# Patient Record
Sex: FEMALE | Race: WHITE | NOT HISPANIC OR LATINO | Employment: UNEMPLOYED | URBAN - NONMETROPOLITAN AREA
[De-identification: names, ages, dates, MRNs, and addresses within clinical notes are randomized per-mention and may not be internally consistent; named-entity substitution may affect disease eponyms.]

---

## 2017-01-31 ENCOUNTER — OFFICE VISIT (OUTPATIENT)
Dept: MEDICAL GROUP | Facility: PHYSICIAN GROUP | Age: 27
End: 2017-01-31
Payer: COMMERCIAL

## 2017-01-31 VITALS
RESPIRATION RATE: 16 BRPM | OXYGEN SATURATION: 98 % | SYSTOLIC BLOOD PRESSURE: 134 MMHG | HEIGHT: 70 IN | WEIGHT: 293 LBS | TEMPERATURE: 98.7 F | DIASTOLIC BLOOD PRESSURE: 82 MMHG | HEART RATE: 74 BPM | BODY MASS INDEX: 41.95 KG/M2

## 2017-01-31 DIAGNOSIS — G93.2 PSEUDOTUMOR CEREBRI: ICD-10-CM

## 2017-01-31 DIAGNOSIS — I10 ESSENTIAL HYPERTENSION: ICD-10-CM

## 2017-01-31 DIAGNOSIS — R53.82 CHRONIC FATIGUE: ICD-10-CM

## 2017-01-31 DIAGNOSIS — E66.01 MORBID OBESITY WITH BMI OF 40.0-44.9, ADULT (HCC): ICD-10-CM

## 2017-01-31 PROCEDURE — 99214 OFFICE O/P EST MOD 30 MIN: CPT | Performed by: INTERNAL MEDICINE

## 2017-01-31 RX ORDER — AMLODIPINE BESYLATE 5 MG/1
5 TABLET ORAL DAILY
Qty: 30 TAB | Refills: 11 | Status: SHIPPED | OUTPATIENT
Start: 2017-01-31 | End: 2017-09-11

## 2017-01-31 ASSESSMENT — PATIENT HEALTH QUESTIONNAIRE - PHQ9: CLINICAL INTERPRETATION OF PHQ2 SCORE: 0

## 2017-01-31 NOTE — MR AVS SNAPSHOT
"Nickolas Galdamez   2017 10:00 AM   Office Visit   MRN: [id number]    Department:  [facility]   Dept Phone:  [phone]    Description:  Female : 1990   Provider:  Cecilia Garcia M.D.           Reason for Visit     Hypertension discuss elevated BP, weight      Allergies as of 2017     No Known Allergies      You were diagnosed with     Essential hypertension   [0539038]       Pseudotumor cerebri   [599684]       Morbid obesity with BMI of 40.0-44.9, adult (CMS-HCC)   [524019]       Chronic fatigue   [509735]         Vital Signs     Blood Pressure Pulse Temperature Respirations Height Weight    134/82 mmHg 74 37.1 °C (98.7 °F) 16 1.778 m (5' 10\") 137.44 kg (303 lb)    Body Mass Index Oxygen Saturation Last Menstrual Period Smoking Status          43.48 kg/m2 98% 2012 Former Smoker        Basic Information     Date Of Birth Sex Race Ethnicity Preferred Language    1990 Female White Non- English      Problem List              ICD-10-CM Priority Class Noted - Resolved    Pseudotumor cerebri G93.2   10/15/2012 - Present    History of migraine headaches Z86.69   10/15/2012 - Present    Seizure disorder (CMS-HCC) G40.909   10/22/2012 - Present    Obesity E66.9   2013 - Present    Essential hypertension I10   2017 - Present    Morbid obesity with BMI of 40.0-44.9, adult ([facility]) E66.01, Z68.41   2017 - Present      Health Maintenance        Date Due Completion Dates    IMM HEP B VACCINE (1 of 3 - Primary Series) 1990 ---    IMM HEP A VACCINE (1 of 2 - Standard Series) 3/2/1992 ---    IMM HPV VACCINE (1 of 3 - Female 3 Dose Series) 3/2/2002 ---    IMM VARICELLA (CHICKENPOX) VACCINE (1 of 2 - 2 Dose Adolescent Series) 3/2/2004 ---    IMM DTaP/Tdap/Td Vaccine (1 - Tdap) 3/2/2010 ---    IMM INFLUENZA (1) 2016    PAP SMEAR 2017, 10/15/2012            Current Immunizations     Influenza TIV (IM) 2013 11:30 AM      Below and/or " attached are the medications your provider expects you to take. Review all of your home medications and newly ordered medications with your provider and/or pharmacist. Follow medication instructions as directed by your provider and/or pharmacist. Please keep your medication list with you and share with your provider. Update the information when medications are discontinued, doses are changed, or new medications (including over-the-counter products) are added; and carry medication information at all times in the event of emergency situations     Allergies:  No Known Allergies          Medications  Valid as of: January 31, 2017 - 10:22 AM    Generic Name Brand Name Tablet Size Instructions for use    AmLODIPine Besylate (Tab) NORVASC 5 MG Take 1 Tab by mouth every day.        Amoxicillin (Tab) AMOXIL 875 MG Take 1 Tab by mouth 2 times a day.        Amoxicillin (Cap) AMOXIL 500 MG Take 1 Cap by mouth 3 times a day.        Azithromycin (Tab) ZITHROMAX 250 MG Take 2 tablets PO on day one, then 1 tablet PO on day two to five.        Erythromycin (Ointment) erythromycin 5 MG/GM Place 0.5 Inches in right eye 2 times a day.        Fluticasone Propionate (Suspension) FLONASE 50 MCG/ACT Spray 1 Spray in nose every day. Each Nostril        Fluticasone Propionate (Suspension) FLONASE 50 MCG/ACT Spray 2 Sprays in nose every day.        maalox plus-benadryl-xylocaine (MBX) (Suspension) MBX  Take 5 mL by mouth every 6 hours as needed.        Topiramate (CAPSULE SPRINKLE) TOPAMAX 25 MG Take 1 Cap by mouth 2 times a day.        TraMADol HCl (Tab) ULTRAM 50 MG Take 1-2 Tabs by mouth every 6 hours as needed for Moderate Pain or Severe Pain.        TraMADol HCl (Tab) ULTRAM 50 MG Take 1-2 Tabs by mouth every 6 hours as needed.        .                 Medicines prescribed today were sent to:     [facility] - [address]    [address] [address]    Phone: [phone] Fax:  [phone]    Open 24 Hours?: No      Medication refill instructions:       If your prescription bottle indicates you have medication refills left, it is not necessary to call your provider’s office. Please contact your pharmacy and they will refill your medication.    If your prescription bottle indicates you do not have any refills left, you may request refills at any time through one of the following ways: The online GreenButton system (except Urgent Care), by calling your provider’s office, or by asking your pharmacy to contact your provider’s office with a refill request. Medication refills are processed only during regular business hours and may not be available until the next business day. Your provider may request additional information or to have a follow-up visit with you prior to refilling your medication.   *Please Note: Medication refills are assigned a new Rx number when refilled electronically. Your pharmacy may indicate that no refills were authorized even though a new prescription for the same medication is available at the pharmacy. Please request the medicine by name with the pharmacy before contacting your provider for a refill.        Your To Do List     Future Labs/Procedures Complete By Expires    COMP METABOLIC PANEL  As directed 1/31/2018    FREE THYROXINE  As directed 1/31/2018    LIPID PROFILE  As directed 1/31/2018    TESTOSTERONE SERUM  As directed 1/31/2018    TSH  As directed 1/31/2018      Referral     A referral request has been sent to our patient care coordination department. Please allow 3-5 business days for us to process this request and contact you either by phone or mail. If you do not hear from us by the 5th business day, please call us at [phone].        Instructions    1. Start amlodipine 5mg once per day.    2. Referral to Neurology.    3. Referral to Bariatric Surgery.     4. Follow up in 1 month with fasting labs.           GreenButton Access Code:  [email or web address]  Expires: 3/2/2017 10:22 AM    Seanodes  A secure, online tool to manage your health information     [email or web address]’s Seanodes® is a secure, online tool that connects you to your personalized health information from the privacy of your home -- day or night - making it very easy for you to manage your healthcare. Once the activation process is completed, you can even access your medical information using the Seanodes nevaeh, which is available for free in the Apple Nevaeh store or Google Play store.     Seanodes provides the following levels of access (as shown below):   My Chart Features   [facility] Primary Care Doctor [facility]  Specialists [facility]  Urgent  Care Non-[facility]  Primary Care  Doctor   Email your healthcare team securely and privately 24/7 X X X    Manage appointments: schedule your next appointment; view details of past/upcoming appointments X      Request prescription refills. X      View recent personal medical records, including lab and immunizations X X X X   View health record, including health history, allergies, medications X X X X   Read reports about your outpatient visits, procedures, consult and ER notes X X X X   See your discharge summary, which is a recap of your hospital and/or ER visit that includes your diagnosis, lab results, and care plan. X X       How to register for Seanodes:  1. Go to  [email or web address].  2. Click on the Sign Up Now box, which takes you to the New Member Sign Up page. You will need to provide the following information:  a. Enter your Seanodes Access Code exactly as it appears at the top of this page. (You will not need to use this code after you’ve completed the sign-up process. If you do not sign up before the expiration date, you must request a new code.)   b. Enter your date of birth.   c. Enter your home email address.   d. Click Submit, and follow the next screen’s instructions.  3. Create a Seanodes ID. This will be your Seanodes login ID and cannot be  changed, so think of one that is secure and easy to remember.  4. Create a GroupTalent password. You can change your password at any time.  5. Enter your Password Reset Question and Answer. This can be used at a later time if you forget your password.   6. Enter your e-mail address. This allows you to receive e-mail notifications when new information is available in GroupTalent.  7. Click Sign Up. You can now view your health information.    For assistance activating your GroupTalent account, call [phone]

## 2017-01-31 NOTE — PATIENT INSTRUCTIONS
1. Start amlodipine 5mg once per day.    2. Referral to Neurology.    3. Referral to Bariatric Surgery.     4. Follow up in 1 month with fasting labs.

## 2017-01-31 NOTE — PROGRESS NOTES
Chief Complaint   Patient presents with   • Hypertension     discuss elevated BP, weight       HISTORY OF PRESENT ILLNESS: Patient is a 26 y.o. female established patient who presents today to be seen for acute and chronic issues.    Essential hypertension  Patient is a 26-year-old female who comes in today for follow-up. I have not seen her in over 2 years. She tells me today that she's had 2 ER visits in the past few months for very high blood pressure. Both days she had bad headaches and on one episode she passed out. Patient notes that her blood pressures were 160 and 180 systolic when she was seen in the ER. She was given medications bring them down and recommended to follow-up with me but has not done so until now. Patient does have a history of pseudotumor cerebri. We discussed today starting her on amlodipine given these high blood pressures. She will begin 5 mg a day.    Pseudotumor cerebri  Patient has a long history of pseudotumor cerebri in the past was on Topamax and acetazolamide. She was seeing Dr. Maharaj. At her last visit with , he told her she no longer had the disease and didn't need to worry about it anymore. Based on these elevated pressures, I would like her evaluated by neurology due to this unusual history. Patient would prefer to see a different neurologist.    Morbid obesity with BMI of 40.0-44.9, adult ([facility])  Patient has morbid obesity and has struggled with weight gain for several years. She has tried exercise and diet control with no effect. She tried phentermine and did not lose weight on the medication. We discussed today referring her to bariatric surgery.      Patient Active Problem List    Diagnosis Date Noted   • Essential hypertension 01/31/2017   • Morbid obesity with BMI of 40.0-44.9, adult ([facility]) 01/31/2017   • Obesity 04/14/2013   • Seizure disorder (CMS-[facility]) 10/22/2012   • Pseudotumor cerebri 10/15/2012   • History of migraine headaches 10/15/2012       Allergies:Review  of patient's allergies indicates no known allergies.    Current Outpatient Prescriptions Ordered in [facility]   Medication Sig Dispense Refill   • amlodipine (NORVASC) 5 MG Tab Take 1 Tab by mouth every day. 30 Tab 11   • tramadol (ULTRAM) 50 MG Tab Take 1-2 Tabs by mouth every 6 hours as needed. 30 Tab 0   • amoxicillin (AMOXIL) 500 MG Cap Take 1 Cap by mouth 3 times a day. 30 Cap 0   • tramadol (ULTRAM) 50 MG Tab Take 1-2 Tabs by mouth every 6 hours as needed for Moderate Pain or Severe Pain. 6 Tab 0   • azithromycin (ZITHROMAX) 250 MG Tab Take 2 tablets PO on day one, then 1 tablet PO on day two to five. 6 Tab 0   • fluticasone (FLONASE) 50 MCG/ACT nasal spray Spray 2 Sprays in nose every day. 1 Bottle 0   • erythromycin 5 MG/GM Ointment Place 0.5 Inches in right eye 2 times a day. 1 Tube 1   • amoxicillin (AMOXIL) 875 MG tablet Take 1 Tab by mouth 2 times a day. 20 Tab 0   • maalox plus-benadryl-xylocaine (MBX) Take 5 mL by mouth every 6 hours as needed. 90 mL 0   • fluticasone (FLONASE) 50 MCG/ACT nasal spray Spray 1 Spray in nose every day. Each Nostril 1 Bottle 0   • topiramate (TOPAMAX) 25 MG capsule Take 1 Cap by mouth 2 times a day. 60 Cap 0     No current Epic-ordered facility-administered medications on file.       Past Medical History   Diagnosis Date   • Pseudotumor cerebri    • Seizure disorder (CMS-HCC)    • Migraine        Social History   Substance Use Topics   • Smoking status: Former Smoker     Quit date: 05/03/2012   • Smokeless tobacco: Never Used      Comment: 1/2 pack a day   • Alcohol Use: No       Family Status   Relation Status Death Age   • Mother Alive    • Father Alive    • Sister Alive    • Brother Alive    • Sister Alive    • Brother Alive    • Sister Alive    • Sister Alive    • Brother Alive      Family History   Problem Relation Age of Onset   • Cancer Mother      Cervical       ROS:  Review of Systems   Constitutional: Negative for fever and malaise/fatigue.   HENT: Negative for  "congestion  Respiratory: Negative for cough  Cardiovascular: Negative for chest pain  Gastrointestinal: Negative for nausea, vomiting and abdominal pain.  Neuro: positive for headaches  All other systems reviewed and are negative except as in HPI.      Exam:  Blood pressure 134/82, pulse 74, temperature 37.1 °C (98.7 °F), resp. rate 16, height 1.778 m (5' 10\"), weight 137.44 kg (303 lb), last menstrual period 03/03/2012, SpO2 98 %.  General:  Morbidly obese female in NAD  Head is grossly normal.  Neck: Supple without JVD   Pulmonary: Clear to ausculation and percussion.  Normal effort. No rales, ronchi, or wheezing.  Cardiovascular: Regular rate and rhythm without murmur. Carotid and radial pulses are intact and equal bilaterally.  Extremities: no clubbing, cyanosis, or edema.  Neuro: CN 2-12 grossly intact, strength 5/5 in upper and lower extremities bilaterally, sensation intact to light touch, gait intact        Assessment/Plan:  1. Essential hypertension  amlodipine (NORVASC) 5 MG Tab    REFERRAL TO BARIATRIC SURGERY    COMP METABOLIC PANEL    LIPID PROFILE    uncontrolled, starting medications   2. Pseudotumor cerebri  REFERRAL TO NEUROLOGY    uncontrolled, referred to Neurology   3. Morbid obesity with BMI of 40.0-44.9, adult (CMS-[facility])  REFERRAL TO BARIATRIC SURGERY    uncontrolled, referred to bariatric surgery   4. Chronic fatigue  TSH    FREE THYROXINE    TESTOSTERONE SERUM     Please note that this dictation was created using voice recognition software. I have made every reasonable attempt to correct obvious errors, but I expect that there are errors of grammar and possibly content that I did not discover before finalizing the note.         "

## 2017-01-31 NOTE — ASSESSMENT & PLAN NOTE
Patient is a 26-year-old female who comes in today for follow-up. I have not seen her in over 2 years. She tells me today that she's had 2 ER visits in the past few months for very high blood pressure. Both days she had bad headaches and on one episode she passed out. Patient notes that her blood pressures were 160 and 180 systolic when she was seen in the ER. She was given medications bring them down and recommended to follow-up with me but has not done so until now. Patient does have a history of pseudotumor cerebri. We discussed today starting her on amlodipine given these high blood pressures. She will begin 5 mg a day.

## 2017-01-31 NOTE — ASSESSMENT & PLAN NOTE
Patient has a long history of pseudotumor cerebri in the past was on Topamax and acetazolamide. She was seeing Dr. Maharaj. At her last visit with , he told her she no longer had the disease and didn't need to worry about it anymore. Based on these elevated pressures, I would like her evaluated by neurology due to this unusual history. Patient would prefer to see a different neurologist.

## 2017-01-31 NOTE — ASSESSMENT & PLAN NOTE
Patient has morbid obesity and has struggled with weight gain for several years. She has tried exercise and diet control with no effect. She tried phentermine and did not lose weight on the medication. We discussed today referring her to bariatric surgery.

## 2017-02-06 ENCOUNTER — HOSPITAL ENCOUNTER (OUTPATIENT)
Dept: LAB | Facility: MEDICAL CENTER | Age: 27
End: 2017-02-06
Attending: INTERNAL MEDICINE
Payer: COMMERCIAL

## 2017-02-06 DIAGNOSIS — R53.82 CHRONIC FATIGUE: ICD-10-CM

## 2017-02-06 DIAGNOSIS — I10 ESSENTIAL HYPERTENSION: ICD-10-CM

## 2017-02-06 LAB
ALBUMIN SERPL BCP-MCNC: 4.4 G/DL (ref 3.2–4.9)
ALBUMIN/GLOB SERPL: 1.5 G/DL
ALP SERPL-CCNC: 71 U/L (ref 30–99)
ALT SERPL-CCNC: 20 U/L (ref 2–50)
ANION GAP SERPL CALC-SCNC: 9 MMOL/L (ref 0–11.9)
AST SERPL-CCNC: 19 U/L (ref 12–45)
BILIRUB SERPL-MCNC: 0.4 MG/DL (ref 0.1–1.5)
BUN SERPL-MCNC: 9 MG/DL (ref 8–22)
CALCIUM SERPL-MCNC: 9.4 MG/DL (ref 8.5–10.5)
CHLORIDE SERPL-SCNC: 106 MMOL/L (ref 96–112)
CHOLEST SERPL-MCNC: 174 MG/DL (ref 100–199)
CO2 SERPL-SCNC: 24 MMOL/L (ref 20–33)
CREAT SERPL-MCNC: 0.87 MG/DL (ref 0.5–1.4)
GLOBULIN SER CALC-MCNC: 2.9 G/DL (ref 1.9–3.5)
GLUCOSE SERPL-MCNC: 80 MG/DL (ref 65–99)
HDLC SERPL-MCNC: 34 MG/DL
LDLC SERPL CALC-MCNC: 105 MG/DL
POTASSIUM SERPL-SCNC: 4 MMOL/L (ref 3.6–5.5)
PROT SERPL-MCNC: 7.3 G/DL (ref 6–8.2)
SODIUM SERPL-SCNC: 139 MMOL/L (ref 135–145)
T4 FREE SERPL-MCNC: 0.89 NG/DL (ref 0.53–1.43)
TESTOST SERPL-MCNC: 92 NG/DL (ref 9–75)
TRIGL SERPL-MCNC: 176 MG/DL (ref 0–149)
TSH SERPL DL<=0.005 MIU/L-ACNC: 4.8 UIU/ML (ref 0.3–3.7)

## 2017-02-06 PROCEDURE — 84439 ASSAY OF FREE THYROXINE: CPT

## 2017-02-06 PROCEDURE — 80053 COMPREHEN METABOLIC PANEL: CPT

## 2017-02-06 PROCEDURE — 84443 ASSAY THYROID STIM HORMONE: CPT

## 2017-02-06 PROCEDURE — 84403 ASSAY OF TOTAL TESTOSTERONE: CPT

## 2017-02-06 PROCEDURE — 80061 LIPID PANEL: CPT

## 2017-02-06 PROCEDURE — 36415 COLL VENOUS BLD VENIPUNCTURE: CPT

## 2017-02-20 ENCOUNTER — APPOINTMENT (OUTPATIENT)
Dept: RADIOLOGY | Facility: IMAGING CENTER | Age: 27
End: 2017-02-20
Attending: PHYSICIAN ASSISTANT
Payer: COMMERCIAL

## 2017-02-20 ENCOUNTER — OFFICE VISIT (OUTPATIENT)
Dept: URGENT CARE | Facility: PHYSICIAN GROUP | Age: 27
End: 2017-02-20
Payer: COMMERCIAL

## 2017-02-20 VITALS
BODY MASS INDEX: 41.95 KG/M2 | DIASTOLIC BLOOD PRESSURE: 80 MMHG | SYSTOLIC BLOOD PRESSURE: 126 MMHG | OXYGEN SATURATION: 95 % | WEIGHT: 293 LBS | HEART RATE: 84 BPM | TEMPERATURE: 97.3 F | HEIGHT: 70 IN | RESPIRATION RATE: 14 BRPM

## 2017-02-20 DIAGNOSIS — M25.571 ACUTE RIGHT ANKLE PAIN: ICD-10-CM

## 2017-02-20 DIAGNOSIS — S93.401A SPRAIN OF RIGHT ANKLE, UNSPECIFIED LIGAMENT, INITIAL ENCOUNTER: ICD-10-CM

## 2017-02-20 PROCEDURE — 73610 X-RAY EXAM OF ANKLE: CPT | Mod: TC | Performed by: PHYSICIAN ASSISTANT

## 2017-02-20 PROCEDURE — 99214 OFFICE O/P EST MOD 30 MIN: CPT | Performed by: PHYSICIAN ASSISTANT

## 2017-02-20 RX ORDER — ACETAMINOPHEN AND CODEINE PHOSPHATE 300; 30 MG/1; MG/1
1 TABLET ORAL EVERY 6 HOURS PRN
Qty: 16 TAB | Refills: 0 | Status: SHIPPED | OUTPATIENT
Start: 2017-02-20 | End: 2017-02-24

## 2017-02-20 ASSESSMENT — ENCOUNTER SYMPTOMS
DIZZINESS: 0
EYE REDNESS: 0
EYE DISCHARGE: 0
WHEEZING: 0
TINGLING: 1
FEVER: 0
ABDOMINAL PAIN: 0
SORE THROAT: 0
COUGH: 0
FALLS: 0
CHILLS: 0
BACK PAIN: 0
LOSS OF MOTION: 1
NECK PAIN: 0
DIARRHEA: 0
HEADACHES: 0
INABILITY TO BEAR WEIGHT: 1
MYALGIAS: 0

## 2017-02-20 NOTE — MR AVS SNAPSHOT
"        Nickolas Galdamez   2017 6:00 PM   Office Visit   MRN: [id number]    Department:  [facility]   Dept Phone:  [phone]    Description:  Female : 1990   Provider:  Raffy Hernandez PA-C           Reason for Visit     Ankle Injury fell last night / twisted akle/ rolled and felt a pop      Allergies as of 2017     No Known Allergies      You were diagnosed with     Acute right ankle pain   [[id number]]       Sprain of right ankle, unspecified ligament, initial encounter   [[id number]]         Vital Signs     Blood Pressure Pulse Temperature Respirations Height Weight    126/80 mmHg 84 36.3 °C (97.3 °F) 14 1.778 m (5' 10\") 141.976 kg (313 lb)    Body Mass Index Oxygen Saturation Last Menstrual Period Smoking Status          44.91 kg/m2 95% 2012 Former Smoker        Basic Information     Date Of Birth Sex Race Ethnicity Preferred Language    1990 Female White Non- English      Your appointments     2017  8:40 AM   Established Patient with Cecilia Garcia M.D.   [facility] ([address] [address] [phone]           You will be receiving a confirmation call a few days before your appointment from our automated call confirmation system.            2017  8:00 AM   New Patient with Chel Sandra M.D.   [facility] (--)    [address] [place] [address], [address]  Formerly [facility] [address] [phone]           Please bring Photo ID, Insurance Cards, All Medication Bottles and copies of any legal documents (such as Living Will, Power of ) If speaking a language besides English please bring an adult . Please arrive 30 minutes prior for check in and registration. You will be receiving a confirmation call a few days before your appointment from our automated call confirmation system.              Problem List              ICD-10-CM Priority Class Noted - Resolved    Pseudotumor cerebri G93.2   " 10/15/2012 - Present    History of migraine headaches Z86.69   10/15/2012 - Present    Seizure disorder (CMS-[facility]) G40.909   10/22/2012 - Present    Obesity E66.9   4/14/2013 - Present    Essential hypertension I10   1/31/2017 - Present    Morbid obesity with BMI of 40.0-44.9, adult ([facility]) E66.01, Z68.41   1/31/2017 - Present      Health Maintenance        Date Due Completion Dates    IMM HEP B VACCINE (1 of 3 - Primary Series) 1990 ---    IMM HEP A VACCINE (1 of 2 - Standard Series) 3/2/1992 ---    IMM HPV VACCINE (1 of 3 - Female 3 Dose Series) 3/2/2002 ---    IMM VARICELLA (CHICKENPOX) VACCINE (1 of 2 - 2 Dose Adolescent Series) 3/2/2004 ---    IMM DTaP/Tdap/Td Vaccine (1 - Tdap) 3/2/2010 ---    IMM INFLUENZA (1) 9/1/2016 1/30/2013    PAP SMEAR 12/1/2017 12/1/2014, 10/15/2012            Current Immunizations     Influenza TIV (IM) 1/30/2013 11:30 AM      Below and/or attached are the medications your provider expects you to take. Review all of your home medications and newly ordered medications with your provider and/or pharmacist. Follow medication instructions as directed by your provider and/or pharmacist. Please keep your medication list with you and share with your provider. Update the information when medications are discontinued, doses are changed, or new medications (including over-the-counter products) are added; and carry medication information at all times in the event of emergency situations     Allergies:  No Known Allergies          Medications  Valid as of: February 20, 2017 -  6:45 PM    Generic Name Brand Name Tablet Size Instructions for use    Acetaminophen-Codeine (Tab) Acetaminophen-Codeine 300-30 MG Take 1 Tab by mouth every 6 hours as needed for up to 4 days.        AmLODIPine Besylate (Tab) NORVASC 5 MG Take 1 Tab by mouth every day.        Amoxicillin (Tab) AMOXIL 875 MG Take 1 Tab by mouth 2 times a day.        Amoxicillin (Cap) AMOXIL 500 MG Take 1 Cap by mouth 3 times a day.         Azithromycin (Tab) ZITHROMAX 250 MG Take 2 tablets PO on day one, then 1 tablet PO on day two to five.        Erythromycin (Ointment) erythromycin 5 MG/GM Place 0.5 Inches in right eye 2 times a day.        Fluticasone Propionate (Suspension) FLONASE 50 MCG/ACT Spray 1 Spray in nose every day. Each Nostril        Fluticasone Propionate (Suspension) FLONASE 50 MCG/ACT Spray 2 Sprays in nose every day.        maalox plus-benadryl-xylocaine (MBX) (Suspension) MBX  Take 5 mL by mouth every 6 hours as needed.        Topiramate (CAPSULE SPRINKLE) TOPAMAX 25 MG Take 1 Cap by mouth 2 times a day.        TraMADol HCl (Tab) ULTRAM 50 MG Take 1-2 Tabs by mouth every 6 hours as needed for Moderate Pain or Severe Pain.        TraMADol HCl (Tab) ULTRAM 50 MG Take 1-2 Tabs by mouth every 6 hours as needed.        .                 Medicines prescribed today were sent to:     [facility] [address] [place] [id number]    Phone: [phone] Fax: [phone]    Open 24 Hours?: No      Medication refill instructions:       If your prescription bottle indicates you have medication refills left, it is not necessary to call your provider’s office. Please contact your pharmacy and they will refill your medication.    If your prescription bottle indicates you do not have any refills left, you may request refills at any time through one of the following ways: The online [employer] system (except Urgent Care), by calling your provider’s office, or by asking your pharmacy to contact your provider’s office with a refill request. Medication refills are processed only during regular business hours and may not be available until the next business day. Your provider may request additional information or to have a follow-up visit with you prior to refilling your medication.   *Please Note: Medication refills are assigned a new Rx number when refilled electronically. Your pharmacy may  indicate that no refills were authorized even though a new prescription for the same medication is available at the pharmacy. Please request the medicine by name with the pharmacy before contacting your provider for a refill.        Your To Do List     Future Labs/Procedures Complete By Expires    DX-ANKLE 3+ VIEWS RIGHT  As directed 2/20/2018         [employer] Access Code: [email or web address]  Expires: 3/15/2017  8:09 AM    [employer]  A secure, online tool to manage your health information     [employer]’s [employer]® is a secure, online tool that connects you to your personalized health information from the privacy of your home -- day or night - making it very easy for you to manage your healthcare. Once the activation process is completed, you can even access your medical information using the [employer] nevaeh, which is available for free in the Apple [employer] store or Google Play store.     [employer] provides the following levels of access (as shown below):   My Chart Features   [facility] Doctor [facility]  Specialists [facility]  Urgent  [facility]  Primary Care  Doctor   Email your healthcare team securely and privately 24/7 X X X    Manage appointments: schedule your next appointment; view details of past/upcoming appointments X      Request prescription refills. X      View recent personal medical records, including lab and immunizations X X X X   View health record, including health history, allergies, medications X X X X   Read reports about your outpatient visits, procedures, consult and ER notes X X X X   See your discharge summary, which is a recap of your hospital and/or ER visit that includes your diagnosis, lab results, and care plan. X X       How to register for [employer]:  1. Go to  [email or web address].  2. Click on the Sign Up Now box, which takes you to the New Member Sign Up page. You will need to provide the following information:  a. Enter your [employer] Access Code exactly as it appears at the top  of this page. (You will not need to use this code after you’ve completed the sign-up process. If you do not sign up before the expiration date, you must request a new code.)   b. Enter your date of birth.   c. Enter your home email address.   d. Click Submit, and follow the next screen’s instructions.  3. Create a [employer] ID. This will be your [employer] login ID and cannot be changed, so think of one that is secure and easy to remember.  4. Create a [employer] password. You can change your password at any time.  5. Enter your Password Reset Question and Answer. This can be used at a later time if you forget your password.   6. Enter your e-mail address. This allows you to receive e-mail notifications when new information is available in [employer].  7. Click Sign Up. You can now view your health information.    For assistance activating your [employer] account, call [phone]

## 2017-02-21 NOTE — PROGRESS NOTES
"Subjective:      Nickolas Galdamez is a 26 y.o. female who presents with Ankle Injury          Pt is 25 y/o female who presents with right ankle pain after avoiding stepping on her daughter who was on the floor last night and rolled her right ankle. She notes that the swelling, and pain has gotten worse today, however she was limping at work as well.   She denies any numbness into her toes, or pain into her knee.   Ankle Injury   The incident occurred 12 to 24 hours ago. The incident occurred at home. The injury mechanism was an inversion injury. The pain is present in the right ankle. The quality of the pain is described as shooting and stabbing. The pain is at a severity of 5/10. The pain is moderate. The pain has been constant since onset. Associated symptoms include an inability to bear weight, a loss of motion and tingling. She reports no foreign bodies present. The symptoms are aggravated by movement, palpation and weight bearing. She has tried ice for the symptoms. The treatment provided mild relief.       Review of Systems   Constitutional: Negative for fever and chills.   HENT: Negative for sore throat.    Eyes: Negative for discharge and redness.   Respiratory: Negative for cough and wheezing.    Cardiovascular: Negative for chest pain and leg swelling.   Gastrointestinal: Negative for abdominal pain and diarrhea.   Genitourinary: Negative for dysuria and urgency.   Musculoskeletal: Positive for joint pain. Negative for myalgias, back pain, falls and neck pain.   Skin: Negative for itching and rash.   Neurological: Positive for tingling. Negative for dizziness and headaches.          Objective:     /80 mmHg  Pulse 84  Temp(Src) 36.3 °C (97.3 °F)  Resp 14  Ht 1.778 m (5' 10\")  Wt 141.976 kg (313 lb)  BMI 44.91 kg/m2  SpO2 95%  LMP 03/03/2012   PMH:  has a past medical history of Pseudotumor cerebri; Seizure disorder (CMS-HCC); and Migraine. She also has no past medical history of " Diabetes.  MEDS:   Current outpatient prescriptions:   •  amlodipine (NORVASC) 5 MG Tab, Take 1 Tab by mouth every day., Disp: 30 Tab, Rfl: 11  •  tramadol (ULTRAM) 50 MG Tab, Take 1-2 Tabs by mouth every 6 hours as needed., Disp: 30 Tab, Rfl: 0  •  amoxicillin (AMOXIL) 500 MG Cap, Take 1 Cap by mouth 3 times a day., Disp: 30 Cap, Rfl: 0  •  tramadol (ULTRAM) 50 MG Tab, Take 1-2 Tabs by mouth every 6 hours as needed for Moderate Pain or Severe Pain., Disp: 6 Tab, Rfl: 0  •  azithromycin (ZITHROMAX) 250 MG Tab, Take 2 tablets PO on day one, then 1 tablet PO on day two to five., Disp: 6 Tab, Rfl: 0  •  fluticasone (FLONASE) 50 MCG/ACT nasal spray, Spray 2 Sprays in nose every day., Disp: 1 Bottle, Rfl: 0  •  erythromycin 5 MG/GM Ointment, Place 0.5 Inches in right eye 2 times a day., Disp: 1 Tube, Rfl: 1  •  amoxicillin (AMOXIL) 875 MG tablet, Take 1 Tab by mouth 2 times a day., Disp: 20 Tab, Rfl: 0  •  maalox plus-benadryl-xylocaine (MBX), Take 5 mL by mouth every 6 hours as needed., Disp: 90 mL, Rfl: 0  •  fluticasone (FLONASE) 50 MCG/ACT nasal spray, Spray 1 Spray in nose every day. Each Nostril, Disp: 1 Bottle, Rfl: 0  •  topiramate (TOPAMAX) 25 MG capsule, Take 1 Cap by mouth 2 times a day., Disp: 60 Cap, Rfl: 0  ALLERGIES: No Known Allergies  SURGHX: History reviewed. No pertinent past surgical history.  SOCHX:  reports that she quit smoking about 4 years ago. She has never used smokeless tobacco. She reports that she does not drink alcohol or use illicit drugs.  FH: Family history was reviewed, no pertinent findings to report    Physical Exam   Constitutional: She is oriented to person, place, and time. She appears well-developed and well-nourished.   HENT:   Head: Normocephalic and atraumatic.   Eyes: EOM are normal. Pupils are equal, round, and reactive to light.   Neck: Normal range of motion. Neck supple.   Cardiovascular: Normal rate.    Pulmonary/Chest: Effort normal.   Musculoskeletal: She exhibits  edema.        Right ankle: She exhibits decreased range of motion, swelling and ecchymosis. She exhibits no deformity, no laceration and normal pulse. Tenderness. Lateral malleolus tenderness found. No medial malleolus, no AITFL, no head of 5th metatarsal and no proximal fibula tenderness found. Achilles tendon exhibits no pain, no defect and normal Tristan's test results.        Feet:    Noted tenderness over lateral malleolus- entirely. DPF intact without noted deformity. Pulses 2+ cap. Refill 2+.    Neurological: She is alert and oriented to person, place, and time.   Skin: Skin is warm. No rash noted.   Psychiatric: She has a normal mood and affect. Her behavior is normal.   Vitals reviewed.            Right Ankle xray:    No evidence of fracture or dislocation.    Soft tissue swelling about the lateral ankle.    Ankle joint effusion may be present.  Reviewed by the radiologist.     Assessment/Plan:     1. Acute right ankle pain    - DX-ANKLE 3+ VIEWS RIGHT; Future    2. Ankle sprain- Tylenol #3 written.     NV  was reviewed by myself-  Document  does not reveal any concerning patterns. Pt. was advised to avoid the operation of heavy machine along with driving while on such medications. Finally pt. was advised to use medication only as prescribed.     Pt. Has an ankle brace and even splint- at home. Pt. Also has crutches at home.   Pt. Is to [facility]- she has an appt. Next week with her PCP- encouraged recheck.   RTC PRN or if not improving prior to visit.

## 2017-02-28 ENCOUNTER — OFFICE VISIT (OUTPATIENT)
Dept: MEDICAL GROUP | Facility: PHYSICIAN GROUP | Age: 27
End: 2017-02-28
Payer: COMMERCIAL

## 2017-02-28 VITALS
RESPIRATION RATE: 20 BRPM | TEMPERATURE: 97.9 F | HEIGHT: 70 IN | WEIGHT: 293 LBS | SYSTOLIC BLOOD PRESSURE: 122 MMHG | BODY MASS INDEX: 41.95 KG/M2 | DIASTOLIC BLOOD PRESSURE: 82 MMHG | HEART RATE: 82 BPM | OXYGEN SATURATION: 98 %

## 2017-02-28 DIAGNOSIS — I10 ESSENTIAL HYPERTENSION: ICD-10-CM

## 2017-02-28 DIAGNOSIS — R79.89 ELEVATED TESTOSTERONE LEVEL IN FEMALE: ICD-10-CM

## 2017-02-28 DIAGNOSIS — R51.9 CHRONIC DAILY HEADACHE: ICD-10-CM

## 2017-02-28 DIAGNOSIS — E66.01 MORBID OBESITY WITH BMI OF 40.0-44.9, ADULT (HCC): ICD-10-CM

## 2017-02-28 PROCEDURE — 99214 OFFICE O/P EST MOD 30 MIN: CPT | Performed by: INTERNAL MEDICINE

## 2017-02-28 RX ORDER — TOPIRAMATE SPINKLE 25 MG/1
CAPSULE ORAL
Qty: 60 CAP | Refills: 3 | Status: SHIPPED | OUTPATIENT
Start: 2017-02-28 | End: 2017-05-02

## 2017-02-28 NOTE — PROGRESS NOTES
Chief Complaint   Patient presents with   • Weight Gain     fv weight, BP       HISTORY OF PRESENT ILLNESS: Patient is a 26 y.o. female established patient who presents today to be seen for acute and chronic issues.    Chronic daily headache  Patient is a 26-year-old female who has struggled with chronic daily headache for a while. She has a history of pseudotumor cerebri and migraine headaches and will be establishing with neurology in April. She notes that she's had chronic tension headaches. She is also noted headaches that she has the day after she has sexual intercourse. In the past the patient was on Topamax for her pseudotumor cerebri and so she was taken off by a previous neurology. We discussed today restarting her Topamax to also see if that might help with weight loss. Patient will start on 25 once a day for a week and then increase to 25 mg twice a day.    Morbid obesity with BMI of 40.0-44.9, adult (HCC)  At her last visit, I started the patient on amlodipine for high blood pressure and referred her to bariatric surgery. She recently found out that her work will not cover the cost of the surgery. She is quite devastated by this. She was very hopeful that she could have the procedure done. We discussed going to the gym a working on dietary changes. She is wondering about restarting phentermine. I told her I would like to first discuss this with neurology.    Essential hypertension  This is a chronic condition which is well controlled on medications. Patient is tolerating medications without side effects.    Elevated testosterone level in female  Patient recently was found to have an elevated testosterone level. She notes that she struggles with facial hair. Patient does have a 4-year-old child but she was actually active with her  for 3 years off of birth control prior to becoming pregnant. She wonders if she could've PCOS. I think it is a good possibility. She does have a gynecologist. I  recommended I order a ultrasound looking for ovarian cysts and the patient follow-up with gynecology.      Patient Active Problem List    Diagnosis Date Noted   • Chronic daily headache 02/28/2017   • Elevated testosterone level in female 02/28/2017   • Essential hypertension 01/31/2017   • Morbid obesity with BMI of 40.0-44.9, adult ([facility]) 01/31/2017   • Obesity 04/14/2013   • Seizure disorder (CMS-HCC) 10/22/2012   • Pseudotumor cerebri 10/15/2012   • History of migraine headaches 10/15/2012       Allergies:Review of patient's allergies indicates no known allergies.    Current Outpatient Prescriptions Ordered in [facility]   Medication Sig Dispense Refill   • topiramate (TOPAMAX) 25 MG capsule Take 1 tab once per day x 7 days then increase to 1 tab twice per day 60 Cap 3   • amlodipine (NORVASC) 5 MG Tab Take 1 Tab by mouth every day. 30 Tab 11   • fluticasone (FLONASE) 50 MCG/ACT nasal spray Spray 1 Spray in nose every day. Each Nostril 1 Bottle 0     No current Epic-ordered facility-administered medications on file.       Past Medical History   Diagnosis Date   • Pseudotumor cerebri    • Seizure disorder (CMS-HCC)    • Migraine        Social History   Substance Use Topics   • Smoking status: Former Smoker     Quit date: 05/03/2012   • Smokeless tobacco: Never Used      Comment: 1/2 pack a day   • Alcohol Use: No       Family Status   Relation Status Death Age   • Mother Alive    • Father Alive    • Sister Alive    • Brother Alive    • Sister Alive    • Brother Alive    • Sister Alive    • Sister Alive    • Brother Alive      Family History   Problem Relation Age of Onset   • Cancer Mother      Cervical       ROS:  Review of Systems   Constitutional: Negative for fever and malaise/fatigue.   HENT: Negative for congestion  Respiratory: Negative for cough  Cardiovascular: Negative for chest pain  Gastrointestinal: Negative for nausea, vomiting and abdominal pain.  Neuro: positive for headaches  All other systems reviewed  "and are negative except as in HPI.      Exam:  Blood pressure 122/82, pulse 82, temperature 36.6 °C (97.9 °F), resp. rate 20, height 1.778 m (5' 10\"), weight 139.708 kg (308 lb), last menstrual period 03/03/2012, SpO2 98 %.  General:  Morbidly obese female in NAD  Head is grossly normal.  Neck: Supple without JVD   Pulmonary: Clear to ausculation and percussion.  Normal effort. No rales, ronchi, or wheezing.  Cardiovascular: Regular rate and rhythm without murmur. Carotid and radial pulses are intact and equal bilaterally.  Extremities: no clubbing, cyanosis, or edema.  Neuro: CN 2-12 grossly intact, strength 5/5 in upper and lower extremities bilaterally, sensation intact to light touch, Rhomberg negative, gait intact    Hospital Outpatient Visit on 02/06/2017   Component Date Value Ref Range Status   • TSH 02/06/2017 4.800* 0.300 - 3.700 uIU/mL Final   • Free T-4 02/06/2017 0.89  0.53 - 1.43 ng/dL Final   • Testosterone,Total 02/06/2017 92* 9 - 75 ng/dL Final   • Sodium 02/06/2017 139  135 - 145 mmol/L Final   • Potassium 02/06/2017 4.0  3.6 - 5.5 mmol/L Final   • Chloride 02/06/2017 106  96 - 112 mmol/L Final   • Co2 02/06/2017 24  20 - 33 mmol/L Final   • Anion Gap 02/06/2017 9.0  0.0 - 11.9 Final   • Glucose 02/06/2017 80  65 - 99 mg/dL Final   • Bun 02/06/2017 9  8 - 22 mg/dL Final   • Creatinine 02/06/2017 0.87  0.50 - 1.40 mg/dL Final   • Calcium 02/06/2017 9.4  8.5 - 10.5 mg/dL Final   • AST(SGOT) 02/06/2017 19  12 - 45 U/L Final   • ALT(SGPT) 02/06/2017 20  2 - 50 U/L Final   • Alkaline Phosphatase 02/06/2017 71  30 - 99 U/L Final   • Total Bilirubin 02/06/2017 0.4  0.1 - 1.5 mg/dL Final   • Albumin 02/06/2017 4.4  3.2 - 4.9 g/dL Final   • Total Protein 02/06/2017 7.3  6.0 - 8.2 g/dL Final   • Globulin 02/06/2017 2.9  1.9 - 3.5 g/dL Final   • A-G Ratio 02/06/2017 1.5   Final   • Cholesterol,Tot 02/06/2017 174  100 - 199 mg/dL Final   • Triglycerides 02/06/2017 176* 0 - 149 mg/dL Final   • HDL 02/06/2017 " 34* >=40 mg/dL Final   • LDL 02/06/2017 105* <100 mg/dL Final   • GFR If  02/06/2017 >60  >60 mL/min/1.73 m 2 Final   • GFR If Non  02/06/2017 >60  >60 mL/min/1.73 m 2 Final         Assessment/Plan:  1. Chronic daily headache  topiramate (TOPAMAX) 25 MG capsule    Uncontrolled, will start on Topamax   2. Elevated testosterone level in female  US-GYN-PELVIS TRANSVAGINAL    Uncontrolled, order gynecologic ultrasound   3. Morbid obesity with BMI of 40.0-44.9, adult (HCC)      Uncontrolled, working on dietary changes   4. Essential hypertension      Controlled, on amlodipine     Please note that this dictation was created using voice recognition software. I have made every reasonable attempt to correct obvious errors, but I expect that there are errors of grammar and possibly content that I did not discover before finalizing the note.

## 2017-02-28 NOTE — PATIENT INSTRUCTIONS
1. Have ultrasound.    2. Start topamax for headaches.    3. Continue amlodipine for now.    4. Follow up in 1 month on headaches.

## 2017-02-28 NOTE — MR AVS SNAPSHOT
"Nickolas Galdamez   2017 8:40 AM   Office Visit   MRN: [id number]    Department:  [facility]   Dept Phone:  [phone]    Description:  Female : 1990   Provider:  Cecilia Garcia M.D.           Reason for Visit     Weight Gain fv weight, BP      Allergies as of 2017     No Known Allergies      You were diagnosed with     Chronic daily headache   [779699]       Elevated testosterone level in female   [822857]         Vital Signs     Blood Pressure Pulse Temperature Respirations Height Weight    122/82 mmHg 82 36.6 °C (97.9 °F) 20 1.778 m (5' 10\") 139.708 kg (308 lb)    Body Mass Index Oxygen Saturation Last Menstrual Period Smoking Status          44.19 kg/m2 98% 2012 Former Smoker        Basic Information     Date Of Birth Sex Race Ethnicity Preferred Language    1990 Female White Non- English      Your appointments     2017  8:00 AM   New Patient with Chel Sandra M.D.   [facility] Neurology (--)    [address] [place] [address], [address]  [facility] [id number] [phone]           Please bring Photo ID, Insurance Cards, All Medication Bottles and copies of any legal documents (such as Living Will, Power of ) If speaking a language besides English please bring an adult . Please arrive 30 minutes prior for check in and registration. You will be receiving a confirmation call a few days before your appointment from our automated call confirmation system.              Problem List              ICD-10-CM Priority Class Noted - Resolved    Pseudotumor cerebri G93.2   10/15/2012 - Present    History of migraine headaches Z86.69   10/15/2012 - Present    Seizure disorder (CMS-HCC) G40.909   10/22/2012 - Present    Obesity E66.9   2013 - Present    Essential hypertension I10   2017 - Present    Morbid obesity with BMI of 40.0-44.9, adult ([facility]) E66.01, Z68.41   2017 - Present    Chronic daily headache R51   2017 - Present      "   Health Maintenance        Date Due Completion Dates    IMM HEP B VACCINE (1 of 3 - Primary Series) 1990 ---    IMM HEP A VACCINE (1 of 2 - Standard Series) 3/2/1992 ---    IMM HPV VACCINE (1 of 3 - Female 3 Dose Series) 3/2/2002 ---    IMM VARICELLA (CHICKENPOX) VACCINE (1 of 2 - 2 Dose Adolescent Series) 3/2/2004 ---    IMM DTaP/Tdap/Td Vaccine (1 - Tdap) 3/2/2010 ---    IMM INFLUENZA (1) 9/1/2016 1/30/2013    PAP SMEAR 12/1/2017 12/1/2014, 10/15/2012            Current Immunizations     Influenza TIV (IM) 1/30/2013 11:30 AM      Below and/or attached are the medications your provider expects you to take. Review all of your home medications and newly ordered medications with your provider and/or pharmacist. Follow medication instructions as directed by your provider and/or pharmacist. Please keep your medication list with you and share with your provider. Update the information when medications are discontinued, doses are changed, or new medications (including over-the-counter products) are added; and carry medication information at all times in the event of emergency situations     Allergies:  No Known Allergies          Medications  Valid as of: February 28, 2017 -  9:13 AM    Generic Name Brand Name Tablet Size Instructions for use    AmLODIPine Besylate (Tab) NORVASC 5 MG Take 1 Tab by mouth every day.        Fluticasone Propionate (Suspension) FLONASE 50 MCG/ACT Spray 1 Spray in nose every day. Each Nostril        Topiramate (CAPSULE SPRINKLE) TOPAMAX 25 MG Take 1 tab once per day x 7 days then increase to 1 tab twice per day        .                 Medicines prescribed today were sent to:     [id number]  [place] - [address]    [address] [facility] [address]    Phone: [phone] Fax: [phone]    Open 24 Hours?: No    [id number] - [place], [place] - [address]    [address] [place] [address]    Phone: [phone] Fax: [phone]    Open  24 Hours?: No      Medication refill instructions:       If your prescription bottle indicates you have medication refills left, it is not necessary to call your provider’s office. Please contact your pharmacy and they will refill your medication.    If your prescription bottle indicates you do not have any refills left, you may request refills at any time through one of the following ways: The online [employer] system (except Urgent Care), by calling your provider’s office, or by asking your pharmacy to contact your provider’s office with a refill request. Medication refills are processed only during regular business hours and may not be available until the next business day. Your provider may request additional information or to have a follow-up visit with you prior to refilling your medication.   *Please Note: Medication refills are assigned a new Rx number when refilled electronically. Your pharmacy may indicate that no refills were authorized even though a new prescription for the same medication is available at the pharmacy. Please request the medicine by name with the pharmacy before contacting your provider for a refill.        Your To Do List     Future Labs/Procedures Complete By Expires    US-GYN-PELVIS TRANSVAGINAL  As directed 2/28/2018      Instructions    1. Have ultrasound.    2. Start topamax for headaches.    3. Continue amlodipine for now.    4. Follow up in 1 month on headaches.          [employer] Access Code: [email or web address]  Expires: 3/15/2017  8:09 AM    [employer]  A secure, online tool to manage your health information     [employer]’s [employer]® is a secure, online tool that connects you to your personalized health information from the privacy of your home -- day or night - making it very easy for you to manage your healthcare. Once the activation process is completed, you can even access your medical information using the [employer] nevaeh, which is available for free in the Apple Nevaeh store or Op[employer]ative  Play store.     [employer] provides the following levels of access (as shown below):   My Chart Features   [facility] Doctor [facility]  Specialists [facility]  Urgent  Care Non-[facility]  Primary Care  Doctor   Email your healthcare team securely and privately 24/7 X X X    Manage appointments: schedule your next appointment; view details of past/upcoming appointments X      Request prescription refills. X      View recent personal medical records, including lab and immunizations X X X X   View health record, including health history, allergies, medications X X X X   Read reports about your outpatient visits, procedures, consult and ER notes X X X X   See your discharge summary, which is a recap of your hospital and/or ER visit that includes your diagnosis, lab results, and care plan. X X       How to register for [employer]:  1. Go to  [email or web address].  2. Click on the Sign Up Now box, which takes you to the New Member Sign Up page. You will need to provide the following information:  a. Enter your [employer] Access Code exactly as it appears at the top of this page. (You will not need to use this code after you’ve completed the sign-up process. If you do not sign up before the expiration date, you must request a new code.)   b. Enter your date of birth.   c. Enter your home email address.   d. Click Submit, and follow the next screen’s instructions.  3. Create a [employer] ID. This will be your [employer] login ID and cannot be changed, so think of one that is secure and easy to remember.  4. Create a [employer] password. You can change your password at any time.  5. Enter your Password Reset Question and Answer. This can be used at a later time if you forget your password.   6. Enter your e-mail address. This allows you to receive e-mail notifications when new information is available in [employer].  7. Click Sign Up. You can now view your health information.    For assistance activating your [employer] account, call [phone]

## 2017-02-28 NOTE — ASSESSMENT & PLAN NOTE
Patient is a 26-year-old female who has struggled with chronic daily headache for a while. She has a history of pseudotumor cerebri and migraine headaches and will be establishing with neurology in April. She notes that she's had chronic tension headaches. She is also noted headaches that she has the day after she has sexual intercourse. In the past the patient was on Topamax for her pseudotumor cerebri and so she was taken off by a previous neurology. We discussed today restarting her Topamax to also see if that might help with weight loss. Patient will start on 25 once a day for a week and then increase to 25 mg twice a day.

## 2017-02-28 NOTE — ASSESSMENT & PLAN NOTE
Patient recently was found to have an elevated testosterone level. She notes that she struggles with facial hair. Patient does have a 4-year-old child but she was actually active with her  for 3 years off of birth control prior to becoming pregnant. She wonders if she could've PCOS. I think it is a good possibility. She does have a gynecologist. I recommended I order a ultrasound looking for ovarian cysts and the patient follow-up with gynecology.

## 2017-02-28 NOTE — ASSESSMENT & PLAN NOTE
At her last visit, I started the patient on amlodipine for high blood pressure and referred her to bariatric surgery. She recently found out that her work will not cover the cost of the surgery. She is quite devastated by this. She was very hopeful that she could have the procedure done. We discussed going to the gym a working on dietary changes. She is wondering about restarting phentermine. I told her I would like to first discuss this with neurology.

## 2017-03-13 ENCOUNTER — TELEPHONE (OUTPATIENT)
Dept: MEDICAL GROUP | Facility: PHYSICIAN GROUP | Age: 27
End: 2017-03-13

## 2017-03-13 NOTE — TELEPHONE ENCOUNTER
1. Caller Name: Nickolas                               Call Back Number: [phone] (home) WORK [phone]        Patient approves a detailed voicemail message: yes Home #    2. Patient is requesting imaging - US results dated: about 1 week ago Kateryna Escobar    3. Results are not in chart, but have been requested. Patient advised they will be contacted once received and interpreted by provider.

## 2017-04-24 ENCOUNTER — OFFICE VISIT (OUTPATIENT)
Dept: NEUROLOGY | Facility: MEDICAL CENTER | Age: 27
End: 2017-04-24
Payer: COMMERCIAL

## 2017-04-24 ENCOUNTER — HOSPITAL ENCOUNTER (OUTPATIENT)
Dept: LAB | Facility: MEDICAL CENTER | Age: 27
End: 2017-04-24
Attending: PSYCHIATRY & NEUROLOGY
Payer: COMMERCIAL

## 2017-04-24 VITALS
RESPIRATION RATE: 18 BRPM | HEART RATE: 79 BPM | WEIGHT: 293 LBS | SYSTOLIC BLOOD PRESSURE: 120 MMHG | HEIGHT: 70 IN | OXYGEN SATURATION: 99 % | DIASTOLIC BLOOD PRESSURE: 76 MMHG | TEMPERATURE: 99 F | BODY MASS INDEX: 41.95 KG/M2

## 2017-04-24 DIAGNOSIS — G93.2 PSEUDOTUMOR CEREBRI: ICD-10-CM

## 2017-04-24 DIAGNOSIS — G43.019 INTRACTABLE MIGRAINE WITHOUT AURA AND WITHOUT STATUS MIGRAINOSUS: ICD-10-CM

## 2017-04-24 DIAGNOSIS — G40.909 SEIZURE DISORDER (HCC): ICD-10-CM

## 2017-04-24 DIAGNOSIS — R51.9 CHRONIC DAILY HEADACHE: ICD-10-CM

## 2017-04-24 PROBLEM — G43.919 INTRACTABLE MIGRAINE WITHOUT STATUS MIGRAINOSUS: Status: ACTIVE | Noted: 2017-04-24

## 2017-04-24 LAB
25(OH)D3 SERPL-MCNC: 9 NG/ML (ref 30–100)
APTT PPP: 26.2 SEC (ref 24.7–36)
BASOPHILS # BLD AUTO: 0.6 % (ref 0–1.8)
BASOPHILS # BLD: 0.05 K/UL (ref 0–0.12)
CRP SERPL HS-MCNC: 2.3 MG/L (ref 0–7.5)
EOSINOPHIL # BLD AUTO: 0.37 K/UL (ref 0–0.51)
EOSINOPHIL NFR BLD: 4.8 % (ref 0–6.9)
ERYTHROCYTE [DISTWIDTH] IN BLOOD BY AUTOMATED COUNT: 41.1 FL (ref 35.9–50)
ERYTHROCYTE [SEDIMENTATION RATE] IN BLOOD BY WESTERGREN METHOD: 9 MM/HOUR (ref 0–20)
HCT VFR BLD AUTO: 46.1 % (ref 37–47)
HGB BLD-MCNC: 15.5 G/DL (ref 12–16)
IMM GRANULOCYTES # BLD AUTO: 0.05 K/UL (ref 0–0.11)
IMM GRANULOCYTES NFR BLD AUTO: 0.6 % (ref 0–0.9)
INR PPP: 0.98 (ref 0.87–1.13)
LYMPHOCYTES # BLD AUTO: 1.84 K/UL (ref 1–4.8)
LYMPHOCYTES NFR BLD: 23.8 % (ref 22–41)
MCH RBC QN AUTO: 28.5 PG (ref 27–33)
MCHC RBC AUTO-ENTMCNC: 33.6 G/DL (ref 33.6–35)
MCV RBC AUTO: 84.7 FL (ref 81.4–97.8)
MONOCYTES # BLD AUTO: 0.43 K/UL (ref 0–0.85)
MONOCYTES NFR BLD AUTO: 5.6 % (ref 0–13.4)
NEUTROPHILS # BLD AUTO: 4.98 K/UL (ref 2–7.15)
NEUTROPHILS NFR BLD: 64.6 % (ref 44–72)
NRBC # BLD AUTO: 0 K/UL
NRBC BLD AUTO-RTO: 0 /100 WBC
PLATELET # BLD AUTO: 268 K/UL (ref 164–446)
PMV BLD AUTO: 9.7 FL (ref 9–12.9)
PROTHROMBIN TIME: 13.3 SEC (ref 12–14.6)
RBC # BLD AUTO: 5.44 M/UL (ref 4.2–5.4)
RHEUMATOID FACT SER IA-ACNC: <10 IU/ML (ref 0–14)
T4 FREE SERPL-MCNC: 0.85 NG/DL (ref 0.53–1.43)
THYROPEROXIDASE AB SERPL-ACNC: 69.9 IU/ML (ref 0–9)
TSH SERPL DL<=0.005 MIU/L-ACNC: 3.85 UIU/ML (ref 0.3–3.7)
VIT B12 SERPL-MCNC: 419 PG/ML (ref 211–911)
WBC # BLD AUTO: 7.7 K/UL (ref 4.8–10.8)

## 2017-04-24 PROCEDURE — 85610 PROTHROMBIN TIME: CPT

## 2017-04-24 PROCEDURE — 86141 C-REACTIVE PROTEIN HS: CPT

## 2017-04-24 PROCEDURE — 99204 OFFICE O/P NEW MOD 45 MIN: CPT | Performed by: PSYCHIATRY & NEUROLOGY

## 2017-04-24 PROCEDURE — 86431 RHEUMATOID FACTOR QUANT: CPT

## 2017-04-24 PROCEDURE — 84439 ASSAY OF FREE THYROXINE: CPT

## 2017-04-24 PROCEDURE — 86038 ANTINUCLEAR ANTIBODIES: CPT

## 2017-04-24 PROCEDURE — 86225 DNA ANTIBODY NATIVE: CPT

## 2017-04-24 PROCEDURE — 36415 COLL VENOUS BLD VENIPUNCTURE: CPT

## 2017-04-24 PROCEDURE — 86147 CARDIOLIPIN ANTIBODY EA IG: CPT

## 2017-04-24 PROCEDURE — 85652 RBC SED RATE AUTOMATED: CPT

## 2017-04-24 PROCEDURE — 86376 MICROSOMAL ANTIBODY EACH: CPT

## 2017-04-24 PROCEDURE — 86235 NUCLEAR ANTIGEN ANTIBODY: CPT | Mod: 91

## 2017-04-24 PROCEDURE — 82607 VITAMIN B-12: CPT

## 2017-04-24 PROCEDURE — 85025 COMPLETE CBC W/AUTO DIFF WBC: CPT

## 2017-04-24 PROCEDURE — 84443 ASSAY THYROID STIM HORMONE: CPT

## 2017-04-24 PROCEDURE — 86800 THYROGLOBULIN ANTIBODY: CPT

## 2017-04-24 PROCEDURE — 82595 ASSAY OF CRYOGLOBULIN: CPT

## 2017-04-24 PROCEDURE — 82306 VITAMIN D 25 HYDROXY: CPT

## 2017-04-24 PROCEDURE — 85730 THROMBOPLASTIN TIME PARTIAL: CPT

## 2017-04-24 RX ORDER — TOPIRAMATE 50 MG/1
50 TABLET, FILM COATED ORAL 2 TIMES DAILY
Qty: 60 TAB | Refills: 3 | Status: SHIPPED | OUTPATIENT
Start: 2017-04-24 | End: 2017-08-08 | Stop reason: SDUPTHER

## 2017-04-24 RX ORDER — SUMATRIPTAN 50 MG/1
50 TABLET, FILM COATED ORAL
Qty: 10 TAB | Refills: 3 | Status: SHIPPED | OUTPATIENT
Start: 2017-04-24 | End: 2018-01-11

## 2017-04-24 NOTE — PATIENT INSTRUCTIONS
No papilledema    IMPRESSION:    1.  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  Hx of seizure -- the last seizure was 2013  3.  Hx of HTN, Thyroid Disease   4.  Intractable headache associated with speech problems      PLAN/RECOMMENDATIONS:    Will give her Imitrex 50mg for headache rescue-- maximum 10# per month  Will repeat LP and measure opening pressure  Up Topamax to 50mg two times per day  for better headache prevention and weight reduction  Head CT  Blood Tests  EEG      ________________________________________________________________________    Advise exercise muscle and brain  Avoid processed foods-- focus on natural foods  Avoid sugar  Weight Loss  ________________________________________________________________________  ________________________________________________________________________    ________________________________________________________________________          SIGNATURE:  Chel Sandra    CC:  Cecilia Garcia M.D.

## 2017-04-24 NOTE — PROGRESS NOTES
NEUROLOGY NOTE    Referring Physician  Cecilia Garcia      CHIEF COMPLAINT:  Last GTCS at the age of 24YO  From the age of 16YO to 24YO-- followed by Dr Maharaj for pseudotumor cerebri  Dr Maharaj then referred the patient to another neurologist--another neurologist also told her that she does not have pseudotumor cereberi any more?  Insurance denied weight loss surgery  Headache    Chief Complaint   Patient presents with   • Establish Care     Pseudotumor       PRESENT ILLNESS:       Headache intractable-- for 4-5 months  1. Location--occipital  2. Characteristics--throbbing  3. Associated--light , blurred vision , dizziness, black spots  4. Worsening--stress, intercourse, altitude  5. Relieving factors-- Excedrin migraine, topamax 50mg HS   6. Family History-not she could recall  7. Every 3 day- could last 1-2 days   8. Severity-- can not work   9. Sleep-- not able to sleep  10. Coffee intake-- not coffee addict      The last lumbar puncture, the opening pressure was high normal based on what  Average 32-43 opening pressure    PAST MEDICAL HISTORY:  Past Medical History   Diagnosis Date   • Pseudotumor cerebri    • Seizure disorder (CMS-HCC)    • Migraine        PAST SURGICAL HISTORY:  No past surgical history on file.    FAMILY HISTORY:  Family History   Problem Relation Age of Onset   • Cancer Mother      Cervical       SOCIAL HISTORY:  Social History     Social History   • Marital Status: Single     Spouse Name: N/A   • Number of Children: N/A   • Years of Education: N/A     Occupational History   • Not on file.     Social History Main Topics   • Smoking status: Former Smoker     Quit date: 05/03/2012   • Smokeless tobacco: Never Used      Comment: 1/2 pack a day   • Alcohol Use: No   • Drug Use: No   • Sexual Activity: No     Other Topics Concern   • Not on file     Social History Narrative    ** Merged History Encounter **          ALLERGIES:  No Known Allergies  TOBHX  History   Smoking status   • Former  "Smoker   • Quit date: 05/03/2012   Smokeless tobacco   • Never Used     Comment: 1/2 pack a day     ALCHX  History   Alcohol Use No     DRUGHX  History   Drug Use No           MEDICATIONS:  Current Outpatient Prescriptions   Medication   • topiramate (TOPAMAX) 25 MG capsule   • amlodipine (NORVASC) 5 MG Tab   • fluticasone (FLONASE) 50 MCG/ACT nasal spray     No current facility-administered medications for this visit.       REVIEW OF SYSTEM:    Constitutional: Denies fevers, Denies weight changes   Eyes: Denies changes in vision, no eye pain   Ears/Nose/Throat/Mouth: Denies nasal congestion or sore throat   Cardiovascular: HTN  Respiratory: Denies SOB.   Gastrointestinal/Hepatic: Denies abdominal pain, nausea, vomiting, diarrhea, constipation or GI bleeding   Genitourinary: Denies bladder dysfunction, dysuria or frequency   Musculoskeletal/Rheum: Denies joint pain and swelling   Skin/Breast: Denies rash, denies breast lumps or discharge   Neurological: seizure, pseudotumor cereberri  Psychiatric: denies mood disorder   Endocrine: denies hx of diabetes or thyroid dysfunction   Heme/Oncology/Lymph Nodes: Denies enlarged lymph nodes, denies brusing or known bleeding disorder   Allergic/Immunologic: Denies hx of allergies   Thyroid -- borderline ,-- thyroid disease runs in the family        PHYSICAL AND NEUROLOGICAL EXMAINATIONS:  VITAL SIGNS: /76 mmHg  Pulse 79  Temp(Src) 37.2 °C (99 °F)  Resp 18  Ht 1.778 m (5' 10\")  Wt 138.347 kg (305 lb)  BMI 43.76 kg/m2  SpO2 99%  LMP 03/03/2012  CURRENT WEIGHT:   BMI: Body mass index is 43.76 kg/(m^2).  PREVIOUS WEIGHTS:  Wt Readings from Last 25 Encounters:   04/24/17 138.347 kg (305 lb)   02/28/17 139.708 kg (308 lb)   02/20/17 141.976 kg (313 lb)   01/31/17 137.44 kg (303 lb)   05/03/16 136.986 kg (302 lb)   11/15/15 134.265 kg (296 lb)   09/13/15 135.172 kg (298 lb)   09/04/15 133.811 kg (295 lb)   04/22/15 126.1 kg (278 lb)   12/01/14 127.007 kg (280 lb) "   06/05/14 123.378 kg (272 lb)   05/14/14 125.193 kg (276 lb)   01/16/14 113.399 kg (250 lb)   06/11/13 112.946 kg (249 lb)   05/10/13 115.214 kg (254 lb)   04/12/13 117.028 kg (258 lb)   03/12/13 113.853 kg (251 lb)   03/06/13 115.667 kg (255 lb)   01/29/13 121.11 kg (267 lb)   01/28/13 121 kg (266 lb 12.1 oz)   01/24/13 121.11 kg (267 lb)   01/15/13 123.378 kg (272 lb)   01/10/13 122.018 kg (269 lb)   12/21/12 119.75 kg (264 lb)   12/15/12 117.935 kg (260 lb)       General appearance of patient: WDWN(+) NAD(+)    EYES  o Fundus : Papilledem(-) Exudates(-) Hemorrhage(-)  Nervous System  Orientation to time, place and person(+)  Memory normal(+)  Language: aphasia(-)  Knowledge: past(+) Current(+)  Attention(+)  Cranial Nerves  • Nerve 2: intact  • Nerve 3,4,6: intact  • Nerve 5 : intact  • Nerve 7: intact  • Nerve 8: intact  • Nerve 9 & 10: intact  • Nerve 11: intact  • Nerve 12: intact  Muscle Power and muscle tone: symmetric, normal in upper and lower  Sensory System: Pin sensation intact(+)  Reflexes: symmetric throughout  Cerebellar Function FNP normal   Gait : Steady(+) TandemGait steady(+)  Heart and Vascular  Peripheral Vasucular system : Edema (-) Swelling(-)  RHB, Breathing sound clear  abdomen bowel sound normoactive  Extremities freely moveable  Joints no contracture       NEUROIMAGING: I reviewed the MRI/CT of brain         No papilledema    IMPRESSION:    1.  Hx of pseudotumor cereberi from age of 16 YO to 22YO--- taken cared by Dr Carol Ann hills  2.  Hx of seizure -- the last seizure was 2013  3.  Hx of HTN, Thyroid Disease   4.  Intractable headache associated with speech problems      PLAN/RECOMMENDATIONS:    Will give her Imitrex 50mg for headache rescue-- maximum 10# per month  Will repeat LP and measure opening pressure  Up Topamax to 50mg two times per day  for better headache prevention and weight reduction  Head CT  Blood  Tests  EEG      ________________________________________________________________________    Advise exercise muscle and brain  Avoid processed foods-- focus on natural foods  Avoid sugar  Weight Loss  ________________________________________________________________________  ________________________________________________________________________    ________________________________________________________________________          SIGNATURE:  Chel Sandra    CC:  Cecilia Garcia M.D.

## 2017-04-24 NOTE — MR AVS SNAPSHOT
"        Nickolas Galdamez   2017 8:00 AM   Office Visit   MRN: [id number]    Department:  [facility]   Dept Phone:  [phone]    Description:  Female : 1990   Provider:  Chel Sandra M.D.           Reason for Visit     Establish Care Pseudotumor      Allergies as of 2017     No Known Allergies      You were diagnosed with     Seizure disorder (CMS-HCC)   [622985]       Pseudotumor cerebri   [881651]       Chronic daily headache   [841967]   Uncontrolled, will start on Topamax    Intractable migraine without aura and without status migrainosus   [678608]         Vital Signs     Blood Pressure Pulse Temperature Respirations Height Weight    120/76 mmHg 79 37.2 °C (99 °F) 18 1.778 m (5' 10\") 138.347 kg (305 lb)    Body Mass Index Oxygen Saturation Last Menstrual Period Smoking Status          43.76 kg/m2 99% 2012 Former Smoker        Basic Information     Date Of Birth Sex Race Ethnicity Preferred Language    1990 Female White Non- English      Your appointments     May 02, 2017  1:00 PM   Established Patient with KARINE Cuevas   [facility]    [address] [place] [id number] [phone]           You will be receiving a confirmation call a few days before your appointment from our automated call confirmation system.            Aug 08, 2017 10:00 AM   Follow Up Visit with Chel Sandra M.D.   [facility] (--)    [address] [place] [address], [address]  [facility] [address] [phone]           You will be receiving a confirmation call a few days before your appointment from our automated call confirmation system.              Problem List              ICD-10-CM Priority Class Noted - Resolved    Pseudotumor cerebri G93.2   10/15/2012 - Present    History of migraine headaches Z86.69   10/15/2012 - Present    Seizure disorder (CMS-HCC) G40.909   10/22/2012 - Present    Obesity E66.9   2013 - Present    Essential " hypertension I10   1/31/2017 - Present    Morbid obesity with BMI of 40.0-44.9, adult ([facility]) E66.01, Z68.41   1/31/2017 - Present    Chronic daily headache R51   2/28/2017 - Present    Elevated testosterone level in female E34.9   2/28/2017 - Present    Intractable migraine without status migrainosus G43.919   4/24/2017 - Present      Health Maintenance        Date Due Completion Dates    IMM HEP B VACCINE (1 of 3 - Primary Series) 1990 ---    IMM HEP A VACCINE (1 of 2 - Standard Series) 3/2/1992 ---    IMM HPV VACCINE (1 of 3 - Female 3 Dose Series) 3/2/2002 ---    IMM VARICELLA (CHICKENPOX) VACCINE (1 of 2 - 2 Dose Adolescent Series) 3/2/2004 ---    IMM DTaP/Tdap/Td Vaccine (1 - Tdap) 3/2/2010 ---    PAP SMEAR 12/1/2017 12/1/2014, 10/15/2012            Current Immunizations     Influenza TIV (IM) 1/30/2013 11:30 AM      Below and/or attached are the medications your provider expects you to take. Review all of your home medications and newly ordered medications with your provider and/or pharmacist. Follow medication instructions as directed by your provider and/or pharmacist. Please keep your medication list with you and share with your provider. Update the information when medications are discontinued, doses are changed, or new medications (including over-the-counter products) are added; and carry medication information at all times in the event of emergency situations     Allergies:  No Known Allergies          Medications  Valid as of: April 24, 2017 -  8:46 AM    Generic Name Brand Name Tablet Size Instructions for use    AmLODIPine Besylate (Tab) NORVASC 5 MG Take 1 Tab by mouth every day.        Fluticasone Propionate (Suspension) FLONASE 50 MCG/ACT Spray 1 Spray in nose every day. Each Nostril        SUMAtriptan Succinate (Tab) IMITREX 50 MG Take 1 Tab by mouth Once PRN for Migraine for up to 1 dose.        Topiramate (CAPSULE SPRINKLE) TOPAMAX 25 MG Take 1 tab once per day x 7 days then increase to 1 tab  twice per day        Topiramate (Tab) TOPAMAX 50 MG Take 1 Tab by mouth 2 times a day.        .                 Medicines prescribed today were sent to:     [id number] - [place] - [address]    [address] [place] [address]    Phone: [phone] Fax: [phone]    Open 24 Hours?: No    [facility]Y #[id number] - [place] - [address]    [address] [place] [address]    Phone: [phone] Fax: [phone]    Open 24 Hours?: No      Medication refill instructions:       If your prescription bottle indicates you have medication refills left, it is not necessary to call your provider’s office. Please contact your pharmacy and they will refill your medication.    If your prescription bottle indicates you do not have any refills left, you may request refills at any time through one of the following ways: The online [employer] system (except Urgent Care), by calling your provider’s office, or by asking your pharmacy to contact your provider’s office with a refill request. Medication refills are processed only during regular business hours and may not be available until the next business day. Your provider may request additional information or to have a follow-up visit with you prior to refilling your medication.   *Please Note: Medication refills are assigned a new Rx number when refilled electronically. Your pharmacy may indicate that no refills were authorized even though a new prescription for the same medication is available at the pharmacy. Please request the medicine by name with the pharmacy before contacting your provider for a refill.        Your To Do List     Future Labs/Procedures Complete By Expires    GLORIA ANTIBODY WITH REFLEX  As directed 4/25/2018    CBC WITH DIFFERENTIAL  As directed 4/24/2018    CSF CELL COUNT  As directed 4/24/2018    CSF GLUCOSE  As directed 4/24/2018    CSF PROTEIN  As directed 4/24/2018    CT-HEAD W/O  As directed 10/25/2017     OLIGOCLONAL BANDS SERUM/CSF  As directed 4/24/2018    Comments:    Needs simultaneous blood tests    RHEUMATOID ARTHRITIS FACTOR  As directed 4/25/2018    VITAMIN B12  As directed 4/25/2018    WESTERGREN SED RATE  As directed 4/25/2018      Referral     A referral request has been sent to our patient care coordination department. Please allow 3-5 business days for us to process this request and contact you either by phone or mail. If you do not hear from us by the 5th business day, please call us at [phone].        Instructions        No papilledema    IMPRESSION:    1.  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  Hx of seizure -- the last seizure was 2013  3.  Hx of HTN, Thyroid Disease   4.  Intractable headache associated with speech problems      PLAN/RECOMMENDATIONS:    Will give her Imitrex 50mg for headache rescue-- maximum 10# per month  Will repeat LP and measure opening pressure  Up Topamax to 50mg two times per day  for better headache prevention and weight reduction  Head CT  Blood Tests  EEG      ________________________________________________________________________    Advise exercise muscle and brain  Avoid processed foods-- focus on natural foods  Avoid sugar  Weight Loss  ________________________________________________________________________  ________________________________________________________________________    ________________________________________________________________________          SIGNATURE:  Chel Sandra    CC:  DARBY Barrera Access Code: [email or web address]  Expires: 5/24/2017  7:44 AM    [employer]  A secure, online tool to manage your health information     [employer]® is a secure, online tool that connects you to your personalized health information from the privacy of your home -- day or night - making it very easy for you to manage your healthcare. Once the activation process is  completed, you can even access your medical information using the [employer] nevaeh, which is available for free in the Apple Nevaeh store or Google Play store.     [employer] provides the following levels of access (as shown below):   My Chart Features   [facility] Doctor [facility]  Specialists [facility]  Urgent  Care Non-[facility]  Primary Care  Doctor   Email your healthcare team securely and privately 24/7 X X X    Manage appointments: schedule your next appointment; view details of past/upcoming appointments X      Request prescription refills. X      View recent personal medical records, including lab and immunizations X X X X   View health record, including health history, allergies, medications X X X X   Read reports about your outpatient visits, procedures, consult and ER notes X X X X   See your discharge summary, which is a recap of your hospital and/or ER visit that includes your diagnosis, lab results, and care plan. X X       How to register for [employer]:  1. Go to  [email or web address].  2. Click on the Sign Up Now box, which takes you to the New Member Sign Up page. You will need to provide the following information:  a. Enter your [employer] Access Code exactly as it appears at the top of this page. (You will not need to use this code after you’ve completed the sign-up process. If you do not sign up before the expiration date, you must request a new code.)   b. Enter your date of birth.   c. Enter your home email address.   d. Click Submit, and follow the next screen’s instructions.  3. Create a [employer] ID. This will be your [employer] login ID and cannot be changed, so think of one that is secure and easy to remember.  4. Create a [employer] password. You can change your password at any time.  5. Enter your Password Reset Question and Answer. This can be used at a later time if you forget your password.   6. Enter your e-mail address. This allows you to receive e-mail notifications when new information is  available in [employer].  7. Click Sign Up. You can now view your health information.    For assistance activating your [employer] account, call [phone]

## 2017-04-25 ENCOUNTER — TELEPHONE (OUTPATIENT)
Dept: NEUROLOGY | Facility: MEDICAL CENTER | Age: 27
End: 2017-04-25

## 2017-04-25 LAB — THYROGLOB AB SERPL-ACNC: 1.1 IU/ML (ref 0–4)

## 2017-04-25 NOTE — TELEPHONE ENCOUNTER
Due to Vit D deficiency, please take vit D-3 2000unit-4000unit daily . VILMA    Pt called back and all information/instructions were given. She verbally understood and will comply with all given. ERIN

## 2017-04-26 LAB
CARDIOLIPIN IGA SER IA-ACNC: 1 APL (ref 0–11)
CARDIOLIPIN IGG SER IA-ACNC: 2 GPL (ref 0–14)
CARDIOLIPIN IGM SER IA-ACNC: 4 MPL (ref 0–12)
NUCLEAR IGG SER QL IA: NORMAL

## 2017-04-29 LAB — CRYOGLOB SER QL 3D COLD INC: NORMAL

## 2017-04-30 ENCOUNTER — HOSPITAL ENCOUNTER (OUTPATIENT)
Dept: RADIOLOGY | Facility: MEDICAL CENTER | Age: 27
End: 2017-04-30
Attending: PSYCHIATRY & NEUROLOGY
Payer: COMMERCIAL

## 2017-04-30 DIAGNOSIS — R51.9 CHRONIC DAILY HEADACHE: ICD-10-CM

## 2017-04-30 DIAGNOSIS — G43.019 INTRACTABLE MIGRAINE WITHOUT AURA AND WITHOUT STATUS MIGRAINOSUS: ICD-10-CM

## 2017-04-30 DIAGNOSIS — G93.2 PSEUDOTUMOR CEREBRI: ICD-10-CM

## 2017-04-30 DIAGNOSIS — G40.909 SEIZURE DISORDER (HCC): ICD-10-CM

## 2017-04-30 PROCEDURE — 70450 CT HEAD/BRAIN W/O DYE: CPT

## 2017-05-01 ENCOUNTER — TELEPHONE (OUTPATIENT)
Dept: NEUROLOGY | Facility: MEDICAL CENTER | Age: 27
End: 2017-05-01

## 2017-05-01 DIAGNOSIS — G40.909 SEIZURE DISORDER (HCC): ICD-10-CM

## 2017-05-02 ENCOUNTER — OFFICE VISIT (OUTPATIENT)
Dept: MEDICAL GROUP | Facility: PHYSICIAN GROUP | Age: 27
End: 2017-05-02
Payer: COMMERCIAL

## 2017-05-02 VITALS
BODY MASS INDEX: 41.95 KG/M2 | DIASTOLIC BLOOD PRESSURE: 80 MMHG | HEIGHT: 70 IN | SYSTOLIC BLOOD PRESSURE: 124 MMHG | RESPIRATION RATE: 16 BRPM | OXYGEN SATURATION: 98 % | TEMPERATURE: 97.7 F | HEART RATE: 66 BPM | WEIGHT: 293 LBS

## 2017-05-02 DIAGNOSIS — G40.909 SEIZURE DISORDER (HCC): ICD-10-CM

## 2017-05-02 DIAGNOSIS — R79.89 ABNORMAL THYROID BLOOD TEST: ICD-10-CM

## 2017-05-02 DIAGNOSIS — E66.01 MORBID OBESITY WITH BMI OF 40.0-44.9, ADULT (HCC): ICD-10-CM

## 2017-05-02 DIAGNOSIS — R79.89 ELEVATED TESTOSTERONE LEVEL IN FEMALE: ICD-10-CM

## 2017-05-02 DIAGNOSIS — G93.2 PSEUDOTUMOR CEREBRI: ICD-10-CM

## 2017-05-02 PROCEDURE — 99214 OFFICE O/P EST MOD 30 MIN: CPT | Performed by: NURSE PRACTITIONER

## 2017-05-02 RX ORDER — PHENTERMINE HYDROCHLORIDE 37.5 MG/1
37.5 TABLET ORAL
COMMUNITY
End: 2017-05-05

## 2017-05-02 RX ORDER — MULTIVIT-MIN/IRON/FOLIC ACID/K 18-600-40
2000 CAPSULE ORAL 2 TIMES DAILY
COMMUNITY
End: 2019-10-30

## 2017-05-02 NOTE — ASSESSMENT & PLAN NOTE
Patient is followed by neurology, she has recently reestablished with new provider. She recently had a panel of lab work done and was restarted on some medications. She is also been restarted on vitamin D as well, for a vitamin D of 9. She does have follow-up appointment in August.

## 2017-05-02 NOTE — ASSESSMENT & PLAN NOTE
Patient has long history of pseudotumor cerebri. She has reestablished with new neurologist and has been seen recently. She has had labs done and has follow-up appointment for follow-up in August. She is to continue on her current regimen and follow-up as scheduled.

## 2017-05-02 NOTE — MR AVS SNAPSHOT
"        Nickolas Galdamez   2017 1:00 PM   Office Visit   MRN: [id number]    Department:  [facility]   Dept Phone:  [phone]    Description:  Female : 1990   Provider:  KARINE Cuevas           Reason for Visit     Establish Care fv neurology, fv labs    Weight Loss discuss phentermine      Allergies as of 2017     No Known Allergies      Vital Signs     Blood Pressure Pulse Temperature Respirations Height Weight    124/80 mmHg 66 36.5 °C (97.7 °F) 16 1.778 m (5' 10\") 136.079 kg (300 lb)    Body Mass Index Oxygen Saturation Last Menstrual Period Smoking Status          43.05 kg/m2 98% 2012 Former Smoker        Basic Information     Date Of Birth Sex Race Ethnicity Preferred Language    1990 Female White Non- English      Your appointments     May 03, 2017  2:30 PM   DX LUMBAR PUNCTURE with [facility]O [facility], RADIOLOGIST, FLUORO, [facility] IMAGING -  DIAGNOSTIC - [facility] ([facility])    [address] [address]   [phone]            2017  7:30 AM   ELECTROENCEPHALOGRAPHY with NEURODIAGNOSTIC LAB [facility] (--)    [address] [id number] [phone]           Limit caffeine. Clean, dry hair, no gels, oils, or hairsprays. If testing for seizures or spells, please allow no more than 4 hours of sleep the night before the exam (sleep 12am-4am).            Aug 08, 2017 10:00 AM   Follow Up Visit with Chel Sandra M.D.   [facility] Neu[facility] (--)    [address], [address] [id number] [phone]           You will be receiving a confirmation call a few days before your appointment from our automated call confirmation system.              Problem List              ICD-10-CM Priority Class Noted - Resolved    Pseudotumor cerebri G93.2   10/15/2012 - Present    History of migraine headaches Z86.69   10/15/2012 - Present    Seizure disorder (CMS-HCC) " G40.909   10/22/2012 - Present    Obesity E66.9   4/14/2013 - Present    Essential hypertension I10   1/31/2017 - Present    Morbid obesity with BMI of 40.0-44.9, adult ([facility]) E66.01, Z68.41   1/31/2017 - Present    Chronic daily headache R51   2/28/2017 - Present    Elevated testosterone level in female E34.9   2/28/2017 - Present    Intractable migraine without status migrainosus G43.919   4/24/2017 - Present      Health Maintenance        Date Due Completion Dates    IMM HEP B VACCINE (1 of 3 - Primary Series) 1991 ---    IMM HEP A VACCINE (1 of 2 - Standard Series) 3/2/1992 ---    IMM HPV VACCINE (1 of 3 - Female 3 Dose Series) 3/2/2002 ---    IMM VARICELLA (CHICKENPOX) VACCINE (1 of 2 - 2 Dose Adolescent Series) 3/2/2004 ---    IMM DTaP/Tdap/Td Vaccine (1 - Tdap) 3/2/2010 ---    PAP SMEAR 12/1/2017 12/1/2014, 10/15/2012            Current Immunizations     Influenza TIV (IM) 1/30/2013 11:30 AM      Below and/or attached are the medications your provider expects you to take. Review all of your home medications and newly ordered medications with your provider and/or pharmacist. Follow medication instructions as directed by your provider and/or pharmacist. Please keep your medication list with you and share with your provider. Update the information when medications are discontinued, doses are changed, or new medications (including over-the-counter products) are added; and carry medication information at all times in the event of emergency situations     Allergies:  No Known Allergies          Medications  Valid as of: May 02, 2017 -  1:37 PM    Generic Name Brand Name Tablet Size Instructions for use    AmLODIPine Besylate (Tab) NORVASC 5 MG Take 1 Tab by mouth every day.        Cholecalciferol (Cap) Vitamin D 2000 UNITS Take  by mouth.        Phentermine HCl (Tab) ADIPEX-P 37.5 MG Take 37.5 mg by mouth every morning before breakfast.        SUMAtriptan Succinate (Tab) IMITREX 50 MG Take 1 Tab by mouth Once  PRN for Migraine for up to 1 dose.        Topiramate (Tab) TOPAMAX 50 MG Take 1 Tab by mouth 2 times a day.        .                 Medicines prescribed today were sent to:     [facility]ARMACY #[id number] - [place] - [address]    [address] [place] [address]    Phone: [phone] Fax: [phone]    Open 24 Hours?: No      Medication refill instructions:       If your prescription bottle indicates you have medication refills left, it is not necessary to call your provider’s office. Please contact your pharmacy and they will refill your medication.    If your prescription bottle indicates you do not have any refills left, you may request refills at any time through one of the following ways: The online [employer] system (except Urgent Care), by calling your provider’s office, or by asking your pharmacy to contact your provider’s office with a refill request. Medication refills are processed only during regular business hours and may not be available until the next business day. Your provider may request additional information or to have a follow-up visit with you prior to refilling your medication.   *Please Note: Medication refills are assigned a new Rx number when refilled electronically. Your pharmacy may indicate that no refills were authorized even though a new prescription for the same medication is available at the pharmacy. Please request the medicine by name with the pharmacy before contacting your provider for a refill.        Instructions    Will send him a message regarding the phentermine--I will get back to as soon as possible    With regard to thyroid--let's monitor this, let's recheck in 3 months    Continue care per neurology    Stay on all meds    Follow up with me 3 months          [employer] Access Code: Activation code not generated  Current [employer] Status: Active

## 2017-05-02 NOTE — ASSESSMENT & PLAN NOTE
Patient here to discuss the several labs done after reestablishing care with neurology for her migraines, seizure disorder and history of pseudotumor cerebri. She does not have follow up appointment with neurology until August and has concerns regarding the thyroid labs. Her TSH is very mildly elevated 3.850, however her thyroid antibodies are elevated at 69. She is concerned about this.   I explained to her that the presence of thyroid antibodies usually indicates the cause of a thyroid disorder is autoimmune in nature, such as in the cases of Hashimoto's or Graves' disease. Because her TSH is only very mildly elevated, I discussed with her that we would continue to monitor this closely. Patient verbalized understanding and was agreeable to this plan.

## 2017-05-02 NOTE — PROGRESS NOTES
Chief Complaint   Patient presents with   • Establish Care     fv neurology, fv labs   • Weight Loss     discuss phentermine         This is a 27 y.o.female patient that presents today with the following: Establish care with new PCP, discuss acute and chronic conditions, review labs    Morbid obesity with BMI of 40.0-44.9, adult (HCC)  This is a chronic condition, uncontrolled. She has struggled with obesity and difficulty with weight loss. Her weight is 300 pounds, BMI 43.05. At her last visit with PCP, she requested to go back on phentermine that had been prescribed in the past. Her past PCP told her that she would have to discuss this with neurology as she is being treated for seizure disorder, pseudotumor cerebri and intractable migraines. Patient states to me that neurology has cleared her to take phentermine, but is not willing to prescribe it. I have reviewed that past note, the do not see documentation that she has been cleared to take the medication. I did discuss with her that I would send staff message to neurologist to confirm this and if it is okay with him I will prescribe the medication.  She does admit to me today that she has bought phentermine online and has been taking 37.5 mg daily for the past month. She reports to me that she tolerates this medication well with no significant or bothersome side effects other than elevated heart rate. She does state that she has lost 15 pounds in the last month according to her scale home. I encouraged her to continue with regular physical activity and healthy diet. She reports to me she has run out of the medication and would like to know soon as possible with her not she can restart the phentermine, she will need to have this prescribed on myself because she is not willing to pay the inflated prices online.    Elevated testosterone level in female  Patient reports a history of elevated testosterone levels. She has been referred back to her OB/GYN and has an  appointment at the end of the month. She had pelvic ultrasound ordered and has showed me a copy of these results, I do not see the scanned into the medical record. We will request these records.    Abnormal thyroid blood test  Patient here to discuss the several labs done after reestablishing care with neurology for her migraines, seizure disorder and history of pseudotumor cerebri. She does not have follow up appointment with neurology until August and has concerns regarding the thyroid labs. Her TSH is very mildly elevated 3.850, however her thyroid antibodies are elevated at 69. She is concerned about this.   I explained to her that the presence of thyroid antibodies usually indicates the cause of a thyroid disorder is autoimmune in nature, such as in the cases of Hashimoto's or Graves' disease. Because her TSH is only very mildly elevated, I discussed with her that we would continue to monitor this closely. Patient verbalized understanding and was agreeable to this plan.    Seizure disorder  Patient is followed by neurology, she has recently reestablished with new provider. She recently had a panel of lab work done and was restarted on some medications. She is also been restarted on vitamin D as well, for a vitamin D of 9. She does have follow-up appointment in August.    Pseudotumor cerebri  Patient has long history of pseudotumor cerebri. She has reestablished with new neurologist and has been seen recently. She has had labs done and has follow-up appointment for follow-up in August. She is to continue on her current regimen and follow-up as scheduled.      Hospital Outpatient Visit on 04/24/2017   Component Date Value   • Antinuclear Antibody 04/24/2017 None Detected    • Rheumatoid Factor -Neph- 04/24/2017 <10    • Sed Rate Westergren 04/24/2017 9    • Vitamin B12 -True Cobala* 04/24/2017 419    • WBC 04/24/2017 7.7    • RBC 04/24/2017 5.44*   • Hemoglobin 04/24/2017 15.5    • Hematocrit 04/24/2017 46.1    •  MCV 04/24/2017 84.7    • MCH 04/24/2017 28.5    • MCHC 04/24/2017 33.6    • RDW 04/24/2017 41.1    • Platelet Count 04/24/2017 268    • MPV 04/24/2017 9.7    • Neutrophils-Polys 04/24/2017 64.60    • Lymphocytes 04/24/2017 23.80    • Monocytes 04/24/2017 5.60    • Eosinophils 04/24/2017 4.80    • Basophils 04/24/2017 0.60    • Immature Granulocytes 04/24/2017 0.60    • Nucleated RBC 04/24/2017 0.00    • Neutrophils (Absolute) 04/24/2017 4.98    • Lymphs (Absolute) 04/24/2017 1.84    • Monos (Absolute) 04/24/2017 0.43    • Eos (Absolute) 04/24/2017 0.37    • Baso (Absolute) 04/24/2017 0.05    • Immature Granulocytes (a* 04/24/2017 0.05    • NRBC (Absolute) 04/24/2017 0.00    • C Reactive Protein High * 04/24/2017 2.3    • 25-Hydroxy   Vitamin D 25 04/24/2017 9*   • Cryoglobulins 04/24/2017 NEG 72Hour    • Anti-Cariolipin Ab Igg 04/24/2017 2    • Anti-Cardiolipin Ab Igm 04/24/2017 4    • Anti-Cardiolipin Ab Iga 04/24/2017 1    • PT 04/24/2017 13.3    • INR 04/24/2017 0.98    • APTT 04/24/2017 26.2    • TSH 04/24/2017 3.850*   • Free T-4 04/24/2017 0.85    • Anti-Thyroglobulin 04/24/2017 1.1    • Microsomal -Tpo- Abs 04/24/2017 69.9*         clinical course has been stable    Past Medical History   Diagnosis Date   • Pseudotumor cerebri    • Seizure disorder (CMS-HCC)    • Migraine        No past surgical history on file.    Family History   Problem Relation Age of Onset   • Cancer Mother      Cervical       Review of patient's allergies indicates no known allergies.    Current Outpatient Prescriptions Ordered in [facility]   Medication Sig Dispense Refill   • phentermine (ADIPEX-P) 37.5 MG tablet Take 37.5 mg by mouth every morning before breakfast.     • Cholecalciferol (VITAMIN D) 2000 UNITS Cap Take  by mouth.     • sumatriptan (IMITREX) 50 MG Tab Take 1 Tab by mouth Once PRN for Migraine for up to 1 dose. 10 Tab 3   • topiramate (TOPAMAX) 50 MG tablet Take 1 Tab by mouth 2 times a day. 60 Tab 3   • amlodipine (NORVASC)  "5 MG Tab Take 1 Tab by mouth every day. 30 Tab 11     No current Epic-ordered facility-administered medications on file.       Constitutional ROS: No unexpected change in weight, No weakness, No unexplained fevers, sweats, or chills  Pulmonary ROS: No chronic cough, sputum, or hemoptysis, No shortness of breath, No recent change in breathing  Cardiovascular ROS: No chest pain, No edema, No palpitations, Positive for hypertension, controlled  Gastrointestinal ROS: No abdominal pain, No nausea, vomiting, diarrhea, or constipation, no blood in stool  Musculoskeletal/Extremities ROS: No clubbing, No peripheral edema, No pain, redness or swelling on the joints  Neurologic ROS: Positive per history of present illness  Endocrine ROS: Positive per history of present illness  Genitourinary ROS: Positive per history of present illness    Physical exam:  /80 mmHg  Pulse 66  Temp(Src) 36.5 °C (97.7 °F)  Resp 16  Ht 1.778 m (5' 10\")  Wt 136.079 kg (300 lb)  BMI 43.05 kg/m2  SpO2 98%  LMP 03/03/2012  General Appearance: Young female, alert, no distress, morbidly obese, well-groomed  Skin: Skin color, texture, turgor normal. No rashes or lesions.  Lungs: negative findings: normal respiratory rate and rhythm, lungs clear to auscultation  Heart: negative. RRR without murmur, gallop, or rubs.  No ectopy.  Abdomen: Abdomen soft, non-tender. BS normal. No masses,  No organomegaly  Musculoskeletal: negative findings: ROM of all joints is normal, strength normal, no evidence of muscle atrophy, no deformities present  Neurologic: intact, oriented, mood appropriate, judgment intact. Cranial nerves II through XII grossly intact    Medical decision making/discussion: Patient here to establish care with new PCP and discuss her acute and chronic conditions. With regards to her weight and desire to restart Tim, I discussed with her that I would have to confirm appropriateness with her neurologist. I will send him staff " message back to patient as soon as possible. In the interim, she is to continue with healthy eating and regular physical activity. She is also to continue care per neurology for seizure disorder, migraines and pseudotumor cerebri monitoring. She is also follow-up with her OB/GYN for her elevated testosterone levels. We will closely monitor her thyroid function tests including TSH and antibodies. She is to follow up with me in 3 months, sooner if needed.    Nickolas was seen today for establish care and weight loss.    Diagnoses and all orders for this visit:    Seizure disorder (CMS-HCC)    Pseudotumor cerebri    Morbid obesity with BMI of 40.0-44.9, adult (HCC)    Abnormal thyroid blood test    Elevated testosterone level in female          Please note that this dictation was created using voice recognition software. I have made every reasonable attempt to correct obvious errors, but I expect that there are errors of grammar and possibly content that I did not discover before finalizing the note.

## 2017-05-02 NOTE — ASSESSMENT & PLAN NOTE
Patient reports a history of elevated testosterone levels. She has been referred back to her OB/GYN and has an appointment at the end of the month. She had pelvic ultrasound ordered and has showed me a copy of these results, I do not see the scanned into the medical record. We will request these records.

## 2017-05-02 NOTE — ASSESSMENT & PLAN NOTE
This is a chronic condition, uncontrolled. She has struggled with obesity and difficulty with weight loss. Her weight is 300 pounds, BMI 43.05. At her last visit with PCP, she requested to go back on phentermine that had been prescribed in the past. Her past PCP told her that she would have to discuss this with neurology as she is being treated for seizure disorder, pseudotumor cerebri and intractable migraines. Patient states to me that neurology has cleared her to take phentermine, but is not willing to prescribe it. I have reviewed that past note, the do not see documentation that she has been cleared to take the medication. I did discuss with her that I would send staff message to neurologist to confirm this and if it is okay with him I will prescribe the medication.  She does admit to me today that she has bought phentermine online and has been taking 37.5 mg daily for the past month. She reports to me that she tolerates this medication well with no significant or bothersome side effects other than elevated heart rate. She does state that she has lost 15 pounds in the last month according to her scale home. I encouraged her to continue with regular physical activity and healthy diet. She reports to me she has run out of the medication and would like to know soon as possible with her not she can restart the phentermine, she will need to have this prescribed on myself because she is not willing to pay the inflated prices online.

## 2017-05-02 NOTE — PATIENT INSTRUCTIONS
Will send him a message regarding the phentermine--I will get back to as soon as possible    With regard to thyroid--let's monitor this, let's recheck in 3 months    Continue care per neurology    Stay on all meds    Follow up with me 3 months

## 2017-05-03 ENCOUNTER — HOSPITAL ENCOUNTER (OUTPATIENT)
Facility: MEDICAL CENTER | Age: 27
End: 2017-05-03
Attending: PSYCHIATRY & NEUROLOGY
Payer: COMMERCIAL

## 2017-05-03 ENCOUNTER — TELEPHONE (OUTPATIENT)
Dept: NEUROLOGY | Facility: MEDICAL CENTER | Age: 27
End: 2017-05-03

## 2017-05-03 ENCOUNTER — HOSPITAL ENCOUNTER (OUTPATIENT)
Dept: RADIOLOGY | Facility: MEDICAL CENTER | Age: 27
End: 2017-05-03
Attending: PSYCHIATRY & NEUROLOGY
Payer: COMMERCIAL

## 2017-05-03 DIAGNOSIS — G40.909 SEIZURE DISORDER (HCC): ICD-10-CM

## 2017-05-03 DIAGNOSIS — G93.2 PSEUDOTUMOR CEREBRI: ICD-10-CM

## 2017-05-03 DIAGNOSIS — G43.019 INTRACTABLE MIGRAINE WITHOUT AURA AND WITHOUT STATUS MIGRAINOSUS: ICD-10-CM

## 2017-05-03 DIAGNOSIS — R51.9 CHRONIC DAILY HEADACHE: ICD-10-CM

## 2017-05-03 LAB
25(OH)D3 SERPL-MCNC: 11 NG/ML (ref 30–100)
APTT PPP: 28.1 SEC (ref 24.7–36)
BASOPHILS # BLD AUTO: 0.5 % (ref 0–1.8)
BASOPHILS # BLD: 0.04 K/UL (ref 0–0.12)
BURR CELLS/RBC NFR CSF MANUAL: 75 %
CLARITY CSF: NORMAL
COLOR CSF: NORMAL
COLOR SPUN CSF: COLORLESS
CRP SERPL HS-MCNC: 1.8 MG/L (ref 0–7.5)
EOSINOPHIL # BLD AUTO: 0.3 K/UL (ref 0–0.51)
EOSINOPHIL NFR BLD: 3.6 % (ref 0–6.9)
ERYTHROCYTE [DISTWIDTH] IN BLOOD BY AUTOMATED COUNT: 41.1 FL (ref 35.9–50)
ERYTHROCYTE [SEDIMENTATION RATE] IN BLOOD BY WESTERGREN METHOD: 11 MM/HOUR (ref 0–20)
GLUCOSE CSF-MCNC: 65 MG/DL (ref 40–80)
HCT VFR BLD AUTO: 43.9 % (ref 37–47)
HGB BLD-MCNC: 14.8 G/DL (ref 12–16)
IMM GRANULOCYTES # BLD AUTO: 0.08 K/UL (ref 0–0.11)
IMM GRANULOCYTES NFR BLD AUTO: 0.9 % (ref 0–0.9)
INR PPP: 0.98 (ref 0.87–1.13)
LYMPHOCYTES # BLD AUTO: 2.06 K/UL (ref 1–4.8)
LYMPHOCYTES NFR BLD: 24.4 % (ref 22–41)
LYMPHOCYTES NFR CSF: 25 %
MCH RBC QN AUTO: 28.2 PG (ref 27–33)
MCHC RBC AUTO-ENTMCNC: 33.7 G/DL (ref 33.6–35)
MCV RBC AUTO: 83.8 FL (ref 81.4–97.8)
MONOCYTES # BLD AUTO: 0.5 K/UL (ref 0–0.85)
MONOCYTES NFR BLD AUTO: 5.9 % (ref 0–13.4)
MONONUC CELLS NFR CSF: 23 %
NEUTROPHILS # BLD AUTO: 5.45 K/UL (ref 2–7.15)
NEUTROPHILS NFR BLD: 64.7 % (ref 44–72)
NEUTROPHILS NFR CSF: 52 %
NRBC # BLD AUTO: 0 K/UL
NRBC BLD AUTO-RTO: 0 /100 WBC
PLATELET # BLD AUTO: 304 K/UL (ref 164–446)
PMV BLD AUTO: 9.8 FL (ref 9–12.9)
PROT CSF-MCNC: 56 MG/DL (ref 15–45)
PROTHROMBIN TIME: 13.3 SEC (ref 12–14.6)
RBC # BLD AUTO: 5.24 M/UL (ref 4.2–5.4)
RBC # CSF: 5000 CELLS/UL
SPECIMEN VOL CSF: 19 ML
T4 FREE SERPL-MCNC: 0.89 NG/DL (ref 0.53–1.43)
THYROPEROXIDASE AB SERPL-ACNC: 86.3 IU/ML (ref 0–9)
TSH SERPL DL<=0.005 MIU/L-ACNC: 5.54 UIU/ML (ref 0.3–3.7)
TUBE # CSF: 3
TUBE # CSF: 4
VIT B12 SERPL-MCNC: 434 PG/ML (ref 211–911)
WBC # BLD AUTO: 8.4 K/UL (ref 4.8–10.8)
WBC # CSF: 9 CELLS/UL (ref 0–10)

## 2017-05-03 PROCEDURE — 86225 DNA ANTIBODY NATIVE: CPT

## 2017-05-03 PROCEDURE — 82945 GLUCOSE OTHER FLUID: CPT

## 2017-05-03 PROCEDURE — 83916 OLIGOCLONAL BANDS: CPT

## 2017-05-03 PROCEDURE — 85610 PROTHROMBIN TIME: CPT

## 2017-05-03 PROCEDURE — 84439 ASSAY OF FREE THYROXINE: CPT

## 2017-05-03 PROCEDURE — 86376 MICROSOMAL ANTIBODY EACH: CPT

## 2017-05-03 PROCEDURE — 85025 COMPLETE CBC W/AUTO DIFF WBC: CPT

## 2017-05-03 PROCEDURE — 86038 ANTINUCLEAR ANTIBODIES: CPT

## 2017-05-03 PROCEDURE — 82306 VITAMIN D 25 HYDROXY: CPT

## 2017-05-03 PROCEDURE — 89051 BODY FLUID CELL COUNT: CPT

## 2017-05-03 PROCEDURE — 86147 CARDIOLIPIN ANTIBODY EA IG: CPT

## 2017-05-03 PROCEDURE — 86141 C-REACTIVE PROTEIN HS: CPT

## 2017-05-03 PROCEDURE — 85652 RBC SED RATE AUTOMATED: CPT

## 2017-05-03 PROCEDURE — 84443 ASSAY THYROID STIM HORMONE: CPT

## 2017-05-03 PROCEDURE — 85730 THROMBOPLASTIN TIME PARTIAL: CPT

## 2017-05-03 PROCEDURE — 62270 DX LMBR SPI PNXR: CPT

## 2017-05-03 PROCEDURE — 86800 THYROGLOBULIN ANTIBODY: CPT

## 2017-05-03 PROCEDURE — 84157 ASSAY OF PROTEIN OTHER: CPT

## 2017-05-03 PROCEDURE — 82607 VITAMIN B-12: CPT

## 2017-05-03 PROCEDURE — 86235 NUCLEAR ANTIGEN ANTIBODY: CPT

## 2017-05-03 NOTE — TELEPHONE ENCOUNTER
Pt is calling and stating that you started her on Topamax for the Seizures and she is try ing to lose some weight. She is aware that Topamax can help with this. She is also asking her PCP if she can get a Rx for Phentermine. Her PCP will not do the Rx until she hears from you that it is ok for her to take this medication with her current medical issues. Please advise. Thank you. ERIN

## 2017-05-04 ENCOUNTER — TELEPHONE (OUTPATIENT)
Dept: MEDICAL GROUP | Facility: PHYSICIAN GROUP | Age: 27
End: 2017-05-04

## 2017-05-04 ENCOUNTER — TELEPHONE (OUTPATIENT)
Dept: NEUROLOGY | Facility: MEDICAL CENTER | Age: 27
End: 2017-05-04

## 2017-05-04 DIAGNOSIS — E55.9 VITAMIN D DEFICIENCY: ICD-10-CM

## 2017-05-04 DIAGNOSIS — E06.3 HASHIMOTO'S THYROIDITIS: ICD-10-CM

## 2017-05-04 RX ORDER — LEVOTHYROXINE SODIUM 0.05 MG/1
50 TABLET ORAL
Qty: 30 TAB | Refills: 5 | Status: SHIPPED | OUTPATIENT
Start: 2017-05-04 | End: 2017-05-05

## 2017-05-04 RX ORDER — ERGOCALCIFEROL 1.25 MG/1
50000 CAPSULE ORAL
Qty: 12 CAP | Refills: 0 | Status: SHIPPED | OUTPATIENT
Start: 2017-05-04 | End: 2017-08-16

## 2017-05-04 NOTE — TELEPHONE ENCOUNTER
Called and discussed with the patient on the phone.     She is quite concerned that her labs have come back abnormal from Dr. Sandra. She does have elevated TSH, normal T4, she has elevated TPO antibodies. We did discuss Hashimoto's thyroiditis. I started her on Synthroid 50 µg daily, she'll repeat her TSH and T4 in 6 weeks.    Additionally her vitamin D is low at 11 despite 4000 international units of D3 daily, she will start on 50,000 international units every Sunday for 12 weeks. We will recheck this in 3 months.    Lastly, Dr. Sandra has approved phentermine use, she is requesting a prescription from her PCP as they discuss this at her last office visit. Please faxed to pharmacy on file.

## 2017-05-04 NOTE — TELEPHONE ENCOUNTER
When I called and spoke with pt about her lab results and about the LP and opening pressure at 28 CM. She stated the Radiologist took out 11 cc of the CSF. She wanted to let you know since he drained that much CSF out she has not had a headache. Please advise. Thank you. ERIN

## 2017-05-04 NOTE — OR SURGEON
Immediate Post- Operative Note        PostOp Diagnosis: elevated CSF pressure      Procedure(s): fluoro guided lumbar puncture      Estimated Blood Loss: Less than 5 ml        Complications: None            5/3/2017     5:10 PM     Uli Lilly

## 2017-05-04 NOTE — TELEPHONE ENCOUNTER
The weight losing medicine is not my speciality   I do can say, from neurologist's perspective, I do not see any contraindication from neurologist's perspective     Weight losing medicine may have side effects on GI , other organs, which is not my speciality     Due to Vit D deficiency, please take vit D-3 2000 unit-4000 unit daily     Also told her   The opening pressure 28 CM--- borderline   Weight loss is extremely important for her. YP    Called and spoke with pt. All information/instructions were given. She verbally understood and will comply with all given. ERIN

## 2017-05-04 NOTE — TELEPHONE ENCOUNTER
1. Caller Name: Pt                      Call Back Number: [phone] (home)       2. Message: Pt states her neurologist okayed a rx of Phentermine, also she had a spinal tap yesterday and was told her PCP needs to look at it because she needs to be on thyroid medication. Pt is very concerned and would like call back.     3. Patient approves office to leave a detailed voicemail/MyChart message: N\A

## 2017-05-05 DIAGNOSIS — E66.01 MORBID OBESITY WITH BMI OF 40.0-44.9, ADULT (HCC): ICD-10-CM

## 2017-05-05 LAB — THYROGLOB AB SERPL-ACNC: 1 IU/ML (ref 0–4)

## 2017-05-05 RX ORDER — PHENTERMINE HYDROCHLORIDE 37.5 MG/1
37.5 TABLET ORAL
Qty: 30 TAB | Refills: 2 | Status: SHIPPED
Start: 2017-05-05 | End: 2017-08-16 | Stop reason: SDUPTHER

## 2017-05-06 LAB
CARDIOLIPIN IGA SER IA-ACNC: 2 APL (ref 0–11)
CARDIOLIPIN IGG SER IA-ACNC: 4 GPL (ref 0–14)
CARDIOLIPIN IGM SER IA-ACNC: 0 MPL (ref 0–12)
OLIGOCLONAL BANDS CSF ELPH-IMP: NORMAL
OLIGOCLONAL BANDS CSF IEF: 0 BANDS (ref 0–1)
OLIGOCLONAL BANDS.IT SER+CSF QL: NEGATIVE

## 2017-05-07 LAB — NUCLEAR IGG SER QL IA: NORMAL

## 2017-05-10 ENCOUNTER — TELEPHONE (OUTPATIENT)
Dept: NEUROLOGY | Facility: MEDICAL CENTER | Age: 27
End: 2017-05-10

## 2017-05-10 NOTE — TELEPHONE ENCOUNTER
Pt is calling and stating that she saw on My Chart that the Protein was high in her CSF. She wants to know if she needs to be really concerned about this and what does it mean that it is high. Please advise. Thank you. ERIN

## 2017-05-11 ENCOUNTER — TELEPHONE (OUTPATIENT)
Dept: URGENT CARE | Facility: PHYSICIAN GROUP | Age: 27
End: 2017-05-11

## 2017-05-11 NOTE — TELEPHONE ENCOUNTER
No need to worry   We will notify her is something in lab needs attention. YP    Called and spoke with pt. All information was given. Pt understood all information given. ERIN

## 2017-05-12 ENCOUNTER — TELEPHONE (OUTPATIENT)
Dept: NEUROLOGY | Facility: MEDICAL CENTER | Age: 27
End: 2017-05-12

## 2017-05-12 NOTE — TELEPHONE ENCOUNTER
Pt is calling and stating that she had an episode last night where she passed out due to a really bad headache. When she passed out it was after intercourse. She states out of all the migraines she has ever had this was the worse one. She has never passed out either. She states she was out only for about 10 seconds or so. She took an Imitrex to stop the pain. She woke up this morning and she still has a slight/dull headache. She feels the pain in the back of her head at the base of her neck. Pt is super scared this pain was the worse she has ever experienced. Please advise. Thank you. ERIN

## 2017-05-12 NOTE — TELEPHONE ENCOUNTER
Keep tract of these events     We are waiting for EEG   Brain CT -- did not show structural lesion. YP    Pt notified. She verbally understood and will comply with all given information/instructions. ERIN

## 2017-06-08 ENCOUNTER — OFFICE VISIT (OUTPATIENT)
Dept: NEUROLOGY | Facility: MEDICAL CENTER | Age: 27
End: 2017-06-08
Payer: COMMERCIAL

## 2017-06-08 VITALS
BODY MASS INDEX: 41.95 KG/M2 | WEIGHT: 293 LBS | HEART RATE: 70 BPM | TEMPERATURE: 98.1 F | SYSTOLIC BLOOD PRESSURE: 124 MMHG | HEIGHT: 70 IN | OXYGEN SATURATION: 97 % | RESPIRATION RATE: 16 BRPM | DIASTOLIC BLOOD PRESSURE: 80 MMHG

## 2017-06-08 DIAGNOSIS — R76.8 ANTI-TPO ANTIBODIES PRESENT: ICD-10-CM

## 2017-06-08 DIAGNOSIS — E66.9 NON MORBID OBESITY, UNSPECIFIED OBESITY TYPE: ICD-10-CM

## 2017-06-08 PROCEDURE — 7101 PR PHYSICAL: Performed by: NURSE PRACTITIONER

## 2017-06-08 NOTE — PROGRESS NOTES
"Subjective:      Nickolas Galdamez is a 27 y.o. female who presents with Follow-Up for History of pseudo-tumor cerebri and seizures.  Established with Dr Sandra.        HPI   Here for [facility] paperwork today.    Wishes to mention that since since a lumbar tap her general headache migraines have improved.  However, with pressure changes primarily during sex she will have an extreme head pressure starting in the occipital region.    Appreciates the TPX 50mg BID dosing as is.  Has had some weight loss with the phentermine 27.5mg per morning.    Current Outpatient Prescriptions   Medication Sig Dispense Refill   • phentermine (ADIPEX-P) 37.5 MG tablet Take 1 Tab by mouth every morning before breakfast. 30 Tab 2   • vitamin D, Ergocalciferol, (DRISDOL) 76492 UNITS Cap capsule Take 1 Cap by mouth every 7 days. 12 Cap 0   • Cholecalciferol (VITAMIN D) 2000 UNITS Cap Take  by mouth.     • topiramate (TOPAMAX) 50 MG tablet Take 1 Tab by mouth 2 times a day. 60 Tab 3   • amlodipine (NORVASC) 5 MG Tab Take 1 Tab by mouth every day. 30 Tab 11   • sumatriptan (IMITREX) 50 MG Tab Take 1 Tab by mouth Once PRN for Migraine for up to 1 dose. 10 Tab 3     No current facility-administered medications for this visit.        ROS       Objective:     /80 mmHg  Pulse 70  Temp(Src) 36.7 °C (98.1 °F)  Resp 16  Ht 1.778 m (5' 10\")  Wt 135.172 kg (298 lb)  BMI 42.76 kg/m2  SpO2 97%  LMP 03/03/2012     Physical Exam            Assessment/Plan:     Pseudo-tumor cerebri and Seizure Disorder:  [facility] paperwork completed.    Spoke at length regarding phone call messages.    Ultimately, she is advised and agrees to have her labs collected per PCP and a referral is placed to endocrinology.    Return for follow-up as previously planned for EEG and subsequent follow-up.  "

## 2017-06-16 ENCOUNTER — TELEPHONE (OUTPATIENT)
Dept: NEUROLOGY | Facility: MEDICAL CENTER | Age: 27
End: 2017-06-16

## 2017-06-27 ENCOUNTER — HOSPITAL ENCOUNTER (OUTPATIENT)
Dept: LAB | Facility: MEDICAL CENTER | Age: 27
End: 2017-06-27
Attending: NURSE PRACTITIONER
Payer: COMMERCIAL

## 2017-06-27 DIAGNOSIS — E06.3 HASHIMOTO'S THYROIDITIS: ICD-10-CM

## 2017-06-27 LAB
T4 FREE SERPL-MCNC: 0.98 NG/DL (ref 0.53–1.43)
TSH SERPL DL<=0.005 MIU/L-ACNC: 1.92 UIU/ML (ref 0.3–3.7)

## 2017-06-27 PROCEDURE — 36415 COLL VENOUS BLD VENIPUNCTURE: CPT

## 2017-06-27 PROCEDURE — 84443 ASSAY THYROID STIM HORMONE: CPT

## 2017-06-27 PROCEDURE — 84439 ASSAY OF FREE THYROXINE: CPT

## 2017-07-24 ENCOUNTER — NON-PROVIDER VISIT (OUTPATIENT)
Dept: NEUROLOGY | Facility: MEDICAL CENTER | Age: 27
End: 2017-07-24
Payer: COMMERCIAL

## 2017-07-24 DIAGNOSIS — G40.909 SEIZURE DISORDER (HCC): ICD-10-CM

## 2017-07-24 PROCEDURE — 95819 EEG AWAKE AND ASLEEP: CPT | Performed by: PSYCHIATRY & NEUROLOGY

## 2017-07-24 NOTE — MR AVS SNAPSHOT
Nickolas Galdamez   2017 7:30 AM   Non-Provider Visit   MRN: [id number]    Department:  [facility]   Dept Phone:  [phone]    Description:  Female : 1990   Provider:  NEURODIAGNOSTIC LAB [facility]           Reason for Visit     Procedure           Allergies as of 2017     No Known Allergies      You were diagnosed with     Seizure disorder (CMS-HCC)   [255924]         Vital Signs     Last Menstrual Period Smoking Status                2012 Former Smoker          Basic Information     Date Of Birth Sex Race Ethnicity Preferred Language    1990 Female White Non- English      Your appointments     2017  9:30 AM   New Patient with Sanford Ayala M.D.   [facility]    [address], [address]  [place] [address] [phone]           Please bring Photo ID, Insurance Cards, All Medication Bottles and copies of any legal documents (such as Living Will, Power of ) If speaking a language besides English please bring an adult . Please arrive 30 minutes prior for check in and registration. You will be receiving a confirmation call a few days before your appointment from our automated call confirmation system.            Aug 08, 2017 10:00 AM   Follow Up Visit with Chel Sandra M.D.   [facility] (--)    [address], [address]  [place] [address] [phone]           You will be receiving a confirmation call a few days before your appointment from our automated call confirmation system.            Aug 16, 2017  1:20 PM   Established Patient with KARINE Cuevas   [facility]    [address] [address] [phone]           You will be receiving a confirmation call a few days before your appointment from our automated call confirmation system.              Problem List              ICD-10-CM Priority Class Noted -  Resolved    Pseudotumor cerebri G93.2   10/15/2012 - Present    History of migraine headaches Z86.69   10/15/2012 - Present    Seizure disorder (CMS-[facility]) G40.909   10/22/2012 - Present    Obesity E66.9   4/14/2013 - Present    Essential hypertension I10   1/31/2017 - Present    Morbid obesity with BMI of 40.0-44.9, adult ([facility]) E66.01, Z68.41   1/31/2017 - Present    Chronic daily headache R51   2/28/2017 - Present    Elevated testosterone level in female E34.9   2/28/2017 - Present    Intractable migraine without status migrainosus G43.919   4/24/2017 - Present    Abnormal thyroid blood test R94.6   5/2/2017 - Present    Vitamin D deficiency E55.9   5/4/2017 - Present    Hashimoto's thyroiditis E06.3   5/4/2017 - Present      Health Maintenance        Date Due Completion Dates    IMM HEP B VACCINE (1 of 3 - Primary Series) 1990 ---    IMM HEP A VACCINE (1 of 2 - Standard Series) 3/2/1992 ---    IMM HPV VACCINE (1 of 3 - Female 3 Dose Series) 3/2/2002 ---    IMM VARICELLA (CHICKENPOX) VACCINE (1 of 2 - 2 Dose Adolescent Series) 3/2/2004 ---    IMM DTaP/Tdap/Td Vaccine (1 - Tdap) 3/2/2010 ---    IMM INFLUENZA (1) 9/1/2017 1/30/2013    PAP SMEAR 12/1/2017 12/1/2014, 10/15/2012            Current Immunizations     Influenza TIV (IM) 1/30/2013 11:30 AM      Below and/or attached are the medications your provider expects you to take. Review all of your home medications and newly ordered medications with your provider and/or pharmacist. Follow medication instructions as directed by your provider and/or pharmacist. Please keep your medication list with you and share with your provider. Update the information when medications are discontinued, doses are changed, or new medications (including over-the-counter products) are added; and carry medication information at all times in the event of emergency situations     Allergies:  No Known Allergies          Medications  Valid as of: July 24, 2017 - 12:34 PM    Generic Name  Brand Name Tablet Size Instructions for use    AmLODIPine Besylate (Tab) NORVASC 5 MG Take 1 Tab by mouth every day.        Cholecalciferol (Cap) Vitamin D 2000 UNITS Take  by mouth.        Ergocalciferol (Cap) DRISDOL 27772 UNITS Take 1 Cap by mouth every 7 days.        Phentermine HCl (Tab) ADIPEX-P 37.5 MG Take 1 Tab by mouth every morning before breakfast.        SUMAtriptan Succinate (Tab) IMITREX 50 MG Take 1 Tab by mouth Once PRN for Migraine for up to 1 dose.        Topiramate (Tab) TOPAMAX 50 MG Take 1 Tab by mouth 2 times a day.        .                 Medicines prescribed today were sent to:     [id number] - [place] - [address]    [address] [place] [address]    Phone: [phone] Fax: [phone]    Open 24 Hours?: No      Medication refill instructions:       If your prescription bottle indicates you have medication refills left, it is not necessary to call your provider’s office. Please contact your pharmacy and they will refill your medication.    If your prescription bottle indicates you do not have any refills left, you may request refills at any time through one of the following ways: The online [employer] system (except Urgent Care), by calling your provider’s office, or by asking your pharmacy to contact your provider’s office with a refill request. Medication refills are processed only during regular business hours and may not be available until the next business day. Your provider may request additional information or to have a follow-up visit with you prior to refilling your medication.   *Please Note: Medication refills are assigned a new Rx number when refilled electronically. Your pharmacy may indicate that no refills were authorized even though a new prescription for the same medication is available at the pharmacy. Please request the medicine by name with the pharmacy before contacting your provider for a refill.           [employer] Access Code: Activation code not  generated  Current MyChart Status: Active

## 2017-07-25 NOTE — PROGRESS NOTES
ROUTINE ELECTROENCEPHALOGRAM REPORT        INDICATION:     History of pseudo-tumor cerebri and seizures. Migraines    TECHNIQUE: 30 channel routine electroencephalogram (EEG) was performed in accordance with the international 10-20 system. The study was reviewed in bipolar and referential montages. The recording examined the patient during wakeful and drowsy state(s).     DESCRIPTION OF THE RECORD:        Background rhythm during awake stage shows well-organized, well-developed, average voltage 10 to 11 hertz alpha activity in the posterior regions.  It blocks with eye opening and it is bilaterally synchronous and symmetrical.  No definite spike-and-wave discharges or any lateralizing abnormalities are seen.  Photic stimulation induced some generalized bursts- theta, delta and sharp. Hyperventilation did not produce any abnormalities.   Stage I sleep was achieved.      ACTIVATION PROCEDURES:      Hyperventilation and Photic Stimulation were done    ICTAL AND/OR INTERICTAL FINDINGS:    Photic stimulation induced some generalized bursts- theta, delta and sharp No clinical events or seizures were reported or recorded during the study.      EKG: sampling of the EKG recording demonstrated sinus rhythm.        INTERPRETATION:    ________________________________________________________________________    This Scalp EEG is mildly abnormal because of photic stimulation induced generalized bursts- theta, delta and sharp    This abnormality remains not specific--- and idiopathic generalized epilepsy or frontal lobe seizures remain possible    Clinical Correlation would help    ________________________________________________________________________

## 2017-07-27 ENCOUNTER — OFFICE VISIT (OUTPATIENT)
Dept: ENDOCRINOLOGY | Facility: MEDICAL CENTER | Age: 27
End: 2017-07-27
Payer: COMMERCIAL

## 2017-07-27 VITALS
HEIGHT: 70 IN | HEART RATE: 92 BPM | OXYGEN SATURATION: 98 % | DIASTOLIC BLOOD PRESSURE: 80 MMHG | WEIGHT: 286 LBS | SYSTOLIC BLOOD PRESSURE: 128 MMHG | BODY MASS INDEX: 40.94 KG/M2

## 2017-07-27 DIAGNOSIS — R73.03 PREDIABETES: ICD-10-CM

## 2017-07-27 DIAGNOSIS — E16.2 HYPOGLYCEMIA: ICD-10-CM

## 2017-07-27 DIAGNOSIS — E24.0 CUSHING'S DISEASE (HCC): ICD-10-CM

## 2017-07-27 DIAGNOSIS — E53.8 VITAMIN B12 DEFICIENCY: ICD-10-CM

## 2017-07-27 DIAGNOSIS — E06.3 HYPOTHYROIDISM DUE TO HASHIMOTO'S THYROIDITIS: ICD-10-CM

## 2017-07-27 DIAGNOSIS — E03.8 HYPOTHYROIDISM DUE TO HASHIMOTO'S THYROIDITIS: ICD-10-CM

## 2017-07-27 DIAGNOSIS — E55.9 VITAMIN D DEFICIENCY: ICD-10-CM

## 2017-07-27 PROCEDURE — 99205 OFFICE O/P NEW HI 60 MIN: CPT | Performed by: INTERNAL MEDICINE

## 2017-07-27 RX ORDER — ERGOCALCIFEROL 1.25 MG/1
50000 CAPSULE ORAL
Qty: 18 CAP | Refills: 0 | Status: SHIPPED | OUTPATIENT
Start: 2017-07-27 | End: 2017-08-16

## 2017-07-27 NOTE — PROGRESS NOTES
New Patient Consult Note  Primary care physician: KARINE Cuevas    Reason for consult: Hashimoto's thyroiditis    HPI:  Nickolas Galdamez is a 27 y.o. old patient who comes in today for evaluation of Hashimoto's thyroiditis. Most recent TSH and T4 appears to be in the normal range however the previous 3 thyroid function tests on 3 separate occasions clearly showing hypothyroidism secondary to Hashimoto's thyroiditis.     She complains of dizzy spells since February 2017. She states that this dizzy spells could happen any time in few minutes prior to this dizzy spells she would experience heart palpitations along with sweating, slight confusion and sometimes this is followed by completely passing out. She also states that on one occasion she developed this dizzy spell at the time of the sexual intercourse when she was about to enter into the orgasmic face and she completely passed out. She doesn't even remember what happened and she woke up several hours later not knowing that she had passed out. Similar episodes am also happened when she is driving.     ROS:  Constitutional: No weight loss  Cardiac:  palpitations or racing heart prior to dizziness and passing out  Resp: No shortness of breath  Neuro: dizziness, No numbness or tinging in feet  Endo: No heat or cold intolerance, no polyuria or polydipsia  Skin:   All other systems were reviewed and were negative.    Past Medical History:  Patient Active Problem List    Diagnosis Date Noted   • Vitamin D deficiency 05/04/2017   • Hashimoto's thyroiditis 05/04/2017   • Abnormal thyroid blood test 05/02/2017   • Intractable migraine without status migrainosus 04/24/2017   • Chronic daily headache 02/28/2017   • Elevated testosterone level in female 02/28/2017   • Essential hypertension 01/31/2017   • Morbid obesity with BMI of 40.0-44.9, adult ([facility]) 01/31/2017   • Obesity 04/14/2013   • Seizure disorder (CMS-[facility]) 10/22/2012   • Pseudotumor cerebri 10/15/2012  "  • History of migraine headaches 10/15/2012       Past Surgical History:  History reviewed. No pertinent past surgical history.    Allergies:  Review of patient's allergies indicates no known allergies.    Social History:  Social History     Social History   • Marital Status: Single     Spouse Name: N/A   • Number of Children: N/A   • Years of Education: N/A     Occupational History   • Not on file.     Social History Main Topics   • Smoking status: Former Smoker     Quit date: 05/03/2012   • Smokeless tobacco: Never Used      Comment: 1/2 pack a day   • Alcohol Use: 0.6 oz/week     0 Standard drinks or equivalent, 1 Glasses of wine per week      Comment: socially   • Drug Use: No   • Sexual Activity: No     Other Topics Concern   • Not on file     Social History Narrative    ** Merged History Encounter **            Family History:  Family History   Problem Relation Age of Onset   • Cancer Mother      Cervical       Medications:    Current outpatient prescriptions:   •  vitamin D, Ergocalciferol, (DRISDOL) 16128 UNITS Cap capsule, Take 1 Cap by mouth every 3 days for 56 days., Disp: 18 Cap, Rfl: 0  •  phentermine (ADIPEX-P) 37.5 MG tablet, Take 1 Tab by mouth every morning before breakfast., Disp: 30 Tab, Rfl: 2  •  vitamin D, Ergocalciferol, (DRISDOL) 50500 UNITS Cap capsule, Take 1 Cap by mouth every 7 days., Disp: 12 Cap, Rfl: 0  •  Cholecalciferol (VITAMIN D) 2000 UNITS Cap, Take 4,000 Units by mouth., Disp: , Rfl:   •  topiramate (TOPAMAX) 50 MG tablet, Take 1 Tab by mouth 2 times a day., Disp: 60 Tab, Rfl: 3  •  amlodipine (NORVASC) 5 MG Tab, Take 1 Tab by mouth every day., Disp: 30 Tab, Rfl: 11  •  sumatriptan (IMITREX) 50 MG Tab, Take 1 Tab by mouth Once PRN for Migraine for up to 1 dose., Disp: 10 Tab, Rfl: 3    Labs:     Physical Examination:  Vital signs: /80 mmHg  Pulse 92  Ht 1.778 m (5' 10\")  Wt 129.729 kg (286 lb)  BMI 41.04 kg/m2  SpO2 98%  LMP 03/03/2012 Body mass index is 41.04 " kg/(m^2).  General: No apparent distress, cooperative, morbidly obese  Eyes: No scleral icterus or discharge  ENMT: Normal on external inspection of nose, lips, normal thyroid exam  Neck: No abnormal masses on inspection  Resp: Normal effort, clear to auscultation bilaterally   CVS: Regular rate and rhythm, S1 S2 normal, no murmur   Extremities: No edema  Abdomen: abdominal obesity present  Neuro: Alert and oriented  Skin: No rash  Psych: Normal mood and affect, intact memory and able to make informed decisions    Assessment and Plan:    1. Hypoglycemia  Ensure that she is not having hypoglycemia at the time of her dizzy spells.  She Does have some symptoms that could be attributed to neuroglycopenic symptoms of hypoglycemia like sweating and heart palpitations along with confusion. Her A1c done in the office is also very low at 5%. A1c 5% correlates to an average glucose of 97 only which indicates that she might be having a lot of simple hypoglycemia. Advised the patient to check blood sugars in the fasting state and also during the times of dizziness    - C-PEPTIDE; Future  - BLOOD GLUCOSE; Future  - INSULIN FASTING; Future    2. Hypothyroidism due to Hashimoto's thyroiditis  Obtain a detailed thyroid panel for better clarification.    - TRIIDOTHYRONINE; Future  - FREE THYROXINE; Future  - TRIIDOTHYRONINE; Future  - TSH; Future    3. Vitamin B12 deficiency  Rule out vitamin B12 deficiency    4. Cushing's disease (CMS-HCC)  Screen for Cushing's considering her morbid obesity.    - URINE CORTISOL 24 HR, ICMA; Future  - URINE CREATININE 24 HR; Future  - CORTISOL; Future  - ACTH; Future    5. Prediabetes  There is no evidence of prediabetes based on A1c    6. Vitamin D deficiency  Start   - vitamin D, Ergocalciferol, (DRISDOL) 86596 UNITS Cap capsule; Take 1 Cap by mouth every 3 days for 56 days.     Return in about 1 month (around 8/27/2017).    Total face to face time spent with patient equals 60 minutes. 35/60  minutes were spent on counseling the patient about the pathophysiology of pituitary-thyroid axis, pituitary-adrenal axis, hypoglycemia and its possible causes including insulinoma, side effects and benefits of thyroid hormone, Vit D and Vit B12 replacement.    Thank you for allowing me to participate in the care of this patient.    Sanford Ayala M.D.  07/27/2017    CC:   Erica Fuentes, [email or web address].    This note was created using voice recognition software (Dragon). The accuracy of the dictation is limited by the abilities of the software. I have reviewed the note prior to signing, however some errors in grammar and context are still possible. If you have any questions related to this note please do not hesitate to contact our office.

## 2017-07-27 NOTE — MR AVS SNAPSHOT
"        Nickolas Denice   2017 9:30 AM   Office Visit   MRN: [id number]    Department:  [facility]inology [facility]   Dept Phone:  [phone]    Description:  Female : 1990   Provider:  Sanford Ayala M.D.           Reason for Visit     Headache     Weight Gain           Allergies as of 2017     No Known Allergies      You were diagnosed with     Hypoglycemia   [488470]       Hypothyroidism due to Hashimoto's thyroiditis   [1029264]       Vitamin B12 deficiency   [926874]       Cushing's disease (CMS-[facility])   [672765]       Prediabetes   [691015]       Vitamin D deficiency   [5737861]         Vital Signs     Blood Pressure Pulse Height Weight Body Mass Index Oxygen Saturation    128/80 mmHg 92 1.778 m (5' 10\") 129.729 kg (286 lb) 41.04 kg/m2 98%    Last Menstrual Period Smoking Status                2012 Former Smoker          Basic Information     Date Of Birth Sex Race Ethnicity Preferred Language    1990 Female White Non- English      Your appointments     Aug 08, 2017 10:00 AM   Follow Up Visit with Chel Sandra M.D.   [facility] (--)    [address], [address]  [facility] [address] [phone]           You will be receiving a confirmation call a few days before your appointment from our automated call confirmation system.            Aug 16, 2017  1:20 PM   Established Patient with KARINE Cuevas   [address] [address] [phone]           You will be receiving a confirmation call a few days before your appointment from our automated call confirmation system.            Aug 30, 2017 10:30 AM   Established Patient with Sanford Ayala M.D.   [facility] Endocr[facility]    [address], [address]  [facility] [address] [phone]           You will be receiving a confirmation call a few days before your appointment from our automated call " confirmation system.              Problem List              ICD-10-CM Priority Class Noted - Resolved    Pseudotumor cerebri G93.2   10/15/2012 - Present    History of migraine headaches Z86.69   10/15/2012 - Present    Seizure disorder (CMS-HCC) G40.909   10/22/2012 - Present    Obesity E66.9   4/14/2013 - Present    Essential hypertension I10   1/31/2017 - Present    Morbid obesity with BMI of 40.0-44.9, adult ([facility]) E66.01, Z68.41   1/31/2017 - Present    Chronic daily headache R51   2/28/2017 - Present    Elevated testosterone level in female E34.9   2/28/2017 - Present    Intractable migraine without status migrainosus G43.919   4/24/2017 - Present    Abnormal thyroid blood test R94.6   5/2/2017 - Present    Vitamin D deficiency E55.9   5/4/2017 - Present    Hashimoto's thyroiditis E06.3   5/4/2017 - Present      Health Maintenance        Date Due Completion Dates    IMM HEP B VACCINE (1 of 3 - Primary Series) 1990 ---    IMM HEP A VACCINE (1 of 2 - Standard Series) 3/2/1992 ---    IMM HPV VACCINE (1 of 3 - Female 3 Dose Series) 3/2/2002 ---    IMM VARICELLA (CHICKENPOX) VACCINE (1 of 2 - 2 Dose Adolescent Series) 3/2/2004 ---    IMM DTaP/Tdap/Td Vaccine (1 - Tdap) 3/2/2010 ---    IMM INFLUENZA (1) 9/1/2017 1/30/2013    PAP SMEAR 12/1/2017 12/1/2014, 10/15/2012            Current Immunizations     Influenza TIV (IM) 1/30/2013 11:30 AM      Below and/or attached are the medications your provider expects you to take. Review all of your home medications and newly ordered medications with your provider and/or pharmacist. Follow medication instructions as directed by your provider and/or pharmacist. Please keep your medication list with you and share with your provider. Update the information when medications are discontinued, doses are changed, or new medications (including over-the-counter products) are added; and carry medication information at all times in the event of emergency situations     Allergies:  No  Known Allergies          Medications  Valid as of: July 27, 2017 - 10:47 AM    Generic Name Brand Name Tablet Size Instructions for use    AmLODIPine Besylate (Tab) NORVASC 5 MG Take 1 Tab by mouth every day.        Cholecalciferol (Cap) Vitamin D 2000 UNITS Take 4,000 Units by mouth.        Ergocalciferol (Cap) DRISDOL 62731 UNITS Take 1 Cap by mouth every 7 days.        Ergocalciferol (Cap) DRISDOL 12994 UNITS Take 1 Cap by mouth every 3 days for 56 days.        Phentermine HCl (Tab) ADIPEX-P 37.5 MG Take 1 Tab by mouth every morning before breakfast.        SUMAtriptan Succinate (Tab) IMITREX 50 MG Take 1 Tab by mouth Once PRN for Migraine for up to 1 dose.        Topiramate (Tab) TOPAMAX 50 MG Take 1 Tab by mouth 2 times a day.        .                 Medicines prescribed today were sent to:     [facility]/PHARMACY #[id number] - [place], [place] - [address]    [address] [place] [place] [address]    Phone: [phone] Fax: [phone]    Open 24 Hours?: No      Medication refill instructions:       If your prescription bottle indicates you have medication refills left, it is not necessary to call your provider’s office. Please contact your pharmacy and they will refill your medication.    If your prescription bottle indicates you do not have any refills left, you may request refills at any time through one of the following ways: The online [employer] system (except Urgent Care), by calling your provider’s office, or by asking your pharmacy to contact your provider’s office with a refill request. Medication refills are processed only during regular business hours and may not be available until the next business day. Your provider may request additional information or to have a follow-up visit with you prior to refilling your medication.   *Please Note: Medication refills are assigned a new Rx number when refilled electronically. Your pharmacy may indicate that no refills were authorized even though a new prescription for the  same medication is available at the pharmacy. Please request the medicine by name with the pharmacy before contacting your provider for a refill.        Your To Do List     Future Labs/Procedures Complete By Expires    ACTH  As directed 7/27/2018    BLOOD GLUCOSE  As directed 7/27/2018    C-PEPTIDE  As directed 7/27/2018    CORTISOL  As directed 7/27/2018    Scheduling Instructions:    Between 6.0 am and 9.0 am only.    Comments:    Between 6.0 am and 9.0 am only.    FREE THYROXINE  As directed 7/27/2018    INSULIN FASTING  As directed 7/27/2018    TRIIDOTHYRONINE  As directed 7/27/2018    Comments:    Free T3    TRIIDOTHYRONINE  As directed 7/27/2018    Comments:    Total T3    TSH  As directed 7/27/2018    URINE CORTISOL 24 HR, ICMA  As directed 7/27/2018    URINE CREATININE 24 HR  As directed 7/27/2018    VITAMIN B12  As directed 7/27/2018         [employer] Access Code: Activation code not generated  Current Fairphone Status: Active

## 2017-08-08 ENCOUNTER — OFFICE VISIT (OUTPATIENT)
Dept: NEUROLOGY | Facility: MEDICAL CENTER | Age: 27
End: 2017-08-08
Payer: COMMERCIAL

## 2017-08-08 ENCOUNTER — HOSPITAL ENCOUNTER (OUTPATIENT)
Dept: LAB | Facility: MEDICAL CENTER | Age: 27
End: 2017-08-08
Attending: NURSE PRACTITIONER
Payer: COMMERCIAL

## 2017-08-08 ENCOUNTER — HOSPITAL ENCOUNTER (OUTPATIENT)
Dept: LAB | Facility: MEDICAL CENTER | Age: 27
End: 2017-08-08
Attending: INTERNAL MEDICINE
Payer: COMMERCIAL

## 2017-08-08 VITALS
DIASTOLIC BLOOD PRESSURE: 70 MMHG | HEIGHT: 70 IN | SYSTOLIC BLOOD PRESSURE: 140 MMHG | WEIGHT: 282 LBS | RESPIRATION RATE: 16 BRPM | BODY MASS INDEX: 40.37 KG/M2 | OXYGEN SATURATION: 99 % | HEART RATE: 84 BPM | TEMPERATURE: 98.1 F

## 2017-08-08 DIAGNOSIS — E06.3 HYPOTHYROIDISM DUE TO HASHIMOTO'S THYROIDITIS: ICD-10-CM

## 2017-08-08 DIAGNOSIS — E55.9 VITAMIN D DEFICIENCY: ICD-10-CM

## 2017-08-08 DIAGNOSIS — G93.2 PSEUDOTUMOR CEREBRI: ICD-10-CM

## 2017-08-08 DIAGNOSIS — G40.909 SEIZURE DISORDER (HCC): ICD-10-CM

## 2017-08-08 DIAGNOSIS — E24.0 CUSHING'S DISEASE (HCC): ICD-10-CM

## 2017-08-08 DIAGNOSIS — R51.9 CHRONIC DAILY HEADACHE: ICD-10-CM

## 2017-08-08 DIAGNOSIS — E53.8 VITAMIN B12 DEFICIENCY: ICD-10-CM

## 2017-08-08 DIAGNOSIS — G43.019 INTRACTABLE MIGRAINE WITHOUT AURA AND WITHOUT STATUS MIGRAINOSUS: ICD-10-CM

## 2017-08-08 DIAGNOSIS — R79.89 ABNORMAL THYROID BLOOD TEST: ICD-10-CM

## 2017-08-08 DIAGNOSIS — E03.8 HYPOTHYROIDISM DUE TO HASHIMOTO'S THYROIDITIS: ICD-10-CM

## 2017-08-08 DIAGNOSIS — E16.2 HYPOGLYCEMIA: ICD-10-CM

## 2017-08-08 DIAGNOSIS — R73.03 PREDIABETES: ICD-10-CM

## 2017-08-08 LAB
25(OH)D3 SERPL-MCNC: 41 NG/ML (ref 30–100)
GLUCOSE SERPL-MCNC: 76 MG/DL (ref 65–99)
T3 SERPL-MCNC: 100.6 NG/DL (ref 60–181)
T4 FREE SERPL-MCNC: 0.92 NG/DL (ref 0.53–1.43)
THYROPEROXIDASE AB SERPL-ACNC: 60.7 IU/ML (ref 0–9)
TSH SERPL DL<=0.005 MIU/L-ACNC: 3.16 UIU/ML (ref 0.3–3.7)
TSH SERPL DL<=0.005 MIU/L-ACNC: 3.68 UIU/ML (ref 0.3–3.7)
VIT B12 SERPL-MCNC: 584 PG/ML (ref 211–911)

## 2017-08-08 PROCEDURE — 82607 VITAMIN B-12: CPT

## 2017-08-08 PROCEDURE — 99214 OFFICE O/P EST MOD 30 MIN: CPT | Performed by: PSYCHIATRY & NEUROLOGY

## 2017-08-08 PROCEDURE — 82306 VITAMIN D 25 HYDROXY: CPT

## 2017-08-08 PROCEDURE — 84480 ASSAY TRIIODOTHYRONINE (T3): CPT

## 2017-08-08 PROCEDURE — 82024 ASSAY OF ACTH: CPT

## 2017-08-08 PROCEDURE — 84681 ASSAY OF C-PEPTIDE: CPT

## 2017-08-08 PROCEDURE — 83525 ASSAY OF INSULIN: CPT

## 2017-08-08 PROCEDURE — 82947 ASSAY GLUCOSE BLOOD QUANT: CPT

## 2017-08-08 PROCEDURE — 84439 ASSAY OF FREE THYROXINE: CPT

## 2017-08-08 PROCEDURE — 84443 ASSAY THYROID STIM HORMONE: CPT

## 2017-08-08 PROCEDURE — 86376 MICROSOMAL ANTIBODY EACH: CPT

## 2017-08-08 PROCEDURE — 84443 ASSAY THYROID STIM HORMONE: CPT | Mod: 91

## 2017-08-08 PROCEDURE — 36415 COLL VENOUS BLD VENIPUNCTURE: CPT

## 2017-08-08 RX ORDER — TOPIRAMATE 100 MG/1
100 TABLET, FILM COATED ORAL 2 TIMES DAILY
Qty: 60 TAB | Refills: 3 | Status: SHIPPED | OUTPATIENT
Start: 2017-08-08 | End: 2017-12-18 | Stop reason: SDUPTHER

## 2017-08-08 NOTE — MR AVS SNAPSHOT
"        Nickolas Denice   2017 10:00 AM   Office Visit   MRN: [id number]    Department:  [facility]   Dept Phone:  [phone]    Description:  Female : 1990   Provider:  Chel Sandra M.D.           Reason for Visit     Follow-Up Seizure      Allergies as of 2017     No Known Allergies      You were diagnosed with     Seizure disorder (CMS-HCC)   [285523]       Pseudotumor cerebri   [268565]       Chronic daily headache   [459124]   Uncontrolled, will start on Topamax    Intractable migraine without aura and without status migrainosus   [042498]         Vital Signs     Blood Pressure Pulse Temperature Respirations Height Weight    140/70 mmHg 84 36.7 °C (98.1 °F) 16 1.778 m (5' 10\") 127.914 kg (282 lb)    Body Mass Index Oxygen Saturation Last Menstrual Period Smoking Status          40.46 kg/m2 99% 2012 Former Smoker        Basic Information     Date Of Birth Sex Race Ethnicity Preferred Language    1990 Female White Non- English      Your appointments     Aug 08, 2017 11:45 AM   Adult Draw/Collection with LAB [facility] OUT (--)    [address] [place] [address] [phone]            Aug 16, 2017  1:20 PM   Established Patient with KARINE Cuevas   [facility] ([place])    [address] [place] [address] [phone]           You will be receiving a confirmation call a few days before your appointment from our automated call confirmation system.            Aug 30, 2017 10:30 AM   Established Patient with Sanford Ayala M.D.   [facility] End[facility] ([facility])    [address], [address]  [place] [address] [phone]           You will be receiving a confirmation call a few days before your appointment from our automated call confirmation system.              Problem List              ICD-10-CM Priority Class Noted - Resolved    Pseudotumor cerebri G93.2   10/15/2012 - " Present    History of migraine headaches Z86.69   10/15/2012 - Present    Seizure disorder (CMS-[facility]) G40.909   10/22/2012 - Present    Obesity E66.9   4/14/2013 - Present    Essential hypertension I10   1/31/2017 - Present    Morbid obesity with BMI of 40.0-44.9, adult ([facility]) E66.01, Z68.41   1/31/2017 - Present    Chronic daily headache R51   2/28/2017 - Present    Elevated testosterone level in female E34.9   2/28/2017 - Present    Intractable migraine without status migrainosus G43.919   4/24/2017 - Present    Abnormal thyroid blood test R94.6   5/2/2017 - Present    Vitamin D deficiency E55.9   5/4/2017 - Present    Hashimoto's thyroiditis E06.3   5/4/2017 - Present      Health Maintenance        Date Due Completion Dates    IMM HEP B VACCINE (1 of 3 - Primary Series) 1990 ---    IMM HEP A VACCINE (1 of 2 - Standard Series) 3/2/1992 ---    IMM HPV VACCINE (1 of 3 - Female 3 Dose Series) 3/2/2002 ---    IMM VARICELLA (CHICKENPOX) VACCINE (1 of 2 - 2 Dose Adolescent Series) 3/2/2004 ---    IMM DTaP/Tdap/Td Vaccine (1 - Tdap) 3/2/2010 ---    IMM INFLUENZA (1) 9/1/2017 1/30/2013    PAP SMEAR 12/1/2017 12/1/2014, 10/15/2012            Current Immunizations     Influenza TIV (IM) 1/30/2013 11:30 AM      Below and/or attached are the medications your provider expects you to take. Review all of your home medications and newly ordered medications with your provider and/or pharmacist. Follow medication instructions as directed by your provider and/or pharmacist. Please keep your medication list with you and share with your provider. Update the information when medications are discontinued, doses are changed, or new medications (including over-the-counter products) are added; and carry medication information at all times in the event of emergency situations     Allergies:  No Known Allergies          Medications  Valid as of: August 08, 2017 - 10:24 AM    Generic Name Brand Name Tablet Size Instructions for use     AmLODIPine Besylate (Tab) NORVASC 5 MG Take 1 Tab by mouth every day.        Cholecalciferol (Cap) Vitamin D 2000 UNITS Take 4,000 Units by mouth.        Ergocalciferol (Cap) DRISDOL 54736 UNITS Take 1 Cap by mouth every 7 days.        Ergocalciferol (Cap) DRISDOL 61910 UNITS Take 1 Cap by mouth every 3 days for 56 days.        Phentermine HCl (Tab) ADIPEX-P 37.5 MG Take 1 Tab by mouth every morning before breakfast.        SUMAtriptan Succinate (Tab) IMITREX 50 MG Take 1 Tab by mouth Once PRN for Migraine for up to 1 dose.        Topiramate (Tab) TOPAMAX 100 MG Take 1 Tab by mouth 2 times a day.        .                 Medicines prescribed today were sent to:     [facility]/PHARMACY #[id number] - [place] - [address]    [address] [place] [address]    Phone: [phone] Fax: [phone]    Open 24 Hours?: No      Medication refill instructions:       If your prescription bottle indicates you have medication refills left, it is not necessary to call your provider’s office. Please contact your pharmacy and they will refill your medication.    If your prescription bottle indicates you do not have any refills left, you may request refills at any time through one of the following ways: The online [employer] system (except Urgent Care), by calling your provider’s office, or by asking your pharmacy to contact your provider’s office with a refill request. Medication refills are processed only during regular business hours and may not be available until the next business day. Your provider may request additional information or to have a follow-up visit with you prior to refilling your medication.   *Please Note: Medication refills are assigned a new Rx number when refilled electronically. Your pharmacy may indicate that no refills were authorized even though a new prescription for the same medication is available at the pharmacy. Please request the medicine by name with the pharmacy before contacting your provider for a  refill.        Instructions      IMPRESSION:    1.  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  Hx of seizure -- the last seizure was 2013-- but there are spells of passing out associated with headache--- usually happened   3.  Hx of HTN, Thyroid Disease ( Borderline)   4.  Intractable headache associated with speech problems  5.  Vit D deficiency      PLAN/RECOMMENDATIONS:      Topamax helped the headache--- but sex and light could triggered headache( worse)    Imitrex 50mg for headache rescue-- maximum 10# per month    Repeat LP and opening pressure 5/2017  The opening pressure was 28 cm CSF and the closing pressure 17 cm CSF. The patient tolerated the procedure well with no evidence of complication.  We will need to repeat LP in the future    This time will up Topamax to 100mg two times per day  for better headache prevention and weight reduction  Try ASA before sex activity-- try sunglasses to address light sensitivity    We also told the patient, no driving 3 months after any spell of passing out    Due to Vit D deficiency--- PCP is taking care of this Vit D already      ________________________________________________________________________    Advise exercise muscle and brain  Avoid processed foods-- focus on natural foods  Avoid sugar  Weight Loss  ________________________________________________________________________  ________________________________________________________________________    ________________________________________________________________________              MyChart Access Code: Activation code not generated  Current MyChart Status: Active

## 2017-08-08 NOTE — PROGRESS NOTES
NEUROLOGY NOTE    Referring Physician  Cecilia Garcia      CHIEF COMPLAINT:  Last GTCS at the age of 22YO  From the age of 18YO to 22YO-- followed by Dr Maharaj for pseudotumor cerebri  Dr Maharaj then referred the patient to another neurologist--another neurologist also told her that she does not have pseudotumor cereberi any more?  Insurance denied weight loss surgery  Headache    Chief Complaint   Patient presents with   • Follow-Up     Seizure       PRESENT ILLNESS:       Headache intractable-- for 4-5 months  1. Location--occipital  2. Characteristics--throbbing  3. Associated--light , blurred vision , dizziness, black spots  4. Worsening--stress, intercourse, altitude  5. Relieving factors-- Excedrin migraine, topamax 50mg HS   6. Family History-not she could recall  7. Every 3 day- could last 1-2 days   8. Severity-- can not work   9. Sleep-- not able to sleep  10. Coffee intake-- not coffee addict      The last lumbar puncture, the opening pressure was high normal based on what  Average 32-43 opening pressure    PAST MEDICAL HISTORY:  Past Medical History   Diagnosis Date   • Pseudotumor cerebri    • Seizure disorder (CMS-HCC)    • Migraine        PAST SURGICAL HISTORY:  No past surgical history on file.    FAMILY HISTORY:  Family History   Problem Relation Age of Onset   • Cancer Mother      Cervical       SOCIAL HISTORY:  Social History     Social History   • Marital Status: Single     Spouse Name: N/A   • Number of Children: N/A   • Years of Education: N/A     Occupational History   • Not on file.     Social History Main Topics   • Smoking status: Former Smoker     Quit date: 05/03/2012   • Smokeless tobacco: Never Used      Comment: 1/2 pack a day   • Alcohol Use: 0.6 oz/week     0 Standard drinks or equivalent, 1 Glasses of wine per week      Comment: socially   • Drug Use: No   • Sexual Activity: No     Other Topics Concern   • Not on file     Social History Narrative    ** Merged History Encounter  "**          ALLERGIES:  No Known Allergies  TOBHX  History   Smoking status   • Former Smoker   • Quit date: 05/03/2012   Smokeless tobacco   • Never Used     Comment: 1/2 pack a day     ALCHX  History   Alcohol Use   • 0.6 oz/week   • 0 Standard drinks or equivalent, 1 Glasses of wine per week     Comment: socially     DRUGHX  History   Drug Use No           MEDICATIONS:  Current Outpatient Prescriptions   Medication   • vitamin D, Ergocalciferol, (DRISDOL) 25871 UNITS Cap capsule   • phentermine (ADIPEX-P) 37.5 MG tablet   • Cholecalciferol (VITAMIN D) 2000 UNITS Cap   • topiramate (TOPAMAX) 50 MG tablet   • amlodipine (NORVASC) 5 MG Tab   • vitamin D, Ergocalciferol, (DRISDOL) 21542 UNITS Cap capsule   • sumatriptan (IMITREX) 50 MG Tab     No current facility-administered medications for this visit.       REVIEW OF SYSTEM:    Constitutional: Denies fevers, Denies weight changes   Eyes: Denies changes in vision, no eye pain   Ears/Nose/Throat/Mouth: Denies nasal congestion or sore throat   Cardiovascular: HTN  Respiratory: Denies SOB.   Gastrointestinal/Hepatic: Denies abdominal pain, nausea, vomiting, diarrhea, constipation or GI bleeding   Genitourinary: Denies bladder dysfunction, dysuria or frequency   Musculoskeletal/Rheum: Denies joint pain and swelling   Skin/Breast: Denies rash, denies breast lumps or discharge   Neurological: seizure, pseudotumor cereberri  Psychiatric: denies mood disorder   Endocrine: denies hx of diabetes or thyroid dysfunction   Heme/Oncology/Lymph Nodes: Denies enlarged lymph nodes, denies brusing or known bleeding disorder   Allergic/Immunologic: Denies hx of allergies   Thyroid -- borderline ,-- thyroid disease runs in the family        PHYSICAL AND NEUROLOGICAL EXMAINATIONS:  VITAL SIGNS: /70 mmHg  Pulse 84  Temp(Src) 36.7 °C (98.1 °F)  Resp 16  Ht 1.778 m (5' 10\")  Wt 127.914 kg (282 lb)  BMI 40.46 kg/m2  SpO2 99%  LMP 03/03/2012  CURRENT WEIGHT:   BMI: Body mass " index is 40.46 kg/(m^2).  PREVIOUS WEIGHTS:  Wt Readings from Last 25 Encounters:   08/08/17 127.914 kg (282 lb)   07/27/17 129.729 kg (286 lb)   06/08/17 135.172 kg (298 lb)   05/02/17 136.079 kg (300 lb)   04/24/17 138.347 kg (305 lb)   02/28/17 139.708 kg (308 lb)   02/20/17 141.976 kg (313 lb)   01/31/17 137.44 kg (303 lb)   05/03/16 136.986 kg (302 lb)   11/15/15 134.265 kg (296 lb)   09/13/15 135.172 kg (298 lb)   09/04/15 133.811 kg (295 lb)   04/22/15 126.1 kg (278 lb)   12/01/14 127.007 kg (280 lb)   06/05/14 123.378 kg (272 lb)   05/14/14 125.193 kg (276 lb)   01/16/14 113.399 kg (250 lb)   06/11/13 112.946 kg (249 lb)   05/10/13 115.214 kg (254 lb)   04/12/13 117.028 kg (258 lb)   03/12/13 113.853 kg (251 lb)   03/06/13 115.667 kg (255 lb)   01/29/13 121.11 kg (267 lb)   01/28/13 121 kg (266 lb 12.1 oz)   01/24/13 121.11 kg (267 lb)       General appearance of patient: WDWN(+) NAD(+)    EYES  o Fundus : Papilledem(-) Exudates(-) Hemorrhage(-)  Nervous System  Orientation to time, place and person(+)  Memory normal(+)  Language: aphasia(-)  Knowledge: past(+) Current(+)  Attention(+)  Cranial Nerves  • Nerve 2: intact  • Nerve 3,4,6: intact  • Nerve 5 : intact  • Nerve 7: intact  • Nerve 8: intact  • Nerve 9 & 10: intact  • Nerve 11: intact  • Nerve 12: intact  Muscle Power and muscle tone: symmetric, normal in upper and lower  Sensory System: Pin sensation intact(+)  Reflexes: symmetric throughout  Cerebellar Function FNP normal   Gait : Steady(+) TandemGait steady(+)  Heart and Vascular  Peripheral Vasucular system : Edema (-) Swelling(-)  RHB, Breathing sound clear  abdomen bowel sound normoactive  Extremities freely moveable  Joints no contracture       NEUROIMAGING: I reviewed the MRI/CT of brain       Seeing PCP  No papilledema, seeing an endocrinologist now ( was referred to endocrinologist by Jennie)    IMPRESSION:    1.  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  Hx of seizure -- the last seizure was 2013-- but there are spells of passing out associated with headache--- usually happened   3.  Hx of HTN, Thyroid Disease ( Borderline)   4.  Intractable headache associated with speech problems  5.  Vit D deficiency      PLAN/RECOMMENDATIONS:      Topamax helped the headache--- but sex and light could triggered headache( worse)    Imitrex 50mg for headache rescue-- maximum 10# per month    Repeat LP and opening pressure 5/2017  The opening pressure was 28 cm CSF and the closing pressure 17 cm CSF. The patient tolerated the procedure well with no evidence of complication.  We will need to repeat LP in the future    This time will up Topamax to 100mg two times per day  for better headache prevention and weight reduction  Try ASA before sex activity-- try sunglasses to address light sensitivity    We also told the patient, no driving 3 months after any spell of passing out    Due to Vit D deficiency--- PCP is taking care of this Vit D already      ________________________________________________________________________    Advise exercise muscle and brain  Avoid processed foods-- focus on natural foods  Avoid sugar  Weight Loss  ________________________________________________________________________  ________________________________________________________________________    ________________________________________________________________________      Results for WILLIE FLANAGAN (MRN [id number]) as of 8/8/2017 10:05   Ref. Range 5/3/2017 16:30   Vitamin B12 -True Cobalamin Latest Ref Range: 211-911 pg/mL 434   25-Hydroxy   Vitamin D 25 Latest Ref Range:  ng/mL 11 (L)   Anti-Cariolipin Ab Igg Latest Ref Range: 0-14 GPL 4   Anti-Cardiolipin Ab Iga Latest Ref Range: 0-11 APL 2   Anti-Cardiolipin Ab Igm Latest Ref Range: 0-12 MPL 0   TSH Latest Ref Range: 0.300-3.700 uIU/mL 5.540 (H)   Free T-4 Latest Ref  Range: 0.53-1.43 ng/dL 0.89   Microsomal -Tpo- Abs Latest Ref Range: 0.0-9.0 IU/mL 86.3 (H)   Anti-Thyroglobulin Latest Ref Range: 0.0-4.0 IU/mL 1.0   Antinuclear Antibody Latest Ref Range: None Detected  None Detected   Oligoclonal Bands CSF Latest Ref Range: 0-1 Bands 0   Oligoclonal Bands CSF Unknown Negative   Interpretation Unknown See Note           SIGNATURE:  Chel Sandra    CC:  Cecilia Garcia M.D.              INTERPRETATION:    ________________________________________________________________________    This Scalp EEG is mildly abnormal because of photic stimulation induced generalized bursts- theta, delta and sharp    This abnormality remains not specific--- and idiopathic generalized epilepsy or frontal lobe seizures remain possible    Clinical Correlation would help    ________________________________________________________________________

## 2017-08-08 NOTE — PATIENT INSTRUCTIONS
IMPRESSION:    1.  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  Hx of seizure -- the last seizure was 2013-- but there are spells of passing out associated with headache--- usually happened   3.  Hx of HTN, Thyroid Disease ( Borderline)   4.  Intractable headache associated with speech problems  5.  Vit D deficiency      PLAN/RECOMMENDATIONS:      Topamax helped the headache--- but sex and light could triggered headache( worse)    Imitrex 50mg for headache rescue-- maximum 10# per month    Repeat LP and opening pressure 5/2017  The opening pressure was 28 cm CSF and the closing pressure 17 cm CSF. The patient tolerated the procedure well with no evidence of complication.  We will need to repeat LP in the future    This time will up Topamax to 100mg two times per day  for better headache prevention and weight reduction  Try ASA before sex activity-- try sunglasses to address light sensitivity    We also told the patient, no driving 3 months after any spell of passing out    Due to Vit D deficiency--- PCP is taking care of this Vit D already      ________________________________________________________________________    Advise exercise muscle and brain  Avoid processed foods-- focus on natural foods  Avoid sugar  Weight Loss  ________________________________________________________________________  ________________________________________________________________________    ________________________________________________________________________

## 2017-08-09 ENCOUNTER — HOSPITAL ENCOUNTER (OUTPATIENT)
Dept: LAB | Facility: MEDICAL CENTER | Age: 27
End: 2017-08-09
Attending: INTERNAL MEDICINE
Payer: COMMERCIAL

## 2017-08-09 DIAGNOSIS — E24.0 CUSHING'S DISEASE (HCC): ICD-10-CM

## 2017-08-09 LAB — CORTIS SERPL-MCNC: 9.4 UG/DL (ref 0–23)

## 2017-08-09 PROCEDURE — 36415 COLL VENOUS BLD VENIPUNCTURE: CPT

## 2017-08-09 PROCEDURE — 82533 TOTAL CORTISOL: CPT

## 2017-08-10 ENCOUNTER — HOSPITAL ENCOUNTER (OUTPATIENT)
Facility: MEDICAL CENTER | Age: 27
End: 2017-08-10
Attending: INTERNAL MEDICINE
Payer: COMMERCIAL

## 2017-08-10 DIAGNOSIS — E24.0 CUSHING'S DISEASE (HCC): ICD-10-CM

## 2017-08-10 LAB
ACTH PLAS-MCNC: 40 PG/ML (ref 6–58)
C PEPTIDE SERPL-MCNC: 2.4 NG/ML (ref 0.8–3.5)
CREAT 24H UR-MSRATE: 2511 MG/24 HR (ref 800–1800)
CREAT UR-MCNC: 173.2 MG/DL
INSULIN P FAST SERPL-ACNC: 16 UIU/ML (ref 3–19)
SPECIMEN VOL UR: 1450 ML

## 2017-08-10 PROCEDURE — 81050 URINALYSIS VOLUME MEASURE: CPT

## 2017-08-10 PROCEDURE — 82530 CORTISOL FREE: CPT

## 2017-08-10 PROCEDURE — 82570 ASSAY OF URINE CREATININE: CPT

## 2017-08-14 LAB
ANNOTATION COMMENT IMP: ABNORMAL
CORTIS F 24H UR HPLC-MCNC: 9.72 UG/L
CORTIS F 24H UR-MRATE: 14.1 UG/D
CORTIS F/CREAT 24H UR: 6.35 UG/G CRT
CREAT 24H UR-MCNC: 153 MG/DL
CREAT 24H UR-MRATE: 2218 MG/D (ref 700–1600)
SPECIMEN VOL ?TM UR: 1450 ML

## 2017-08-16 ENCOUNTER — OFFICE VISIT (OUTPATIENT)
Dept: MEDICAL GROUP | Facility: PHYSICIAN GROUP | Age: 27
End: 2017-08-16
Payer: COMMERCIAL

## 2017-08-16 VITALS
HEIGHT: 70 IN | DIASTOLIC BLOOD PRESSURE: 80 MMHG | TEMPERATURE: 98.4 F | BODY MASS INDEX: 40.8 KG/M2 | RESPIRATION RATE: 16 BRPM | WEIGHT: 285 LBS | SYSTOLIC BLOOD PRESSURE: 138 MMHG | OXYGEN SATURATION: 95 % | HEART RATE: 81 BPM

## 2017-08-16 DIAGNOSIS — G40.909 SEIZURE DISORDER (HCC): ICD-10-CM

## 2017-08-16 DIAGNOSIS — G93.2 PSEUDOTUMOR CEREBRI: ICD-10-CM

## 2017-08-16 DIAGNOSIS — Z30.432 ENCOUNTER FOR REMOVAL OF INTRAUTERINE CONTRACEPTIVE DEVICE (IUD): ICD-10-CM

## 2017-08-16 DIAGNOSIS — E66.01 MORBID OBESITY WITH BMI OF 40.0-44.9, ADULT (HCC): ICD-10-CM

## 2017-08-16 DIAGNOSIS — R79.89 ABNORMAL THYROID BLOOD TEST: ICD-10-CM

## 2017-08-16 PROCEDURE — 99214 OFFICE O/P EST MOD 30 MIN: CPT | Performed by: NURSE PRACTITIONER

## 2017-08-16 RX ORDER — PHENTERMINE HYDROCHLORIDE 37.5 MG/1
37.5 TABLET ORAL
Qty: 30 TAB | Refills: 2 | Status: SHIPPED | OUTPATIENT
Start: 2017-08-16 | End: 2017-12-28 | Stop reason: SDUPTHER

## 2017-08-16 NOTE — MR AVS SNAPSHOT
"        Nickolas Galdamez   2017 1:20 PM   Office Visit   MRN: [id number]    Department:  [facility]   Dept Phone:  [phone]    Description:  Female : 1990   Provider:  KARINE Cuevas           Reason for Visit     Medication Refill phentermine    Referral Needed gyn      Allergies as of 2017     No Known Allergies      You were diagnosed with     Morbid obesity with BMI of 40.0-44.9, adult (CMS-HCC)   [167076]       Encounter for removal of intrauterine contraceptive device (IUD)   [5259433]         Vital Signs     Blood Pressure Pulse Temperature Respirations Height Weight    138/80 mmHg 81 36.9 °C (98.4 °F) 16 1.778 m (5' 10\") 129.275 kg (285 lb)    Body Mass Index Oxygen Saturation Last Menstrual Period Smoking Status          40.89 kg/m2 95% 2012 Former Smoker        Basic Information     Date Of Birth Sex Race Ethnicity Preferred Language    1990 Female White Non- English      Your appointments     Aug 30, 2017 10:30 AM   Established Patient with Sanford Ayala M.D.   [facility] ([facility])    [address], [address]  [place] [address] [phone]           You will be receiving a confirmation call a few days before your appointment from our automated call confirmation system.            Dec 06, 2017 10:00 AM   Follow Up Visit with Chel Sandra M.D.   [facility] (--)    [address], [address]  [place] [address] [phone]           You will be receiving a confirmation call a few days before your appointment from our automated call confirmation system.              Problem List              ICD-10-CM Priority Class Noted - Resolved    Pseudotumor cerebri G93.2   10/15/2012 - Present    History of migraine headaches Z86.69   10/15/2012 - Present    Seizure disorder (CMS-HCC) G40.909   10/22/2012 - Present    Obesity E66.9   2013 - Present    Essential hypertension I10   2017 " - Present    Morbid obesity with BMI of 40.0-44.9, adult ([facility]) E66.01, Z68.41   1/31/2017 - Present    Chronic daily headache R51   2/28/2017 - Present    Elevated testosterone level in female E34.9   2/28/2017 - Present    Intractable migraine without status migrainosus G43.919   4/24/2017 - Present    Abnormal thyroid blood test R94.6   5/2/2017 - Present    Vitamin D deficiency E55.9   5/4/2017 - Present    Hashimoto's thyroiditis E06.3   5/4/2017 - Present      Health Maintenance        Date Due Completion Dates    IMM HEP B VACCINE (1 of 3 - Primary Series) 1990 ---    IMM HEP A VACCINE (1 of 2 - Standard Series) 3/2/1992 ---    IMM HPV VACCINE (1 of 3 - Female 3 Dose Series) 3/2/2002 ---    IMM VARICELLA (CHICKENPOX) VACCINE (1 of 2 - 2 Dose Adolescent Series) 3/2/2004 ---    IMM DTaP/Tdap/Td Vaccine (1 - Tdap) 3/2/2010 ---    IMM INFLUENZA (1) 9/1/2017 1/30/2013    PAP SMEAR 12/1/2017 12/1/2014, 10/15/2012            Current Immunizations     Influenza TIV (IM) 1/30/2013 11:30 AM      Below and/or attached are the medications your provider expects you to take. Review all of your home medications and newly ordered medications with your provider and/or pharmacist. Follow medication instructions as directed by your provider and/or pharmacist. Please keep your medication list with you and share with your provider. Update the information when medications are discontinued, doses are changed, or new medications (including over-the-counter products) are added; and carry medication information at all times in the event of emergency situations     Allergies:  No Known Allergies          Medications  Valid as of: August 16, 2017 -  1:44 PM    Generic Name Brand Name Tablet Size Instructions for use    AmLODIPine Besylate (Tab) NORVASC 5 MG Take 1 Tab by mouth every day.        Cholecalciferol (Cap) Vitamin D 2000 UNITS Take 4,000 Units by mouth.        Phentermine HCl (Tab) ADIPEX-P 37.5 MG Take 1 Tab by mouth every  morning before breakfast.        SUMAtriptan Succinate (Tab) IMITREX 50 MG Take 1 Tab by mouth Once PRN for Migraine for up to 1 dose.        Topiramate (Tab) TOPAMAX 100 MG Take 1 Tab by mouth 2 times a day.        .                 Medicines prescribed today were sent to:     [facility][id number] - [place], [place] - [address]    [address] [place] [address]    Phone: [phone] Fax: [phone]    Open 24 Hours?: No      Medication refill instructions:       If your prescription bottle indicates you have medication refills left, it is not necessary to call your provider’s office. Please contact your pharmacy and they will refill your medication.    If your prescription bottle indicates you do not have any refills left, you may request refills at any time through one of the following ways: The online [employer] system (except Urgent Care), by calling your provider’s office, or by asking your pharmacy to contact your provider’s office with a refill request. Medication refills are processed only during regular business hours and may not be available until the next business day. Your provider may request additional information or to have a follow-up visit with you prior to refilling your medication.   *Please Note: Medication refills are assigned a new Rx number when refilled electronically. Your pharmacy may indicate that no refills were authorized even though a new prescription for the same medication is available at the pharmacy. Please request the medicine by name with the pharmacy before contacting your provider for a refill.        Referral     A referral request has been sent to our patient care coordination department. Please allow 3-5 business days for us to process this request and contact you either by phone or mail. If you do not hear from us by the 5th business day, please call us at [phone].        Instructions    Follow up with me in 4 months, sooner if needed    Referral to  gyn    Phentermine has been refilled          MyChart Access Code: Activation code not generated  Current [employer] Status: Active

## 2017-08-16 NOTE — ASSESSMENT & PLAN NOTE
Patient requesting referral to gynecology to have IUD removed. She states it is nearly time to have it removed and would like to remove any possible source of migraine trigger such as hormones that are contained in the IUD. She does not have difficulties with the IUD and she does not have menstrual periods as a result of the IUD. Referral has been placed for her.

## 2017-08-16 NOTE — ASSESSMENT & PLAN NOTE
This is a chronic condition, currently uncontrolled. Her weight is 285, BMI 40.89. However this is down since her last visit with me in early May, weight was 300, BMI was 43.05. She was congratulated for this. At that time we initiated treatment with phentermine 37.5 mg daily, as she had part medications online and this is the dose she started with. She has tolerated medication well with no significant bothersome side effects. She has left the prescription lapse and she needs refill. This is refilled for her atelectasis in her back in 3-4 months. She is to continue with healthy diet, regular physical activity as tolerated and continued efforts towards weight loss.  Of note she does have history of pseudotumor cerebri past seizure disorder, and intractable migraines for which she has been followed by neurology. She is currently being worked up by endocrinology for further conditions that could be contributing to her migraines. Her neurologist has okayed the use of phentermine.

## 2017-08-16 NOTE — PROGRESS NOTES
Chief Complaint   Patient presents with   • Medication Refill     phentermine   • Referral Needed     gyn         This is a 27 y.o.female patient that presents today with the following: Follow-up visit, acute and chronic conditions, medication refills    Morbid obesity with BMI of 40.0-44.9, adult ([facility])  This is a chronic condition, currently uncontrolled. Her weight is 285, BMI 40.89. However this is down since her last visit with me in early May, weight was 300, BMI was 43.05. She was congratulated for this. At that time we initiated treatment with phentermine 37.5 mg daily, as she had bought medications online and this is the dose she started with. She has tolerated medication well with no significant bothersome side effects. She has left the prescription lapse and she needs refill. This is refilled for her atelectasis in her back in 3-4 months. She is to continue with healthy diet, regular physical activity as tolerated and continued efforts towards weight loss.  Of note she does have history of pseudotumor cerebri past seizure disorder, and intractable migraines for which she has been followed by neurology. She is currently being worked up by endocrinology for further conditions that could be contributing to her migraines. Her neurologist has okayed the use of phentermine.    Abnormal thyroid blood test  Patient has been followed by endocrinology for abnormal thyroid tests. She has been diagnosed with Hashimoto's thyroiditis but is not currently on thyroid replacement as she is asymptomatic and her TSH has been within normal limits. She does have positive thyroid antibodies. She continues care per endocrinology as she is currently undergoing workup for conditions that could possibly be triggering her migraines. She does have upcoming appointment with endocrinologist on 30 August.    Encounter for removal of intrauterine contraceptive device (IUD)  Patient requesting referral to gynecology to have IUD removed.  She states it is nearly time to have it removed and would like to remove any possible source of migraine trigger such as hormones that are contained in the IUD. She does not have difficulties with the IUD and she does not have menstrual periods as a result of the IUD. Referral has been placed for her.      Hospital Outpatient Visit on 08/10/2017   Component Date Value   • Total Volume 08/10/2017 1450    • Cortisol, Urinary Free 2* 08/10/2017 14.1    • Cortisol Ur Free ug/g CRT 08/10/2017 6.35    • Cortisol Ur Free ug/L 08/10/2017 9.72    • Creatinine Urine 08/10/2017 153    • Creatinine, Random Urine 08/10/2017 2218*   • Cortisol, Ur Interp 08/10/2017 See Note    • Creatinine, Urine 08/10/2017 173.20    • Total Volume, Urine 08/10/2017 1450    • Creatinine 24 Hr Urine 08/10/2017 2511*   Hospital Outpatient Visit on 08/09/2017   Component Date Value   • Cortisol 08/09/2017 9.4    Hospital Outpatient Visit on 08/08/2017   Component Date Value   • TSH 08/08/2017 3.680    • Microsomal -Tpo- Abs 08/08/2017 60.7*   • 25-Hydroxy   Vitamin D 25 08/08/2017 41    Hospital Outpatient Visit on 08/08/2017   Component Date Value   • T3 08/08/2017 100.6    • Free T-4 08/08/2017 0.92    • TSH 08/08/2017 3.160    • C-Peptide 08/08/2017 2.4    • Glucose 08/08/2017 76    • Vitamin B12 -True Cobala* 08/08/2017 584    • Acth 08/08/2017 40    • Insulin Fasting 08/08/2017 16          clinical course has been stable    Past Medical History   Diagnosis Date   • Pseudotumor cerebri    • Seizure disorder (CMS-HCC)    • Migraine        History reviewed. No pertinent past surgical history.    Family History   Problem Relation Age of Onset   • Cancer Mother      Cervical       Review of patient's allergies indicates no known allergies.    Current Outpatient Prescriptions Ordered in [facility]   Medication Sig Dispense Refill   • phentermine (ADIPEX-P) 37.5 MG tablet Take 1 Tab by mouth every morning before breakfast. 30 Tab 2   • topiramate (TOPAMAX)  "100 MG Tab Take 1 Tab by mouth 2 times a day. 60 Tab 3   • Cholecalciferol (VITAMIN D) 2000 UNITS Cap Take 4,000 Units by mouth.     • sumatriptan (IMITREX) 50 MG Tab Take 1 Tab by mouth Once PRN for Migraine for up to 1 dose. 10 Tab 3   • amlodipine (NORVASC) 5 MG Tab Take 1 Tab by mouth every day. (Patient taking differently: Take 10 mg by mouth every day.) 30 Tab 11     No current [facility]-ordered facility-administered medications on file.       Constitutional ROS: No unexpected change in weight, No weakness, No unexplained fevers, sweats, or chills  Pulmonary ROS: No chronic cough, sputum, or hemoptysis, No shortness of breath, No recent change in breathing  Cardiovascular ROS: No chest pain, No edema, No palpitations  Gastrointestinal ROS: No abdominal pain, No nausea, vomiting, diarrhea, or constipation, no blood in stool  Musculoskeletal/Extremities ROS: No clubbing, No peripheral edema, No pain, redness or swelling on the joints  Neurologic ROS: Positive per history of present illness  Endocrine ROS: Positive per history of present illness  Genitourinary ROS: Positive per history of present illness    Physical exam:  /80 mmHg  Pulse 81  Temp(Src) 36.9 °C (98.4 °F)  Resp 16  Ht 1.778 m (5' 10\")  Wt 129.275 kg (285 lb)  BMI 40.89 kg/m2  SpO2 95%  LMP 03/03/2012  General Appearance: Young female, alert, no distress, morbid morbidly obese, well-groomed  Skin: Skin color, texture, turgor normal. No rashes or lesions.  Lungs: negative findings: normal respiratory rate and rhythm, normal effort  Abdomen: Abdomen soft, non-tender. BS normal. No masses,  No organomegaly  Musculoskeletal: negative findings: ROM of all joints is normal, no evidence of joint instability, strength normal, no deformities present  Neurologic: intact, oriented, mood appropriate, judgment intact. Cranial nerves II-12 grossly intact    Medical decision making/discussion: Medications have been refilled, she is to take these as " prescribed and follow up with me in 4 months. She is to continue care per neurology and endocrinology as scheduled. She is to continue working on healthy eating, regular physical activity as tolerated and continued efforts with weight loss. Referral has been placed gynecology.    Nickolas was seen today for medication refill and referral needed.    Diagnoses and all orders for this visit:    Morbid obesity with BMI of 40.0-44.9, adult ([facility])  -     phentermine (ADIPEX-P) 37.5 MG tablet; Take 1 Tab by mouth every morning before breakfast.    Pseudotumor cerebri    Seizure disorder (CMS-HCC)    Abnormal thyroid blood test    Encounter for removal of intrauterine contraceptive device (IUD)  -     REFERRAL TO GYNECOLOGY          Please note that this dictation was created using voice recognition software. I have made every reasonable attempt to correct obvious errors, but I expect that there are errors of grammar and possibly content that I did not discover before finalizing the note.

## 2017-08-16 NOTE — PATIENT INSTRUCTIONS
Follow up with me in 4 months, sooner if needed    Referral to gyn    Phentermine has been refilled

## 2017-08-16 NOTE — ASSESSMENT & PLAN NOTE
Patient has been followed by endocrinology for abnormal thyroid tests. She has been diagnosed with Hashimoto's thyroiditis but is not currently on thyroid replacement as she is asymptomatic and her TSH has been within normal limits. She does have positive thyroid antibodies. She continues care per endocrinology as she is currently undergoing workup for conditions that could possibly be triggering her migraines. She does have upcoming appointment with endocrinologist on 30 August.

## 2017-08-30 ENCOUNTER — OFFICE VISIT (OUTPATIENT)
Dept: ENDOCRINOLOGY | Facility: MEDICAL CENTER | Age: 27
End: 2017-08-30
Payer: COMMERCIAL

## 2017-08-30 VITALS
DIASTOLIC BLOOD PRESSURE: 80 MMHG | WEIGHT: 282 LBS | BODY MASS INDEX: 40.37 KG/M2 | OXYGEN SATURATION: 93 % | HEART RATE: 98 BPM | HEIGHT: 70 IN | SYSTOLIC BLOOD PRESSURE: 128 MMHG

## 2017-08-30 DIAGNOSIS — D13.7 INSULINOMA: ICD-10-CM

## 2017-08-30 DIAGNOSIS — E24.0 CUSHING'S DISEASE (HCC): ICD-10-CM

## 2017-08-30 DIAGNOSIS — E06.3 HYPOTHYROIDISM DUE TO HASHIMOTO'S THYROIDITIS: ICD-10-CM

## 2017-08-30 DIAGNOSIS — E55.9 VITAMIN D DEFICIENCY: ICD-10-CM

## 2017-08-30 DIAGNOSIS — E16.2 HYPOGLYCEMIA: ICD-10-CM

## 2017-08-30 DIAGNOSIS — E03.8 HYPOTHYROIDISM DUE TO HASHIMOTO'S THYROIDITIS: ICD-10-CM

## 2017-08-30 PROCEDURE — 99214 OFFICE O/P EST MOD 30 MIN: CPT | Performed by: INTERNAL MEDICINE

## 2017-08-30 RX ORDER — LEVOTHYROXINE SODIUM 0.2 MG/1
200 TABLET ORAL
Qty: 30 TAB | Refills: 3 | Status: SHIPPED | OUTPATIENT
Start: 2017-08-30 | End: 2017-11-03 | Stop reason: SDUPTHER

## 2017-08-30 NOTE — PROGRESS NOTES
Endocrinology Clinic Progress Note  PCP: COBY Cuevas.    HPI:  Nickolas Galdamez is a 27 y.o. old patient who comes in today for routine follow up. In my first encounter with her based on her symptoms I had suspected that she most likely is having hypoglycemia.  This suspicion is conformed as the patient has been checking blood sugars at home and she has recorded the lowest blood sugar of 55 mg/dL. She had this low blood sugar after she had done some exercise and after some exercise she had also participated in sexual activity with her boyfriend. She gets hypoglycemic after exercise and also sometimes early in the morning. He reports at least 4-5 episodes of hypoglycemia every week. Today  She also further adds that in 2010 when she was shopping with her mother in [place] she had seizures and at the time she was also found to have low blood sugars and when the paramedical team gave her glucose her seizures resolved.  She also complains of bloating sensation around her abdominal area as if she is still storing excessive fat in her belly. She does have difficulty losing weight.    ROS:  Constitutional: Hypoglycemia frequent, fatigue, No unintentional weight loss  Endo: Denies excessive thirst or frequent urination  All other systems were reviewed and were negative.    Past Medical History:  Patient Active Problem List    Diagnosis Date Noted   • Encounter for removal of intrauterine contraceptive device (IUD) 08/16/2017   • Vitamin D deficiency 05/04/2017   • Hashimoto's thyroiditis 05/04/2017   • Abnormal thyroid blood test 05/02/2017   • Intractable migraine without status migrainosus 04/24/2017   • Chronic daily headache 02/28/2017   • Elevated testosterone level in female 02/28/2017   • Essential hypertension 01/31/2017   • Morbid obesity with BMI of 40.0-44.9, adult (HCC) 01/31/2017   • Obesity 04/14/2013   • Seizure disorder (CMS-[facility]) 10/22/2012   • Pseudotumor cerebri 10/15/2012   •  "History of migraine headaches 10/15/2012       Medications:    Current Outpatient Prescriptions:   •  levothyroxine (SYNTHROID) 200 MCG Tab, Take 1 Tab by mouth Every morning on an empty stomach., Disp: 30 Tab, Rfl: 3  •  phentermine (ADIPEX-P) 37.5 MG tablet, Take 1 Tab by mouth every morning before breakfast., Disp: 30 Tab, Rfl: 2  •  topiramate (TOPAMAX) 100 MG Tab, Take 1 Tab by mouth 2 times a day. (Patient taking differently: Take 200 mg by mouth 2 times a day.), Disp: 60 Tab, Rfl: 3  •  sumatriptan (IMITREX) 50 MG Tab, Take 1 Tab by mouth Once PRN for Migraine for up to 1 dose., Disp: 10 Tab, Rfl: 3  •  amlodipine (NORVASC) 5 MG Tab, Take 1 Tab by mouth every day. (Patient taking differently: Take 10 mg by mouth every day.), Disp: 30 Tab, Rfl: 11  •  Cholecalciferol (VITAMIN D) 2000 UNITS Cap, Take 4,000 Units by mouth., Disp: , Rfl:     Labs: Reviewed    Physical Examination:  Vital signs: /80   Pulse 98   Ht 1.778 m (5' 10\")   Wt (!) 127.9 kg (282 lb)   LMP 03/03/2012   SpO2 93%   BMI 40.46 kg/m²  Body mass index is 40.46 kg/m².  General: No apparent distress, cooperative  Eyes: No scleral icterus, no discharge, normal eyelids  Neck: No abnormal masses on inspection, normal thyroid exam  Resp: Normal effort, clear to auscultation bilaterally  CVS: Regular rate and rhythm, S1 S2 normal, no murmur  Extremities: No lower extremity edema  Abdomen: abdominal obesity present  Musculoskeletal: Normal digits and nails  Skin: No rash on visible skin  Psych: Alert and oriented, normal mood and affect, intact memory and able to make informed decisions.    Assessment and Plan:    1. Vitamin D deficiency  Vitamin D levels have now come upto 41. She can take 50,000 units once a week his maintenance dose.    2. Cushing's disease (CMS-HCC)  The screening test has been negative.    3. Hypothyroidism due to Hashimoto's thyroiditis  He does have Hashimoto's hypothyroidism with high TSH. Start levothyroxine " (SYNTHROID) 200 MCG Tab daily.     4. Hypoglycemia  Based on her classic history as per the history of present illness in this visit and the first visit with me, and also on the basis of documented hypoglycemia readings the index of suspicion for insulinoma is very very high. However we need to document detectable C-peptide when the blood glucoses below 60 mg/dL to further confirm this.  - C-PEPTIDE; Future  - INSULIN FASTING; Future  - BETA-HYDROXYBUTYRIC ACID; Future  - KETONE BODIES, SERUM  - PROINSULIN  - BLOOD GLUCOSE; Standing    5. Insulinoma  Plan as per above. Will also do a sulfonylurea screen as well.      Return in about 2 months (around 10/30/2017).    Thank you for allowing me to participate in the care of this patient.    Sanford Ayala M.D.    CC:   Erica Fuentes, [email or web address].    This note was created using voice recognition software (Dragon). The accuracy of the dictation is limited by the abilities of the software. I have reviewed the note prior to signing, however some errors in grammar and context are still possible. If you have any questions related to this note please do not hesitate to contact our office.

## 2017-09-06 ENCOUNTER — HOSPITAL ENCOUNTER (OUTPATIENT)
Dept: LAB | Facility: MEDICAL CENTER | Age: 27
End: 2017-09-06
Attending: INTERNAL MEDICINE
Payer: COMMERCIAL

## 2017-09-06 ENCOUNTER — TELEPHONE (OUTPATIENT)
Dept: ENDOCRINOLOGY | Facility: MEDICAL CENTER | Age: 27
End: 2017-09-06

## 2017-09-06 DIAGNOSIS — D13.7 INSULINOMA: ICD-10-CM

## 2017-09-06 DIAGNOSIS — E16.2 HYPOGLYCEMIA: ICD-10-CM

## 2017-09-06 LAB
B-OH-BUTYR SERPL-MCNC: 0.16 MMOL/L (ref 0.02–0.27)
FASTING STATUS PATIENT QL REPORTED: NORMAL
FASTING STATUS PATIENT QL REPORTED: NORMAL
GLUCOSE SERPL-MCNC: 74 MG/DL (ref 65–99)

## 2017-09-06 PROCEDURE — 83525 ASSAY OF INSULIN: CPT

## 2017-09-06 PROCEDURE — 82010 KETONE BODYS QUAN: CPT

## 2017-09-06 PROCEDURE — 36415 COLL VENOUS BLD VENIPUNCTURE: CPT

## 2017-09-06 PROCEDURE — G0480 DRUG TEST DEF 1-7 CLASSES: HCPCS | Mod: 91

## 2017-09-06 PROCEDURE — 84206 ASSAY OF PROINSULIN: CPT

## 2017-09-06 PROCEDURE — 84681 ASSAY OF C-PEPTIDE: CPT

## 2017-09-06 PROCEDURE — G0480 DRUG TEST DEF 1-7 CLASSES: HCPCS

## 2017-09-06 PROCEDURE — 82947 ASSAY GLUCOSE BLOOD QUANT: CPT

## 2017-09-06 NOTE — TELEPHONE ENCOUNTER
Pt called and she mentioned that she had a Glucose test that need to be done and it she did fast since yesterday from 3 pm until today.    Pt was crying co'z her BS reading was 70 when she checked and she went to the Gym and did exercise and when she checked her BS it goes up to 80 and she didn't know what to do co'z its not dropping to 60's.    Pls. Advise    Thank you  Rhea

## 2017-09-06 NOTE — TELEPHONE ENCOUNTER
Spoke to the patient. She was able to get down to a blood sugar of 64 mg/dl today at around 1.30 pm and the necessary labs were also drawn at that time. Suggested to try to drop it below 60 mg/dl if she can and get all the labs drawn again.

## 2017-09-08 LAB — ACETONE SERPL-MCNC: <5 MG/DL

## 2017-09-09 LAB — C PEPTIDE SERPL-MCNC: 2.4 NG/ML (ref 0.8–3.5)

## 2017-09-11 ENCOUNTER — HOSPITAL ENCOUNTER (INPATIENT)
Facility: MEDICAL CENTER | Age: 27
LOS: 3 days | DRG: 641 | End: 2017-09-14
Attending: EMERGENCY MEDICINE | Admitting: HOSPITALIST
Payer: COMMERCIAL

## 2017-09-11 ENCOUNTER — RESOLUTE PROFESSIONAL BILLING HOSPITAL PROF FEE (OUTPATIENT)
Dept: HOSPITALIST | Facility: MEDICAL CENTER | Age: 27
End: 2017-09-11
Payer: COMMERCIAL

## 2017-09-11 ENCOUNTER — TELEPHONE (OUTPATIENT)
Dept: ENDOCRINOLOGY | Facility: MEDICAL CENTER | Age: 27
End: 2017-09-11

## 2017-09-11 DIAGNOSIS — E16.2 HYPOGLYCEMIA: ICD-10-CM

## 2017-09-11 PROBLEM — G43.909 MIGRAINE: Status: ACTIVE | Noted: 2017-09-11

## 2017-09-11 LAB
ACETOHEXAMIDE SERPL QL: NOT DETECTED NG/ML
ALBUMIN SERPL BCP-MCNC: 4.5 G/DL (ref 3.2–4.9)
ALBUMIN/GLOB SERPL: 1.6 G/DL
ALP SERPL-CCNC: 59 U/L (ref 30–99)
ALT SERPL-CCNC: 17 U/L (ref 2–50)
ANION GAP SERPL CALC-SCNC: 8 MMOL/L (ref 0–11.9)
AST SERPL-CCNC: 15 U/L (ref 12–45)
BASOPHILS # BLD AUTO: 0.3 % (ref 0–1.8)
BASOPHILS # BLD: 0.02 K/UL (ref 0–0.12)
BILIRUB SERPL-MCNC: 0.4 MG/DL (ref 0.1–1.5)
BUN SERPL-MCNC: 13 MG/DL (ref 8–22)
CALCIUM SERPL-MCNC: 8.9 MG/DL (ref 8.4–10.2)
CHLORIDE SERPL-SCNC: 107 MMOL/L (ref 96–112)
CHLORPROPAMIDE SERPL QL: NOT DETECTED NG/ML
CO2 SERPL-SCNC: 21 MMOL/L (ref 20–33)
CREAT SERPL-MCNC: 1.1 MG/DL (ref 0.5–1.4)
EOSINOPHIL # BLD AUTO: 0.21 K/UL (ref 0–0.51)
EOSINOPHIL NFR BLD: 2.9 % (ref 0–6.9)
ERYTHROCYTE [DISTWIDTH] IN BLOOD BY AUTOMATED COUNT: 39.6 FL (ref 35.9–50)
GFR SERPL CREATININE-BSD FRML MDRD: 60 ML/MIN/1.73 M 2
GLIMEPIRIDE SERPL QL: NOT DETECTED NG/ML
GLIPIZIDE SERPL QL: NOT DETECTED NG/ML
GLOBULIN SER CALC-MCNC: 2.8 G/DL (ref 1.9–3.5)
GLUCOSE BLD-MCNC: 77 MG/DL (ref 65–99)
GLUCOSE BLD-MCNC: 87 MG/DL (ref 65–99)
GLUCOSE SERPL-MCNC: 91 MG/DL (ref 65–99)
GLYBURIDE SERPL QL: NOT DETECTED NG/ML
HCG SERPL QL: NEGATIVE
HCT VFR BLD AUTO: 40.6 % (ref 37–47)
HGB BLD-MCNC: 14.2 G/DL (ref 12–16)
IMM GRANULOCYTES # BLD AUTO: 0.02 K/UL (ref 0–0.11)
IMM GRANULOCYTES NFR BLD AUTO: 0.3 % (ref 0–0.9)
LYMPHOCYTES # BLD AUTO: 1.79 K/UL (ref 1–4.8)
LYMPHOCYTES NFR BLD: 24.9 % (ref 22–41)
MCH RBC QN AUTO: 29 PG (ref 27–33)
MCHC RBC AUTO-ENTMCNC: 35 G/DL (ref 33.6–35)
MCV RBC AUTO: 82.9 FL (ref 81.4–97.8)
MONOCYTES # BLD AUTO: 0.39 K/UL (ref 0–0.85)
MONOCYTES NFR BLD AUTO: 5.4 % (ref 0–13.4)
NATEGLINIDE SERPL QL: NOT DETECTED NG/ML
NEUTROPHILS # BLD AUTO: 4.75 K/UL (ref 2–7.15)
NEUTROPHILS NFR BLD: 66.2 % (ref 44–72)
NRBC # BLD AUTO: 0 K/UL
NRBC BLD AUTO-RTO: 0 /100 WBC
PLATELET # BLD AUTO: 259 K/UL (ref 164–446)
PMV BLD AUTO: 9.4 FL (ref 9–12.9)
POTASSIUM SERPL-SCNC: 3.4 MMOL/L (ref 3.6–5.5)
PROT SERPL-MCNC: 7.3 G/DL (ref 6–8.2)
RBC # BLD AUTO: 4.9 M/UL (ref 4.2–5.4)
REPAGLINIDE SERPL QL: NOT DETECTED NG/ML
SODIUM SERPL-SCNC: 136 MMOL/L (ref 135–145)
TOLAZAMIDE SERPL QL: NOT DETECTED NG/ML
TOLBUTAMIDE SERPL QL: NOT DETECTED NG/ML
WBC # BLD AUTO: 7.2 K/UL (ref 4.8–10.8)

## 2017-09-11 PROCEDURE — 99285 EMERGENCY DEPT VISIT HI MDM: CPT

## 2017-09-11 PROCEDURE — 700101 HCHG RX REV CODE 250: Performed by: HOSPITALIST

## 2017-09-11 PROCEDURE — 96361 HYDRATE IV INFUSION ADD-ON: CPT

## 2017-09-11 PROCEDURE — 82962 GLUCOSE BLOOD TEST: CPT | Mod: 91

## 2017-09-11 PROCEDURE — 96360 HYDRATION IV INFUSION INIT: CPT

## 2017-09-11 PROCEDURE — 99223 1ST HOSP IP/OBS HIGH 75: CPT | Performed by: HOSPITALIST

## 2017-09-11 PROCEDURE — 80307 DRUG TEST PRSMV CHEM ANLYZR: CPT

## 2017-09-11 PROCEDURE — 36415 COLL VENOUS BLD VENIPUNCTURE: CPT

## 2017-09-11 PROCEDURE — 85025 COMPLETE CBC W/AUTO DIFF WBC: CPT

## 2017-09-11 PROCEDURE — 700105 HCHG RX REV CODE 258: Performed by: EMERGENCY MEDICINE

## 2017-09-11 PROCEDURE — 82010 KETONE BODYS QUAN: CPT

## 2017-09-11 PROCEDURE — 770020 HCHG ROOM/CARE - TELE (206)

## 2017-09-11 PROCEDURE — 84703 CHORIONIC GONADOTROPIN ASSAY: CPT

## 2017-09-11 PROCEDURE — 80053 COMPREHEN METABOLIC PANEL: CPT

## 2017-09-11 RX ORDER — SODIUM CHLORIDE, SODIUM LACTATE, POTASSIUM CHLORIDE, CALCIUM CHLORIDE 600; 310; 30; 20 MG/100ML; MG/100ML; MG/100ML; MG/100ML
INJECTION, SOLUTION INTRAVENOUS CONTINUOUS
Status: DISCONTINUED | OUTPATIENT
Start: 2017-09-11 | End: 2017-09-11

## 2017-09-11 RX ORDER — ACETAMINOPHEN 325 MG/1
650 TABLET ORAL EVERY 6 HOURS PRN
Status: DISCONTINUED | OUTPATIENT
Start: 2017-09-11 | End: 2017-09-14 | Stop reason: HOSPADM

## 2017-09-11 RX ORDER — LEVOTHYROXINE SODIUM 0.1 MG/1
200 TABLET ORAL
Status: DISCONTINUED | OUTPATIENT
Start: 2017-09-12 | End: 2017-09-14 | Stop reason: HOSPADM

## 2017-09-11 RX ORDER — AMLODIPINE BESYLATE 5 MG/1
10 TABLET ORAL DAILY
Status: DISCONTINUED | OUTPATIENT
Start: 2017-09-12 | End: 2017-09-14 | Stop reason: HOSPADM

## 2017-09-11 RX ORDER — BISACODYL 10 MG
10 SUPPOSITORY, RECTAL RECTAL
Status: DISCONTINUED | OUTPATIENT
Start: 2017-09-11 | End: 2017-09-14 | Stop reason: HOSPADM

## 2017-09-11 RX ORDER — AMOXICILLIN 250 MG
2 CAPSULE ORAL 2 TIMES DAILY
Status: DISCONTINUED | OUTPATIENT
Start: 2017-09-11 | End: 2017-09-14 | Stop reason: HOSPADM

## 2017-09-11 RX ORDER — POLYETHYLENE GLYCOL 3350 17 G/17G
1 POWDER, FOR SOLUTION ORAL
Status: DISCONTINUED | OUTPATIENT
Start: 2017-09-11 | End: 2017-09-14 | Stop reason: HOSPADM

## 2017-09-11 RX ORDER — ERGOCALCIFEROL 1.25 MG/1
50000 CAPSULE ORAL
Status: DISCONTINUED | OUTPATIENT
Start: 2017-09-15 | End: 2017-09-14 | Stop reason: HOSPADM

## 2017-09-11 RX ORDER — AMLODIPINE BESYLATE 10 MG/1
10 TABLET ORAL DAILY
COMMUNITY
End: 2018-01-30 | Stop reason: SDUPTHER

## 2017-09-11 RX ORDER — ERGOCALCIFEROL 1.25 MG/1
50000 CAPSULE ORAL
COMMUNITY
End: 2017-09-20 | Stop reason: SDUPTHER

## 2017-09-11 RX ORDER — SODIUM CHLORIDE 9 MG/ML
INJECTION, SOLUTION INTRAVENOUS CONTINUOUS
Status: DISCONTINUED | OUTPATIENT
Start: 2017-09-11 | End: 2017-09-11

## 2017-09-11 RX ORDER — LEVOTHYROXINE SODIUM 0.05 MG/1
200 TABLET ORAL
Status: DISCONTINUED | OUTPATIENT
Start: 2017-09-12 | End: 2017-09-11

## 2017-09-11 RX ORDER — TOPIRAMATE 100 MG/1
100 TABLET, FILM COATED ORAL 2 TIMES DAILY
Status: DISCONTINUED | OUTPATIENT
Start: 2017-09-11 | End: 2017-09-14 | Stop reason: HOSPADM

## 2017-09-11 RX ORDER — SODIUM CHLORIDE AND POTASSIUM CHLORIDE 150; 900 MG/100ML; MG/100ML
INJECTION, SOLUTION INTRAVENOUS CONTINUOUS
Status: DISCONTINUED | OUTPATIENT
Start: 2017-09-11 | End: 2017-09-12

## 2017-09-11 RX ORDER — DEXTROSE MONOHYDRATE 25 G/50ML
25 INJECTION, SOLUTION INTRAVENOUS
Status: DISCONTINUED | OUTPATIENT
Start: 2017-09-11 | End: 2017-09-14 | Stop reason: HOSPADM

## 2017-09-11 RX ORDER — SUMATRIPTAN 25 MG/1
50 TABLET, FILM COATED ORAL
Status: DISCONTINUED | OUTPATIENT
Start: 2017-09-11 | End: 2017-09-13

## 2017-09-11 RX ADMIN — POTASSIUM CHLORIDE AND SODIUM CHLORIDE: 900; 150 INJECTION, SOLUTION INTRAVENOUS at 23:44

## 2017-09-11 RX ADMIN — TOPIRAMATE 100 MG: 100 TABLET, FILM COATED ORAL at 20:51

## 2017-09-11 RX ADMIN — POTASSIUM CHLORIDE AND SODIUM CHLORIDE: 900; 150 INJECTION, SOLUTION INTRAVENOUS at 16:33

## 2017-09-11 RX ADMIN — SODIUM CHLORIDE, POTASSIUM CHLORIDE, SODIUM LACTATE AND CALCIUM CHLORIDE 125 ML: 600; 310; 30; 20 INJECTION, SOLUTION INTRAVENOUS at 12:50

## 2017-09-11 ASSESSMENT — ENCOUNTER SYMPTOMS
SPEECH CHANGE: 0
DEPRESSION: 0
DIARRHEA: 0
PALPITATIONS: 0
NECK PAIN: 0
HEADACHES: 0
SPUTUM PRODUCTION: 0
LOSS OF CONSCIOUSNESS: 0
BRUISES/BLEEDS EASILY: 0
FOCAL WEAKNESS: 0
VOMITING: 0
FEVER: 0
DIZZINESS: 0
SHORTNESS OF BREATH: 0
MYALGIAS: 0
ABDOMINAL PAIN: 0
EYE PAIN: 0
EYE DISCHARGE: 0
DIAPHORESIS: 0
SENSORY CHANGE: 0
SORE THROAT: 0
WEAKNESS: 0
COUGH: 0
SEIZURES: 1
CONSTIPATION: 0
WHEEZING: 0
HEMOPTYSIS: 0
CLAUDICATION: 0
NAUSEA: 0
BACK PAIN: 0
CHILLS: 0

## 2017-09-11 ASSESSMENT — LIFESTYLE VARIABLES
TOTAL SCORE: 0
AVERAGE NUMBER OF DAYS PER WEEK YOU HAVE A DRINK CONTAINING ALCOHOL: 1
EVER HAD A DRINK FIRST THING IN THE MORNING TO STEADY YOUR NERVES TO GET RID OF A HANGOVER: NO
ALCOHOL_USE: YES
CONSUMPTION TOTAL: NEGATIVE
HOW MANY TIMES IN THE PAST YEAR HAVE YOU HAD 5 OR MORE DRINKS IN A DAY: 0
TOTAL SCORE: 0
SUBSTANCE_ABUSE: 0
TOTAL SCORE: 0
EVER_SMOKED: YES
ON A TYPICAL DAY WHEN YOU DRINK ALCOHOL HOW MANY DRINKS DO YOU HAVE: 1
HAVE PEOPLE ANNOYED YOU BY CRITICIZING YOUR DRINKING: NO
EVER FELT BAD OR GUILTY ABOUT YOUR DRINKING: NO
HAVE YOU EVER FELT YOU SHOULD CUT DOWN ON YOUR DRINKING: NO

## 2017-09-11 ASSESSMENT — PAIN SCALES - GENERAL
PAINLEVEL_OUTOF10: 0
PAINLEVEL_OUTOF10: 6
PAINLEVEL_OUTOF10: 0

## 2017-09-11 ASSESSMENT — PATIENT HEALTH QUESTIONNAIRE - PHQ9
SUM OF ALL RESPONSES TO PHQ9 QUESTIONS 1 AND 2: 0
2. FEELING DOWN, DEPRESSED, IRRITABLE, OR HOPELESS: NOT AT ALL
SUM OF ALL RESPONSES TO PHQ QUESTIONS 1-9: 0
1. LITTLE INTEREST OR PLEASURE IN DOING THINGS: NOT AT ALL

## 2017-09-11 NOTE — ASSESSMENT & PLAN NOTE
Patient taking phentermine for weight loss since May 2017. She states she has had significant weight loss of 40 pounds since that time.

## 2017-09-11 NOTE — ED NOTES
Pt sent by endocrinologist, Lucy, for 72 hour fast and to be medically supervised through it for possible pancreatic mass.

## 2017-09-11 NOTE — ED PROVIDER NOTES
ED Provider Note    CHIEF COMPLAINT  Chief Complaint   Patient presents with   • Other       HPI  Nickolas Galdamez is a 27 y.o. female who presents at the urging of her endocrinologist for admission to the hospital. The patient has had frequent episodes of hypoglycemia. Her endocrinologist, with whom I spoke on like her admitted to the hospital for 72 hour fast, following her blood sugars through this as he has a strong suspicion she has an insulinoma. The patient has a history of pseudotumor cerebri with headaches and vision changes when she was younger. This seemed to go away when she was pregnant and delivered her child. However they again returned earlier this year. She socially had a CT scan which was done in April which showed no changes. She saw neurology who did a repeat lumbar puncture. Opening pressure was elevated at that time at 28 and also showing some protein, 56 back in May. EEG was done in July was nondiagnostic. Dr. Sandra was concerned about the possibility of an endocrine problem hence referred her. The patient describes having had episodes of hypoglycemia in the setting of seizures. Workup this month shows a negative acetone, normal bets Hydroxy and a normal C-peptide. Presently she feels fine and has no complaints other than being appropriately anxious to diagnose what might be wrong with her.    PAST MEDICAL HISTORY  Past Medical History:   Diagnosis Date   • Migraine    • Pseudotumor cerebri    • Seizure disorder (CMS-HCC)        FAMILY HISTORY  Family History   Problem Relation Age of Onset   • Cancer Mother      Cervical       SOCIAL HISTORY  Social History   Substance Use Topics   • Smoking status: Former Smoker     Quit date: 5/3/2012   • Smokeless tobacco: Never Used      Comment: 1/2 pack a day   • Alcohol use 0.6 oz/week     1 Glasses of wine per week      Comment: socially     She is here with mom and her daughter.    CURRENT MEDICATIONS  Home Medications     Reviewed by Edwin MEHTA  "Hank Scott (Pharmacy Tech) on 09/11/17 at 1146  Med List Status: Complete   Medication Last Dose Status   amlodipine (NORVASC) 10 MG Tab 9/11/2017 Active   Cholecalciferol (VITAMIN D) 2000 UNITS Cap 9/11/2017 Active   levothyroxine (SYNTHROID) 200 MCG Tab 9/11/2017 Active   phentermine (ADIPEX-P) 37.5 MG tablet 9/11/2017 Active   sumatriptan (IMITREX) 50 MG Tab >week Active   topiramate (TOPAMAX) 100 MG Tab 9/11/2017 Active   vitamin D, Ergocalciferol, (DRISDOL) 35354 units Cap capsule 9/8/2017 Active                I have reviewed the nurses notes and/or the list brought with the patient.    ALLERGIES  No Known Allergies    REVIEW OF SYSTEMS  See HPI for further details. Review of systems as above, otherwise all other systems are negative.     PHYSICAL EXAM  VITAL SIGNS: /100   Pulse 89   Temp 36.6 °C (97.9 °F)   Resp 16   Ht 1.778 m (5' 10\")   Wt (!) 126.8 kg (279 lb 8.7 oz)   LMP 03/03/2012   SpO2 99%   BMI 40.11 kg/m²   Constitutional: Well appearing patient in no acute distress.  Not toxic, nor ill in appearance.  HENT: Mucus membranes moist.  Oropharynx is clear.  Eyes: Pupils equally round.  No scleral icterus.   Neck: Full nontender range of motion.  Lymphatic: No cervical lymphadenopathy noted.   Cardiovascular: Regular heart rate and rhythm.  No murmurs, rubs, nor gallop appreciated.   Thorax & Lungs: Chest is nontender.  Lungs are clear to auscultation with good air movement bilaterally.  No wheeze, rhonchi, nor rales.   Abdomen: Soft, with no tenderness, rebound nor guarding.  No mass, pulsatile mass, nor hepatosplenomegaly appreciated.  Skin: No purpura nor petechia noted.  Extremities/Musculoskeletal: No sign of trauma.    Neurologic: Alert & oriented.  Strength and sensation is intact all around.  Gait is normal.  Psychiatric: Normal affect appropriate for the clinical situation.    LABS  Labs Reviewed   COMP METABOLIC PANEL - Abnormal; Notable for the following:        Result Value "    Potassium 3.4 (*)     All other components within normal limits   CBC WITH DIFFERENTIAL   HCG QUAL SERUM   ESTIMATED GFR         MEDICAL RECORD  I have reviewed patient's medical record and pertinent results are listed above.    COURSE & MEDICAL DECISION MAKING  I have reviewed any medical record information, laboratory studies and radiographic results as noted above.  This patient is referred to the ER for admission to the hospital for evaluation of possible insulinoma. I spoke with the referring endocrinologist. I have relayed her history to the hospitalist, Dr. Mcallister. Her initial laboratories are unrevealing. I have gone through her records as noted above. I do not see any recent hypoglycemia, however there is a gap about 3 years in our records. Dr. Mcallister is speaking with the endocrinologist once again and she is admitted for further workup. As she is nothing by mouth, IV fluids were initiated.      FINAL IMPRESSION  1. Hypoglycemia    2. Suspected insulinoma       This dictation was created using voice recognition software.    Electronically signed by: George Corea, 9/11/2017 1:38 PM

## 2017-09-11 NOTE — TELEPHONE ENCOUNTER
Talked to patient. She will be headed to ER to get admitted for 72 hour supervised fast to rule out insulinoma. I have provided my cell phone to her which she can provide to ER physician or the team and they can call me for questions or concerns.

## 2017-09-11 NOTE — ASSESSMENT & PLAN NOTE
SO FAR ACCU CHECKS ARE IN 80S AND NO NUMBERS BELOW 60  Patient currently is being worked up for possible insulinoma.   I did speak with Dr. Ayala, endocrinologist at [phone].  The plan is to keep patient nothing by mouth she can support her ice chips up to 72 hours. Normal saline for hydration. Check serum glucose every hour until blood sugar less than 60 at that point I have ordered for C-peptide serum hydroxybutyric acid serum serum ketones and proinsulin level. I've ordered for deformities to be given after these blood draws for seizure or instability. Once the blood was drawn that patient can resume eating a regular diet. Once this testing is confirmed for insulinoma then further imaging will be ordered as an outpatient. Of note she did take her home phentermine for weight loss this morning. I would hold any further phentermine. Also a serum sulfonylurea test was ordered to ensure the patient isn't taking endogenous metformin. A urine drug screen also was added. I have ordered a hemoglobin A1c. As well as a serum pregnancy test. She states she has an IUD in place but is sexually active.

## 2017-09-11 NOTE — H&P
Hospital Medicine History and Physical    Date of Service  9/11/2017    Chief Complaint  Chief Complaint   Patient presents with   • Other       History of Presenting Illness  27 y.o. female who presented 9/11/2017 with sent by patient's Endocrinologist Dr. 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 The patient states that she's had several seizures over the years with low blood sugars documented. He was treated for pseudotumor cerebri since 17 years of age. Had multiple OP EEGs performed by Dr. Sandra and Dr. Mahoney away over the years. Her last EEG were within normal limits. She was then sent to endocrinology who is working her up for insulinoma. As a source of the multiple seizures. She states she had low blood sugar was seizure in 2010. She's not had any recent seizures. She is taking phentermine for weight loss since May 2017. This actually might be the reason why she is having exercise intolerance. She is also recently diagnosed with Hashimoto's thyroiditis as well as vitamin D deficiency on replacement of both.   Of note she did take a dose of phentermine this morning. I have discontinued her phentermine during this study.     Primary Care Physician  Erica Fuentes, COBY.    Consultants  Dr. Ayala:  Endocrinologist cell phone [phone].  Stated to call anytime for questions or orders.    Code Status  Full code    Review of Systems  Review of Systems   Constitutional: Negative for chills, diaphoresis, fever and malaise/fatigue.   HENT: Negative for congestion and sore throat.    Eyes: Negative for pain and discharge.   Respiratory: Negative for cough, hemoptysis, sputum production, shortness of breath and wheezing.    Cardiovascular: Negative for chest pain, palpitations, claudication and leg swelling.   Gastrointestinal: Negative for abdominal pain, constipation, diarrhea, melena, nausea and vomiting.   Genitourinary: Negative for dysuria, frequency  and urgency.   Musculoskeletal: Negative for back pain, joint pain, myalgias and neck pain.   Skin: Negative for itching and rash.   Neurological: Positive for seizures. Negative for dizziness, sensory change, speech change, focal weakness, loss of consciousness, weakness and headaches.   Endo/Heme/Allergies: Does not bruise/bleed easily.   Psychiatric/Behavioral: Negative for depression, substance abuse and suicidal ideas.        Past Medical History  Past Medical History:   Diagnosis Date   • Migraine    • Pseudotumor cerebri    • Seizure disorder (CMS-HCC)        Surgical History  No past surgical history on file.    Medications  No current facility-administered medications on file prior to encounter.      Current Outpatient Prescriptions on File Prior to Encounter   Medication Sig Dispense Refill   • levothyroxine (SYNTHROID) 200 MCG Tab Take 1 Tab by mouth Every morning on an empty stomach. 30 Tab 3   • phentermine (ADIPEX-P) 37.5 MG tablet Take 1 Tab by mouth every morning before breakfast. 30 Tab 2   • topiramate (TOPAMAX) 100 MG Tab Take 1 Tab by mouth 2 times a day. 60 Tab 3   • Cholecalciferol (VITAMIN D) 2000 UNITS Cap Take 4,000 Units by mouth every day.     • sumatriptan (IMITREX) 50 MG Tab Take 1 Tab by mouth Once PRN for Migraine for up to 1 dose. 10 Tab 3       Family History  Family History   Problem Relation Age of Onset   • Cancer Mother      Cervical       Social History  Social History   Substance Use Topics   • Smoking status: Former Smoker     Quit date: 5/3/2012   • Smokeless tobacco: Never Used      Comment: 1/2 pack a day   • Alcohol use 0.6 oz/week     1 Glasses of wine per week      Comment: socially   Lives with her boyfriend as well as 4-year-old daughter. Denies any drug use smoking or alcohol. She states she has been steadily losing weight since May 2017.    Allergies  No Known Allergies     Physical Exam  Laboratory   Hemodynamics  Temp (24hrs), Av.6 °C (97.9 °F), Min:36.6 °C  (97.9 °F), Max:36.6 °C (97.9 °F)   Temperature: 36.6 °C (97.9 °F)  Pulse  Av  Min: 89  Max: 89    Blood Pressure: 137/100      Respiratory      Respiration: 16, Pulse Oximetry: 99 %             Physical Exam   Constitutional: She is oriented to person, place, and time. She appears well-developed and well-nourished. No distress.   HENT:   Head: Normocephalic and atraumatic.   Nose: Nose normal.   Mouth/Throat: Oropharynx is clear and moist. No oropharyngeal exudate.   Eyes: Conjunctivae and EOM are normal. Pupils are equal, round, and reactive to light. Right eye exhibits no discharge. Left eye exhibits no discharge. No scleral icterus.   Neck: Normal range of motion. Neck supple. No JVD present. No tracheal deviation present. No thyromegaly present.   Cardiovascular: Normal rate, regular rhythm, normal heart sounds and intact distal pulses.  Exam reveals no gallop and no friction rub.    No murmur heard.  Pulmonary/Chest: Effort normal and breath sounds normal. No stridor. No respiratory distress. She has no wheezes. She has no rales. She exhibits no tenderness.   Abdominal: Soft. Bowel sounds are normal. She exhibits no distension and no mass. There is no tenderness. There is no rebound and no guarding.   Musculoskeletal: Normal range of motion. She exhibits no edema or tenderness.   Lymphadenopathy:     She has no cervical adenopathy.   Neurological: She is alert and oriented to person, place, and time. No cranial nerve deficit. She exhibits normal muscle tone. Coordination normal.   Skin: Skin is warm and dry. No rash noted. She is not diaphoretic. No erythema.   Psychiatric: She has a normal mood and affect. Her behavior is normal. Judgment and thought content normal.   Nursing note and vitals reviewed.      Recent Labs      17   1246   WBC  7.2   RBC  4.90   HEMOGLOBIN  14.2   HEMATOCRIT  40.6   MCV  82.9   MCH  29.0   MCHC  35.0   RDW  39.6   PLATELETCT  259   MPV  9.4         No results for  input(s): ALTSGPT, ASTSGOT, ALKPHOSPHAT, TBILIRUBIN, DBILIRUBIN, GAMMAGT, AMYLASE, LIPASE, ALB, PREALBUMIN, GLUCOSE in the last 72 hours.              No results found for: TROPONINI  Urinalysis:    Lab Results  Component Value Date/Time   SPECGRAVITY 1.009 10/25/2012 1532   GLUCOSEUR Negative 10/25/2012 1532   KETONES Negative 10/25/2012 1532   NITRITE Negative 10/25/2012 1532   WBCURINE 2-5 10/25/2012 1532   RBCURINE 0-2 10/25/2012 1532   BACTERIA Few (A) 10/25/2012 1532   EPITHELCELL Few 10/25/2012 1532        Imaging  none   Assessment/Plan     I anticipate this patient is appropriate for observation status at this time.    * Hypoglycemia- (present on admission)   Assessment & Plan    Patient currently is being worked up for possible insulinoma.   I did speak with Dr. Ayala, endocrinologist at [phone].  The plan is to keep patient nothing by mouth she can support her ice chips up to 72 hours. Normal saline for hydration. Check serum glucose every hour until blood sugar less than 60 at that point I have ordered for C-peptide serum hydroxybutyric acid serum serum ketones and proinsulin level. I've ordered for deformities to be given after these blood draws for seizure or instability. Once the blood was drawn that patient can resume eating a regular diet. Once this testing is confirmed for insulinoma then further imaging will be ordered as an outpatient. Of note she did take her home phentermine for weight loss this morning. I would hold any further phentermine. Also a serum sulfonylurea test was ordered to ensure the patient isn't taking endogenous metformin. A urine drug screen also was added. I have ordered a hemoglobin A1c. As well as a serum pregnancy test. She states she has an IUD in place but is sexually active.        Migraine- (present on admission)   Assessment & Plan    History migraine headaches daily. Continue with Imitrex as needed.        Hashimoto's thyroiditis- (present on admission)    Assessment & Plan    Check a TSH as well as free T4. Continue home Synthroid dose 200 µg daily.        Vitamin D deficiency- (present on admission)   Assessment & Plan    Continue with the patient's home vitamin D replacement. [place] vitamin D level.        Essential hypertension- (present on admission)   Assessment & Plan    Continue with Norvasc home dose 10 mg daily.        Obesity- (present on admission)   Assessment & Plan    Patient taking phentermine for weight loss since May 2017. She states she has had significant weight loss of 40 pounds since that time.            VTE prophylaxis: SCDs.

## 2017-09-11 NOTE — ED NOTES
Report called to floor, no further questions . [facility]

## 2017-09-12 ENCOUNTER — APPOINTMENT (OUTPATIENT)
Dept: RADIOLOGY | Facility: MEDICAL CENTER | Age: 27
DRG: 641 | End: 2017-09-12
Attending: FAMILY MEDICINE
Payer: COMMERCIAL

## 2017-09-12 PROBLEM — R10.9 ABDOMINAL PAIN: Status: ACTIVE | Noted: 2017-09-12

## 2017-09-12 LAB
25(OH)D3 SERPL-MCNC: 59 NG/ML (ref 30–100)
AMPHET UR QL SCN: NEGATIVE
ANION GAP SERPL CALC-SCNC: 7 MMOL/L (ref 0–11.9)
B-OH-BUTYR SERPL-MCNC: 0.13 MMOL/L (ref 0.02–0.27)
BARBITURATES UR QL SCN: NEGATIVE
BASOPHILS # BLD AUTO: 0.4 % (ref 0–1.8)
BASOPHILS # BLD: 0.03 K/UL (ref 0–0.12)
BENZODIAZ UR QL SCN: NEGATIVE
BUN SERPL-MCNC: 8 MG/DL (ref 8–22)
BZE UR QL SCN: NEGATIVE
CALCIUM SERPL-MCNC: 8.6 MG/DL (ref 8.4–10.2)
CANNABINOIDS UR QL SCN: NEGATIVE
CHLORIDE SERPL-SCNC: 113 MMOL/L (ref 96–112)
CO2 SERPL-SCNC: 18 MMOL/L (ref 20–33)
CREAT SERPL-MCNC: 0.99 MG/DL (ref 0.5–1.4)
EOSINOPHIL # BLD AUTO: 0.32 K/UL (ref 0–0.51)
EOSINOPHIL NFR BLD: 4.6 % (ref 0–6.9)
ERYTHROCYTE [DISTWIDTH] IN BLOOD BY AUTOMATED COUNT: 39.3 FL (ref 35.9–50)
GFR SERPL CREATININE-BSD FRML MDRD: >60 ML/MIN/1.73 M 2
GLUCOSE BLD-MCNC: 101 MG/DL (ref 65–99)
GLUCOSE BLD-MCNC: 80 MG/DL (ref 65–99)
GLUCOSE BLD-MCNC: 83 MG/DL (ref 65–99)
GLUCOSE BLD-MCNC: 84 MG/DL (ref 65–99)
GLUCOSE BLD-MCNC: 84 MG/DL (ref 65–99)
GLUCOSE BLD-MCNC: 85 MG/DL (ref 65–99)
GLUCOSE BLD-MCNC: 86 MG/DL (ref 65–99)
GLUCOSE BLD-MCNC: 88 MG/DL (ref 65–99)
GLUCOSE BLD-MCNC: 89 MG/DL (ref 65–99)
GLUCOSE BLD-MCNC: 92 MG/DL (ref 65–99)
GLUCOSE BLD-MCNC: 93 MG/DL (ref 65–99)
GLUCOSE BLD-MCNC: 94 MG/DL (ref 65–99)
GLUCOSE BLD-MCNC: 99 MG/DL (ref 65–99)
GLUCOSE SERPL-MCNC: 88 MG/DL (ref 65–99)
HCT VFR BLD AUTO: 42.9 % (ref 37–47)
HGB BLD-MCNC: 14.9 G/DL (ref 12–16)
IMM GRANULOCYTES # BLD AUTO: 0.03 K/UL (ref 0–0.11)
IMM GRANULOCYTES NFR BLD AUTO: 0.4 % (ref 0–0.9)
LYMPHOCYTES # BLD AUTO: 1.67 K/UL (ref 1–4.8)
LYMPHOCYTES NFR BLD: 24.2 % (ref 22–41)
MCH RBC QN AUTO: 28.9 PG (ref 27–33)
MCHC RBC AUTO-ENTMCNC: 34.7 G/DL (ref 33.6–35)
MCV RBC AUTO: 83.1 FL (ref 81.4–97.8)
METHADONE UR QL SCN: NEGATIVE
MONOCYTES # BLD AUTO: 0.42 K/UL (ref 0–0.85)
MONOCYTES NFR BLD AUTO: 6.1 % (ref 0–13.4)
NEUTROPHILS # BLD AUTO: 4.43 K/UL (ref 2–7.15)
NEUTROPHILS NFR BLD: 64.3 % (ref 44–72)
NRBC # BLD AUTO: 0 K/UL
NRBC BLD AUTO-RTO: 0 /100 WBC
OPIATES UR QL SCN: NEGATIVE
OXYCODONE UR QL SCN: NEGATIVE
PCP UR QL SCN: NEGATIVE
PLATELET # BLD AUTO: 265 K/UL (ref 164–446)
PMV BLD AUTO: 9.6 FL (ref 9–12.9)
POTASSIUM SERPL-SCNC: 3.7 MMOL/L (ref 3.6–5.5)
PROINSULIN P 12H FAST SERPL-SCNC: 3.8 PMOL/L
PROPOXYPH UR QL SCN: NEGATIVE
RBC # BLD AUTO: 5.16 M/UL (ref 4.2–5.4)
SODIUM SERPL-SCNC: 138 MMOL/L (ref 135–145)
WBC # BLD AUTO: 6.9 K/UL (ref 4.8–10.8)

## 2017-09-12 PROCEDURE — 85025 COMPLETE CBC W/AUTO DIFF WBC: CPT

## 2017-09-12 PROCEDURE — 99232 SBSQ HOSP IP/OBS MODERATE 35: CPT | Performed by: FAMILY MEDICINE

## 2017-09-12 PROCEDURE — 700102 HCHG RX REV CODE 250 W/ 637 OVERRIDE(OP): Performed by: HOSPITALIST

## 2017-09-12 PROCEDURE — 770020 HCHG ROOM/CARE - TELE (206)

## 2017-09-12 PROCEDURE — 80048 BASIC METABOLIC PNL TOTAL CA: CPT

## 2017-09-12 PROCEDURE — A9270 NON-COVERED ITEM OR SERVICE: HCPCS | Performed by: FAMILY MEDICINE

## 2017-09-12 PROCEDURE — A9270 NON-COVERED ITEM OR SERVICE: HCPCS | Performed by: HOSPITALIST

## 2017-09-12 PROCEDURE — 700101 HCHG RX REV CODE 250: Performed by: HOSPITALIST

## 2017-09-12 PROCEDURE — 83036 HEMOGLOBIN GLYCOSYLATED A1C: CPT

## 2017-09-12 PROCEDURE — 82962 GLUCOSE BLOOD TEST: CPT

## 2017-09-12 PROCEDURE — 700102 HCHG RX REV CODE 250 W/ 637 OVERRIDE(OP): Performed by: FAMILY MEDICINE

## 2017-09-12 PROCEDURE — 74000 DX-ABDOMEN-1 VIEW: CPT

## 2017-09-12 PROCEDURE — 82306 VITAMIN D 25 HYDROXY: CPT

## 2017-09-12 RX ADMIN — SUMATRIPTAN SUCCINATE 50 MG: 25 TABLET ORAL at 03:10

## 2017-09-12 RX ADMIN — VITAMIN D, TAB 1000IU (100/BT) 2000 UNITS: 25 TAB at 20:22

## 2017-09-12 RX ADMIN — LEVOTHYROXINE SODIUM 200 MCG: 100 TABLET ORAL at 06:14

## 2017-09-12 RX ADMIN — TOPIRAMATE 100 MG: 100 TABLET, FILM COATED ORAL at 08:16

## 2017-09-12 RX ADMIN — TOPIRAMATE 100 MG: 100 TABLET, FILM COATED ORAL at 20:22

## 2017-09-12 RX ADMIN — AMLODIPINE BESYLATE 10 MG: 5 TABLET ORAL at 08:15

## 2017-09-12 RX ADMIN — VITAMIN D, TAB 1000IU (100/BT) 2000 UNITS: 25 TAB at 09:21

## 2017-09-12 ASSESSMENT — ENCOUNTER SYMPTOMS
CONSTITUTIONAL NEGATIVE: 1
CHILLS: 0
ABDOMINAL PAIN: 1
WEIGHT LOSS: 0
CARDIOVASCULAR NEGATIVE: 1
RESPIRATORY NEGATIVE: 1
FEVER: 0
VOMITING: 0
EYES NEGATIVE: 1
DIARRHEA: 0
NEUROLOGICAL NEGATIVE: 1
MUSCULOSKELETAL NEGATIVE: 1

## 2017-09-12 ASSESSMENT — PAIN SCALES - GENERAL
PAINLEVEL_OUTOF10: 4
PAINLEVEL_OUTOF10: 0

## 2017-09-12 NOTE — DISCHARGE PLANNING
Medical Social Work    Referral: Pt discussed at IDT rounds this AM.    Intervention: Per vicente, pt lives with boyfriend and expects to d.c home.  No SS needs identified nor any requests for VELMA Headley during rounds.      Plan: VELMA available for any assistance with d.c planning.    Care Transition Team Assessment    Information Source  Orientation : Oriented x 4  Information Given By: Patient    Readmission Evaluation  Is this a readmission?: No    Elopement Risk  Legal Hold: No  Ambulatory or Self Mobile in Wheelchair: Yes  Disoriented: No  Psychiatric Symptoms: None  History of Wandering: No  Elopement this Admit: No  Vocalizing Wanting to Leave: No  Displays Behaviors, Body Language Wanting to Leave: No-Not at Risk for Elopement  Elopement Risk: Not at Risk for Elopement    Interdisciplinary Discharge Planning  Does Admitting Nurse Feel This Could be a Complex Discharge?: No  Primary Care Physician: loly Fuentes referral  Lives with - Patient's Self Care Capacity: Significant Other  Patient or legal guardian wants to designate a caregiver (see row info): No  Support Systems: Family Member(s)  Housing / Facility: [address]  Do You Take your Prescribed Medications Regularly: Yes  Able to Return to Previous ADL's: Yes  Mobility Issues: No  Prior Services: None  Patient Expects to be Discharged to:: home  Assistance Needed: No  Durable Medical Equipment: Not Applicable    Discharge Preparedness  What is your plan after discharge?: Uncertain - pending medical team collaboration         Finances  Prescription Coverage: Yes    Vision / Hearing Impairment  Vision Impairment : Yes  Right Eye Vision: Impaired, Wears Glasses  Left Eye Vision: Impaired, Wears Glasses  Hearing Impairment : No    Values / Beliefs / Concerns  Values / Beliefs Concerns : No    Advance Directive  Advance Directive?: None    Domestic Abuse  Have you ever been the victim of abuse or violence?: No  Physical Abuse or Sexual Abuse:  No  Verbal Abuse or Emotional Abuse: No  Possible Abuse Reported to:: Not Applicable    Psychological Assessment  History of Substance Abuse: None         Anticipated Discharge Information  Anticipated discharge disposition: Home

## 2017-09-12 NOTE — PROGRESS NOTES
FSBS checks Q1h, see lab results. Values ranging between 80-92. Pt ambulating hallways with mother, more steady on her feet than this AM. Pt moved to [address] with all belongings. William RN manager spoke with pt and mother, OK per William for mother to stay due to pt's history of seizures. No additional needs, will CTM.

## 2017-09-12 NOTE — PROGRESS NOTES
1600Report received, plan of care reviewed and discussed, assessment complete, oriented to room, bed alarm on, nonskid socks applied, advised to call for assistance.   1800 Up to bathroom, complaints of headache, refusing medication.  Report given to next shift.  Cardiac Summary.  Sinus rhythm (0.16/0.10/0.42)

## 2017-09-12 NOTE — PROGRESS NOTES
Pt AAOx4, c/o abdominal bloating/discomfort and minor headache. Pain 3-4/10. Declines interventions at this time. No n/v. Due meds given. Pt up to bathroom, SBA with unsteady gait. FSBS 86. No additional needs, will monitor closely.

## 2017-09-12 NOTE — CARE PLAN
Problem: Safety  Goal: Will remain free from falls  Outcome: PROGRESSING AS EXPECTED  SBA. On seizure precautions. CLIP. Pt calls for assist appropriately. Hourly rounding. Personal belongings within reach. Non-skid socks. Bed alarm on. Bed locked & in low position.          Problem: Knowledge Deficit  Goal: Knowledge of disease process/condition, treatment plan, diagnostic tests, and medications will improve  Outcome: PROGRESSING AS EXPECTED  POC discussed w/ pt. Understands disease process and treatment plan, doing 72hr fasting glucose. Questions answered.     Problem: Pain Management  Goal: Pain level will decrease to patient's comfort goal  Outcome: PROGRESSING SLOWER THAN EXPECTED  C/o headache & abdominal pain. Verbalizes pain using 0-10 scale. Orders in place for pain medication. Medicated as needed. Non-pharmacologic options discussed and offered.

## 2017-09-12 NOTE — CARE PLAN
Problem: Communication  Goal: The ability to communicate needs accurately and effectively will improve  Outcome: PROGRESSING AS EXPECTED  POC discussed. Pt oriented to unit environment, moved to [facility] with all belongings, reoriented to new room. Allowed time for questions/concerns. Pt verbalizes understanding.    Problem: Safety  Goal: Will remain free from injury  Outcome: PROGRESSING AS EXPECTED  Safety precautions in place. Pt unsteady on her feet at times, has hx seizures, on 72 hr fast with target glucose <60. Sz precautions in place, glucose checked hourly, pt ambulates with assistance. Pt verbalizes understanding of safety measures.

## 2017-09-12 NOTE — PROGRESS NOTES
[facility] Hospitalist Progress Note    Date of Service: 2017    Chief Complaint  27 y.o. female admitted 2017 with ?OF INSULINOMA AND MILD ABD DISTENTION    Interval Problem Update  NONE    Consultants/Specialty  ENDOCRINOLOGY    Disposition  PENDING        Review of Systems   Constitutional: Negative.  Negative for chills, fever and weight loss.   HENT: Negative.    Eyes: Negative.    Respiratory: Negative.    Cardiovascular: Negative.    Gastrointestinal: Positive for abdominal pain. Negative for diarrhea and vomiting.   Genitourinary: Negative.    Musculoskeletal: Negative.    Skin: Negative.    Neurological: Negative.       Physical Exam  Laboratory/Imaging   Hemodynamics  Temp (24hrs), Av.7 °C (98 °F), Min:36.5 °C (97.7 °F), Max:36.8 °C (98.3 °F)   Temperature: 36.6 °C (97.9 °F)  Pulse  Av.6  Min: 69  Max: 89    Blood Pressure: 142/91, NIBP: 143/93      Respiratory      Respiration: 18, Pulse Oximetry: 99 %        RUL Breath Sounds: Clear, RML Breath Sounds: Clear, RLL Breath Sounds: Diminished, ARLEN Breath Sounds: Clear, LLL Breath Sounds: Diminished    Fluids    Intake/Output Summary (Last 24 hours) at 17 1244  Last data filed at 17 1200   Gross per 24 hour   Intake             1660 ml   Output             4050 ml   Net            -2390 ml       Nutrition  Orders Placed This Encounter   Procedures   • Diet NPO     Standing Status:   Standing     Number of Occurrences:   1     Order Specific Question:   Restrict to:     Answer:   Ice Chips [2]     Comments:   water      Physical Exam   Constitutional: She is oriented to person, place, and time. No distress.   HENT:   Head: Normocephalic and atraumatic.   Eyes: Right eye exhibits no discharge. Left eye exhibits no discharge.   Neck: Neck supple.   Cardiovascular: Regular rhythm.    Pulmonary/Chest: Effort normal. No stridor. No respiratory distress. She has no wheezes. She has no rales.   Abdominal: Soft. She exhibits distension.  There is no rebound and no guarding.   Musculoskeletal: She exhibits no edema or deformity.   Neurological: She is alert and oriented to person, place, and time.   Skin: Skin is warm and dry. She is not diaphoretic.       Recent Labs      09/11/17   1246  09/12/17   0452   WBC  7.2  6.9   RBC  4.90  5.16   HEMOGLOBIN  14.2  14.9   HEMATOCRIT  40.6  42.9   MCV  82.9  83.1   MCH  29.0  28.9   MCHC  35.0  34.7   RDW  39.6  39.3   PLATELETCT  259  265   MPV  9.4  9.6     Recent Labs      09/11/17   1246  09/12/17   0452   SODIUM  136  138   POTASSIUM  3.4*  3.7   CHLORIDE  107  113*   CO2  21  18*   GLUCOSE  91  88   BUN  13  8   CREATININE  1.10  0.99   CALCIUM  8.9  8.6                      Assessment/Plan     * Hypoglycemia- (present on admission)   Assessment & Plan    SO FAR ACCU CHECKS ARE IN 80S  Patient currently is being worked up for possible insulinoma.   I did speak with Dr. Ayala, endocrinologist at [phone].  The plan is to keep patient nothing by mouth she can support her ice chips up to 72 hours. Normal saline for hydration. Check serum glucose every hour until blood sugar less than 60 at that point I have ordered for C-peptide serum hydroxybutyric acid serum serum ketones and proinsulin level. I've ordered for deformities to be given after these blood draws for seizure or instability. Once the blood was drawn that patient can resume eating a regular diet. Once this testing is confirmed for insulinoma then further imaging will be ordered as an outpatient. Of note she did take her home phentermine for weight loss this morning. I would hold any further phentermine. Also a serum sulfonylurea test was ordered to ensure the patient isn't taking endogenous metformin. A urine drug screen also was added. I have ordered a hemoglobin A1c. As well as a serum pregnancy test. She states she has an IUD in place but is sexually active.        Abdominal pain   Assessment & Plan    MILD DISTENTION  WILL DO KUB         Migraine- (present on admission)   Assessment & Plan    History migraine headaches daily. Continue with Imitrex as needed.        Hashimoto's thyroiditis- (present on admission)   Assessment & Plan    Check a TSH as well as free T4. Continue home Synthroid dose 200 µg daily.        Vitamin D deficiency- (present on admission)   Assessment & Plan    Continue with the patient's home vitamin D replacement. [place] vitamin D level.        Essential hypertension- (present on admission)   Assessment & Plan    Continue with Norvasc home dose 10 mg daily.        Obesity- (present on admission)   Assessment & Plan    Patient taking phentermine for weight loss since May 2017. She states she has had significant weight loss of 40 pounds since that time.            DVT prophylaxis pharmacological::  Enoxaparin (Lovenox)

## 2017-09-12 NOTE — PROGRESS NOTES
"1905 -- Assumed care of patient. Bedside report from REMEDIOS Wilder. POC discussed w/ pt. Up to bathroom to void then back to bed. FSBS 77. Lines patent. CLIP. Fall precautions in place.    2058 -- AOx4. Afebrile. C/o 6/10 headache and abdominal pain. Pt relates it to not being able to eat, refuses PRN tylenol, cold, or heat pack. Due NOC meds given (see MAR). Assessment per doc flowsheet. PIV intact & patent. IVF infusing. FSBS 87 now. States understanding of POC. Resting comfortably in bed. No additional concerns at this time. CLIP. Personal belongings within reach. Non-skid socks. Bed locked & in low position.     2334 -- FSBS 94, pt frustrated levels keep increasing. Still in discomfort, but continues to not want any intervention as \"it would not help. Only thing that will help is me eating.\" Per pt able to doze off for short periods. Denies any other needs at this time. Continue to monitor.     0313 -- FSBS 89. Up to bathroom to void, then back to bed. PRN imitrex given for migraine (see MAR). No further needs.     0619 -- Pt states migraine is better. FSBS 88. Sitting up in bed now. Denies any other needs.    0729 -- Bedside report given to REMEDIOS Maguire. POC discussed w/ pt. Lines patent. Fall precautions in place.       Tele strip at 1904 shows sinus rhythm w/ HR of 70.     Measurements: 0.20 / 0.08 / 0.42    Tele Shift Summary:    Rhythm : SR  Rate : 60-80s (drop once to 40)    Ectopy : Per [facility] [place], pt had rare PVCs.     Telemetry monitoring strips placed in pt chart.      "

## 2017-09-12 NOTE — CARE PLAN
Problem: Knowledge Deficit  Goal: Knowledge of disease process/condition, treatment plan, diagnostic tests, and medications will improve  Outcome: PROGRESSING AS EXPECTED  Discussed plan of care, encouraged and answered all questions, will continue to monitor.

## 2017-09-12 NOTE — PROGRESS NOTES
Bedside report received from Jillian WHITTAKER @ 0715. Assumed care. POC discussed. Pt resting comfortably in bed. Safety and seizure precautions in place.

## 2017-09-12 NOTE — DIETARY
NUTRITION SERVICES: BMI - Pt with BMI >40 (40.11). Weight loss counseling not appropriate in acute care setting. RECOMMEND - Referral to outpatient nutrition services for weight management after D/C.

## 2017-09-13 LAB
ALBUMIN SERPL BCP-MCNC: 4.2 G/DL (ref 3.2–4.9)
ALBUMIN/GLOB SERPL: 1.3 G/DL
ALP SERPL-CCNC: 63 U/L (ref 30–99)
ALT SERPL-CCNC: 16 U/L (ref 2–50)
ANION GAP SERPL CALC-SCNC: 8 MMOL/L (ref 0–11.9)
AST SERPL-CCNC: 17 U/L (ref 12–45)
BASOPHILS # BLD AUTO: 0.4 % (ref 0–1.8)
BASOPHILS # BLD: 0.03 K/UL (ref 0–0.12)
BILIRUB SERPL-MCNC: 0.9 MG/DL (ref 0.1–1.5)
BUN SERPL-MCNC: 9 MG/DL (ref 8–22)
CALCIUM SERPL-MCNC: 8.7 MG/DL (ref 8.4–10.2)
CHLORIDE SERPL-SCNC: 110 MMOL/L (ref 96–112)
CO2 SERPL-SCNC: 17 MMOL/L (ref 20–33)
CREAT SERPL-MCNC: 1.03 MG/DL (ref 0.5–1.4)
EOSINOPHIL # BLD AUTO: 0.2 K/UL (ref 0–0.51)
EOSINOPHIL NFR BLD: 2.8 % (ref 0–6.9)
ERYTHROCYTE [DISTWIDTH] IN BLOOD BY AUTOMATED COUNT: 38.4 FL (ref 35.9–50)
GFR SERPL CREATININE-BSD FRML MDRD: >60 ML/MIN/1.73 M 2
GLOBULIN SER CALC-MCNC: 3.2 G/DL (ref 1.9–3.5)
GLUCOSE BLD-MCNC: 72 MG/DL (ref 65–99)
GLUCOSE BLD-MCNC: 76 MG/DL (ref 65–99)
GLUCOSE BLD-MCNC: 77 MG/DL (ref 65–99)
GLUCOSE BLD-MCNC: 77 MG/DL (ref 65–99)
GLUCOSE BLD-MCNC: 81 MG/DL (ref 65–99)
GLUCOSE BLD-MCNC: 81 MG/DL (ref 65–99)
GLUCOSE BLD-MCNC: 82 MG/DL (ref 65–99)
GLUCOSE BLD-MCNC: 84 MG/DL (ref 65–99)
GLUCOSE BLD-MCNC: 85 MG/DL (ref 65–99)
GLUCOSE BLD-MCNC: 86 MG/DL (ref 65–99)
GLUCOSE BLD-MCNC: 91 MG/DL (ref 65–99)
GLUCOSE BLD-MCNC: 91 MG/DL (ref 65–99)
GLUCOSE BLD-MCNC: 95 MG/DL (ref 65–99)
GLUCOSE SERPL-MCNC: 76 MG/DL (ref 65–99)
HCT VFR BLD AUTO: 43.4 % (ref 37–47)
HGB BLD-MCNC: 15.4 G/DL (ref 12–16)
IMM GRANULOCYTES # BLD AUTO: 0.03 K/UL (ref 0–0.11)
IMM GRANULOCYTES NFR BLD AUTO: 0.4 % (ref 0–0.9)
INSULIN P FAST SERPL-ACNC: 13 UIU/ML (ref 3–19)
LYMPHOCYTES # BLD AUTO: 1.39 K/UL (ref 1–4.8)
LYMPHOCYTES NFR BLD: 19.2 % (ref 22–41)
MCH RBC QN AUTO: 28.9 PG (ref 27–33)
MCHC RBC AUTO-ENTMCNC: 35.5 G/DL (ref 33.6–35)
MCV RBC AUTO: 81.6 FL (ref 81.4–97.8)
MONOCYTES # BLD AUTO: 0.43 K/UL (ref 0–0.85)
MONOCYTES NFR BLD AUTO: 5.9 % (ref 0–13.4)
NEUTROPHILS # BLD AUTO: 5.17 K/UL (ref 2–7.15)
NEUTROPHILS NFR BLD: 71.3 % (ref 44–72)
NRBC # BLD AUTO: 0 K/UL
NRBC BLD AUTO-RTO: 0 /100 WBC
PLATELET # BLD AUTO: 272 K/UL (ref 164–446)
PMV BLD AUTO: 9.6 FL (ref 9–12.9)
POTASSIUM SERPL-SCNC: 3.6 MMOL/L (ref 3.6–5.5)
PROT SERPL-MCNC: 7.4 G/DL (ref 6–8.2)
RBC # BLD AUTO: 5.32 M/UL (ref 4.2–5.4)
SODIUM SERPL-SCNC: 135 MMOL/L (ref 135–145)
WBC # BLD AUTO: 7.3 K/UL (ref 4.8–10.8)

## 2017-09-13 PROCEDURE — 82962 GLUCOSE BLOOD TEST: CPT

## 2017-09-13 PROCEDURE — 700102 HCHG RX REV CODE 250 W/ 637 OVERRIDE(OP): Performed by: FAMILY MEDICINE

## 2017-09-13 PROCEDURE — 700102 HCHG RX REV CODE 250 W/ 637 OVERRIDE(OP): Performed by: HOSPITALIST

## 2017-09-13 PROCEDURE — 80053 COMPREHEN METABOLIC PANEL: CPT

## 2017-09-13 PROCEDURE — A9270 NON-COVERED ITEM OR SERVICE: HCPCS | Performed by: HOSPITALIST

## 2017-09-13 PROCEDURE — 85025 COMPLETE CBC W/AUTO DIFF WBC: CPT

## 2017-09-13 PROCEDURE — 700101 HCHG RX REV CODE 250: Performed by: HOSPITALIST

## 2017-09-13 PROCEDURE — 770020 HCHG ROOM/CARE - TELE (206)

## 2017-09-13 PROCEDURE — 700111 HCHG RX REV CODE 636 W/ 250 OVERRIDE (IP): Performed by: FAMILY MEDICINE

## 2017-09-13 PROCEDURE — A9270 NON-COVERED ITEM OR SERVICE: HCPCS | Performed by: FAMILY MEDICINE

## 2017-09-13 PROCEDURE — 99232 SBSQ HOSP IP/OBS MODERATE 35: CPT | Performed by: FAMILY MEDICINE

## 2017-09-13 RX ORDER — SUMATRIPTAN 25 MG/1
50 TABLET, FILM COATED ORAL
Status: COMPLETED | OUTPATIENT
Start: 2017-09-13 | End: 2017-09-13

## 2017-09-13 RX ORDER — KETOROLAC TROMETHAMINE 30 MG/ML
30 INJECTION, SOLUTION INTRAMUSCULAR; INTRAVENOUS EVERY 6 HOURS PRN
Status: DISCONTINUED | OUTPATIENT
Start: 2017-09-13 | End: 2017-09-14 | Stop reason: HOSPADM

## 2017-09-13 RX ORDER — ONDANSETRON 2 MG/ML
4 INJECTION INTRAMUSCULAR; INTRAVENOUS EVERY 4 HOURS PRN
Status: DISCONTINUED | OUTPATIENT
Start: 2017-09-13 | End: 2017-09-14 | Stop reason: HOSPADM

## 2017-09-13 RX ADMIN — KETOROLAC TROMETHAMINE 30 MG: 30 INJECTION, SOLUTION INTRAMUSCULAR at 20:03

## 2017-09-13 RX ADMIN — LEVOTHYROXINE SODIUM 200 MCG: 100 TABLET ORAL at 05:54

## 2017-09-13 RX ADMIN — TOPIRAMATE 100 MG: 100 TABLET, FILM COATED ORAL at 09:11

## 2017-09-13 RX ADMIN — DOCUSATE SODIUM AND SENNOSIDES 2 TABLET: 8.6; 5 TABLET, FILM COATED ORAL at 09:10

## 2017-09-13 RX ADMIN — TOPIRAMATE 100 MG: 100 TABLET, FILM COATED ORAL at 20:02

## 2017-09-13 RX ADMIN — SUMATRIPTAN SUCCINATE 50 MG: 25 TABLET ORAL at 05:56

## 2017-09-13 RX ADMIN — KETOROLAC TROMETHAMINE 30 MG: 30 INJECTION, SOLUTION INTRAMUSCULAR at 10:29

## 2017-09-13 RX ADMIN — VITAMIN D, TAB 1000IU (100/BT) 2000 UNITS: 25 TAB at 20:02

## 2017-09-13 RX ADMIN — VITAMIN D, TAB 1000IU (100/BT) 2000 UNITS: 25 TAB at 09:10

## 2017-09-13 RX ADMIN — AMLODIPINE BESYLATE 10 MG: 5 TABLET ORAL at 09:10

## 2017-09-13 ASSESSMENT — ENCOUNTER SYMPTOMS
CHILLS: 0
MUSCULOSKELETAL NEGATIVE: 1
VOMITING: 0
HEADACHES: 1
EYES NEGATIVE: 1
DIARRHEA: 0
CARDIOVASCULAR NEGATIVE: 1
RESPIRATORY NEGATIVE: 1
FEVER: 0
ABDOMINAL PAIN: 0
WEIGHT LOSS: 0
CONSTITUTIONAL NEGATIVE: 1

## 2017-09-13 ASSESSMENT — PAIN SCALES - GENERAL: PAINLEVEL_OUTOF10: 5

## 2017-09-13 NOTE — DISCHARGE PLANNING
Medical Social Work    Referral: Pt discussed at IDT rounds this AM.    Intervention: Per flowsheet, pt lives with boyfriend and expects to d.c home.  No SS needs identified nor any requests for VELMA Headley during rounds.      Plan: SW available for any assistance with d.c planning.

## 2017-09-13 NOTE — CARE PLAN
Problem: Safety  Goal: Will remain free from falls  Outcome: PROGRESSING AS EXPECTED  SBA. On seizure & fall precautions. CLIP. Pt calls for assist appropriately. Hourly rounding. Personal belongings within reach. Non-skid socks. Bed alarm on. Bed locked & in low position.      Problem: Knowledge Deficit  Goal: Knowledge of disease process/condition, treatment plan, diagnostic tests, and medications will improve  Outcome: PROGRESSING AS EXPECTED  POC discussed w/ pt. Understands disease process and treatment plan, continues on 72hr fasting with qhr glucose checks. Goal < 50. Questions answered.

## 2017-09-13 NOTE — PROGRESS NOTES
[facility]    Date of Service: 2017    Chief Complaint  27 y.o. female admitted 2017 with ?OF INSULINOMA AND MILD ABD DISTENTION    Interval Problem Update  NONE    Consultants/Specialty  ENDOCRINOLOGY    Disposition  PENDING        Review of Systems   Constitutional: Negative.  Negative for chills, fever and weight loss.   Eyes: Negative.    Respiratory: Negative.    Cardiovascular: Negative.    Gastrointestinal: Negative for abdominal pain, diarrhea and vomiting.   Genitourinary: Negative.    Musculoskeletal: Negative.    Skin: Negative.    Neurological: Positive for headaches.      Physical Exam  Laboratory/Imaging   Hemodynamics  Temp (24hrs), Av.6 °C (97.8 °F), Min:36.3 °C (97.4 °F), Max:36.8 °C (98.2 °F)   Temperature: 36.6 °C (97.8 °F)  Pulse  Av.8  Min: 65  Max: 89    Blood Pressure: 117/87      Respiratory      Respiration: 18, Pulse Oximetry: 99 %        RUL Breath Sounds: Clear, RML Breath Sounds: Clear, RLL Breath Sounds: Diminished, ARLEN Breath Sounds: Clear, LLL Breath Sounds: Diminished    Fluids    Intake/Output Summary (Last 24 hours) at 17 1100  Last data filed at 17 1700   Gross per 24 hour   Intake              480 ml   Output                0 ml   Net              480 ml       Nutrition  Orders Placed This Encounter   Procedures   • Diet NPO     Standing Status:   Standing     Number of Occurrences:   1     Order Specific Question:   Restrict to:     Answer:   Ice Chips [2]     Comments:   water      Physical Exam   Constitutional: She is oriented to person, place, and time. No distress.   HENT:   Head: Normocephalic and atraumatic.   Eyes: Right eye exhibits no discharge. Left eye exhibits no discharge.   Neck: Neck supple.   Cardiovascular: Regular rhythm.    Pulmonary/Chest: Effort normal. No stridor. No respiratory distress. She has no wheezes. She has no rales.   Abdominal: Soft. She exhibits no distension. There is no rebound and no  guarding.   Musculoskeletal: She exhibits no edema or deformity.   Neurological: She is alert and oriented to person, place, and time.   Skin: Skin is warm and dry. She is not diaphoretic.       Recent Labs      09/11/17   1246  09/12/17 0452  09/13/17   0446   WBC  7.2  6.9  7.3   RBC  4.90  5.16  5.32   HEMOGLOBIN  14.2  14.9  15.4   HEMATOCRIT  40.6  42.9  43.4   MCV  82.9  83.1  81.6   MCH  29.0  28.9  28.9   MCHC  35.0  34.7  35.5*   RDW  39.6  39.3  38.4   PLATELETCT  259  265  272   MPV  9.4  9.6  9.6     Recent Labs      09/11/17 1246  09/12/17 0452  09/13/17   0446   SODIUM  136  138  135   POTASSIUM  3.4*  3.7  3.6   CHLORIDE  107  113*  110   CO2  21  18*  17*   GLUCOSE  91  88  76   BUN  13  8  9   CREATININE  1.10  0.99  1.03   CALCIUM  8.9  8.6  8.7                      Assessment/Plan     * Hypoglycemia- (present on admission)   Assessment & Plan    SO FAR ACCU CHECKS ARE IN 80S AND NO NUMBERS BELOW 60  Patient currently is being worked up for possible insulinoma.   I did speak with Dr. Ayala, endocrinologist at [phone].  The plan is to keep patient nothing by mouth she can support her ice chips up to 72 hours. Normal saline for hydration. Check serum glucose every hour until blood sugar less than 60 at that point I have ordered for C-peptide serum hydroxybutyric acid serum serum ketones and proinsulin level. I've ordered for deformities to be given after these blood draws for seizure or instability. Once the blood was drawn that patient can resume eating a regular diet. Once this testing is confirmed for insulinoma then further imaging will be ordered as an outpatient. Of note she did take her home phentermine for weight loss this morning. I would hold any further phentermine. Also a serum sulfonylurea test was ordered to ensure the patient isn't taking endogenous metformin. A urine drug screen also was added. I have ordered a hemoglobin A1c. As well as a serum pregnancy test. She  states she has an IUD in place but is sexually active.        Abdominal pain   Assessment & Plan    HAS RESOLVED  KUB RESULT IS NOTED        Migraine- (present on admission)   Assessment & Plan    History migraine headaches daily. Continue with Imitrex as needed.        Hashimoto's thyroiditis- (present on admission)   Assessment & Plan    Check a TSH as well as free T4. Continue home Synthroid dose 200 µg daily.        Vitamin D deficiency- (present on admission)   Assessment & Plan    Continue with the patient's home vitamin D replacement. [place] vitamin D level.        Intractable migraine without status migrainosus- (present on admission)   Assessment & Plan    TORADOL        Essential hypertension- (present on admission)   Assessment & Plan    Continue with Norvasc home dose 10 mg daily.        Obesity- (present on admission)   Assessment & Plan    Patient taking phentermine for weight loss since May 2017. She states she has had significant weight loss of 40 pounds since that time.            Ag catheter::  No Ag  DVT prophylaxis pharmacological::  Enoxaparin (Lovenox)

## 2017-09-13 NOTE — CARE PLAN
Problem: Knowledge Deficit  Goal: Knowledge of disease process/condition, treatment plan, diagnostic tests, and medications will improve  Outcome: PROGRESSING AS EXPECTED  Discussed POC with pt and family. Updating as appropriate.    Problem: Psychosocial Needs:  Goal: Level of anxiety will decrease  Using therapeutic communication and calming techniques, offering self, and encouraging family support.

## 2017-09-13 NOTE — PROGRESS NOTES
"1858 -- Assumed care of patient. Bedside report from REMEDIOS Maguire. POC discussed w/ pt. Lines patent. CLIP. Fall & seizure precautions in place.    2023 -- AOx4. Afebrile. Mild abdominal pain, no intervention needed at this time. Assessment per doc flowsheet. PIV intact & patent, saline locked. Due NOC meds given. FSBS 101 after pt ambulated hallway and did 5 reps up/down stairs with mother. States understanding of POC. No additional concerns at this time. CLIP. Personal belongings within reach. Non-skid socks. Bed locked & in low position.     2205 -- FSBS 99. Pt lying down in bed now. Frustrated glucose levels are not decreasing. Denied any other needs at this time.    2330 -- FSBS 91. No status changes.    0132 -- FSBS 95. No changes.    0249 -- FSBS 86. Pt denied any needs. Resting in bed, able to doze off occasionally in-between glucose checks.    0356 -- FSBS 81. Crying d/t migraine. Offered to call for order for imitrex or to give available PRN tylenol . Pt declined and asked for ice pack only. Mother comforting pt at bedside.    0504 -- FSBS 85. Pt visibly upset and crying. States \"I cannot do this anymore.\" Mother providing comfort and attempting distraction. Pt having nausea and severe migraine.    0524 --  Spoke to Dr. Ahumada and telephone order received to do give imitrex 50mg PO x1 and IV zofran 4mg q4h PRN. RBV.    0559 -- FSBS 82. Synthroid and imitrex given. Pt states nausea is \"better\", does not want zofran at this time. She is very frustrated. Tells this nurse if glucose level does not go below 60 before noon, she is quitting.    0718 -- Report given to REMEDIOS Momin. POC discussed w/ pt and mother. Pt wants to take a shower. Lines patent. Fall & seizure precautions in place.         Tele strip at 1931 shows sinus rhythm w/ HR of 79.   Measurements: 0.18 / 0.08 / 0.40    Tele Shift Summary:  Rhythm : SR/SB/ST  Rate : 50-97 (lowest 39, highest 150)  Ectopy : Per CCT Karmen, pt had rare PVC.     Telemetry " monitoring strips placed in pt chart.

## 2017-09-13 NOTE — PROGRESS NOTES
FSBS remains in the 80's. Pt continues to ambulate hallways with family.     Tele strip at 0700 shows SR w/ HR of 77  Measurements: .18/.10/.42    Tele Shift Summary:  Rhythm: SR  Rate: 60's-80's  Ectopy: Per CCT Vicenta, pt had no ectopy.    Telemetry monitoring strips placed in pt chart.    Bedside report given to Jillian WHITTAKER. POC discussed. Pt resting comfortably in bed. Safety precautions in place.

## 2017-09-14 VITALS
SYSTOLIC BLOOD PRESSURE: 122 MMHG | OXYGEN SATURATION: 95 % | DIASTOLIC BLOOD PRESSURE: 74 MMHG | HEIGHT: 70 IN | HEART RATE: 91 BPM | WEIGHT: 279 LBS | TEMPERATURE: 98.5 F | BODY MASS INDEX: 39.94 KG/M2 | RESPIRATION RATE: 18 BRPM

## 2017-09-14 LAB
ANION GAP SERPL CALC-SCNC: 8 MMOL/L (ref 0–11.9)
BUN SERPL-MCNC: 12 MG/DL (ref 8–22)
CALCIUM SERPL-MCNC: 8.6 MG/DL (ref 8.4–10.2)
CHLORIDE SERPL-SCNC: 110 MMOL/L (ref 96–112)
CO2 SERPL-SCNC: 18 MMOL/L (ref 20–33)
CREAT SERPL-MCNC: 0.97 MG/DL (ref 0.5–1.4)
EST. AVERAGE GLUCOSE BLD GHB EST-MCNC: 105 MG/DL
GFR SERPL CREATININE-BSD FRML MDRD: >60 ML/MIN/1.73 M 2
GLUCOSE BLD-MCNC: 82 MG/DL (ref 65–99)
GLUCOSE BLD-MCNC: 84 MG/DL (ref 65–99)
GLUCOSE BLD-MCNC: 85 MG/DL (ref 65–99)
GLUCOSE BLD-MCNC: 85 MG/DL (ref 65–99)
GLUCOSE BLD-MCNC: 86 MG/DL (ref 65–99)
GLUCOSE BLD-MCNC: 94 MG/DL (ref 65–99)
GLUCOSE SERPL-MCNC: 78 MG/DL (ref 65–99)
HBA1C MFR BLD: 5.3 % (ref 0–5.6)
POTASSIUM SERPL-SCNC: 3.5 MMOL/L (ref 3.6–5.5)
SODIUM SERPL-SCNC: 136 MMOL/L (ref 135–145)

## 2017-09-14 PROCEDURE — 82962 GLUCOSE BLOOD TEST: CPT | Mod: 91

## 2017-09-14 PROCEDURE — 700102 HCHG RX REV CODE 250 W/ 637 OVERRIDE(OP): Performed by: HOSPITALIST

## 2017-09-14 PROCEDURE — 80048 BASIC METABOLIC PNL TOTAL CA: CPT

## 2017-09-14 PROCEDURE — A9270 NON-COVERED ITEM OR SERVICE: HCPCS | Performed by: HOSPITALIST

## 2017-09-14 PROCEDURE — A9270 NON-COVERED ITEM OR SERVICE: HCPCS | Performed by: FAMILY MEDICINE

## 2017-09-14 PROCEDURE — 700102 HCHG RX REV CODE 250 W/ 637 OVERRIDE(OP): Performed by: FAMILY MEDICINE

## 2017-09-14 PROCEDURE — 99239 HOSP IP/OBS DSCHRG MGMT >30: CPT | Performed by: FAMILY MEDICINE

## 2017-09-14 PROCEDURE — 700101 HCHG RX REV CODE 250: Performed by: HOSPITALIST

## 2017-09-14 RX ADMIN — TOPIRAMATE 100 MG: 100 TABLET, FILM COATED ORAL at 08:26

## 2017-09-14 RX ADMIN — AMLODIPINE BESYLATE 10 MG: 5 TABLET ORAL at 08:26

## 2017-09-14 RX ADMIN — VITAMIN D, TAB 1000IU (100/BT) 2000 UNITS: 25 TAB at 08:26

## 2017-09-14 RX ADMIN — LEVOTHYROXINE SODIUM 200 MCG: 100 TABLET ORAL at 06:27

## 2017-09-14 ASSESSMENT — LIFESTYLE VARIABLES: EVER_SMOKED: YES

## 2017-09-14 ASSESSMENT — PAIN SCALES - GENERAL: PAINLEVEL_OUTOF10: 0

## 2017-09-14 NOTE — PROGRESS NOTES
Discharge instructions provided, verbalized understanding. PIV removed tip intact. Pt refused escort and wheelchair out. Walking steady with mother at side. All belongings with patient.

## 2017-09-14 NOTE — PROGRESS NOTES
EKG Report:     Rhythm: Sinus rhythm, sinus tachycardia.    Ectopy: Rare-PACs, Rare-PVCs    0.20/0.08/0.42

## 2017-09-14 NOTE — CARE PLAN
Problem: Safety  Goal: Will remain free from injury  Outcome: PROGRESSING AS EXPECTED  Bed locked and low, controls on call light button within reach.  Steady on her feet.  Pt calls appropriately for needs.    Problem: Infection  Goal: Will remain free from infection  Outcome: PROGRESSING AS EXPECTED  Remains afebrile, will continue to monitor.      Problem: Knowledge Deficit  Goal: Knowledge of disease process/condition, treatment plan, diagnostic tests, and medications will improve  Outcome: PROGRESSING AS EXPECTED  Pt and family updated with POCs, questions answered  wctm    Problem: Pain Management  Goal: Pain level will decrease to patient's comfort goal   09/13/17 2009 09/13/17 2228   OTHER   Nurse Pain Scale 0 - 10  5 --    Non Verbal Scale  --  Sleeping;Unlabored Breathing   Comfort Goal Comfort at Rest;Comfort with Movement;Perform Activity --

## 2017-09-14 NOTE — DISCHARGE PLANNING
Medical Social Work    Referral: Pt discussed at IDT rounds this AM.    Intervention: Per flowsheet, pt lives with boyfriend and expects to d.c home.  Probable d.c home today.  No SS needs identified nor any requests for VELMA Headley during rounds.      Plan: SW available for any assistance with d.c planning.

## 2017-09-14 NOTE — PROGRESS NOTES
Received bedside report from Jahaira RN. Assumed care of pt who is resting in bed, RR even/unlabored. Fall protocol in place. CLIP. Will continue to monitor.

## 2017-09-14 NOTE — DISCHARGE SUMMARY
CHIEF COMPLAINT ON ADMISSION  Chief Complaint   Patient presents with   • Other       CODE STATUS  Full Code    HPI & HOSPITAL COURSE  This is a 27 y.o. female here with ?OF INSULINOMA:  SHE WAS SENT TO THE HOSPITAL BY HER ENDOCRINOLOGIST  FOR 72 HRS EVERY HOUR ACCU CHECKS;  THE LOWEST ACCU CHECKS HAVE BEEN IN THE 7OS.  I CALLED HER ENDOCRINOLOGIST AND LEFYT A MESSAGE WITH OUR FINDINGS.     Therefore, she is discharged in good and stable condition with close outpatient follow-up.    SPECIFIC OUTPATIENT FOLLOW-UP  WITH ENDOCRINOLOGIST AS SCHEDULED    DISCHARGE PROBLEM LIST  Principal Problem:    Hypoglycemia POA: Yes  Active Problems:    Obesity POA: Yes    Essential hypertension POA: Yes    Intractable migraine without status migrainosus POA: Yes    Vitamin D deficiency POA: Yes    Hashimoto's thyroiditis POA: Yes    Migraine POA: Yes    Abdominal pain POA: Unknown      Overview: MILD DISTENTION      WILL DO KUB  Resolved Problems:    * No resolved hospital problems. *      FOLLOW UP  Future Appointments  Date Time Provider Department Center   11/8/2017 4:10 PM DARBY Haq   12/6/2017 10:00 AM Chel Sandra M.D. [facility] None   12/6/2017 1:00 PM KARINE Cuevas [facility]     No follow-up provider specified.    MEDICATIONS ON DISCHARGE   DomiisabellaNickolas   Home Medication Instructions ASHLIE:[id number]    Printed on:09/14/17 0922   Medication Information                      amlodipine (NORVASC) 10 MG Tab  Take 10 mg by mouth every day.             Cholecalciferol (VITAMIN D) 2000 UNITS Cap  Take 2,000 Units by mouth 2 Times a Day.             levothyroxine (SYNTHROID) 200 MCG Tab  Take 1 Tab by mouth Every morning on an empty stomach.             phentermine (ADIPEX-P) 37.5 MG tablet  Take 1 Tab by mouth every morning before breakfast.             sumatriptan (IMITREX) 50 MG Tab  Take 1 Tab by mouth Once PRN for Migraine for up to 1 dose.             topiramate (TOPAMAX) 100 MG  Tab  Take 1 Tab by mouth 2 times a day.             vitamin D, Ergocalciferol, (DRISDOL) 96522 units Cap capsule  Take 50,000 Units by mouth every 7 days.                 DIET  Orders Placed This Encounter   Procedures   • Diet NPO     Standing Status:   Standing     Number of Occurrences:   1     Order Specific Question:   Restrict to:     Answer:   Ice Chips [2]     Comments:   water        ACTIVITY  As tolerated.  Weight bearing as tolerated      CONSULTATIONS  NONE    PROCEDURES  NONE    LABORATORY  Lab Results   Component Value Date/Time    SODIUM 136 09/14/2017 04:21 AM    POTASSIUM 3.5 (L) 09/14/2017 04:21 AM    CHLORIDE 110 09/14/2017 04:21 AM    CO2 18 (L) 09/14/2017 04:21 AM    GLUCOSE 78 09/14/2017 04:21 AM    BUN 12 09/14/2017 04:21 AM    CREATININE 0.97 09/14/2017 04:21 AM        Lab Results   Component Value Date/Time    WBC 7.3 09/13/2017 04:46 AM    HEMOGLOBIN 15.4 09/13/2017 04:46 AM    HEMATOCRIT 43.4 09/13/2017 04:46 AM    PLATELETCT 272 09/13/2017 04:46 AM        Total time of the discharge process exceeds 36 minutes

## 2017-09-21 RX ORDER — ERGOCALCIFEROL 1.25 MG/1
50000 CAPSULE ORAL
Qty: 30 CAP | Refills: 3 | Status: SHIPPED | OUTPATIENT
Start: 2017-09-21 | End: 2018-01-11

## 2017-10-18 ENCOUNTER — APPOINTMENT (OUTPATIENT)
Dept: RADIOLOGY | Facility: MEDICAL CENTER | Age: 27
End: 2017-10-18
Attending: OBSTETRICS & GYNECOLOGY
Payer: COMMERCIAL

## 2017-11-03 DIAGNOSIS — E03.8 HYPOTHYROIDISM DUE TO HASHIMOTO'S THYROIDITIS: ICD-10-CM

## 2017-11-03 DIAGNOSIS — E06.3 HYPOTHYROIDISM DUE TO HASHIMOTO'S THYROIDITIS: ICD-10-CM

## 2017-11-03 RX ORDER — LEVOTHYROXINE SODIUM 0.2 MG/1
200 TABLET ORAL
Qty: 90 TAB | Refills: 0 | Status: SHIPPED | OUTPATIENT
Start: 2017-11-03 | End: 2018-01-08 | Stop reason: SDUPTHER

## 2017-12-04 ENCOUNTER — OFFICE VISIT (OUTPATIENT)
Dept: NEUROLOGY | Facility: MEDICAL CENTER | Age: 27
End: 2017-12-04
Payer: COMMERCIAL

## 2017-12-04 VITALS
HEIGHT: 70 IN | TEMPERATURE: 98.2 F | SYSTOLIC BLOOD PRESSURE: 136 MMHG | BODY MASS INDEX: 38.65 KG/M2 | HEART RATE: 86 BPM | RESPIRATION RATE: 16 BRPM | DIASTOLIC BLOOD PRESSURE: 80 MMHG | WEIGHT: 270 LBS | OXYGEN SATURATION: 99 %

## 2017-12-04 DIAGNOSIS — G43.019 INTRACTABLE MIGRAINE WITHOUT AURA AND WITHOUT STATUS MIGRAINOSUS: ICD-10-CM

## 2017-12-04 DIAGNOSIS — G93.2 PSEUDOTUMOR CEREBRI: ICD-10-CM

## 2017-12-04 DIAGNOSIS — G40.909 SEIZURE DISORDER (HCC): ICD-10-CM

## 2017-12-04 PROCEDURE — 64405 NJX AA&/STRD GR OCPL NRV: CPT | Mod: 50 | Performed by: PSYCHIATRY & NEUROLOGY

## 2017-12-04 PROCEDURE — 99214 OFFICE O/P EST MOD 30 MIN: CPT | Mod: 25 | Performed by: PSYCHIATRY & NEUROLOGY

## 2017-12-04 PROCEDURE — S0020 INJECTION, BUPIVICAINE HYDRO: HCPCS | Performed by: PSYCHIATRY & NEUROLOGY

## 2017-12-04 RX ORDER — TRIAMCINOLONE ACETONIDE 40 MG/ML
80 INJECTION, SUSPENSION INTRA-ARTICULAR; INTRAMUSCULAR ONCE
Status: COMPLETED | OUTPATIENT
Start: 2017-12-04 | End: 2017-12-04

## 2017-12-04 RX ORDER — BUPIVACAINE HYDROCHLORIDE 5 MG/ML
8 INJECTION, SOLUTION EPIDURAL; INTRACAUDAL ONCE
Status: COMPLETED | OUTPATIENT
Start: 2017-12-04 | End: 2017-12-04

## 2017-12-04 RX ADMIN — TRIAMCINOLONE ACETONIDE 80 MG: 40 INJECTION, SUSPENSION INTRA-ARTICULAR; INTRAMUSCULAR at 12:26

## 2017-12-04 RX ADMIN — BUPIVACAINE HYDROCHLORIDE 8 ML: 5 INJECTION, SOLUTION EPIDURAL; INTRACAUDAL at 12:25

## 2017-12-04 NOTE — PROGRESS NOTES
NEUROLOGY NOTE    Referring Physician  Cecilia Garcia      CHIEF COMPLAINT:  Last GTCS at the age of 22YO  From the age of 18YO to 22YO-- followed by Dr Maharaj for pseudotumor cerebri  Dr Maharaj then referred the patient to another neurologist--another neurologist also told her that she does not have pseudotumor cereberi any more?  Insurance denied weight loss surgery  Headache worsened again this morning    Chief Complaint   Patient presents with   • Follow-Up     Seizure disorder       PRESENT ILLNESS:     Last GTCS at the age of 22YO    Passing out in her job    From the age of 18YO to 22YO-- followed by Dr Maharaj for pseudotumor cerebri  Dr Maharaj then referred the patient to another neurologist--another neurologist also told her that she does not have pseudotumor cereberi any more?  Insurance denied weight loss surgery  Headache worsened again this morning    Headache intractable-- for 4-5 months  1. Location--occipital  2. Characteristics--throbbing  3. Associated--light , blurred vision , dizziness, black spots  4. Worsening--stress, intercourse, altitude  5. Relieving factors-- Excedrin migraine, topamax 50mg HS   6. Family History-not she could recall  7. Every 3 day- could last 1-2 days   8. Severity-- can not work   9. Sleep-- not able to sleep  10. Coffee intake-- not coffee addict      The last lumbar puncture, the opening pressure was high normal based on what  Average 32-43 opening pressure    PAST MEDICAL HISTORY:  Past Medical History:   Diagnosis Date   • EEG abnormal    • Hashimoto's disease    • Hyperglycemia    • Migraine    • Pseudotumor cerebri    • Seizure disorder (CMS-HCC)        PAST SURGICAL HISTORY:  Past Surgical History:   Procedure Laterality Date   • GYN SURGERY      precancerous cells with cervical freezing, HPV positive       FAMILY HISTORY:  Family History   Problem Relation Age of Onset   • Cancer Mother      Cervical       SOCIAL HISTORY:  Social History     Social  History   • Marital status: Single     Spouse name: N/A   • Number of children: N/A   • Years of education: N/A     Occupational History   • Not on file.     Social History Main Topics   • Smoking status: Former Smoker     Start date: 1/1/2005     Quit date: 5/3/2012   • Smokeless tobacco: Never Used      Comment: 1/2 pack a day   • Alcohol use 0.6 oz/week     1 Glasses of wine per week      Comment: socially, 1 glass of wine per week   • Drug use: No   • Sexual activity: No     Other Topics Concern   • Not on file     Social History Narrative    ** Merged History Encounter **          ALLERGIES:  No Known Allergies  TOBHX  History   Smoking Status   • Former Smoker   • Start date: 1/1/2005   • Quit date: 5/3/2012   Smokeless Tobacco   • Never Used     Comment: 1/2 pack a day     ALCHX  History   Alcohol Use   • 0.6 oz/week   • 1 Glasses of wine per week     Comment: socially, 1 glass of wine per week     DRUGHX  History   Drug Use No           MEDICATIONS:  Current Outpatient Prescriptions   Medication   • levothyroxine (SYNTHROID) 200 MCG Tab   • amlodipine (NORVASC) 10 MG Tab   • phentermine (ADIPEX-P) 37.5 MG tablet   • topiramate (TOPAMAX) 100 MG Tab   • sumatriptan (IMITREX) 50 MG Tab   • vitamin D, Ergocalciferol, (DRISDOL) 70842 units Cap capsule   • Cholecalciferol (VITAMIN D) 2000 UNITS Cap     No current facility-administered medications for this visit.        REVIEW OF SYSTEM:    Constitutional: Denies fevers, Denies weight changes   Eyes: Denies changes in vision, no eye pain   Ears/Nose/Throat/Mouth: Denies nasal congestion or sore throat   Cardiovascular: HTN  Respiratory: Denies SOB.   Gastrointestinal/Hepatic: Denies abdominal pain, nausea, vomiting, diarrhea, constipation or GI bleeding   Genitourinary: Denies bladder dysfunction, dysuria or frequency   Musculoskeletal/Rheum: Denies joint pain and swelling   Skin/Breast: Denies rash, denies breast lumps or discharge   Neurological: seizure,  "pseudotumor cereberri  Psychiatric: denies mood disorder   Endocrine: denies hx of diabetes or thyroid dysfunction   Heme/Oncology/Lymph Nodes: Denies enlarged lymph nodes, denies brusing or known bleeding disorder   Allergic/Immunologic: Denies hx of allergies   Thyroid -- borderline ,-- thyroid disease runs in the family        PHYSICAL AND NEUROLOGICAL EXMAINATIONS:  VITAL SIGNS: /80   Pulse 86   Temp 36.8 °C (98.2 °F)   Resp 16   Ht 1.778 m (5' 10\")   Wt 122.5 kg (270 lb)   LMP 03/03/2012   SpO2 99%   BMI 38.74 kg/m²   CURRENT WEIGHT:   BMI: Body mass index is 38.74 kg/m².  PREVIOUS WEIGHTS:  Wt Readings from Last 25 Encounters:   12/04/17 122.5 kg (270 lb)   09/12/17 (!) 126.6 kg (279 lb)   08/30/17 (!) 127.9 kg (282 lb)   08/16/17 (!) 129.3 kg (285 lb)   08/08/17 (!) 127.9 kg (282 lb)   07/27/17 (!) 129.7 kg (286 lb)   06/08/17 (!) 135.2 kg (298 lb)   05/02/17 (!) 136.1 kg (300 lb)   04/24/17 (!) 138.3 kg (305 lb)   02/28/17 (!) 139.7 kg (308 lb)   02/20/17 (!) 142 kg (313 lb)   01/31/17 (!) 137.4 kg (303 lb)   05/03/16 (!) 137 kg (302 lb)   11/15/15 (!) 134.3 kg (296 lb)   09/13/15 135.2 kg (298 lb)   09/04/15 133.8 kg (295 lb)   04/22/15 126.1 kg (278 lb)   12/01/14 127 kg (280 lb)   06/05/14 123.4 kg (272 lb)   05/14/14 125.2 kg (276 lb)   01/16/14 113.4 kg (250 lb)   06/11/13 112.9 kg (249 lb)   05/10/13 115.2 kg (254 lb)   04/12/13 117 kg (258 lb)   03/12/13 113.9 kg (251 lb)       General appearance of patient: WDWN(+) NAD(+)    EYES  o Fundus : Papilledem(-) Exudates(-) Hemorrhage(-)  Nervous System  Orientation to time, place and person(+)  Memory normal(+)  Language: aphasia(-)  Knowledge: past(+) Current(+)  Attention(+)  Cranial Nerves  • Nerve 2: intact  • Nerve 3,4,6: intact  • Nerve 5 : intact  • Nerve 7: intact  • Nerve 8: intact  • Nerve 9 & 10: intact  • Nerve 11: intact  • Nerve 12: intact  Muscle Power and muscle tone: symmetric, normal in upper and lower  Sensory System: Pin " sensation intact(+)  Reflexes: symmetric throughout  Cerebellar Function FNP normal   Gait : Steady(+) TandemGait steady(+)  Heart and Vascular  Peripheral Vasucular system : Edema (-) Swelling(-)  RHB, Breathing sound clear  abdomen bowel sound normoactive  Extremities freely moveable  Joints no contracture       NEUROIMAGING: I reviewed the MRI/CT of brain       Seeing PCP  No papilledema, seeing an endocrinologist now ( was referred to endocrinologist by Jennie)    IMPRESSION:    1.  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  Hx of seizure -- the last seizure was 2013-- but there are spells of passing out associated with headache  3.  Hx of HTN, Thyroid Disease ( Borderline)   4.  Intractable headache associated with speech problems  5.  Vit D deficiency      PLAN/RECOMMENDATIONS:      Topamax helped the headache--- but sex and light could triggered headache( worse)    Imitrex 50mg for headache rescue-- maximum 10# per month    Repeat LP and opening pressure      We also told the patient, no driving 3 months after any spell of passing out    Due to Vit D deficiency--- PCP is taking care of this Vit D already    ________________________________________________________________________    Advise exercise muscle and brain  Avoid processed foods-- focus on natural foods  Avoid sugar  Weight Loss  ________________________________________________________________________  ________________________________________________________________________      After obtaining consent the patient received ONBs with 40 mg of Kenalog and 4 ml of Marcaine 0.5% over the trajectory of each greater occipital nerve. The patient tolerated the procedure well  I explained to the patient and relatives risks and benefits of the medication prescribed. I also discussed possible side effects and I answered all of her question to her  satisfaction. She agreed to the plan of action     I also tell the patient to keep record of headache diary to document the severity of daily headache-    ________________________________________________________________________      Results for WILLIE FLANAGAN (MRN [id number]) as of 8/8/2017 10:05   Ref. Range 5/3/2017 16:30   Vitamin B12 -True Cobalamin Latest Ref Range: 211-911 pg/mL 434   25-Hydroxy   Vitamin D 25 Latest Ref Range:  ng/mL 11 (L)   Anti-Cariolipin Ab Igg Latest Ref Range: 0-14 GPL 4   Anti-Cardiolipin Ab Iga Latest Ref Range: 0-11 APL 2   Anti-Cardiolipin Ab Igm Latest Ref Range: 0-12 MPL 0   TSH Latest Ref Range: 0.300-3.700 uIU/mL 5.540 (H)   Free T-4 Latest Ref Range: 0.53-1.43 ng/dL 0.89   Microsomal -Tpo- Abs Latest Ref Range: 0.0-9.0 IU/mL 86.3 (H)   Anti-Thyroglobulin Latest Ref Range: 0.0-4.0 IU/mL 1.0   Antinuclear Antibody Latest Ref Range: None Detected  None Detected   Oligoclonal Bands CSF Latest Ref Range: 0-1 Bands 0   Oligoclonal Bands CSF Unknown Negative   Interpretation Unknown See Note           SIGNATURE:  Chel Sandra    CC:  Cecilia Garcia M.D.              INTERPRETATION:    ________________________________________________________________________    This Scalp EEG is mildly abnormal because of photic stimulation induced generalized bursts- theta, delta and sharp    This abnormality remains not specific--- and idiopathic generalized epilepsy or frontal lobe seizures remain possible    Clinical Correlation would help    ________________________________________________________________________

## 2017-12-06 ENCOUNTER — APPOINTMENT (OUTPATIENT)
Dept: NEUROLOGY | Facility: MEDICAL CENTER | Age: 27
End: 2017-12-06
Payer: COMMERCIAL

## 2017-12-07 ENCOUNTER — HOSPITAL ENCOUNTER (OUTPATIENT)
Dept: LAB | Facility: MEDICAL CENTER | Age: 27
End: 2017-12-07
Attending: PSYCHIATRY & NEUROLOGY
Payer: COMMERCIAL

## 2017-12-07 DIAGNOSIS — G40.909 SEIZURE DISORDER (HCC): ICD-10-CM

## 2017-12-07 DIAGNOSIS — G43.019 INTRACTABLE MIGRAINE WITHOUT AURA AND WITHOUT STATUS MIGRAINOSUS: ICD-10-CM

## 2017-12-07 DIAGNOSIS — G93.2 PSEUDOTUMOR CEREBRI: ICD-10-CM

## 2017-12-07 LAB
APTT PPP: 30.1 SEC (ref 24.7–36)
BASOPHILS # BLD AUTO: 0.5 % (ref 0–1.8)
BASOPHILS # BLD: 0.04 K/UL (ref 0–0.12)
EOSINOPHIL # BLD AUTO: 0.2 K/UL (ref 0–0.51)
EOSINOPHIL NFR BLD: 2.6 % (ref 0–6.9)
ERYTHROCYTE [DISTWIDTH] IN BLOOD BY AUTOMATED COUNT: 39.6 FL (ref 35.9–50)
HCT VFR BLD AUTO: 44.8 % (ref 37–47)
HGB BLD-MCNC: 15 G/DL (ref 12–16)
IMM GRANULOCYTES # BLD AUTO: 0.04 K/UL (ref 0–0.11)
IMM GRANULOCYTES NFR BLD AUTO: 0.5 % (ref 0–0.9)
INR PPP: 1.02 (ref 0.87–1.13)
LYMPHOCYTES # BLD AUTO: 1.57 K/UL (ref 1–4.8)
LYMPHOCYTES NFR BLD: 20.7 % (ref 22–41)
MCH RBC QN AUTO: 28.4 PG (ref 27–33)
MCHC RBC AUTO-ENTMCNC: 33.5 G/DL (ref 33.6–35)
MCV RBC AUTO: 84.7 FL (ref 81.4–97.8)
MONOCYTES # BLD AUTO: 0.35 K/UL (ref 0–0.85)
MONOCYTES NFR BLD AUTO: 4.6 % (ref 0–13.4)
NEUTROPHILS # BLD AUTO: 5.4 K/UL (ref 2–7.15)
NEUTROPHILS NFR BLD: 71.1 % (ref 44–72)
NRBC # BLD AUTO: 0 K/UL
NRBC BLD AUTO-RTO: 0 /100 WBC
PLATELET # BLD AUTO: 316 K/UL (ref 164–446)
PMV BLD AUTO: 9.7 FL (ref 9–12.9)
PROTHROMBIN TIME: 13.1 SEC (ref 12–14.6)
RBC # BLD AUTO: 5.29 M/UL (ref 4.2–5.4)
THYROPEROXIDASE AB SERPL-ACNC: 60.2 IU/ML (ref 0–9)
WBC # BLD AUTO: 7.6 K/UL (ref 4.8–10.8)

## 2017-12-07 PROCEDURE — 85610 PROTHROMBIN TIME: CPT

## 2017-12-07 PROCEDURE — 85025 COMPLETE CBC W/AUTO DIFF WBC: CPT

## 2017-12-07 PROCEDURE — 86376 MICROSOMAL ANTIBODY EACH: CPT

## 2017-12-07 PROCEDURE — 86800 THYROGLOBULIN ANTIBODY: CPT

## 2017-12-07 PROCEDURE — 36415 COLL VENOUS BLD VENIPUNCTURE: CPT

## 2017-12-07 PROCEDURE — 85730 THROMBOPLASTIN TIME PARTIAL: CPT

## 2017-12-08 DIAGNOSIS — R10.9 ABDOMINAL CRAMPS: ICD-10-CM

## 2017-12-08 RX ORDER — DICYCLOMINE HYDROCHLORIDE 10 MG/1
10 CAPSULE ORAL
Qty: 40 CAP | Refills: 0 | Status: SHIPPED | OUTPATIENT
Start: 2017-12-08 | End: 2018-01-29

## 2017-12-09 LAB — THYROGLOB AB SERPL-ACNC: 1.4 IU/ML (ref 0–4)

## 2017-12-13 ENCOUNTER — HOSPITAL ENCOUNTER (OUTPATIENT)
Dept: RADIOLOGY | Facility: MEDICAL CENTER | Age: 27
End: 2017-12-13
Attending: OBSTETRICS & GYNECOLOGY
Payer: COMMERCIAL

## 2017-12-13 DIAGNOSIS — N92.0 EXCESSIVE OR FREQUENT MENSTRUATION: ICD-10-CM

## 2017-12-13 DIAGNOSIS — R10.2 ADNEXAL TENDERNESS: ICD-10-CM

## 2017-12-13 DIAGNOSIS — N93.0 BLEEDING AFTER INTERCOURSE: ICD-10-CM

## 2017-12-13 DIAGNOSIS — N94.6 DYSMENORRHEA: ICD-10-CM

## 2017-12-13 PROCEDURE — 76830 TRANSVAGINAL US NON-OB: CPT

## 2017-12-15 ENCOUNTER — PATIENT MESSAGE (OUTPATIENT)
Dept: NEUROLOGY | Facility: MEDICAL CENTER | Age: 27
End: 2017-12-15

## 2017-12-18 DIAGNOSIS — G40.909 SEIZURE DISORDER (HCC): ICD-10-CM

## 2017-12-18 DIAGNOSIS — R51.9 CHRONIC DAILY HEADACHE: ICD-10-CM

## 2017-12-18 DIAGNOSIS — G43.019 INTRACTABLE MIGRAINE WITHOUT AURA AND WITHOUT STATUS MIGRAINOSUS: ICD-10-CM

## 2017-12-18 DIAGNOSIS — G93.2 PSEUDOTUMOR CEREBRI: ICD-10-CM

## 2017-12-18 RX ORDER — TOPIRAMATE 100 MG/1
100 TABLET, FILM COATED ORAL 2 TIMES DAILY
Qty: 180 TAB | Refills: 1 | Status: SHIPPED | OUTPATIENT
Start: 2017-12-18 | End: 2018-10-30

## 2017-12-20 ENCOUNTER — HOSPITAL ENCOUNTER (OUTPATIENT)
Dept: RADIOLOGY | Facility: MEDICAL CENTER | Age: 27
End: 2017-12-20
Attending: PSYCHIATRY & NEUROLOGY
Payer: COMMERCIAL

## 2017-12-20 DIAGNOSIS — G93.2 BENIGN INTRACRANIAL HYPERTENSION: ICD-10-CM

## 2017-12-20 PROCEDURE — A9579 GAD-BASE MR CONTRAST NOS,1ML: HCPCS | Performed by: PSYCHIATRY & NEUROLOGY

## 2017-12-20 PROCEDURE — 70553 MRI BRAIN STEM W/O & W/DYE: CPT

## 2017-12-20 PROCEDURE — 700117 HCHG RX CONTRAST REV CODE 255: Performed by: PSYCHIATRY & NEUROLOGY

## 2017-12-20 RX ADMIN — GADODIAMIDE 20 ML: 287 INJECTION INTRAVENOUS at 14:55

## 2017-12-28 ENCOUNTER — OFFICE VISIT (OUTPATIENT)
Dept: MEDICAL GROUP | Facility: PHYSICIAN GROUP | Age: 27
End: 2017-12-28
Payer: COMMERCIAL

## 2017-12-28 VITALS
BODY MASS INDEX: 39.48 KG/M2 | OXYGEN SATURATION: 100 % | RESPIRATION RATE: 20 BRPM | WEIGHT: 275.8 LBS | HEIGHT: 70 IN | SYSTOLIC BLOOD PRESSURE: 120 MMHG | TEMPERATURE: 98.5 F | DIASTOLIC BLOOD PRESSURE: 86 MMHG | HEART RATE: 81 BPM

## 2017-12-28 DIAGNOSIS — G93.2 PSEUDOTUMOR CEREBRI: ICD-10-CM

## 2017-12-28 DIAGNOSIS — I10 ESSENTIAL HYPERTENSION: Primary | ICD-10-CM

## 2017-12-28 DIAGNOSIS — Z23 NEED FOR VACCINATION: ICD-10-CM

## 2017-12-28 DIAGNOSIS — E16.2 HYPOGLYCEMIA: ICD-10-CM

## 2017-12-28 DIAGNOSIS — E06.3 HASHIMOTO'S THYROIDITIS: ICD-10-CM

## 2017-12-28 DIAGNOSIS — Z30.432 ENCOUNTER FOR REMOVAL OF INTRAUTERINE CONTRACEPTIVE DEVICE (IUD): ICD-10-CM

## 2017-12-28 DIAGNOSIS — R05.9 COUGH: ICD-10-CM

## 2017-12-28 DIAGNOSIS — E66.01 MORBID OBESITY WITH BMI OF 40.0-44.9, ADULT (HCC): ICD-10-CM

## 2017-12-28 PROCEDURE — 90686 IIV4 VACC NO PRSV 0.5 ML IM: CPT | Performed by: NURSE PRACTITIONER

## 2017-12-28 PROCEDURE — 90471 IMMUNIZATION ADMIN: CPT | Performed by: NURSE PRACTITIONER

## 2017-12-28 PROCEDURE — 99214 OFFICE O/P EST MOD 30 MIN: CPT | Mod: 25 | Performed by: NURSE PRACTITIONER

## 2017-12-28 RX ORDER — PHENTERMINE HYDROCHLORIDE 37.5 MG/1
37.5 TABLET ORAL
Qty: 30 TAB | Refills: 2 | Status: SHIPPED | OUTPATIENT
Start: 2017-12-28 | End: 2018-01-27

## 2017-12-28 NOTE — LETTER
[facility]  COBY Hale.  [address] [facility] [address]  Fax: [phone]   Authorization for Release/Disclosure of   Protected Health Information   Name: WILLIE FLANAGAN : 1990 SSN: [id number]   Address: [address] [place] [id number] Phone:    [phone] (home)    I authorize the entity listed below to release/disclose the PHI below to:   [facility]/KARINE Hale and KARINE Hale   Provider or Entity Name:    Dr. Mark Maharaj         Address   City, [facility], [facility]   Phone:      Fax:     Reason for request: continuity of care   Information to be released:    [  ] LAST COLONOSCOPY,  including any PATH REPORT and follow-up  [  ] LAST FIT/COLOGUARD RESULT [  ] LAST DEXA  [  ] LAST MAMMOGRAM  [  ] LAST PAP  [  ] LAST LABS [  ] RETINA EXAM REPORT  [  ] IMMUNIZATION RECORDS  [ x ] Last 3 office visits       [  ] Check here and initial the line next to each item to release ALL health information INCLUDING  _____ Care and treatment for drug and / or alcohol abuse  _____ HIV testing, infection status, or AIDS  _____ Genetic Testing    DATES OF SERVICE OR TIME PERIOD TO BE DISCLOSED: _____________  I understand and acknowledge that:  * This Authorization may be revoked at any time by you in writing, except if your health information has already been used or disclosed.  * Your health information that will be used or disclosed as a result of you signing this authorization could be re-disclosed by the recipient. If this occurs, your re-disclosed health information may no longer be protected by State or Federal laws.  * You may refuse to sign this Authorization. Your refusal will not affect your ability to obtain treatment.  * This Authorization becomes effective upon signing and will  on (date) __________.      If no date is indicated, this Authorization will  one (1) year from the signature date.    Name: Willie Flanagan    Signature:   Date:          12/28/2017       PLEASE FAX REQUESTED RECORDS BACK TO: [phone]

## 2017-12-28 NOTE — ASSESSMENT & PLAN NOTE
Patient is a 27 her old female who is to establish care and discuss cough that has been ongoing for 3 days. Somewhat productive, initially had a fever though she is afebrile without antipyretics at this point. She has some sinus pressure, runny nose. No wheezing. Her daughter is also sick. She has been using DayQuil which does improve her symptoms. She is concerned that she may need an antibiotic.

## 2017-12-28 NOTE — ASSESSMENT & PLAN NOTE
Patient has a history of pseudotumor cerebri, she was diagnosed with this disorder at 17 after a seizure, CSF was found to be elevated and she has followed with neuro-ophthalmology Dr Maharaj. Patient previously was on acetazolamide and Topamax. She had repeat lumbar puncture and MRI and was told that she no longer has pseudotumor cerebri, she saw another neurologist (Dr. Sandra) for second opinion, repeat LP was borderline with elevated proteins. She continues on topamax for management of pseudotumor cerebri as well as underlying seizure disorder which unfortunately makes her feel quite groggy. Repeat LP was recommended by Dr. Sandra. She discussed this treatment plan with Dr. Maharaj who feels strongly against repeat LP and has asked her to follow up with him in clinic. to  unfortunately, I do not have these records, we will attempt to obtain these for completeness.

## 2017-12-29 NOTE — ASSESSMENT & PLAN NOTE
IUD has been removed as it was thought to be contributing to her migraines, patient cautioned on Topamax category D as she is sexually active and not currently using contraception.

## 2017-12-29 NOTE — ASSESSMENT & PLAN NOTE
Patient continues to have hypoglycemic episodes, she was admitted in September for workup of possible insulinoma. Unfortunately during 72 hour fast her blood sugars did not drop below 74. When asked why she did not follow up with endocrinology she tells me that she is unhappy with the results of this test and having to fast for 72 hours. We did discuss referral to another endocrinologist to review her case for second opinion. At this time she prefers to work with her neurologist Dr. Maharaj whom she has been seeing since she was 17. We will readdress at follow up.

## 2017-12-29 NOTE — ASSESSMENT & PLAN NOTE
The patient is a 27 her old female who is new to me and here to establish care. She is morbidly obese, she has lost nearly 40 pounds since the beginning of the year on phentermine 37.5 mg daily. Of note, she does have pseudotumor cerebri, underlying seizure disorder and Migraines, continued use of phentermine was approved by neurology. Given her underlying seizure disorder it has been recommended that she avoid vigorous exercise as well as vigorous intercourse, the inability to exercise has complicated her weight loss course. We will refill this for 3 months and asked that she follow-up in the office at that time for medication refill.

## 2017-12-29 NOTE — ASSESSMENT & PLAN NOTE
Patient is a 27 her old female who is new to me and here to establish care. She was found to have Hashimoto's thyroiditis with positive TPO ab and elevated TSH. She is now on 200mcg levothyroxine and doing well. At this point she does not want to continue with endocrinology.

## 2017-12-29 NOTE — PROGRESS NOTES
[facility]  Primary Care Office Visit - Problem-Oriented        History:     Nickolas Galdamez is a 27 y.o. female who is here today to discuss Sinus Problem (cold over mojgan)      Pseudotumor cerebri  Patient has a history of pseudotumor cerebri, she was diagnosed with this disorder at 17 after a seizure, CSF was found to be elevated and she has followed with neuro-ophthalmology Dr Maharaj. Patient previously was on acetazolamide and Topamax. She had repeat lumbar puncture and MRI and was told that she no longer has pseudotumor cerebri, she saw another neurologist (Dr. Sandra) for second opinion, repeat LP was borderline with elevated proteins. She continues on topamax for management of pseudotumor cerebri as well as underlying seizure disorder which unfortunately makes her feel quite groggy. Repeat LP was recommended by Dr. Sandra. She discussed this treatment plan with Dr. Maharaj who feels strongly against repeat LP and has asked her to follow up with him in clinic. to  unfortunately, I do not have these records, we will attempt to obtain these for completeness.         Cough  Patient is a 27 her old female who is to establish care and discuss cough that has been ongoing for 3 days. Somewhat productive, initially had a fever though she is afebrile without antipyretics at this point. She has some sinus pressure, runny nose. No wheezing. Her daughter is also sick. She has been using DayQuil which does improve her symptoms. She is concerned that she may need an antibiotic.     Hypoglycemia  Patient continues to have hypoglycemic episodes, she was admitted in September for workup of possible insulinoma. Unfortunately during 72 hour fast her blood sugars did not drop below 74. When asked why she did not follow up with endocrinology she tells me that she is unhappy with the results of this test and having to fast for 72 hours. We did discuss referral to another endocrinologist to review her case for second  opinion. At this time she prefers to work with her neurologist Dr. Maharaj whom she has been seeing since she was 17. We will readdress at follow up.     Hashimoto's thyroiditis  Patient is a 27 her old female who is new to me and here to establish care. She was found to have Hashimoto's thyroiditis with positive TPO ab and elevated TSH. She is now on 200mcg levothyroxine and doing well. At this point she does not want to continue with endocrinology.    Morbid obesity with BMI of 40.0-44.9, adult ([facility])  The patient is a 27 her old female who is new to me and here to establish care. She is morbidly obese, she has lost nearly 40 pounds since the beginning of the year on phentermine 37.5 mg daily. Of note, she does have pseudotumor cerebri, underlying seizure disorder and Migraines, continued use of phentermine was approved by neurology. Given her underlying seizure disorder it has been recommended that she avoid vigorous exercise as well as vigorous intercourse, the inability to exercise has complicated her weight loss course. We will refill this for 3 months and asked that she follow-up in the office at that time for medication refill.    Encounter for removal of intrauterine contraceptive device (IUD)  IUD has been removed as it was thought to be contributing to her migraines, patient cautioned on Topamax category D as she is sexually active and not currently using contraception.        Past Medical History:   Diagnosis Date   • EEG abnormal    • Hashimoto's disease    • Hyperglycemia    • Migraine    • Pseudotumor cerebri    • Seizure disorder (CMS-[facility])      Past Surgical History:   Procedure Laterality Date   • GYN SURGERY      precancerous cells with cervical freezing, HPV positive     Social History     Social History   • Marital status: Single     Spouse name: N/A   • Number of children: N/A   • Years of education: N/A     Occupational History   • Not on file.     Social History Main Topics   • Smoking status:  Former Smoker     Start date: 1/1/2005     Quit date: 5/3/2012   • Smokeless tobacco: Never Used      Comment: 1/2 pack a day   • Alcohol use 0.6 oz/week     1 Glasses of wine per week      Comment: socially, 1 glass of wine per week   • Drug use: No   • Sexual activity: No     Other Topics Concern   • Not on file     Social History Narrative    ** Merged History Encounter **          History   Smoking Status   • Former Smoker   • Start date: 1/1/2005   • Quit date: 5/3/2012   Smokeless Tobacco   • Never Used     Comment: 1/2 pack a day     Family History   Problem Relation Age of Onset   • Cancer Mother      Cervical     No Known Allergies    Problem List:     Patient Active Problem List    Diagnosis Date Noted   • Hypoglycemia 09/11/2017     Priority: High   • Cough 12/28/2017   • Abdominal pain 09/12/2017   • Migraine 09/11/2017   • Vitamin D deficiency 05/04/2017   • Hashimoto's thyroiditis 05/04/2017   • Abnormal thyroid blood test 05/02/2017   • Intractable migraine without status migrainosus 04/24/2017   • Chronic daily headache 02/28/2017   • Elevated testosterone level in female 02/28/2017   • Essential hypertension 01/31/2017   • Morbid obesity with BMI of 40.0-44.9, adult ([facility]) 01/31/2017   • Obesity 04/14/2013   • Seizure disorder (CMS-[facility]) 10/22/2012   • Pseudotumor cerebri 10/15/2012   • History of migraine headaches 10/15/2012         Medications:     Current Outpatient Prescriptions:   •  phentermine (ADIPEX-P) 37.5 MG tablet, Take 1 Tab by mouth every morning before breakfast for 30 days., Disp: 30 Tab, Rfl: 2  •  topiramate (TOPAMAX) 100 MG Tab, TAKE 1 TAB BY MOUTH 2 TIMES A DAY., Disp: 180 Tab, Rfl: 1  •  levothyroxine (SYNTHROID) 200 MCG Tab, Take 1 Tab by mouth Every morning on an empty stomach., Disp: 90 Tab, Rfl: 0  •  vitamin D, Ergocalciferol, (DRISDOL) 70061 units Cap capsule, Take 1 Cap by mouth every 7 days., Disp: 30 Cap, Rfl: 3  •  amlodipine (NORVASC) 10 MG Tab, Take 10 mg by mouth  "every day., Disp: , Rfl:   •  Cholecalciferol (VITAMIN D) 2000 UNITS Cap, Take 2,000 Units by mouth 2 Times a Day., Disp: , Rfl:   •  dicyclomine (BENTYL) 10 MG Cap, Take 1 Cap by mouth 4 Times a Day,Before Meals and at Bedtime., Disp: 40 Cap, Rfl: 0  •  sumatriptan (IMITREX) 50 MG Tab, Take 1 Tab by mouth Once PRN for Migraine for up to 1 dose., Disp: 10 Tab, Rfl: 3      Review of Systems:     Pertinent positives as per HPI, all other systems reviewed and WNL   Physical Assessment:     VS: /86   Pulse 81   Temp 36.9 °C (98.5 °F)   Resp 20   Ht 1.765 m (5' 9.5\")   Wt (!) 125.1 kg (275 lb 12.8 oz)   LMP 03/03/2012   SpO2 100%   BMI 40.14 kg/m²     General: Well-developed, well-nourished, female, obese     Head: PERRL, EOMI. Normocephalic, post oropharynx mildly injected otherwise WNL. No facial asymmetry noted.  Ears:Bilateral TMs mildly injected and bulging.  Bilateral EACs wnl.  Cardiovasc:No JVD.  RRR, no MRG. No thrills or bruits. Pulses 2+ and symmetric at all distal extremities.  Pulmonary: Lungs clear bilaterally.  Normal respiratory effort. No wheeze or crackles.   Neuro: Alert and oriented, grossly non focal   Skin:No rashes noted. Skin warm, dry, intact    Lymph: No cervical, pre- or post-auricular, submandibular, occipital lymphadenopathy.    Psych: Dressed appropriately for the weather, pleasant     Assessment/Plan:   Nickolas was seen today for sinus problem.    Diagnoses and all orders for this visit:    Essential hypertension, chronic, stable on amlodipine 10 mg daily. Continue to monitor     Pseudotumor cerebri, unclear control   - Unfortunately, there seem to be some discrepancies with the patient's diagnosis and therefore management of suspected pseudotumor cerebri, I have encouraged her to follow-up with her neurologist vs referral to another neurologist for 3rd opinion, patient declines and feels comfortable with the treatment plan outline by her long time neurologist, Dr. Maharaj.   "     Hashimoto's thyroiditis, chronic, unclear control   - Clinically euthyroid, continue levothyroxine 200 µg daily, check TSH, T4 in the next 3 months. Recommend patient continue to follow with endocrinology, she declines at this time.    Hypoglycemia, ongoing, uncontrolled  - Unfortunately, patient continues to suffer from hypoglycemic episodes of unknown source, endo has high suspicion for insulinoma unfortunately 72 hour fast did not produce blood glucose reading less than 60 and diagnosis was not made and subsequently the patient does not want to follow-up with endocrinology at this time. Reinforced the importance of continuing to investigate the source of her hypoglycemia, consider second opinion with alternate endo.     Morbid obesity with BMI of 40.0-44.9, adult ([facility]CC)  - Follow up in the office for refills.  -     phentermine (ADIPEX-P) 37.5 MG tablet; Take 1 Tab by mouth every morning before breakfast for 30 days.    Need for vaccination  -     INFLUENZA VACCINE QUAD INJ >3Y(PF)    Cough, new   - reassurance, likely viral and self limiting, self care instructions given, recommend multi- symptom OTC medications ATC, push fluid and rest  - Tylenol PRN OTC as needed for HA or fever  - if not significantly improved in 2-4 days, then consider empiric      Patient is agreeable to the above plan and voiced understanding. All questions answered.     Please note that this dictation was created using voice recognition software. I have made every reasonable attempt to correct obvious errors, but I expect that there are errors of grammar and possibly content that I did not discover before finalizing the note.      DEVORAH Wall  12/28/2017, 5:11 PM

## 2018-01-03 ENCOUNTER — HOSPITAL ENCOUNTER (OUTPATIENT)
Dept: LAB | Facility: MEDICAL CENTER | Age: 28
End: 2018-01-03
Attending: INTERNAL MEDICINE
Payer: COMMERCIAL

## 2018-01-03 ENCOUNTER — OFFICE VISIT (OUTPATIENT)
Dept: ENDOCRINOLOGY | Facility: MEDICAL CENTER | Age: 28
End: 2018-01-03
Payer: COMMERCIAL

## 2018-01-03 ENCOUNTER — HOSPITAL ENCOUNTER (OUTPATIENT)
Dept: LAB | Facility: MEDICAL CENTER | Age: 28
End: 2018-01-03
Attending: PSYCHIATRY & NEUROLOGY
Payer: COMMERCIAL

## 2018-01-03 VITALS
HEIGHT: 69 IN | WEIGHT: 279.7 LBS | HEART RATE: 101 BPM | DIASTOLIC BLOOD PRESSURE: 88 MMHG | OXYGEN SATURATION: 100 % | SYSTOLIC BLOOD PRESSURE: 128 MMHG | BODY MASS INDEX: 41.43 KG/M2

## 2018-01-03 DIAGNOSIS — E55.9 VITAMIN D DEFICIENCY: ICD-10-CM

## 2018-01-03 DIAGNOSIS — E16.2 HYPOGLYCEMIA: ICD-10-CM

## 2018-01-03 DIAGNOSIS — E03.9 HYPOTHYROIDISM, UNSPECIFIED TYPE: ICD-10-CM

## 2018-01-03 LAB
BASOPHILS # BLD AUTO: 0.4 % (ref 0–1.8)
BASOPHILS # BLD: 0.03 K/UL (ref 0–0.12)
EOSINOPHIL # BLD AUTO: 0.29 K/UL (ref 0–0.51)
EOSINOPHIL NFR BLD: 3.9 % (ref 0–6.9)
ERYTHROCYTE [DISTWIDTH] IN BLOOD BY AUTOMATED COUNT: 39.5 FL (ref 35.9–50)
HCT VFR BLD AUTO: 44.8 % (ref 37–47)
HGB BLD-MCNC: 15.1 G/DL (ref 12–16)
IMM GRANULOCYTES # BLD AUTO: 0.15 K/UL (ref 0–0.11)
IMM GRANULOCYTES NFR BLD AUTO: 2 % (ref 0–0.9)
LYMPHOCYTES # BLD AUTO: 2.02 K/UL (ref 1–4.8)
LYMPHOCYTES NFR BLD: 26.9 % (ref 22–41)
MCH RBC QN AUTO: 28.2 PG (ref 27–33)
MCHC RBC AUTO-ENTMCNC: 33.7 G/DL (ref 33.6–35)
MCV RBC AUTO: 83.7 FL (ref 81.4–97.8)
MONOCYTES # BLD AUTO: 0.35 K/UL (ref 0–0.85)
MONOCYTES NFR BLD AUTO: 4.7 % (ref 0–13.4)
NEUTROPHILS # BLD AUTO: 4.66 K/UL (ref 2–7.15)
NEUTROPHILS NFR BLD: 62.1 % (ref 44–72)
NRBC # BLD AUTO: 0 K/UL
NRBC BLD-RTO: 0 /100 WBC
PLATELET # BLD AUTO: 301 K/UL (ref 164–446)
PMV BLD AUTO: 9.8 FL (ref 9–12.9)
RBC # BLD AUTO: 5.35 M/UL (ref 4.2–5.4)
WBC # BLD AUTO: 7.5 K/UL (ref 4.8–10.8)

## 2018-01-03 PROCEDURE — 84439 ASSAY OF FREE THYROXINE: CPT

## 2018-01-03 PROCEDURE — 84703 CHORIONIC GONADOTROPIN ASSAY: CPT

## 2018-01-03 PROCEDURE — 99215 OFFICE O/P EST HI 40 MIN: CPT | Performed by: INTERNAL MEDICINE

## 2018-01-03 PROCEDURE — 36415 COLL VENOUS BLD VENIPUNCTURE: CPT

## 2018-01-03 PROCEDURE — 85730 THROMBOPLASTIN TIME PARTIAL: CPT

## 2018-01-03 PROCEDURE — 80053 COMPREHEN METABOLIC PANEL: CPT

## 2018-01-03 PROCEDURE — 84481 FREE ASSAY (FT-3): CPT

## 2018-01-03 PROCEDURE — 85610 PROTHROMBIN TIME: CPT

## 2018-01-03 PROCEDURE — 84443 ASSAY THYROID STIM HORMONE: CPT

## 2018-01-03 PROCEDURE — 85025 COMPLETE CBC W/AUTO DIFF WBC: CPT

## 2018-01-04 ENCOUNTER — HOSPITAL ENCOUNTER (OUTPATIENT)
Facility: MEDICAL CENTER | Age: 28
End: 2018-01-04
Attending: PSYCHIATRY & NEUROLOGY
Payer: COMMERCIAL

## 2018-01-04 ENCOUNTER — HOSPITAL ENCOUNTER (OUTPATIENT)
Dept: RADIOLOGY | Facility: MEDICAL CENTER | Age: 28
End: 2018-01-04
Attending: PSYCHIATRY & NEUROLOGY
Payer: COMMERCIAL

## 2018-01-04 DIAGNOSIS — H47.11 PAPILLEDEMA ASSOCIATED WITH INCREASED INTRACRANIAL PRESSURE: ICD-10-CM

## 2018-01-04 DIAGNOSIS — G93.2 BENIGN INTRACRANIAL HYPERTENSION: ICD-10-CM

## 2018-01-04 LAB
ALBUMIN SERPL BCP-MCNC: 4.4 G/DL (ref 3.2–4.9)
ALBUMIN SERPL BCP-MCNC: 4.4 G/DL (ref 3.2–4.9)
ALBUMIN/GLOB SERPL: 1.3 G/DL
ALBUMIN/GLOB SERPL: 1.5 G/DL
ALP SERPL-CCNC: 46 U/L (ref 30–99)
ALP SERPL-CCNC: 54 U/L (ref 30–99)
ALT SERPL-CCNC: 19 U/L (ref 2–50)
ALT SERPL-CCNC: 27 U/L (ref 2–50)
ANION GAP SERPL CALC-SCNC: 10 MMOL/L (ref 0–11.9)
ANION GAP SERPL CALC-SCNC: 6 MMOL/L (ref 0–11.9)
APTT PPP: 29 SEC (ref 24.7–36)
AST SERPL-CCNC: 19 U/L (ref 12–45)
AST SERPL-CCNC: 19 U/L (ref 12–45)
BASOPHILS # BLD AUTO: 0.7 % (ref 0–1.8)
BASOPHILS # BLD: 0.07 K/UL (ref 0–0.12)
BILIRUB SERPL-MCNC: 0.3 MG/DL (ref 0.1–1.5)
BILIRUB SERPL-MCNC: 0.3 MG/DL (ref 0.1–1.5)
BUN SERPL-MCNC: 12 MG/DL (ref 8–22)
BUN SERPL-MCNC: 13 MG/DL (ref 8–22)
BURR CELLS/RBC NFR CSF MANUAL: 0 %
CALCIUM SERPL-MCNC: 8.9 MG/DL (ref 8.5–10.5)
CALCIUM SERPL-MCNC: 9.8 MG/DL (ref 8.5–10.5)
CHLORIDE SERPL-SCNC: 106 MMOL/L (ref 96–112)
CHLORIDE SERPL-SCNC: 107 MMOL/L (ref 96–112)
CLARITY CSF: CLEAR
CO2 SERPL-SCNC: 23 MMOL/L (ref 20–33)
CO2 SERPL-SCNC: 25 MMOL/L (ref 20–33)
COLOR CSF: COLORLESS
COLOR SPUN CSF: COLORLESS
CREAT SERPL-MCNC: 0.94 MG/DL (ref 0.5–1.4)
CREAT SERPL-MCNC: 0.97 MG/DL (ref 0.5–1.4)
CRP SERPL HS-MCNC: 1.4 MG/L (ref 0–7.5)
CYTOLOGY REG CYTOL: NORMAL
EOSINOPHIL # BLD AUTO: 0.29 K/UL (ref 0–0.51)
EOSINOPHIL NFR BLD: 3.1 % (ref 0–6.9)
ERYTHROCYTE [DISTWIDTH] IN BLOOD BY AUTOMATED COUNT: 39.5 FL (ref 35.9–50)
ERYTHROCYTE [SEDIMENTATION RATE] IN BLOOD BY WESTERGREN METHOD: 11 MM/HOUR (ref 0–20)
GFR SERPL CREATININE-BSD FRML MDRD: >60 ML/MIN/1.73 M 2
GFR SERPL CREATININE-BSD FRML MDRD: >60 ML/MIN/1.73 M 2
GLOBULIN SER CALC-MCNC: 3 G/DL (ref 1.9–3.5)
GLOBULIN SER CALC-MCNC: 3.3 G/DL (ref 1.9–3.5)
GLUCOSE CSF-MCNC: 55 MG/DL (ref 40–80)
GLUCOSE SERPL-MCNC: 71 MG/DL (ref 65–99)
GLUCOSE SERPL-MCNC: 76 MG/DL (ref 65–99)
GRAM STN SPEC: NORMAL
HCG SERPL QL: NEGATIVE
HCT VFR BLD AUTO: 45.6 % (ref 37–47)
HGB BLD-MCNC: 15.4 G/DL (ref 12–16)
IMM GRANULOCYTES # BLD AUTO: 0.16 K/UL (ref 0–0.11)
IMM GRANULOCYTES NFR BLD AUTO: 1.7 % (ref 0–0.9)
INR PPP: 0.98 (ref 0.87–1.13)
LYMPHOCYTES # BLD AUTO: 2.34 K/UL (ref 1–4.8)
LYMPHOCYTES NFR BLD: 24.9 % (ref 22–41)
LYMPHOCYTES NFR CSF: 59 %
MCH RBC QN AUTO: 27.7 PG (ref 27–33)
MCHC RBC AUTO-ENTMCNC: 33.8 G/DL (ref 33.6–35)
MCV RBC AUTO: 82 FL (ref 81.4–97.8)
MONOCYTES # BLD AUTO: 0.45 K/UL (ref 0–0.85)
MONOCYTES NFR BLD AUTO: 4.8 % (ref 0–13.4)
MONONUC CELLS NFR CSF: 20 %
NEUTROPHILS # BLD AUTO: 6.07 K/UL (ref 2–7.15)
NEUTROPHILS NFR BLD: 64.8 % (ref 44–72)
NEUTROPHILS NFR CSF: 1 %
NRBC # BLD AUTO: 0 K/UL
NRBC BLD-RTO: 0 /100 WBC
PLATELET # BLD AUTO: 345 K/UL (ref 164–446)
PMV BLD AUTO: 9.6 FL (ref 9–12.9)
POTASSIUM SERPL-SCNC: 3.8 MMOL/L (ref 3.6–5.5)
POTASSIUM SERPL-SCNC: 3.8 MMOL/L (ref 3.6–5.5)
PROT CSF-MCNC: 63 MG/DL (ref 15–45)
PROT SERPL-MCNC: 7.4 G/DL (ref 6–8.2)
PROT SERPL-MCNC: 7.7 G/DL (ref 6–8.2)
PROTHROMBIN TIME: 12.7 SEC (ref 12–14.6)
RBC # BLD AUTO: 5.56 M/UL (ref 4.2–5.4)
RBC # CSF: 36 CELLS/UL
SIGNIFICANT IND 70042: NORMAL
SITE SITE: NORMAL
SODIUM SERPL-SCNC: 137 MMOL/L (ref 135–145)
SODIUM SERPL-SCNC: 140 MMOL/L (ref 135–145)
SOURCE SOURCE: NORMAL
SPECIMEN VOL CSF: 12 ML
T3FREE SERPL-MCNC: 3.28 PG/ML (ref 2.4–4.2)
T4 FREE SERPL-MCNC: 0.91 NG/DL (ref 0.53–1.43)
TSH SERPL DL<=0.005 MIU/L-ACNC: 2 UIU/ML (ref 0.38–5.33)
TUBE # CSF: 3
TUBE # CSF: 4
WBC # BLD AUTO: 9.4 K/UL (ref 4.8–10.8)
WBC # CSF: 2 CELLS/UL (ref 0–10)

## 2018-01-04 PROCEDURE — 87116 MYCOBACTERIA CULTURE: CPT

## 2018-01-04 PROCEDURE — 87102 FUNGUS ISOLATION CULTURE: CPT

## 2018-01-04 PROCEDURE — 88108 CYTOPATH CONCENTRATE TECH: CPT

## 2018-01-04 PROCEDURE — 85025 COMPLETE CBC W/AUTO DIFF WBC: CPT

## 2018-01-04 PROCEDURE — 82164 ANGIOTENSIN I ENZYME TEST: CPT

## 2018-01-04 PROCEDURE — 86592 SYPHILIS TEST NON-TREP QUAL: CPT

## 2018-01-04 PROCEDURE — 86147 CARDIOLIPIN ANTIBODY EA IG: CPT

## 2018-01-04 PROCEDURE — 87206 SMEAR FLUORESCENT/ACID STAI: CPT

## 2018-01-04 PROCEDURE — 85652 RBC SED RATE AUTOMATED: CPT

## 2018-01-04 PROCEDURE — 87070 CULTURE OTHR SPECIMN AEROBIC: CPT

## 2018-01-04 PROCEDURE — 87205 SMEAR GRAM STAIN: CPT

## 2018-01-04 PROCEDURE — 83916 OLIGOCLONAL BANDS: CPT

## 2018-01-04 PROCEDURE — 82945 GLUCOSE OTHER FLUID: CPT

## 2018-01-04 PROCEDURE — 62270 DX LMBR SPI PNXR: CPT

## 2018-01-04 PROCEDURE — 82042 OTHER SOURCE ALBUMIN QUAN EA: CPT

## 2018-01-04 PROCEDURE — 89051 BODY FLUID CELL COUNT: CPT

## 2018-01-04 PROCEDURE — 85549 MURAMIDASE: CPT

## 2018-01-04 PROCEDURE — 86141 C-REACTIVE PROTEIN HS: CPT

## 2018-01-04 PROCEDURE — 87015 SPECIMEN INFECT AGNT CONCNTJ: CPT

## 2018-01-04 PROCEDURE — 80053 COMPREHEN METABOLIC PANEL: CPT

## 2018-01-04 PROCEDURE — 86038 ANTINUCLEAR ANTIBODIES: CPT

## 2018-01-04 PROCEDURE — 84157 ASSAY OF PROTEIN OTHER: CPT

## 2018-01-04 PROCEDURE — 82784 ASSAY IGA/IGD/IGG/IGM EACH: CPT

## 2018-01-04 PROCEDURE — 82040 ASSAY OF SERUM ALBUMIN: CPT

## 2018-01-05 LAB
ACE SERPL-CCNC: 26 U/L (ref 9–67)
RHODAMINE-AURAMINE STN SPEC: NORMAL
SIGNIFICANT IND 70042: NORMAL
SITE SITE: NORMAL
SOURCE SOURCE: NORMAL

## 2018-01-05 NOTE — CONSULTS
DATE OF SERVICE:  01/04/2018    The patient was evaluated at [facility] on 01/04/2018.  The patient   is 27 years of age and was previously evaluated at the [facility]   Clinic on 12/18/2017.  The patient has a history of recurrent idiopathic   intracranial hypertension, primarily manifested as high pressure headache.  It   is noteworthy that the papilledema has resolved.  The patient also has   migraine cephalalgia with and without aura.    Due to the fact that the patient has been having increase in headaches, which   are bioccipital and retroorbital in location, it was elected to repeat the   lumbar puncture with opening pressure.  The patient reports that the headaches   are graded as a 6-8 on a scale of 0-10.  The headaches are sharp, stabbing   and dull aching in quality and associated with nausea, vomiting,   photosensitivity and photophobia.  Importantly, the patient denies transient   visual obscuration or subjective bruit.    ALLERGIES:  No known allergies.    MEDICATIONS:  Topamax 200 mg p.o. daily, levothyroxine 200 mcg p.o. daily,   vitamin D, amlodipine, phentermine, and sumatriptan.    PAST MEDICAL HISTORY:  Hypertension and Hashimoto's thyroiditis.    PAST SURGICAL HISTORY:  Unchanged compared to previous examination.    FAMILY HISTORY:  Mother with a history of hematological issues.  Father's   history is unknown.    SOCIAL HISTORY:  The patient does not smoke and has 1 alcoholic beverage a   week.    REVIEW OF SYSTEMS:  The patient has history of chest pain, migraine   cephalgia,  thyroid disease.    PHYSICAL EXAMINATION:  MENTAL STATUS EXAMINATION:  Her speech was fluent.  Insight, comprehension,   and judgment were normal.  CRANIAL NERVES EXAMINATION:  Corneal sensation and facial sensation were   intact.  There was symmetric eyelid closure and facial grimace.  Lower cranial   nerves were normal.  There was no evidence of papilledema.  MOTOR EXAMINATION:  Strength, muscle tone  and bulk were normal in both upper   and lower extremities.  The deep tendon reflexes were symmetric throughout.  SENSORY EXAMINATION:  Normal pinprick, temperature and light testing   proprioception of both upper and lower extremities.    LUMBAR PUNCTURE UNDER FLUOROSCOPY:  Lumbar puncture was performed with Dr. Lilly.  The opening pressure measured by Dr. Myers with the patient lying in   the left lateral decubitus position was 182 mm of water.  Approximately 15 mL   of serous cerebrospinal fluid was obtained.  The needle was withdrawn without   difficulty.    ASSESSMENT:  1.  Clinically improved recurrent idiopathic intracranial hypertension.  It is   noteworthy that the opening pressure was normal at 182 mm of water.  2.  Clinically resolved bilateral papilledema.  3.  Clinically probable recurrent migraine cephalalgia with and without aura.  4.  Coital headache.  5.  Small-angle exotropia.  6.  Mild convergence insufficiency.  7.  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    RECOMMENDATIONS:  1.  Maintain the Topamax 200 mg p.o. daily.  2.  Return to [facility]mology Clinic for followup visit in 2-4 weeks.  3.  Repeat screening examination of the visual fields, OCT, fundus photography   and flicker fusion at the next visit.  4.  The risks, benefits, alternatives and expectations of the above-mentioned   therapy have been discussed at length with the patient.  5.  Further recommendations to follow with regards to therapy will be   addressed at the next visit.       ____________________________________     TERRY MYERS DO    [facility] / NTS    DD:  01/05/2018 11:31:37  DT:  01/05/2018 12:28:00    D#:  [id number]  Job#:  [id number]

## 2018-01-06 LAB
ALB CSF/SERPL: 5.2 RATIO (ref 0–9)
ALBUMIN CSF-MCNC: 25 MG/DL (ref 0–35)
ALBUMIN SERPL-MCNC: 4790 MG/DL (ref 3500–5200)
CARDIOLIPIN IGA SER IA-ACNC: 0 APL (ref 0–11)
CARDIOLIPIN IGG SER IA-ACNC: 4 GPL (ref 0–14)
CARDIOLIPIN IGM SER IA-ACNC: 3 MPL (ref 0–12)
IGG CSF-MCNC: 2.6 MG/DL (ref 0–6)
IGG SERPL-MCNC: 1210 MG/DL (ref 768–1632)
IGG SYNTH RATE SER+CSF CALC-MRATE: <0 MG/D
IGG/ALB CLEAR SER+CSF-RTO: 0.41 RATIO (ref 0.28–0.66)
IGG/ALB CSF: 0.1 RATIO (ref 0.09–0.25)
NUCLEAR IGG SER QL IA: NORMAL
OLIGOCLONAL BANDS CSF ELPH-IMP: NEGATIVE
OLIGOCLONAL BANDS CSF ELPH-IMP: NORMAL
OLIGOCLONAL BANDS CSF IEF: 0 BANDS (ref 0–1)
VDRL CSF QL: NON REACTIVE

## 2018-01-07 LAB
BACTERIA CSF CULT: NORMAL
BACTERIA CSF CULT: NORMAL
GRAM STN SPEC: NORMAL
GRAM STN SPEC: NORMAL
SIGNIFICANT IND 70042: NORMAL
SITE SITE: NORMAL
SOURCE SOURCE: NORMAL

## 2018-01-08 DIAGNOSIS — E03.8 HYPOTHYROIDISM DUE TO HASHIMOTO'S THYROIDITIS: ICD-10-CM

## 2018-01-08 DIAGNOSIS — E06.3 HYPOTHYROIDISM DUE TO HASHIMOTO'S THYROIDITIS: ICD-10-CM

## 2018-01-08 LAB
ACE CSF-CCNC: 3.9 U/L (ref 0–2.5)
LYSOZYME SERPL-MCNC: 1.7 UG/ML (ref 0–2.75)

## 2018-01-08 RX ORDER — LEVOTHYROXINE SODIUM 0.2 MG/1
200 TABLET ORAL
Qty: 90 TAB | Refills: 1 | Status: SHIPPED | OUTPATIENT
Start: 2018-01-08 | End: 2019-01-31 | Stop reason: SDUPTHER

## 2018-01-09 ENCOUNTER — PATIENT MESSAGE (OUTPATIENT)
Dept: MEDICAL GROUP | Facility: PHYSICIAN GROUP | Age: 28
End: 2018-01-09

## 2018-01-09 NOTE — TELEPHONE ENCOUNTER
From: Nickolas Galdamez  To: KARINE Hale  Sent: 1/9/2018 8:54 AM PST  Subject: RE: Test Result Question    I had my lumbar puncture the next day. I know I had the cold I seen you for a few days prior. Would that have been enough to elevate it?     ----- Message -----  From: KARINE Hale  Sent: 1/9/18 8:45 AM  To: Nickolas Galdamez  Subject: RE: Test Result Question    At this point I would not be concerned if you aren't having any drenching night sweats, fevers, unintentional weight loss. Repeating in a few weeks is always a good idea to develop a trend. You can discuss this with your neurologist as he is the one who ordered this, not sure what he was checking for.       ----- Message -----   From: Nickolas Galdamez   Sent: 1/9/2018 6:13 AM PST   To: KARINE Hale  Subject: Test Result Question    I have a question about CBC with Differential resulted on 1/3/18 at 4:01 PM.    My immature granulytes have never come back this high. Should I be concerned....

## 2018-01-11 ENCOUNTER — APPOINTMENT (OUTPATIENT)
Dept: RADIOLOGY | Facility: MEDICAL CENTER | Age: 28
End: 2018-01-11
Attending: EMERGENCY MEDICINE
Payer: COMMERCIAL

## 2018-01-11 ENCOUNTER — APPOINTMENT (OUTPATIENT)
Dept: RADIOLOGY | Facility: MEDICAL CENTER | Age: 28
End: 2018-01-11
Attending: INTERNAL MEDICINE
Payer: COMMERCIAL

## 2018-01-11 ENCOUNTER — RESOLUTE PROFESSIONAL BILLING HOSPITAL PROF FEE (OUTPATIENT)
Dept: HOSPITALIST | Facility: MEDICAL CENTER | Age: 28
End: 2018-01-11
Payer: COMMERCIAL

## 2018-01-11 ENCOUNTER — HOSPITAL ENCOUNTER (OUTPATIENT)
Facility: MEDICAL CENTER | Age: 28
End: 2018-01-12
Attending: EMERGENCY MEDICINE | Admitting: INTERNAL MEDICINE
Payer: COMMERCIAL

## 2018-01-11 ENCOUNTER — TELEPHONE (OUTPATIENT)
Dept: ENDOCRINOLOGY | Facility: MEDICAL CENTER | Age: 28
End: 2018-01-11

## 2018-01-11 DIAGNOSIS — E86.0 DEHYDRATION: ICD-10-CM

## 2018-01-11 DIAGNOSIS — R19.7 VOMITING AND DIARRHEA: ICD-10-CM

## 2018-01-11 DIAGNOSIS — R11.10 VOMITING AND DIARRHEA: ICD-10-CM

## 2018-01-11 DIAGNOSIS — K52.9 COLITIS: ICD-10-CM

## 2018-01-11 PROBLEM — E03.9 HYPOTHYROIDISM: Status: ACTIVE | Noted: 2018-01-11

## 2018-01-11 PROBLEM — I10 HTN (HYPERTENSION): Status: ACTIVE | Noted: 2018-01-11

## 2018-01-11 LAB
ALBUMIN SERPL BCP-MCNC: 4.3 G/DL (ref 3.2–4.9)
ALBUMIN/GLOB SERPL: 1.5 G/DL
ALP SERPL-CCNC: 55 U/L (ref 30–99)
ALT SERPL-CCNC: 13 U/L (ref 2–50)
ANION GAP SERPL CALC-SCNC: 6 MMOL/L (ref 0–11.9)
APPEARANCE UR: CLEAR
AST SERPL-CCNC: 13 U/L (ref 12–45)
BACTERIA #/AREA URNS HPF: ABNORMAL /HPF
BASOPHILS # BLD AUTO: 0.3 % (ref 0–1.8)
BASOPHILS # BLD: 0.03 K/UL (ref 0–0.12)
BILIRUB SERPL-MCNC: 0.2 MG/DL (ref 0.1–1.5)
BILIRUB UR QL STRIP.AUTO: NEGATIVE
BUN SERPL-MCNC: 12 MG/DL (ref 8–22)
CALCIUM SERPL-MCNC: 9.1 MG/DL (ref 8.5–10.5)
CHLORIDE SERPL-SCNC: 110 MMOL/L (ref 96–112)
CO2 SERPL-SCNC: 21 MMOL/L (ref 20–33)
COLOR UR: YELLOW
CREAT SERPL-MCNC: 0.96 MG/DL (ref 0.5–1.4)
EOSINOPHIL # BLD AUTO: 0.3 K/UL (ref 0–0.51)
EOSINOPHIL NFR BLD: 2.8 % (ref 0–6.9)
EPI CELLS #/AREA URNS HPF: ABNORMAL /HPF
ERYTHROCYTE [DISTWIDTH] IN BLOOD BY AUTOMATED COUNT: 41.3 FL (ref 35.9–50)
GLOBULIN SER CALC-MCNC: 2.9 G/DL (ref 1.9–3.5)
GLUCOSE SERPL-MCNC: 89 MG/DL (ref 65–99)
GLUCOSE UR STRIP.AUTO-MCNC: NEGATIVE MG/DL
HCG SERPL QL: NEGATIVE
HCT VFR BLD AUTO: 43.8 % (ref 37–47)
HGB BLD-MCNC: 14.5 G/DL (ref 12–16)
HYALINE CASTS #/AREA URNS LPF: ABNORMAL /LPF
IMM GRANULOCYTES # BLD AUTO: 0.04 K/UL (ref 0–0.11)
IMM GRANULOCYTES NFR BLD AUTO: 0.4 % (ref 0–0.9)
INR PPP: 0.94 (ref 0.87–1.13)
KETONES UR STRIP.AUTO-MCNC: NEGATIVE MG/DL
LACTATE BLD-SCNC: 1.5 MMOL/L (ref 0.5–2)
LEUKOCYTE ESTERASE UR QL STRIP.AUTO: ABNORMAL
LIPASE SERPL-CCNC: 11 U/L (ref 11–82)
LYMPHOCYTES # BLD AUTO: 1.54 K/UL (ref 1–4.8)
LYMPHOCYTES NFR BLD: 14.6 % (ref 22–41)
MAGNESIUM SERPL-MCNC: 2.2 MG/DL (ref 1.5–2.5)
MCH RBC QN AUTO: 27.6 PG (ref 27–33)
MCHC RBC AUTO-ENTMCNC: 33.1 G/DL (ref 33.6–35)
MCV RBC AUTO: 83.4 FL (ref 81.4–97.8)
MICRO URNS: ABNORMAL
MONOCYTES # BLD AUTO: 0.46 K/UL (ref 0–0.85)
MONOCYTES NFR BLD AUTO: 4.4 % (ref 0–13.4)
NEUTROPHILS # BLD AUTO: 8.16 K/UL (ref 2–7.15)
NEUTROPHILS NFR BLD: 77.5 % (ref 44–72)
NITRITE UR QL STRIP.AUTO: NEGATIVE
NRBC # BLD AUTO: 0 K/UL
NRBC BLD-RTO: 0 /100 WBC
PH UR STRIP.AUTO: 5.5 [PH]
PHOSPHATE SERPL-MCNC: 2.2 MG/DL (ref 2.5–4.5)
PLATELET # BLD AUTO: 324 K/UL (ref 164–446)
PMV BLD AUTO: 9.5 FL (ref 9–12.9)
POTASSIUM SERPL-SCNC: 4 MMOL/L (ref 3.6–5.5)
PROT SERPL-MCNC: 7.2 G/DL (ref 6–8.2)
PROT UR QL STRIP: NEGATIVE MG/DL
PROTHROMBIN TIME: 12.3 SEC (ref 12–14.6)
RBC # BLD AUTO: 5.25 M/UL (ref 4.2–5.4)
RBC # URNS HPF: ABNORMAL /HPF
RBC UR QL AUTO: ABNORMAL
SODIUM SERPL-SCNC: 137 MMOL/L (ref 135–145)
SP GR UR STRIP.AUTO: 1.01
T4 FREE SERPL-MCNC: 0.98 NG/DL (ref 0.53–1.43)
TSH SERPL DL<=0.005 MIU/L-ACNC: 1.27 UIU/ML (ref 0.38–5.33)
UROBILINOGEN UR STRIP.AUTO-MCNC: 0.2 MG/DL
WBC # BLD AUTO: 10.5 K/UL (ref 4.8–10.8)
WBC #/AREA URNS HPF: ABNORMAL /HPF

## 2018-01-11 PROCEDURE — 83605 ASSAY OF LACTIC ACID: CPT

## 2018-01-11 PROCEDURE — 84443 ASSAY THYROID STIM HORMONE: CPT

## 2018-01-11 PROCEDURE — 99285 EMERGENCY DEPT VISIT HI MDM: CPT

## 2018-01-11 PROCEDURE — 96374 THER/PROPH/DIAG INJ IV PUSH: CPT

## 2018-01-11 PROCEDURE — 96375 TX/PRO/DX INJ NEW DRUG ADDON: CPT

## 2018-01-11 PROCEDURE — 80053 COMPREHEN METABOLIC PANEL: CPT

## 2018-01-11 PROCEDURE — 700111 HCHG RX REV CODE 636 W/ 250 OVERRIDE (IP): Performed by: INTERNAL MEDICINE

## 2018-01-11 PROCEDURE — 96361 HYDRATE IV INFUSION ADD-ON: CPT

## 2018-01-11 PROCEDURE — 87086 URINE CULTURE/COLONY COUNT: CPT

## 2018-01-11 PROCEDURE — 96372 THER/PROPH/DIAG INJ SC/IM: CPT

## 2018-01-11 PROCEDURE — 85610 PROTHROMBIN TIME: CPT

## 2018-01-11 PROCEDURE — 81001 URINALYSIS AUTO W/SCOPE: CPT

## 2018-01-11 PROCEDURE — 700105 HCHG RX REV CODE 258: Performed by: INTERNAL MEDICINE

## 2018-01-11 PROCEDURE — 36415 COLL VENOUS BLD VENIPUNCTURE: CPT

## 2018-01-11 PROCEDURE — 83690 ASSAY OF LIPASE: CPT

## 2018-01-11 PROCEDURE — 85025 COMPLETE CBC W/AUTO DIFF WBC: CPT

## 2018-01-11 PROCEDURE — 84439 ASSAY OF FREE THYROXINE: CPT

## 2018-01-11 PROCEDURE — 76830 TRANSVAGINAL US NON-OB: CPT

## 2018-01-11 PROCEDURE — 84100 ASSAY OF PHOSPHORUS: CPT

## 2018-01-11 PROCEDURE — G0378 HOSPITAL OBSERVATION PER HR: HCPCS

## 2018-01-11 PROCEDURE — 96376 TX/PRO/DX INJ SAME DRUG ADON: CPT

## 2018-01-11 PROCEDURE — 99220 PR INITIAL OBSERVATION CARE,LEVL III: CPT | Performed by: INTERNAL MEDICINE

## 2018-01-11 PROCEDURE — 700117 HCHG RX CONTRAST REV CODE 255: Performed by: INTERNAL MEDICINE

## 2018-01-11 PROCEDURE — 76705 ECHO EXAM OF ABDOMEN: CPT

## 2018-01-11 PROCEDURE — 83735 ASSAY OF MAGNESIUM: CPT

## 2018-01-11 PROCEDURE — 74177 CT ABD & PELVIS W/CONTRAST: CPT

## 2018-01-11 PROCEDURE — 700105 HCHG RX REV CODE 258: Performed by: EMERGENCY MEDICINE

## 2018-01-11 PROCEDURE — 700101 HCHG RX REV CODE 250: Performed by: INTERNAL MEDICINE

## 2018-01-11 PROCEDURE — A9270 NON-COVERED ITEM OR SERVICE: HCPCS | Performed by: INTERNAL MEDICINE

## 2018-01-11 PROCEDURE — 700111 HCHG RX REV CODE 636 W/ 250 OVERRIDE (IP): Performed by: EMERGENCY MEDICINE

## 2018-01-11 PROCEDURE — 700102 HCHG RX REV CODE 250 W/ 637 OVERRIDE(OP): Performed by: INTERNAL MEDICINE

## 2018-01-11 PROCEDURE — 84703 CHORIONIC GONADOTROPIN ASSAY: CPT

## 2018-01-11 RX ORDER — ERGOCALCIFEROL 1.25 MG/1
50000 CAPSULE ORAL
COMMUNITY
End: 2018-10-30

## 2018-01-11 RX ORDER — SODIUM CHLORIDE 9 MG/ML
INJECTION, SOLUTION INTRAVENOUS CONTINUOUS
Status: DISCONTINUED | OUTPATIENT
Start: 2018-01-11 | End: 2018-01-12

## 2018-01-11 RX ORDER — ONDANSETRON 4 MG/1
4 TABLET, ORALLY DISINTEGRATING ORAL EVERY 4 HOURS PRN
Status: DISCONTINUED | OUTPATIENT
Start: 2018-01-11 | End: 2018-01-12 | Stop reason: HOSPADM

## 2018-01-11 RX ORDER — LABETALOL HYDROCHLORIDE 5 MG/ML
10 INJECTION, SOLUTION INTRAVENOUS EVERY 4 HOURS PRN
Status: DISCONTINUED | OUTPATIENT
Start: 2018-01-11 | End: 2018-01-12 | Stop reason: HOSPADM

## 2018-01-11 RX ORDER — ONDANSETRON 2 MG/ML
4 INJECTION INTRAMUSCULAR; INTRAVENOUS ONCE
Status: COMPLETED | OUTPATIENT
Start: 2018-01-11 | End: 2018-01-11

## 2018-01-11 RX ORDER — TOPIRAMATE 100 MG/1
100 TABLET, FILM COATED ORAL 2 TIMES DAILY
Status: DISCONTINUED | OUTPATIENT
Start: 2018-01-11 | End: 2018-01-12 | Stop reason: HOSPADM

## 2018-01-11 RX ORDER — HYDROMORPHONE HYDROCHLORIDE 2 MG/ML
0.5 INJECTION, SOLUTION INTRAMUSCULAR; INTRAVENOUS; SUBCUTANEOUS ONCE
Status: COMPLETED | OUTPATIENT
Start: 2018-01-11 | End: 2018-01-11

## 2018-01-11 RX ORDER — SUCRALFATE ORAL 1 G/10ML
1 SUSPENSION ORAL EVERY 6 HOURS
Status: DISCONTINUED | OUTPATIENT
Start: 2018-01-11 | End: 2018-01-12 | Stop reason: HOSPADM

## 2018-01-11 RX ORDER — LEVOTHYROXINE SODIUM 0.2 MG/1
200 TABLET ORAL
Status: DISCONTINUED | OUTPATIENT
Start: 2018-01-12 | End: 2018-01-12 | Stop reason: HOSPADM

## 2018-01-11 RX ORDER — DICYCLOMINE HYDROCHLORIDE 10 MG/1
10 CAPSULE ORAL
Status: DISCONTINUED | OUTPATIENT
Start: 2018-01-11 | End: 2018-01-12 | Stop reason: HOSPADM

## 2018-01-11 RX ORDER — PROMETHAZINE HYDROCHLORIDE 25 MG/1
12.5-25 TABLET ORAL EVERY 4 HOURS PRN
Status: DISCONTINUED | OUTPATIENT
Start: 2018-01-11 | End: 2018-01-12 | Stop reason: HOSPADM

## 2018-01-11 RX ORDER — LORAZEPAM 2 MG/ML
0.5 INJECTION INTRAMUSCULAR EVERY 6 HOURS PRN
Status: DISCONTINUED | OUTPATIENT
Start: 2018-01-11 | End: 2018-01-12 | Stop reason: HOSPADM

## 2018-01-11 RX ORDER — AMLODIPINE BESYLATE 10 MG/1
10 TABLET ORAL DAILY
Status: DISCONTINUED | OUTPATIENT
Start: 2018-01-11 | End: 2018-01-12 | Stop reason: HOSPADM

## 2018-01-11 RX ORDER — SODIUM CHLORIDE 9 MG/ML
1000 INJECTION, SOLUTION INTRAVENOUS ONCE
Status: COMPLETED | OUTPATIENT
Start: 2018-01-11 | End: 2018-01-11

## 2018-01-11 RX ORDER — LORAZEPAM 1 MG/1
0.5 TABLET ORAL EVERY 6 HOURS PRN
Status: DISCONTINUED | OUTPATIENT
Start: 2018-01-11 | End: 2018-01-12 | Stop reason: HOSPADM

## 2018-01-11 RX ORDER — METOCLOPRAMIDE HYDROCHLORIDE 5 MG/ML
10 INJECTION INTRAMUSCULAR; INTRAVENOUS ONCE
Status: DISCONTINUED | OUTPATIENT
Start: 2018-01-11 | End: 2018-01-11

## 2018-01-11 RX ORDER — ONDANSETRON 2 MG/ML
4 INJECTION INTRAMUSCULAR; INTRAVENOUS EVERY 4 HOURS PRN
Status: DISCONTINUED | OUTPATIENT
Start: 2018-01-11 | End: 2018-01-12 | Stop reason: HOSPADM

## 2018-01-11 RX ORDER — SUMATRIPTAN 50 MG/1
50 TABLET, FILM COATED ORAL
Status: DISCONTINUED | OUTPATIENT
Start: 2018-01-11 | End: 2018-01-11

## 2018-01-11 RX ORDER — ACETAMINOPHEN 325 MG/1
650 TABLET ORAL EVERY 6 HOURS PRN
Status: DISCONTINUED | OUTPATIENT
Start: 2018-01-11 | End: 2018-01-12 | Stop reason: HOSPADM

## 2018-01-11 RX ORDER — OXYCODONE HYDROCHLORIDE 5 MG/1
5 TABLET ORAL
Status: DISCONTINUED | OUTPATIENT
Start: 2018-01-11 | End: 2018-01-12 | Stop reason: HOSPADM

## 2018-01-11 RX ORDER — OXYCODONE HYDROCHLORIDE 5 MG/1
2.5 TABLET ORAL
Status: DISCONTINUED | OUTPATIENT
Start: 2018-01-11 | End: 2018-01-12 | Stop reason: HOSPADM

## 2018-01-11 RX ORDER — HEPARIN SODIUM 5000 [USP'U]/ML
5000 INJECTION, SOLUTION INTRAVENOUS; SUBCUTANEOUS EVERY 8 HOURS
Status: DISCONTINUED | OUTPATIENT
Start: 2018-01-11 | End: 2018-01-12 | Stop reason: HOSPADM

## 2018-01-11 RX ORDER — OMEPRAZOLE 20 MG/1
20 CAPSULE, DELAYED RELEASE ORAL 2 TIMES DAILY
Status: DISCONTINUED | OUTPATIENT
Start: 2018-01-11 | End: 2018-01-12 | Stop reason: HOSPADM

## 2018-01-11 RX ORDER — POLYETHYLENE GLYCOL 3350 17 G/17G
17 POWDER, FOR SOLUTION ORAL
Status: ON HOLD | COMMUNITY
End: 2018-01-12

## 2018-01-11 RX ORDER — PROMETHAZINE HYDROCHLORIDE 12.5 MG/1
12.5-25 SUPPOSITORY RECTAL EVERY 4 HOURS PRN
Status: DISCONTINUED | OUTPATIENT
Start: 2018-01-11 | End: 2018-01-12 | Stop reason: HOSPADM

## 2018-01-11 RX ORDER — MORPHINE SULFATE 4 MG/ML
2 INJECTION, SOLUTION INTRAMUSCULAR; INTRAVENOUS
Status: DISCONTINUED | OUTPATIENT
Start: 2018-01-11 | End: 2018-01-12 | Stop reason: HOSPADM

## 2018-01-11 RX ADMIN — SUCRALFATE 1 G: 1 SUSPENSION ORAL at 18:58

## 2018-01-11 RX ADMIN — HYDROMORPHONE HYDROCHLORIDE 0.5 MG: 2 INJECTION INTRAMUSCULAR; INTRAVENOUS; SUBCUTANEOUS at 10:46

## 2018-01-11 RX ADMIN — HEPARIN SODIUM 5000 UNITS: 5000 INJECTION, SOLUTION INTRAVENOUS; SUBCUTANEOUS at 21:03

## 2018-01-11 RX ADMIN — SODIUM CHLORIDE: 9 INJECTION, SOLUTION INTRAVENOUS at 15:41

## 2018-01-11 RX ADMIN — OMEPRAZOLE 20 MG: 20 CAPSULE, DELAYED RELEASE ORAL at 15:31

## 2018-01-11 RX ADMIN — OMEPRAZOLE 20 MG: 20 CAPSULE, DELAYED RELEASE ORAL at 21:03

## 2018-01-11 RX ADMIN — AMLODIPINE BESYLATE 10 MG: 10 TABLET ORAL at 15:31

## 2018-01-11 RX ADMIN — DICYCLOMINE HYDROCHLORIDE 10 MG: 10 CAPSULE ORAL at 18:58

## 2018-01-11 RX ADMIN — HEPARIN SODIUM 5000 UNITS: 5000 INJECTION, SOLUTION INTRAVENOUS; SUBCUTANEOUS at 15:31

## 2018-01-11 RX ADMIN — ONDANSETRON 4 MG: 2 INJECTION INTRAMUSCULAR; INTRAVENOUS at 13:28

## 2018-01-11 RX ADMIN — DICYCLOMINE HYDROCHLORIDE 10 MG: 10 CAPSULE ORAL at 21:03

## 2018-01-11 RX ADMIN — IOHEXOL 100 ML: 350 INJECTION, SOLUTION INTRAVENOUS at 19:04

## 2018-01-11 RX ADMIN — PROCHLORPERAZINE EDISYLATE 10 MG: 5 INJECTION INTRAMUSCULAR; INTRAVENOUS at 15:41

## 2018-01-11 RX ADMIN — SODIUM CHLORIDE 1000 ML: 9 INJECTION, SOLUTION INTRAVENOUS at 10:50

## 2018-01-11 RX ADMIN — MORPHINE SULFATE 2 MG: 4 INJECTION INTRAVENOUS at 15:53

## 2018-01-11 RX ADMIN — TOPIRAMATE 100 MG: 100 TABLET, FILM COATED ORAL at 21:04

## 2018-01-11 RX ADMIN — ONDANSETRON 4 MG: 2 INJECTION INTRAMUSCULAR; INTRAVENOUS at 10:46

## 2018-01-11 RX ADMIN — SUCRALFATE 1 G: 1 SUSPENSION ORAL at 15:31

## 2018-01-11 RX ADMIN — VITAMIN D, TAB 1000IU (100/BT) 2000 UNITS: 25 TAB at 21:03

## 2018-01-11 ASSESSMENT — ENCOUNTER SYMPTOMS
HEARTBURN: 1
DIARRHEA: 0
COUGH: 0
PALPITATIONS: 0
SPUTUM PRODUCTION: 0
DIZZINESS: 0
LOSS OF CONSCIOUSNESS: 0
INSOMNIA: 0
FEVER: 1
DIAPHORESIS: 0
ORTHOPNEA: 0
HEMOPTYSIS: 0
SEIZURES: 0
BLOOD IN STOOL: 0
SPEECH CHANGE: 0
TINGLING: 0
VOMITING: 1
CLAUDICATION: 0
CONSTIPATION: 0
WEIGHT LOSS: 0
BACK PAIN: 0
ABDOMINAL PAIN: 1
WEAKNESS: 1
MEMORY LOSS: 0
BLURRED VISION: 0
DEPRESSION: 0
WHEEZING: 0
FLANK PAIN: 0
TREMORS: 0
PND: 0
FOCAL WEAKNESS: 0
NERVOUS/ANXIOUS: 1
MYALGIAS: 0
SHORTNESS OF BREATH: 0
HEADACHES: 0
NAUSEA: 1
FALLS: 0
CHILLS: 1
DOUBLE VISION: 0
NECK PAIN: 0
HALLUCINATIONS: 0
SENSORY CHANGE: 0

## 2018-01-11 ASSESSMENT — LIFESTYLE VARIABLES
TOTAL SCORE: 0
EVER FELT BAD OR GUILTY ABOUT YOUR DRINKING: NO
AVERAGE NUMBER OF DAYS PER WEEK YOU HAVE A DRINK CONTAINING ALCOHOL: 1
HAVE PEOPLE ANNOYED YOU BY CRITICIZING YOUR DRINKING: NO
ON A TYPICAL DAY WHEN YOU DRINK ALCOHOL HOW MANY DRINKS DO YOU HAVE: 1
HOW MANY TIMES IN THE PAST YEAR HAVE YOU HAD 5 OR MORE DRINKS IN A DAY: 0
EVER HAD A DRINK FIRST THING IN THE MORNING TO STEADY YOUR NERVES TO GET RID OF A HANGOVER: NO
TOTAL SCORE: 0
EVER FELT BAD OR GUILTY ABOUT YOUR DRINKING: NO
DO YOU DRINK ALCOHOL: YES
SUBSTANCE_ABUSE: 0
HOW MANY TIMES IN THE PAST YEAR HAVE YOU HAD 5 OR MORE DRINKS IN A DAY: 0
EVER_SMOKED: YES
HAVE PEOPLE ANNOYED YOU BY CRITICIZING YOUR DRINKING: NO
TOTAL SCORE: 0
CONSUMPTION TOTAL: NEGATIVE
ALCOHOL_USE: YES
EVER HAD A DRINK FIRST THING IN THE MORNING TO STEADY YOUR NERVES TO GET RID OF A HANGOVER: NO
TOTAL SCORE: 0
HAVE YOU EVER FELT YOU SHOULD CUT DOWN ON YOUR DRINKING: NO
TOTAL SCORE: 0
TOTAL SCORE: 0
ON A TYPICAL DAY WHEN YOU DRINK ALCOHOL HOW MANY DRINKS DO YOU HAVE: 1
AVERAGE NUMBER OF DAYS PER WEEK YOU HAVE A DRINK CONTAINING ALCOHOL: 1
CONSUMPTION TOTAL: NEGATIVE
HAVE YOU EVER FELT YOU SHOULD CUT DOWN ON YOUR DRINKING: NO

## 2018-01-11 ASSESSMENT — PAIN SCALES - GENERAL
PAINLEVEL_OUTOF10: 3
PAINLEVEL_OUTOF10: 10
PAINLEVEL_OUTOF10: 7
PAINLEVEL_OUTOF10: 6

## 2018-01-11 ASSESSMENT — PATIENT HEALTH QUESTIONNAIRE - PHQ9
2. FEELING DOWN, DEPRESSED, IRRITABLE, OR HOPELESS: NOT AT ALL
SUM OF ALL RESPONSES TO PHQ9 QUESTIONS 1 AND 2: 0
1. LITTLE INTEREST OR PLEASURE IN DOING THINGS: NOT AT ALL
SUM OF ALL RESPONSES TO PHQ QUESTIONS 1-9: 0

## 2018-01-11 ASSESSMENT — COPD QUESTIONNAIRES
DURING THE PAST 4 WEEKS HOW MUCH DID YOU FEEL SHORT OF BREATH: NONE/LITTLE OF THE TIME
COPD SCREENING SCORE: 2
DO YOU EVER COUGH UP ANY MUCUS OR PHLEGM?: NO/ONLY WITH OCCASIONAL COLDS OR INFECTIONS
HAVE YOU SMOKED AT LEAST 100 CIGARETTES IN YOUR ENTIRE LIFE: YES

## 2018-01-11 NOTE — ED NOTES
Med rec complete per pt at bedside  Allergies reviewed - NKDA  No ABX in last month  Pt states she just started Suxenda (liraglutide) recently and thinks that had caused her symptoms  Last had a dose last night but does not wish to continue this medication

## 2018-01-11 NOTE — PROGRESS NOTES
Pharmacy Consult Request    Per MD: Can any of the patient's medications cause abdominal pain?    · Amlodipine: abdominal pain - 2% (per med hx, pt has been taking since Jan 2017)  · Cholecalciferol: none reported   · Dicyclomine: post marketing reports of abdominal pain, nausea - 14% (per med hx, pt has been taking since Dec 2017)  · Levothyroxine: abdominal cramps (per med hx, pt has been taking since May 2017)  · Liraglutide: abdominal pain - 5%, nausea - 39%, diarrhea - 21%, constipation - 19%, vomiting 16% (per med hx, pt has been taking since Jan 2018)  · Phentermine: constipation (per med hx, pt has been taking since Apr 2013)  · Miralax: abdominal cramps, diarrhea, nausea (per med hx, pt has been taking since Sep 2017)  · Topamax: constipation - 4%, gastritis - 3% (per med hx, pt has been taking since Aug 2010)  · Ergocalciferol - none reported      Brenda Flowers, PharmD

## 2018-01-11 NOTE — ED NOTES
Reports nausea.  Given emesis bag.  Pt heard vomiting from outside room.  No emesis in bag.  Medicated for nausea.

## 2018-01-11 NOTE — ED PROVIDER NOTES
ED Provider Note  CHIEF COMPLAINT  Chief Complaint   Patient presents with   • Nausea/Vomiting/Diarrhea     x 2 days   • Abdominal Pain     with bloating   • Lightheadedness       HPI  Nickolas Galdamez is a 27 y.o. female who presents with nausea, vomiting, diarrhea and abdominal bloating and cramps over the past 3-5 days. Has gotten worse over the past 2-3 days. She has multiple medical issues. She has obesity, type II diabetes, pseudotumor cerebri, Hashimoto's disease. She has a local endocrinologist. Her endocrinologist about 2 weeks ago started her on Saxenda which the medication for type II diabetes for weight loss. It does have side effects which may be causing the issues. No headache, no chest pain, no difficulty breathing. No blood in her bowel movement. She still has her gallbladder and appendix.    REVIEW OF SYSTEMS  No headache, no chest pain, no specific abdominal pain.  ALL OTHER SYSTEMS NEGATIVE    ALLERGIES  No Known Allergies    CURRENT MEDICATIONS  Home Medications     Reviewed by Federico Galindo R.N. (Registered Nurse) on 01/11/18 at 1022  Med List Status: Not Addressed   Medication Last Dose Status   amlodipine (NORVASC) 10 MG Tab Taking Active   Cholecalciferol (VITAMIN D) 2000 UNITS Cap Taking Active   dicyclomine (BENTYL) 10 MG Cap Taking Active   levothyroxine (SYNTHROID) 200 MCG Tab 1/11/2018 Active   Liraglutide -Weight Management 18 MG/3ML Solution Pen-injector  Active   phentermine (ADIPEX-P) 37.5 MG tablet Taking Active   sumatriptan (IMITREX) 50 MG Tab PRN Active   topiramate (TOPAMAX) 100 MG Tab Taking Active   vitamin D, Ergocalciferol, (DRISDOL) 67735 units Cap capsule Taking Active                PAST MEDICAL HISTORY  Past Medical History:   Diagnosis Date   • EEG abnormal    • Hashimoto's disease    • Hyperglycemia    • Migraine    • Pseudotumor cerebri    • Seizure disorder (CMS-HCC)        SURGICAL HISTORY  Past Surgical History:   Procedure Laterality Date   • GYN SURGERY       "precancerous cells with cervical freezing, HPV positive       FAMILY HISTORY  Family History   Problem Relation Age of Onset   • Cancer Mother      Cervical       SOCIAL HISTORY  Nonsmoker    PHYSICAL EXAM  GENERAL: Alert obese dehydrated female adult with dry mucous membranes  VITAL SIGNS: /109   Pulse 83   Temp 36.4 °C (97.6 °F)   Resp 18   Ht 1.778 m (5' 10\")   Wt (!) 125.6 kg (276 lb 14.4 oz)   LMP 03/03/2012   SpO2 98%   BMI 39.73 kg/m²   Constitutional: Alert obese and dehydrated adult   HENT: Scalp is normal size and nontender. Ears are clear. Nose is clear. Throat is clear with no stridor no drooling no trismus. Teeth are all intact.  Eyes: Pupils equal round and reactive to light, extraocular motor fall. There is no scleral icterus.  Neck: Neck is supple and nontender. There is no meningismus. No adenitis. No thyromegaly.  Lymphatic: No adenopathy.   Cardiovascular: Heart regular rhythm without murmurs or gallops   Thorax & Lungs: No chest wall tenderness. Lungs are clear. Patient has good breath sounds bilateral. No rales, no rhonchi, no wheezes.  Abdomen: Abdomen is soft, nontender, not rigid, no guarding, and no organomegaly. There is no palpable hernia   Skin: Warm, pink, and dry with no erythema and no rash.   Back: Nontender, no midline bony tenderness, no flank tenderness.                                             Extremities: Full range of motion  No tenderness to palpation and no deformities noted. No calf or thigh swelling. No calf or thigh tenderness. No clinical DVT.  Neurologic: Alert & oriented . Cranial nerves are grossly intact as tested. Patient moves all 4 extremities well. Patient has good strong flexion and extension of the ankle joints knee joints hip joints and elbow joints. Sensation is normal and symmetrical in the upper and lower extremities.   Psychiatric: Patient is alert oriented coherent and rational.   RADIOLOGY/PROCEDURES  US-GALLBLADDER   Final Result      1.  " Slightly heterogeneous liver. This could represent minor fatty infiltration.      2.  Otherwise negative right upper quadrant ultrasound.      3.  The appendix is not visualized in the right lower quadrant.               COURSE & MEDICAL DECISION MAKING  Patient presents with vomiting, diarrhea, dehydration. She appears dehydrated with dry mucous membranes. She been ill for the past several days. She has multiple medical issues. She was started on a medication for weight loss control for type II diabetes. Differential diagnosis: Vomiting and diarrhea, gastritis, hepatitis, pancreatitis, irritable bowel syndrome, colitis, gallbladder disease, reaction to medication, etc.    Plan: #1 IV #2 bolus of IV fluids for rehydration for her dehydration and dry mucous membranes. #3. Intravenous Zofran and Dilaudid No. 4. Ultrasound of the gallbladder #5. Laboratory evaluation including CBC, CMP, hCG, lipase, T4 and T8 TSH. Etc.    Review of previous medical records: Hashimoto's disease, pseudotumor cerebri, obesity, type II diabetes, migraines.    Laboratory examination: Ultrasound of the gallbladder shows a fatty liver. No gallstones. CBC is normal. No anemia. No bandemia. Chemistry panel is normal with no renal or liver dysfunction. Thyroid studies are normal. HCG is negative. Lactate level is normal.    Results for orders placed or performed during the hospital encounter of 01/11/18   CBC WITH DIFFERENTIAL   Result Value Ref Range    WBC 10.5 4.8 - 10.8 K/uL    RBC 5.25 4.20 - 5.40 M/uL    Hemoglobin 14.5 12.0 - 16.0 g/dL    Hematocrit 43.8 37.0 - 47.0 %    MCV 83.4 81.4 - 97.8 fL    MCH 27.6 27.0 - 33.0 pg    MCHC 33.1 (L) 33.6 - 35.0 g/dL    RDW 41.3 35.9 - 50.0 fL    Platelet Count 324 164 - 446 K/uL    MPV 9.5 9.0 - 12.9 fL    Neutrophils-Polys 77.50 (H) 44.00 - 72.00 %    Lymphocytes 14.60 (L) 22.00 - 41.00 %    Monocytes 4.40 0.00 - 13.40 %    Eosinophils 2.80 0.00 - 6.90 %    Basophils 0.30 0.00 - 1.80 %    Immature  Granulocytes 0.40 0.00 - 0.90 %    Nucleated RBC 0.00 /100 WBC    Neutrophils (Absolute) 8.16 (H) 2.00 - 7.15 K/uL    Lymphs (Absolute) 1.54 1.00 - 4.80 K/uL    Monos (Absolute) 0.46 0.00 - 0.85 K/uL    Eos (Absolute) 0.30 0.00 - 0.51 K/uL    Baso (Absolute) 0.03 0.00 - 0.12 K/uL    Immature Granulocytes (abs) 0.04 0.00 - 0.11 K/uL    NRBC (Absolute) 0.00 K/uL   COMP METABOLIC PANEL   Result Value Ref Range    Sodium 137 135 - 145 mmol/L    Potassium 4.0 3.6 - 5.5 mmol/L    Chloride 110 96 - 112 mmol/L    Co2 21 20 - 33 mmol/L    Anion Gap 6.0 0.0 - 11.9    Glucose 89 65 - 99 mg/dL    Bun 12 8 - 22 mg/dL    Creatinine 0.96 0.50 - 1.40 mg/dL    Calcium 9.1 8.5 - 10.5 mg/dL    AST(SGOT) 13 12 - 45 U/L    ALT(SGPT) 13 2 - 50 U/L    Alkaline Phosphatase 55 30 - 99 U/L    Total Bilirubin 0.2 0.1 - 1.5 mg/dL    Albumin 4.3 3.2 - 4.9 g/dL    Total Protein 7.2 6.0 - 8.2 g/dL    Globulin 2.9 1.9 - 3.5 g/dL    A-G Ratio 1.5 g/dL   LIPASE   Result Value Ref Range    Lipase 11 11 - 82 U/L   LACTIC ACID   Result Value Ref Range    Lactic Acid 1.5 0.5 - 2.0 mmol/L   HCG QUAL SERUM   Result Value Ref Range    Beta-Hcg Qualitative Serum Negative Negative   PROTHROMBIN TIME (INR)   Result Value Ref Range    PT 12.3 12.0 - 14.6 sec    INR 0.94 0.87 - 1.13   TSH   Result Value Ref Range    TSH 1.270 0.380 - 5.330 uIU/mL   FREE THYROXINE   Result Value Ref Range    Free T-4 0.98 0.53 - 1.43 ng/dL   ESTIMATED GFR   Result Value Ref Range    GFR If African American >60 >60 mL/min/1.73 m 2    GFR If Non African American >60 >60 mL/min/1.73 m 2      Assessment: Case discussed. The patient stable on admission after 2 PM  FINAL IMPRESSION  1. Vomiting and diarrhea  2. Dehydration  3. Multiple medical issues including pseudotumor cerebri, Hashimoto's, type II diabetes, obesity.         Electronically signed by: Gary Gansert, 1/11/2018 2 PM

## 2018-01-11 NOTE — ED NOTES
Pt ambulatory to triage c/o N/V/D with abdominal pain/bloating x 2 days. Pt reports starting a new medication from her endocrinologist last week.

## 2018-01-12 ENCOUNTER — PATIENT OUTREACH (OUTPATIENT)
Dept: HEALTH INFORMATION MANAGEMENT | Facility: OTHER | Age: 28
End: 2018-01-12

## 2018-01-12 VITALS
SYSTOLIC BLOOD PRESSURE: 134 MMHG | BODY MASS INDEX: 39.8 KG/M2 | RESPIRATION RATE: 16 BRPM | OXYGEN SATURATION: 95 % | HEIGHT: 70 IN | TEMPERATURE: 97.7 F | WEIGHT: 278 LBS | DIASTOLIC BLOOD PRESSURE: 83 MMHG | HEART RATE: 67 BPM

## 2018-01-12 LAB
ALBUMIN SERPL BCP-MCNC: 3.6 G/DL (ref 3.2–4.9)
ALBUMIN/GLOB SERPL: 1.6 G/DL
ALP SERPL-CCNC: 42 U/L (ref 30–99)
ALT SERPL-CCNC: 9 U/L (ref 2–50)
ANION GAP SERPL CALC-SCNC: 8 MMOL/L (ref 0–11.9)
AST SERPL-CCNC: 13 U/L (ref 12–45)
BASOPHILS # BLD AUTO: 0.3 % (ref 0–1.8)
BASOPHILS # BLD: 0.02 K/UL (ref 0–0.12)
BILIRUB SERPL-MCNC: 0.3 MG/DL (ref 0.1–1.5)
BUN SERPL-MCNC: 9 MG/DL (ref 8–22)
CALCIUM SERPL-MCNC: 7.9 MG/DL (ref 8.5–10.5)
CHLORIDE SERPL-SCNC: 112 MMOL/L (ref 96–112)
CO2 SERPL-SCNC: 18 MMOL/L (ref 20–33)
CREAT SERPL-MCNC: 0.85 MG/DL (ref 0.5–1.4)
EOSINOPHIL # BLD AUTO: 0.33 K/UL (ref 0–0.51)
EOSINOPHIL NFR BLD: 4.6 % (ref 0–6.9)
ERYTHROCYTE [DISTWIDTH] IN BLOOD BY AUTOMATED COUNT: 41.5 FL (ref 35.9–50)
GLOBULIN SER CALC-MCNC: 2.3 G/DL (ref 1.9–3.5)
GLUCOSE SERPL-MCNC: 78 MG/DL (ref 65–99)
HCT VFR BLD AUTO: 37.8 % (ref 37–47)
HGB BLD-MCNC: 12.6 G/DL (ref 12–16)
IMM GRANULOCYTES # BLD AUTO: 0.03 K/UL (ref 0–0.11)
IMM GRANULOCYTES NFR BLD AUTO: 0.4 % (ref 0–0.9)
LYMPHOCYTES # BLD AUTO: 1.78 K/UL (ref 1–4.8)
LYMPHOCYTES NFR BLD: 25 % (ref 22–41)
MCH RBC QN AUTO: 27.9 PG (ref 27–33)
MCHC RBC AUTO-ENTMCNC: 33.3 G/DL (ref 33.6–35)
MCV RBC AUTO: 83.6 FL (ref 81.4–97.8)
MONOCYTES # BLD AUTO: 0.37 K/UL (ref 0–0.85)
MONOCYTES NFR BLD AUTO: 5.2 % (ref 0–13.4)
NEUTROPHILS # BLD AUTO: 4.6 K/UL (ref 2–7.15)
NEUTROPHILS NFR BLD: 64.5 % (ref 44–72)
NRBC # BLD AUTO: 0 K/UL
NRBC BLD-RTO: 0 /100 WBC
PLATELET # BLD AUTO: 267 K/UL (ref 164–446)
PMV BLD AUTO: 9.6 FL (ref 9–12.9)
POTASSIUM SERPL-SCNC: 3.7 MMOL/L (ref 3.6–5.5)
PROT SERPL-MCNC: 5.9 G/DL (ref 6–8.2)
RBC # BLD AUTO: 4.52 M/UL (ref 4.2–5.4)
SODIUM SERPL-SCNC: 138 MMOL/L (ref 135–145)
WBC # BLD AUTO: 7.1 K/UL (ref 4.8–10.8)

## 2018-01-12 PROCEDURE — 36415 COLL VENOUS BLD VENIPUNCTURE: CPT

## 2018-01-12 PROCEDURE — 80053 COMPREHEN METABOLIC PANEL: CPT

## 2018-01-12 PROCEDURE — 87899 AGENT NOS ASSAY W/OPTIC: CPT

## 2018-01-12 PROCEDURE — A9270 NON-COVERED ITEM OR SERVICE: HCPCS | Performed by: INTERNAL MEDICINE

## 2018-01-12 PROCEDURE — 87046 STOOL CULTR AEROBIC BACT EA: CPT

## 2018-01-12 PROCEDURE — 700101 HCHG RX REV CODE 250: Performed by: INTERNAL MEDICINE

## 2018-01-12 PROCEDURE — 96361 HYDRATE IV INFUSION ADD-ON: CPT

## 2018-01-12 PROCEDURE — 700102 HCHG RX REV CODE 250 W/ 637 OVERRIDE(OP): Performed by: INTERNAL MEDICINE

## 2018-01-12 PROCEDURE — G0378 HOSPITAL OBSERVATION PER HR: HCPCS

## 2018-01-12 PROCEDURE — 85025 COMPLETE CBC W/AUTO DIFF WBC: CPT

## 2018-01-12 PROCEDURE — 96376 TX/PRO/DX INJ SAME DRUG ADON: CPT

## 2018-01-12 PROCEDURE — 99217 PR OBSERVATION CARE DISCHARGE: CPT | Performed by: INTERNAL MEDICINE

## 2018-01-12 PROCEDURE — 87045 FECES CULTURE AEROBIC BACT: CPT

## 2018-01-12 PROCEDURE — 700111 HCHG RX REV CODE 636 W/ 250 OVERRIDE (IP): Performed by: INTERNAL MEDICINE

## 2018-01-12 PROCEDURE — 700105 HCHG RX REV CODE 258: Performed by: INTERNAL MEDICINE

## 2018-01-12 RX ORDER — ONDANSETRON 4 MG/1
4 TABLET, ORALLY DISINTEGRATING ORAL EVERY 4 HOURS PRN
Qty: 10 TAB | Refills: 0 | Status: SHIPPED | OUTPATIENT
Start: 2018-01-12 | End: 2018-12-17

## 2018-01-12 RX ADMIN — LEVOTHYROXINE SODIUM 200 MCG: 200 TABLET ORAL at 06:47

## 2018-01-12 RX ADMIN — PROCHLORPERAZINE EDISYLATE 10 MG: 5 INJECTION INTRAMUSCULAR; INTRAVENOUS at 00:16

## 2018-01-12 RX ADMIN — SUCRALFATE 1 G: 1 SUSPENSION ORAL at 00:10

## 2018-01-12 RX ADMIN — SODIUM CHLORIDE: 9 INJECTION, SOLUTION INTRAVENOUS at 03:16

## 2018-01-12 RX ADMIN — HEPARIN SODIUM 5000 UNITS: 5000 INJECTION, SOLUTION INTRAVENOUS; SUBCUTANEOUS at 06:47

## 2018-01-12 RX ADMIN — VITAMIN D, TAB 1000IU (100/BT) 2000 UNITS: 25 TAB at 10:00

## 2018-01-12 RX ADMIN — OMEPRAZOLE 20 MG: 20 CAPSULE, DELAYED RELEASE ORAL at 09:58

## 2018-01-12 RX ADMIN — ONDANSETRON 4 MG: 4 TABLET, ORALLY DISINTEGRATING ORAL at 09:59

## 2018-01-12 RX ADMIN — TOPIRAMATE 100 MG: 100 TABLET, FILM COATED ORAL at 09:59

## 2018-01-12 RX ADMIN — AMLODIPINE BESYLATE 10 MG: 10 TABLET ORAL at 09:58

## 2018-01-12 RX ADMIN — MORPHINE SULFATE 2 MG: 4 INJECTION INTRAVENOUS at 00:15

## 2018-01-12 RX ADMIN — SUCRALFATE 1 G: 1 SUSPENSION ORAL at 06:47

## 2018-01-12 RX ADMIN — MORPHINE SULFATE 2 MG: 4 INJECTION INTRAVENOUS at 10:01

## 2018-01-12 RX ADMIN — DICYCLOMINE HYDROCHLORIDE 10 MG: 10 CAPSULE ORAL at 06:48

## 2018-01-12 ASSESSMENT — PAIN SCALES - GENERAL: PAINLEVEL_OUTOF10: 7

## 2018-01-12 NOTE — DISCHARGE SUMMARY
Hospital Medicine Discharge Note     Admit Date:  1/11/2018       Discharge Date:   1/12/2018    Attending Physician:  Corine Devi     Diagnoses (includes active and resolved):       Abdominal pain POA: Yes    Seizure disorder (CMS-HCC) POA: Yes    Obesity POA: Yes    Vitamin D deficiency POA: Yes    Migraine POA: Yes    Hypothyroidism POA: Yes    HTN (hypertension) POA: Yes      Chief Complaint   Patient presents with   • Nausea/Vomiting/Diarrhea     x 2 days   • Abdominal Pain     with bloating   • Lightheadedness         Hosiery of Present Illness  Patient is a 27-year-old female admitted for nausea vomiting or abdominal pain..   Detailed info pls see H&P.    Hospital Summary (Brief Narrative):       Patient is a 27-year-old female with history of obesity, seizure disorder, presented to the ER with complaint of nausea vomiting abdominal pain. Apparently patient recently started a new medication for overall weight. Patient's symptoms developed after initiation of SAXENDA per her endocrinologist. Review literature showing medication SAXENDA (liraglutide) does have side effect of abdominal or GI symptoms. Patient was then recommended to stop this medication. Patient's CT abdomen in the ER did not show any signs of infection or inflammation or small bowel obstruction. Patient's symptoms slightly improved. Patient wants to go home and will follow-up with endocrinologist for further option of weight reduction. Patient is recommended to take Zofran for nausea at home and stop using SAXENDA before discussing with her endocrinologist. Patient currently stable and will be discharged home. Patient was treated by IV fluid and symptom control during the hospital stay.     Consultants:      Pharmacist    Procedures:        none    Discharge Medications:        (x)  Medication Reconciliation Completed       Medication List      START taking these medications      Instructions   ondansetron 4 MG Tbdp  Commonly known as:  ZOFRAN  ODT   Take 1 Tab by mouth every four hours as needed (give PO if no IV route available).  Dose:  4 mg        CONTINUE taking these medications      Instructions   amlodipine 10 MG Tabs  Commonly known as:  NORVASC   Take 10 mg by mouth every day.  Dose:  10 mg     dicyclomine 10 MG Caps  Commonly known as:  BENTYL   Take 1 Cap by mouth 4 Times a Day,Before Meals and at Bedtime.  Dose:  10 mg     levothyroxine 200 MCG Tabs  Commonly known as:  SYNTHROID   Take 1 Tab by mouth Every morning on an empty stomach.  Dose:  200 mcg     phentermine 37.5 MG tablet  Commonly known as:  ADIPEX-P   Take 1 Tab by mouth every morning before breakfast for 30 days.  Dose:  37.5 mg     topiramate 100 MG Tabs  Commonly known as:  TOPAMAX   TAKE 1 TAB BY MOUTH 2 TIMES A DAY.  Dose:  100 mg     vitamin D (Ergocalciferol) 21549 units Caps capsule  Commonly known as:  DRISDOL   Take 50,000 Units by mouth every 48 hours.  Dose:  85533 Units     Vitamin D 2000 units Caps   Take 2,000 Units by mouth 2 Times a Day.  Dose:  2000 Units        STOP taking these medications    polyethylene glycol/lytes Pack  Commonly known as:  MIRALAX     SAXENDA 18 MG/3ML Sopn  Generic drug:  Liraglutide -Weight Management            Disposition:   Discharge home    Diet:   Advance slowly    Activity:   As tolerated    Code status:   Full code    Primary Care Provider:    KARINE Hale    Follow up appointment details :      PCP in 2 weeks  No follow-up provider specified.  Future Appointments  Date Time Provider Department Center   1/29/2018 9:50 AM [facility]   2/1/2018 1:00 PM KARINE Hale [facility] None       Pending Studies:        None    Time spent on discharge day patient visit: >35 minutes    Patient recovered sooner and currently very stable to be discharge home. Patient's hospital stay is only one day and less than initially expected due to quicker recovery. Patient has an unexpected recovery in the  [facility].    #################################################    Interval history/exam for day of discharge:    Vitals:    01/11/18 2049 01/12/18 0010 01/12/18 0439 01/12/18 0822   BP: 123/73 112/70 113/76 134/83   Pulse: 72 76 86 67   Resp: 18 18 18 16   Temp: 36.4 °C (97.5 °F) 36.6 °C (97.9 °F) 36.7 °C (98 °F) 36.5 °C (97.7 °F)   SpO2: 96% 96% 92% 95%   Weight:       Height:         Weight/BMI: Body mass index is 39.89 kg/m².  Pulse Oximetry: 95 %, O2 (LPM): 0, O2 Delivery: None (Room Air)    Gen: AAOx3, NAD  Eyes: PELLA  Neck: no JVD, no lymphadenopathy  Cardia: RRR, no mrg  Lungs: CTAB, no rales, rhonci or wheezing  Abd: NABS, soft, non extended, no mass  EXT: No C/C/E, peripheral pulse 2+ b/l  Neuro: CN II-XII intact, non focal, reflex 2+ symmetrical  Skin: Intact, no lesion, warm  Psych: Appropriate.    Most Recent Labs:    Lab Results   Component Value Date/Time    WBC 7.1 01/12/2018 05:28 AM    RBC 4.52 01/12/2018 05:28 AM    HEMOGLOBIN 12.6 01/12/2018 05:28 AM    HEMATOCRIT 37.8 01/12/2018 05:28 AM    MCV 83.6 01/12/2018 05:28 AM    MCH 27.9 01/12/2018 05:28 AM    MCHC 33.3 (L) 01/12/2018 05:28 AM    MPV 9.6 01/12/2018 05:28 AM    NEUTSPOLYS 64.50 01/12/2018 05:28 AM    LYMPHOCYTES 25.00 01/12/2018 05:28 AM    MONOCYTES 5.20 01/12/2018 05:28 AM    EOSINOPHILS 4.60 01/12/2018 05:28 AM    BASOPHILS 0.30 01/12/2018 05:28 AM    HYPOCHROMIA 1+ 01/16/2014 09:05 PM      Lab Results   Component Value Date/Time    SODIUM 138 01/12/2018 05:28 AM    POTASSIUM 3.7 01/12/2018 05:28 AM    CHLORIDE 112 01/12/2018 05:28 AM    CO2 18 (L) 01/12/2018 05:28 AM    GLUCOSE 78 01/12/2018 05:28 AM    BUN 9 01/12/2018 05:28 AM    CREATININE 0.85 01/12/2018 05:28 AM      Lab Results   Component Value Date/Time    ALTSGPT 9 01/12/2018 05:28 AM    ASTSGOT 13 01/12/2018 05:28 AM    ALKPHOSPHAT 42 01/12/2018 05:28 AM    TBILIRUBIN 0.3 01/12/2018 05:28 AM    LIPASE 11 01/11/2018 10:57 AM    ALBUMIN 3.6 01/12/2018 05:28 AM    GLOBULIN  2.3 01/12/2018 05:28 AM    INR 0.94 01/11/2018 10:57 AM     Lab Results   Component Value Date/Time    PROTHROMBTM 12.3 01/11/2018 10:57 AM    INR 0.94 01/11/2018 10:57 AM        Imaging/ Testing:      CT-ABDOMEN-PELVIS WITH   Final Result      1.  Negative contrast-enhanced CT of the abdomen and pelvis.      2.  Appendix not visualized the right lower quadrant. No focal inflammatory change.      US-GYN-PELVIS TRANSVAGINAL   Final Result      1.  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      2.  No evidence of ovarian mass or fluid collection.      US-GALLBLADDER   Final Result      1.  Slightly heterogeneous liver. This could represent minor fatty infiltration.      2.  Otherwise negative right upper quadrant ultrasound.      3.  The appendix is not visualized in the right lower quadrant.             Instructions:      The patient was instructed to return to the ER in the event of worsening symptoms. I have counseled the patient on the importance of compliance and the patient has agreed to proceed with all medical recommendations and follow up plan indicated above.   The patient understands that all medications come with benefits and risks. Risks may include permanent injury or death and these risks can be minimized with close reassessment and monitoring.        This dictation was created using voice recognition software. The accuracy of the dictation is limited to the abilities of the software. Although every efforts have been used to decrease the error, I expect there may be some errors of grammar and possibly content.

## 2018-01-12 NOTE — PROGRESS NOTES
Pt dc'd home. IV and monitor removed;. Pt left unit via walking with RN, Transported home with family. Personal belongings with pt when leaving unit. Pt given discharge instructions prior to leaving unit including prescription and when to visit with physician; verbalizes understanding. Copy of discharge instructions with pt and in the chart.

## 2018-01-12 NOTE — DISCHARGE INSTRUCTIONS
Discharge Instructions    Discharged to home by car with relative. Discharged via walking, hospital escort: Yes.  Special equipment needed: Not Applicable    Be sure to schedule a follow-up appointment with your primary care doctor or any specialists as instructed.     Discharge Plan:   Diet Plan: Discussed  Activity Level: Discussed  Confirmed Follow up Appointment: Patient to Call and Schedule Appointment  Confirmed Symptoms Management: Discussed  Medication Reconciliation Updated: Yes  Influenza Vaccine Indication: Not indicated: Previously immunized this influenza season and > 8 years of age    I understand that a diet low in cholesterol, fat, and sodium is recommended for good health. Unless I have been given specific instructions below for another diet, I accept this instruction as my diet prescription.   Other diet: Heart Healthy    Special Instructions: None    · Is patient discharged on Warfarin / Coumadin?   No     · Is patient Post Blood Transfusion?  No    Depression / Suicide Risk    As you are discharged from this [facility] facility, it is important to learn how to keep safe from harming yourself.    Recognize the warning signs:  · Abrupt changes in personality, positive or negative- including increase in energy   · Giving away possessions  · Change in eating patterns- significant weight changes-  positive or negative  · Change in sleeping patterns- unable to sleep or sleeping all the time   · Unwillingness or inability to communicate  · Depression  · Unusual sadness, discouragement and loneliness  · Talk of wanting to die  · Neglect of personal appearance   · Rebelliousness- reckless behavior  · Withdrawal from people/activities they love  · Confusion- inability to concentrate     If you or a loved one observes any of these behaviors or has concerns about self-harm, here's what you can do:  · Talk about it- your feelings and reasons for harming yourself  · Remove any means that you might use to  hurt yourself (examples: pills, rope, extension cords, firearm)  · Get professional help from the community (Mental Health, Substance Abuse, psychological counseling)  · Do not be alone:Call your Safe Contact- someone whom you trust who will be there for you.  · Call your local CRISIS HOTLINE 020-4384 or 657-189-8800  · Call your local Children's Mobile Crisis Response Team [place] (172) 743-8958 or www.Vaultize  · Call the toll free National Suicide Prevention Hotlines   · National Suicide Prevention Lifeline 927-083-BWUC (7272)  · [place] Line Network 800-SUICIDE (060-2763)      Abdominal Pain (Nonspecific)  Your exam might not show the exact reason you have abdominal pain. Since there are many different causes of abdominal pain, another checkup and more tests may be needed. It is very important to follow up for lasting (persistent) or worsening symptoms. A possible cause of abdominal pain in any person who still has his or her appendix is acute appendicitis. Appendicitis is often hard to diagnose. Normal blood tests, urine tests, ultrasound, and CT scans do not completely rule out early appendicitis or other causes of abdominal pain. Sometimes, only the changes that happen over time will allow appendicitis and other causes of abdominal pain to be determined. Other potential problems that may require surgery may also take time to become more apparent. Because of this, it is important that you follow all of the instructions below.  HOME CARE INSTRUCTIONS   · Rest as much as possible.   · Do not eat solid food until your pain is gone.   · While adults or children have pain: A diet of water, weak decaffeinated tea, broth or bouillon, gelatin, oral rehydration solutions (ORS), frozen ice pops, or ice chips may be helpful.   · When pain is gone in adults or children: Start a light diet (dry toast, crackers, applesauce, or white rice). Increase the diet slowly as long as it does not bother you. Eat  no dairy products (including cheese and eggs) and no spicy, fatty, fried, or high-fiber foods.   · Use no alcohol, caffeine, or cigarettes.   · Take your regular medicines unless your caregiver told you not to.   · Take any prescribed medicine as directed.   · Only take over-the-counter or prescription medicines for pain, discomfort, or fever as directed by your caregiver. Do not give aspirin to children.   If your caregiver has given you a follow-up appointment, it is very important to keep that appointment. Not keeping the appointment could result in a permanent injury and/or lasting (chronic) pain and/or disability. If there is any problem keeping the appointment, you must call to reschedule.   SEEK IMMEDIATE MEDICAL CARE IF:   · Your pain is not gone in 24 hours.   · Your pain becomes worse, changes location, or feels different.   · You or your child has an oral temperature above 102° F (38.9° C), not controlled by medicine.   · Your baby is older than 3 months with a rectal temperature of 102° F (38.9° C) or higher.   · Your baby is 3 months old or younger with a rectal temperature of 100.4° F (38° C) or higher.   · You have shaking chills.   · You keep throwing up (vomiting) or cannot drink liquids.   · There is blood in your vomit or you see blood in your bowel movements.   · Your bowel movements become dark or black.   · You have frequent bowel movements.   · Your bowel movements stop (become blocked) or you cannot pass gas.   · You have bloody, frequent, or painful urination.   · You have yellow discoloration in the skin or whites of the eyes.   · Your stomach becomes bloated or bigger.   · You have dizziness or fainting.   · You have chest or back pain.   MAKE SURE YOU:   · Understand these instructions.   · Will watch your condition.   · Will get help right away if you are not doing well or get worse.   Document Released: 12/18/2006 Document Revised: 03/11/2013 Document Reviewed: 11/15/2010  ExitCare®  Patient Information ©2013 [facility].

## 2018-01-12 NOTE — H&P
Hospital Medicine History and Physical    Date of Service  1/11/2018    Chief Complaint  Chief Complaint   Patient presents with   • Nausea/Vomiting/Diarrhea     x 2 days   • Abdominal Pain     with bloating   • Lightheadedness       History of Presenting Illness  27 y.o. female who presented 1/11/2018 with abdominal pain. Patient reports that over the last one week she has had intermittent abdominal pain most profound in the right lower quadrant. She reports that she has had a bloated abdomen. Pain is worse after eating. The pain is at times noted in the umbilical area also. She reports the pain to be cramping in character, 7 out of 10 in intensity and at times radiating towards her lower back. She reports improvement of her pain if she takes Pepto-Bismol or she vomits. Reports worsening with food. There has been associated diarrhea around 5-10 bowel movements a day no bright red blood per rectum or no melena. Reports slight fevers and chills with these symptoms. Nausea and vomiting. No blood in her vomiting. A slight headache which is her routine. No chest pain and shortness of breath or cough. No urinary symptoms. Of note she was recently started on Liraglutide for weight loss by her endocrinologist. She reports development of these symptoms 1-2 days after initiation of this medication.      Primary Care Physician  COBY Hale.    Consultants  None    Code Status  FULL CODE    Review of Systems  Review of Systems   Constitutional: Positive for chills, fever and malaise/fatigue. Negative for diaphoresis and weight loss.   HENT: Negative for hearing loss and tinnitus.    Eyes: Negative for blurred vision and double vision.   Respiratory: Negative for cough, hemoptysis, sputum production, shortness of breath and wheezing.    Cardiovascular: Negative for chest pain, palpitations, orthopnea, claudication, leg swelling and PND.   Gastrointestinal: Positive for abdominal pain, heartburn, nausea and  vomiting. Negative for blood in stool, constipation, diarrhea and melena.   Genitourinary: Negative for dysuria, flank pain, frequency, hematuria and urgency.   Musculoskeletal: Negative for back pain, falls, joint pain, myalgias and neck pain.   Skin: Negative for itching and rash.   Neurological: Positive for weakness. Negative for dizziness, tingling, tremors, sensory change, speech change, focal weakness, seizures, loss of consciousness and headaches.   Psychiatric/Behavioral: Negative for depression, hallucinations, memory loss, substance abuse and suicidal ideas. The patient is nervous/anxious. The patient does not have insomnia.         Past Medical History  Past Medical History:   Diagnosis Date   • EEG abnormal    • Hashimoto's disease    • Hyperglycemia    • Migraine    • Pseudotumor cerebri    • Seizure disorder (CMS-HCC)        Surgical History  Past Surgical History:   Procedure Laterality Date   • GYN SURGERY      precancerous cells with cervical freezing, HPV positive       Medications  No current facility-administered medications on file prior to encounter.      Current Outpatient Prescriptions on File Prior to Encounter   Medication Sig Dispense Refill   • levothyroxine (SYNTHROID) 200 MCG Tab Take 1 Tab by mouth Every morning on an empty stomach. 90 Tab 1   • phentermine (ADIPEX-P) 37.5 MG tablet Take 1 Tab by mouth every morning before breakfast for 30 days. 30 Tab 2   • topiramate (TOPAMAX) 100 MG Tab TAKE 1 TAB BY MOUTH 2 TIMES A DAY. 180 Tab 1   • dicyclomine (BENTYL) 10 MG Cap Take 1 Cap by mouth 4 Times a Day,Before Meals and at Bedtime. 40 Cap 0   • amlodipine (NORVASC) 10 MG Tab Take 10 mg by mouth every day.     • Cholecalciferol (VITAMIN D) 2000 UNITS Cap Take 2,000 Units by mouth 2 Times a Day.         Family History  Family History   Problem Relation Age of Onset   • Cancer Mother      Cervical       Social History  Social History   Substance Use Topics   • Smoking status: Former Smoker      Start date: 2005     Quit date: 5/3/2012   • Smokeless tobacco: Never Used      Comment: 1/2 pack a day   • Alcohol use 0.6 oz/week     1 Glasses of wine per week      Comment: socially, 1 glass of wine per week       Allergies  No Known Allergies     Physical Exam  Laboratory   Hemodynamics  Temp (24hrs), Av.4 °C (97.6 °F), Min:36.4 °C (97.5 °F), Max:36.6 °C (97.8 °F)   Temperature: 36.4 °C (97.5 °F)  Pulse  Av.2  Min: 72  Max: 114    Blood Pressure: 123/73, NIBP: 134/85      Respiratory      Respiration: 18, Pulse Oximetry: 96 %, O2 Daily Delivery Respiratory : Room Air with O2 Available             Physical Exam   Constitutional: She is oriented to person, place, and time. She appears well-developed and well-nourished. No distress.   HENT:   Head: Normocephalic.   Mouth/Throat: Oropharynx is clear and moist. No oropharyngeal exudate.   Eyes: Conjunctivae are normal. Pupils are equal, round, and reactive to light. No scleral icterus.   Neck: No JVD present.   Cardiovascular: Normal rate, regular rhythm and normal heart sounds.  Exam reveals no gallop and no friction rub.    No murmur heard.  Pulmonary/Chest: Breath sounds normal. No stridor. No respiratory distress. She has no wheezes. She has no rales.   Abdominal: Soft. Bowel sounds are normal. She exhibits no distension. There is no tenderness. There is no rebound and no guarding.   Musculoskeletal: Normal range of motion. She exhibits no edema, tenderness or deformity.   Neurological: She is alert and oriented to person, place, and time. No cranial nerve deficit.   Skin: Skin is warm and dry. She is not diaphoretic.   Psychiatric: She has a normal mood and affect. Her behavior is normal. Judgment and thought content normal.       Recent Labs      18   1057   WBC  10.5   RBC  5.25   HEMOGLOBIN  14.5   HEMATOCRIT  43.8   MCV  83.4   MCH  27.6   MCHC  33.1*   RDW  41.3   PLATELETCT  324   MPV  9.5     Recent Labs      18   1057    SODIUM  137   POTASSIUM  4.0   CHLORIDE  110   CO2  21   GLUCOSE  89   BUN  12   CREATININE  0.96   CALCIUM  9.1     Recent Labs      01/11/18   1057   ALTSGPT  13   ASTSGOT  13   ALKPHOSPHAT  55   TBILIRUBIN  0.2   LIPASE  11   GLUCOSE  89     Recent Labs      01/11/18   1057   INR  0.94             No results found for: TROPONINI  Urinalysis:    Lab Results  Component Value Date/Time   SPECGRAVITY 1.015 01/11/2018 1551   GLUCOSEUR Negative 01/11/2018 1551   KETONES Negative 01/11/2018 1551   NITRITE Negative 01/11/2018 1551   WBCURINE 2-5 01/11/2018 1551   RBCURINE 0-2 01/11/2018 1551   BACTERIA Few (A) 01/11/2018 1551   EPITHELCELL Few 01/11/2018 1551        Imaging  Reviewed   Assessment/Plan     I anticipate this patient is appropriate for observation status at this time.    Abdominal pain- (present on admission)   Assessment & Plan    This developed after initiation of SAXENDA per her endocrinologist.  Literature research reveals that it is associated with abdominal pain, diarrhea, nausea and vomiting  Symptoms are most likely associated with this    Respectively other etiologies cannot be ruled out    Recommended admission for observation  Initiate IV fluid hydration  Symptomatically control of nausea, vomiting and pain  Hold SAXENDA  Obtain CT of the abdomen and pelvis and ultrasound of the GYN for further evaluation    Pharmacy team input appreciated and as below:    Pharmacy Consult Request     Per MD: Can any of the patient's medications cause abdominal pain?     · Amlodipine: abdominal pain - 2% (per med hx, pt has been taking since Jan 2017)  · Cholecalciferol: none reported   · Dicyclomine: post marketing reports of abdominal pain, nausea - 14% (per med hx, pt has been taking since Dec 2017)  · Levothyroxine: abdominal cramps (per med hx, pt has been taking since May 2017)  · Liraglutide: abdominal pain - 5%, nausea - 39%, diarrhea - 21%, constipation - 19%, vomiting 16% (per med hx, pt has been  taking since Jan 2018)  · Phentermine: constipation (per med hx, pt has been taking since Apr 2013)  · Miralax: abdominal cramps, diarrhea, nausea (per med hx, pt has been taking since Sep 2017)  · Topamax: constipation - 4%, gastritis - 3% (per med hx, pt has been taking since Aug 2010)  · Ergocalciferol - none reported        Brenda Flowers, PharmD        HTN (hypertension)- (present on admission)   Assessment & Plan    Continue Amlodipine        Hypothyroidism- (present on admission)   Assessment & Plan    Continue Synthroid, check TSH        Migraine- (present on admission)   Assessment & Plan    Continue at-home regimen        Vitamin D deficiency- (present on admission)   Assessment & Plan    Continue replacement, monitoring per primary care physician        Obesity- (present on admission)   Assessment & Plan    Body mass index is 39.89 kg/m².        Seizure disorder (CMS-HCC)- (present on admission)   Assessment & Plan    Continue topamax            VTE prophylaxis: SCD and SC heparin.

## 2018-01-12 NOTE — PROGRESS NOTES
"Pt arrived to unit via gurney at 1249. Pt oriented to room, unit, and plan of care. Complains of pain 6/10 and moans and rolls around in bed while guarding abdomen. Medicated per MAR. Pt refusing oral pain medication, states \"I think it will bother my stomach more\", took other oral medications without difficulty. All questions answered at this time. Call light within reach, fall precautions in place, will continue to monitor.  "

## 2018-01-12 NOTE — ASSESSMENT & PLAN NOTE
This developed after initiation of SAXENDA per her endocrinologist.  Literature research reveals that it is associated with abdominal pain, diarrhea, nausea and vomiting  Symptoms are most likely associated with this    Respectively other etiologies cannot be ruled out    Recommended admission for observation  Initiate IV fluid hydration  Symptomatically control of nausea, vomiting and pain  Hold SAXENDA  Obtain CT of the abdomen and pelvis and ultrasound of the GYN for further evaluation    Pharmacy team input appreciated and as below:    Pharmacy Consult Request     Per MD: Can any of the patient's medications cause abdominal pain?     · Amlodipine: abdominal pain - 2% (per med hx, pt has been taking since Jan 2017)  · Cholecalciferol: none reported   · Dicyclomine: post marketing reports of abdominal pain, nausea - 14% (per med hx, pt has been taking since Dec 2017)  · Levothyroxine: abdominal cramps (per med hx, pt has been taking since May 2017)  · Liraglutide: abdominal pain - 5%, nausea - 39%, diarrhea - 21%, constipation - 19%, vomiting 16% (per med hx, pt has been taking since Jan 2018)  · Phentermine: constipation (per med hx, pt has been taking since Apr 2013)  · Miralax: abdominal cramps, diarrhea, nausea (per med hx, pt has been taking since Sep 2017)  · Topamax: constipation - 4%, gastritis - 3% (per med hx, pt has been taking since Aug 2010)  · Ergocalciferol - none reported        Brenda Flowers, PharmD

## 2018-01-12 NOTE — DISCHARGE PLANNING
Care Transition Team Assessment    Information Source  Orientation : Oriented x 4  Information Given By: Patient  Who is responsible for making decisions for patient? : Patient    Readmission Evaluation  Is this a readmission?: No    Elopement Risk  Legal Hold: No  Ambulatory or Self Mobile in Wheelchair: No-Not an Elopement Risk    Interdisciplinary Discharge Planning  Does Admitting Nurse Feel This Could be a Complex Discharge?: No  Primary Care Physician: sarah  Lives with - Patient's Self Care Capacity: Significant Other, Child Less than 18 Years of Age  Patient or legal guardian wants to designate a caregiver (see row info): No  Support Systems: Spouse / Significant Other, Parent  Housing / Facility: 1 Story Apartment / Condo  Do You Take your Prescribed Medications Regularly: Yes  Able to Return to Previous ADL's: Yes  Mobility Issues: No  Prior Services: None  Patient Expects to be Discharged to:: home  Assistance Needed: No  Durable Medical Equipment: Not Applicable              Finances  Financial Barriers to Discharge: No  Prescription Coverage: Yes    Vision / Hearing Impairment  Vision Impairment : Yes (wears glasses)  Hearing Impairment : No    Values / Beliefs / Concerns  Values / Beliefs Concerns : No         Domestic Abuse  Have you ever been the victim of abuse or violence?: No  Physical Abuse or Sexual Abuse: No  Verbal Abuse or Emotional Abuse: No  Possible Abuse Reported to:: Not Applicable         Discharge Risks or Barriers  Discharge risks or barriers?: No    Anticipated Discharge Information  Anticipated discharge disposition: Home

## 2018-01-12 NOTE — PROGRESS NOTES
Re-checked pt, sitting up in bed in NAD. Watching tv, family at bedside. No complaints at this time, verbalized improvement in pain. Will continue to monitor.

## 2018-01-13 LAB
BACTERIA UR CULT: NORMAL
E COLI SXT1+2 STL IA: NORMAL
SIGNIFICANT IND 70042: NORMAL
SIGNIFICANT IND 70042: NORMAL
SITE SITE: NORMAL
SITE SITE: NORMAL
SOURCE SOURCE: NORMAL
SOURCE SOURCE: NORMAL

## 2018-01-15 LAB
BACTERIA STL CULT: NORMAL
E COLI SXT1+2 STL IA: NORMAL
SIGNIFICANT IND 70042: NORMAL
SITE SITE: NORMAL
SOURCE SOURCE: NORMAL

## 2018-01-29 ENCOUNTER — OFFICE VISIT (OUTPATIENT)
Dept: ENDOCRINOLOGY | Facility: MEDICAL CENTER | Age: 28
End: 2018-01-29
Payer: COMMERCIAL

## 2018-01-29 ENCOUNTER — TELEPHONE (OUTPATIENT)
Dept: MEDICAL GROUP | Facility: PHYSICIAN GROUP | Age: 28
End: 2018-01-29

## 2018-01-29 VITALS
WEIGHT: 278 LBS | HEIGHT: 70 IN | SYSTOLIC BLOOD PRESSURE: 124 MMHG | DIASTOLIC BLOOD PRESSURE: 80 MMHG | BODY MASS INDEX: 39.8 KG/M2 | OXYGEN SATURATION: 95 % | HEART RATE: 84 BPM

## 2018-01-29 DIAGNOSIS — I10 HYPERTENSION, UNSPECIFIED TYPE: ICD-10-CM

## 2018-01-29 DIAGNOSIS — E03.9 HYPOTHYROIDISM, UNSPECIFIED TYPE: ICD-10-CM

## 2018-01-29 DIAGNOSIS — E55.9 VITAMIN D DEFICIENCY: ICD-10-CM

## 2018-01-29 LAB
FUNGUS SPEC CULT: NORMAL
FUNGUS SPEC CULT: NORMAL
SIGNIFICANT IND 70042: NORMAL
SITE SITE: NORMAL
SOURCE SOURCE: NORMAL

## 2018-01-29 PROCEDURE — 99214 OFFICE O/P EST MOD 30 MIN: CPT | Performed by: INTERNAL MEDICINE

## 2018-01-29 RX ORDER — PHENTERMINE HYDROCHLORIDE 37.5 MG/1
37.5 TABLET ORAL
COMMUNITY
End: 2018-02-28 | Stop reason: SDUPTHER

## 2018-01-29 RX ORDER — INDOMETHACIN 25 MG/1
50 CAPSULE ORAL 3 TIMES DAILY
COMMUNITY
End: 2018-12-11

## 2018-01-29 NOTE — PROGRESS NOTES
Endocrinology Clinic Progress Note  PCP: KARINE Hale    HPI:  Nickolas Galdamez is a 27 y.o. old patient who comes in today for obesity management and review of hypothyroidism. Had flulike symptoms recently and the whole family got affected by it and had to stop saxenda.   She is feeling better now.     ROS:  Constitutional: No unintentional weight loss  Endo: Denies excessive thirst or frequent urination  All other systems were reviewed and were negative.    Past Medical History:  Patient Active Problem List    Diagnosis Date Noted   • Abdominal pain 09/12/2017     Priority: High   • Hypoglycemia 09/11/2017     Priority: High   • Hypothyroidism 01/11/2018     Priority: Low   • HTN (hypertension) 01/11/2018     Priority: Low   • Migraine 09/11/2017     Priority: Low   • Vitamin D deficiency 05/04/2017     Priority: Low   • Obesity 04/14/2013     Priority: Low   • Seizure disorder (CMS-[facility]) 10/22/2012     Priority: Low   • Cough 12/28/2017   • Hashimoto's thyroiditis 05/04/2017   • Abnormal thyroid blood test 05/02/2017   • Intractable migraine without status migrainosus 04/24/2017   • Chronic daily headache 02/28/2017   • Elevated testosterone level in female 02/28/2017   • Essential hypertension 01/31/2017   • Morbid obesity with BMI of 40.0-44.9, adult ([facility]) 01/31/2017   • Pseudotumor cerebri 10/15/2012   • History of migraine headaches 10/15/2012       Medications:    Current Outpatient Prescriptions:   •  indomethacin (INDOCIN) 25 MG Cap, Take 50 mg by mouth 3 times a day. Taking 1 hour before sex to prevent headache, Disp: , Rfl:   •  phentermine (ADIPEX-P) 37.5 MG tablet, Take 37.5 mg by mouth every morning before breakfast., Disp: , Rfl:   •  ondansetron (ZOFRAN ODT) 4 MG TABLET DISPERSIBLE, Take 1 Tab by mouth every four hours as needed (give PO if no IV route available)., Disp: 10 Tab, Rfl: 0  •  vitamin D, Ergocalciferol, (DRISDOL) 76467 units Cap capsule, Take 50,000 Units by mouth every 48  "hours., Disp: , Rfl:   •  topiramate (TOPAMAX) 100 MG Tab, TAKE 1 TAB BY MOUTH 2 TIMES A DAY., Disp: 180 Tab, Rfl: 1  •  amlodipine (NORVASC) 10 MG Tab, Take 10 mg by mouth every day., Disp: , Rfl:   •  Cholecalciferol (VITAMIN D) 2000 UNITS Cap, Take 2,000 Units by mouth 2 Times a Day., Disp: , Rfl:   •  levothyroxine (SYNTHROID) 200 MCG Tab, Take 1 Tab by mouth Every morning on an empty stomach., Disp: 90 Tab, Rfl: 1    Labs: Reviewed    Physical Examination:  Vital signs: /80   Pulse 84   Ht 1.778 m (5' 10\")   Wt (!) 126.1 kg (278 lb)   LMP 01/09/2018   SpO2 95%   BMI 39.89 kg/m²  Body mass index is 39.89 kg/m².  General: No apparent distress, cooperative  Eyes: No scleral icterus, no discharge, normal eyelids  Neck: No abnormal masses on inspection, normal thyroid exam  Resp: Normal effort, clear to auscultation bilaterally  CVS: Regular rate and rhythm, S1 S2 normal, no murmur  Extremities: No lower extremity edema  Abdomen: abdominal obesity present  Musculoskeletal: Normal digits and nails  Skin: No rash on visible skin  Psych: Alert and oriented, normal mood and affect, intact memory and able to make informed decisions.    Assessment and Plan:    1. Class 3 obesity due to excess calories with body mass index (BMI) of 40.0 to 44.9 in adult, unspecified whether serious comorbidity present (CMS-HCC)  Restart saxenda again; start at 0.6 mg sc daily for one week and then increase it to 1.2 mg daily thereafter for second week and then to  1.8 mg daily thereafter.     2. Hypothyroidism, unspecified type  Continue current dose.    3. Vitamin D deficiency  Continue vit d supplementation.     Return in about 3 weeks (around 2/19/2018).    Thank you for allowing me to participate in the care of this patient.    Sanford Ayala M.D.    CC:   KARINE Hale    This note was created using voice recognition software (Dragon). The accuracy of the dictation is limited by the abilities of the " software. I have reviewed the note prior to signing, however some errors in grammar and context are still possible. If you have any questions related to this note please do not hesitate to contact our office.

## 2018-01-29 NOTE — TELEPHONE ENCOUNTER
Sushma, Patient is requesting a refill for Amlodipine 5 mg. Recent notes say she has been taking a 10 mg dosage. Refill as you see fit. Thank you.

## 2018-01-29 NOTE — TELEPHONE ENCOUNTER
Was the patient seen in the last year in this department? Yes     Does patient have an active prescription for medications requested? No     Received Request Via: Pharmacy      Pt met protocol?: Yes    LAST OV 12/28/2017    BP Readings from Last 1 Encounters:   01/29/18 124/80     Lab Results   Component Value Date/Time    CHOLSTRLTOT 174 02/06/2017 11:15 AM     (H) 02/06/2017 11:15 AM    HDL 34 (A) 02/06/2017 11:15 AM    TRIGLYCERIDE 176 (H) 02/06/2017 11:15 AM       Lab Results   Component Value Date/Time    SODIUM 138 01/12/2018 05:28 AM    POTASSIUM 3.7 01/12/2018 05:28 AM    CHLORIDE 112 01/12/2018 05:28 AM    CO2 18 (L) 01/12/2018 05:28 AM    GLUCOSE 78 01/12/2018 05:28 AM    BUN 9 01/12/2018 05:28 AM    CREATININE 0.85 01/12/2018 05:28 AM     Lab Results   Component Value Date/Time    ALKPHOSPHAT 42 01/12/2018 05:28 AM    ASTSGOT 13 01/12/2018 05:28 AM    ALTSGPT 9 01/12/2018 05:28 AM    TBILIRUBIN 0.3 01/12/2018 05:28 AM

## 2018-01-30 RX ORDER — AMLODIPINE BESYLATE 5 MG/1
TABLET ORAL
Refills: 7 | OUTPATIENT
Start: 2018-01-30

## 2018-01-30 RX ORDER — AMLODIPINE BESYLATE 10 MG/1
10 TABLET ORAL DAILY
Qty: 90 TAB | Refills: 1 | Status: SHIPPED | OUTPATIENT
Start: 2018-01-30 | End: 2018-03-01

## 2018-01-30 NOTE — TELEPHONE ENCOUNTER
Please call the patient, I see a prescription refill for amlodipine 5 mg daily, medication list shows that she was taking amlodipine 10 mg daily. I need to know what she has been taking so that I can refill appropriately.

## 2018-02-28 ENCOUNTER — OFFICE VISIT (OUTPATIENT)
Dept: MEDICAL GROUP | Facility: PHYSICIAN GROUP | Age: 28
End: 2018-02-28
Payer: COMMERCIAL

## 2018-02-28 VITALS
RESPIRATION RATE: 14 BRPM | HEART RATE: 95 BPM | SYSTOLIC BLOOD PRESSURE: 110 MMHG | WEIGHT: 267 LBS | BODY MASS INDEX: 38.22 KG/M2 | OXYGEN SATURATION: 97 % | DIASTOLIC BLOOD PRESSURE: 88 MMHG | HEIGHT: 70 IN | TEMPERATURE: 98.7 F

## 2018-02-28 DIAGNOSIS — E66.01 MORBID OBESITY WITH BMI OF 40.0-44.9, ADULT (HCC): ICD-10-CM

## 2018-02-28 DIAGNOSIS — E55.9 VITAMIN D DEFICIENCY: ICD-10-CM

## 2018-02-28 DIAGNOSIS — E28.2 PCOS (POLYCYSTIC OVARIAN SYNDROME): Primary | ICD-10-CM

## 2018-02-28 DIAGNOSIS — L68.0 FEMALE HIRSUTISM: ICD-10-CM

## 2018-02-28 DIAGNOSIS — I10 HYPERTENSION, UNSPECIFIED TYPE: ICD-10-CM

## 2018-02-28 DIAGNOSIS — E16.2 HYPOGLYCEMIA: ICD-10-CM

## 2018-02-28 PROCEDURE — 99214 OFFICE O/P EST MOD 30 MIN: CPT | Performed by: NURSE PRACTITIONER

## 2018-02-28 RX ORDER — LIRAGLUTIDE 6 MG/ML
INJECTION, SOLUTION SUBCUTANEOUS
COMMUNITY
Start: 2018-02-22 | End: 2018-12-11

## 2018-02-28 RX ORDER — PHENTERMINE HYDROCHLORIDE 37.5 MG/1
37.5 TABLET ORAL
Qty: 30 TAB | Refills: 0 | Status: SHIPPED | OUTPATIENT
Start: 2018-05-05 | End: 2018-06-04 | Stop reason: SDUPTHER

## 2018-02-28 RX ORDER — PHENTERMINE HYDROCHLORIDE 37.5 MG/1
37.5 TABLET ORAL
Qty: 30 TAB | Refills: 0 | Status: SHIPPED | OUTPATIENT
Start: 2018-04-04 | End: 2018-02-28 | Stop reason: SDUPTHER

## 2018-02-28 RX ORDER — SPIRONOLACTONE 25 MG/1
25 TABLET ORAL 2 TIMES DAILY
Qty: 60 TAB | Refills: 2
Start: 2018-02-28 | End: 2018-12-11

## 2018-02-28 ASSESSMENT — PATIENT HEALTH QUESTIONNAIRE - PHQ9: CLINICAL INTERPRETATION OF PHQ2 SCORE: 0

## 2018-02-28 NOTE — PATIENT INSTRUCTIONS
Spironolactone tablets  What is this medicine?  SPIRONOLACTONE (merry on [place] LAK tone) is a diuretic. It helps you make more urine and to lose excess water from your body. This medicine is used to treat high blood pressure, and edema or swelling from heart, kidney, or liver disease. It is also used to treat patients who make too much aldosterone or have low potassium.  This medicine may be used for other purposes; ask your health care provider or pharmacist if you have questions.  COMMON BRAND NAME(S): Aldactone  What should I tell my health care provider before I take this medicine?  They need to know if you have any of these conditions:  -high blood level of potassium  -kidney disease or trouble making urine  -liver disease  -an unusual or allergic reaction to spironolactone, other medicines, foods, dyes, or preservatives  -pregnant or trying to get pregnant  -breast-feeding  How should I use this medicine?  Take this medicine by mouth with a drink of water. Follow the directions on your prescription label. You can take it with or without food. If it upsets your stomach, take it with food. Do not take your medicine more often than directed. Remember that you will need to pass more urine after taking this medicine. Do not take your doses at a time of day that will cause you problems. Do not take at bedtime.  Talk to your pediatrician regarding the use of this medicine in children. While this drug may be prescribed for selected conditions, precautions do apply.  Overdosage: If you think you have taken too much of this medicine contact a poison control center or emergency room at once.  NOTE: This medicine is only for you. Do not share this medicine with others.  What if I miss a dose?  If you miss a dose, take it as soon as you can. If it is almost time for your next dose, take only that dose. Do not take double or extra doses.  What may interact with this medicine?  Do not take this medicine with any of the  following medications:  -eplerenone  This medicine may also interact with the following medications:  -corticosteroids  -digoxin  -lithium  -medicines for high blood pressure like ACE inhibitors  -skeletal muscle relaxants like tubocurarine  -NSAIDs, medicines for pain and inflammation, like ibuprofen or naproxen  -potassium products like salt substitute or supplements  -pressor amines like norepinephrine  -some diuretics  This list may not describe all possible interactions. Give your health care provider a list of all the medicines, herbs, non-prescription drugs, or dietary supplements you use. Also tell them if you smoke, drink alcohol, or use illegal drugs. Some items may interact with your medicine.  What should I watch for while using this medicine?  Visit your doctor or health care professional for regular checks on your progress. Check your blood pressure as directed. Ask your doctor what your blood pressure should be, and when you should contact them.  You may need to be on a special diet while taking this medicine. Ask your doctor. Also, ask how many glasses of fluid you need to drink a day. You must not get dehydrated.  This medicine may make you feel confused, dizzy or lightheaded. Drinking alcohol and taking some medicines can make this worse. Do not drive, use machinery, or do anything that needs mental alertness until you know how this medicine affects you. Do not sit or stand up quickly.  What side effects may I notice from receiving this medicine?  Side effects that you should report to your doctor or health care professional as soon as possible:  -allergic reactions such as skin rash or itching, hives, swelling of the lips, mouth, tongue, or throat  -black or tarry stools  -fast, irregular heartbeat  -fever  -muscle pain, cramps  -numbness, tingling in hands or feet  -trouble breathing  -trouble passing urine  -unusual bleeding  -unusually weak or tired  Side effects that usually do not require  medical attention (report to your doctor or health care professional if they continue or are bothersome):  -change in voice or hair growth  -confusion  -dizzy, drowsy  -dry mouth, increased thirst  -enlarged or tender breasts  -headache  -irregular menstrual periods  -sexual difficulty, unable to have an erection  -stomach upset  This list may not describe all possible side effects. Call your doctor for medical advice about side effects. You may report side effects to FDA at 9-203-FDA-4014.  Where should I keep my medicine?  Keep out of the reach of children.  Store below 25 degrees C (77 degrees F). Throw away any unused medicine after the expiration date.  NOTE: This sheet is a summary. It may not cover all possible information. If you have questions about this medicine, talk to your doctor, pharmacist, or health care provider.  © 2014, [employer]. (8/30/2011 12:51:30 PM)

## 2018-02-28 NOTE — ASSESSMENT & PLAN NOTE
Patient is a 26 y/o female who was recently diagnosed with PCOS by Dr. Cole CHAPIN. She was found to have muliple ovarian cysts and elevated testosterone in 2017, irregular menses and hirsutism. This is thought to be contributing to her hypoglycemic episodes. Recommendation is to start spironolactone 25 mg twice a day, she is also on amlodipine 10 mg daily with good control for blood pressure, we did discuss a cross taper I recommend that she decrease amlodipine to 5 mg daily and follow-up in one week for blood pressure check, she may then start her spironolactone twice daily and follow-up in one week for blood pressure check, we will make adjustments as necessary we also did discuss the importance of monitoring renal function and potassium within the first month of starting spironolactone and again 3 months following initiation.

## 2018-03-01 PROBLEM — R05.9 COUGH: Status: RESOLVED | Noted: 2017-12-28 | Resolved: 2018-03-01

## 2018-03-01 LAB
MYCOBACTERIUM SPEC CULT: NORMAL
RHODAMINE-AURAMINE STN SPEC: NORMAL
RHODAMINE-AURAMINE STN SPEC: NORMAL
SIGNIFICANT IND 70042: NORMAL
SITE SITE: NORMAL
SOURCE SOURCE: NORMAL

## 2018-03-01 RX ORDER — AMLODIPINE BESYLATE 10 MG/1
5 TABLET ORAL DAILY
Qty: 90 TAB | Refills: 1
Start: 2018-03-01 | End: 2018-12-06 | Stop reason: SDUPTHER

## 2018-03-01 NOTE — ASSESSMENT & PLAN NOTE
Patient is a 27-year-old morbidly obese female currently on phentermine 37.5 mg daily as well as Saxenda which is managed by her endocrinologist. She is having some reflux with this medication and we discussed using zantac as needed. She continues to lose weight, down 10 lb from our last visit. Due to seizure disorder is recommended that she avoid vigorous exercise and this has complicated her weight loss course.

## 2018-03-01 NOTE — PROGRESS NOTES
[facility]  Primary Care Office Visit - Problem-Oriented        History:     Nickolas Galdamez is a 27 y.o. female who is here today to discuss Gastrophageal Reflux        PCOS (polycystic ovarian syndrome)  Patient is a 26 y/o female who was recently diagnosed with PCOS by Dr. Cole CHAPIN. She was found to have muliple ovarian cysts and elevated testosterone in 2017, irregular menses and hirsutism. This is thought to be contributing to her hypoglycemic episodes. Recommendation is to start spironolactone 25 mg twice a day, she is also on amlodipine 10 mg daily with good control for blood pressure, we did discuss a cross taper I recommend that she decrease amlodipine to 5 mg daily and follow-up in one week for blood pressure check, she may then start her spironolactone twice daily and follow-up in one week for blood pressure check, we will make adjustments as necessary we also did discuss the importance of monitoring renal function and potassium within the first month of starting spironolactone and again 3 months following initiation.      Vitamin D deficiency  Patient is a history of vitamin D deficiency, she completed per prescription replacement and continues on 2000 international units daily. We will monitor her vitamin D level.    HTN (hypertension)  Patient has hypertension and continues on amlodipine 10 mg daily, she is recently been diagnosed with PCO S and recommendation is to start spironolactone 25 mg twice daily, we will ask that she decrease her amlodipine dose to 5 mg daily and come in for a blood pressure check in one week, she may then start spironolactone 25 mg twice daily and we will closely monitor her blood pressure adjusting her medications as needed.    Morbid obesity with BMI of 40.0-44.9, adult (HCC)  Patient is a 27-year-old morbidly obese female currently on phentermine 37.5 mg daily as well as Saxenda which is managed by her endocrinologist. She is having some reflux with this  medication and we discussed using zantac as needed. She continues to lose weight, down 10 lb from our last visit. Due to seizure disorder is recommended that she avoid vigorous exercise and this has complicated her weight loss course.        Past Medical History:   Diagnosis Date   • EEG abnormal    • Hashimoto's disease    • Hyperglycemia    • Migraine    • Pseudotumor cerebri    • Seizure disorder (CMS-HCC)      Past Surgical History:   Procedure Laterality Date   • GYN SURGERY      precancerous cells with cervical freezing, HPV positive     Social History     Social History   • Marital status: Single     Spouse name: N/A   • Number of children: N/A   • Years of education: N/A     Occupational History   • Not on file.     Social History Main Topics   • Smoking status: Former Smoker     Start date: 1/1/2005     Quit date: 5/3/2012   • Smokeless tobacco: Never Used      Comment: 1/2 pack a day   • Alcohol use 0.6 oz/week     1 Glasses of wine per week      Comment: socially, 1 glass of wine per week   • Drug use: No   • Sexual activity: No     Other Topics Concern   • Not on file     Social History Narrative    ** Merged History Encounter **          History   Smoking Status   • Former Smoker   • Start date: 1/1/2005   • Quit date: 5/3/2012   Smokeless Tobacco   • Never Used     Comment: 1/2 pack a day     Family History   Problem Relation Age of Onset   • Cancer Mother      Cervical     No Known Allergies    Problem List:     Patient Active Problem List    Diagnosis Date Noted   • Abdominal pain 09/12/2017     Priority: High   • Hypoglycemia 09/11/2017     Priority: High   • Hypothyroidism 01/11/2018     Priority: Low   • HTN (hypertension) 01/11/2018     Priority: Low   • Migraine 09/11/2017     Priority: Low   • Vitamin D deficiency 05/04/2017     Priority: Low   • Obesity 04/14/2013     Priority: Low   • Seizure disorder (CMS-HCC) 10/22/2012     Priority: Low   • PCOS (polycystic ovarian syndrome) 02/28/2018   •  Female hirsutism 02/28/2018   • Hashimoto's thyroiditis 05/04/2017   • Abnormal thyroid blood test 05/02/2017   • Intractable migraine without status migrainosus 04/24/2017   • Chronic daily headache 02/28/2017   • Elevated testosterone level in female 02/28/2017   • Essential hypertension 01/31/2017   • Morbid obesity with BMI of 40.0-44.9, adult ([facility]) 01/31/2017   • Pseudotumor cerebri 10/15/2012   • History of migraine headaches 10/15/2012         Medications:     Current Outpatient Prescriptions:   •  amLODIPine (NORVASC) 10 MG Tab, Take 0.5 Tabs by mouth every day., Disp: 90 Tab, Rfl: 1  •  SAXENDA 18 MG/3ML Solution Pen-injector, , Disp: , Rfl:   •  MIRENA, 52 MG, 20 MCG/24HR IUD, , Disp: , Rfl:   •  spironolactone (ALDACTONE) 25 MG Tab, Take 1 Tab by mouth 2 times a day., Disp: 60 Tab, Rfl: 2  •  [START ON 5/5/2018] phentermine (ADIPEX-P) 37.5 MG tablet, Take 1 Tab by mouth every morning before breakfast for 30 days., Disp: 30 Tab, Rfl: 0  •  levothyroxine (SYNTHROID) 200 MCG Tab, Take 1 Tab by mouth Every morning on an empty stomach., Disp: 90 Tab, Rfl: 1  •  topiramate (TOPAMAX) 100 MG Tab, TAKE 1 TAB BY MOUTH 2 TIMES A DAY. (Patient taking differently: Take 100 mg by mouth every day.), Disp: 180 Tab, Rfl: 1  •  indomethacin (INDOCIN) 25 MG Cap, Take 50 mg by mouth 3 times a day. Taking 1 hour before sex to prevent headache, Disp: , Rfl:   •  ondansetron (ZOFRAN ODT) 4 MG TABLET DISPERSIBLE, Take 1 Tab by mouth every four hours as needed (give PO if no IV route available)., Disp: 10 Tab, Rfl: 0  •  vitamin D, Ergocalciferol, (DRISDOL) 71304 units Cap capsule, Take 50,000 Units by mouth every 48 hours., Disp: , Rfl:   •  Cholecalciferol (VITAMIN D) 2000 UNITS Cap, Take 2,000 Units by mouth 2 Times a Day., Disp: , Rfl:       Review of Systems:     Pertinent positives as per HPI, all other systems reviewed and WNL   Physical Assessment:     VS: /88   Pulse 95   Temp 37.1 °C (98.7 °F)   Resp 14   Ht  "1.778 m (5' 10\")   Wt 121.1 kg (267 lb)   LMP 03/03/2012   SpO2 97%   BMI 38.31 kg/m²     General: Well-developed, well-nourished, female, obese     Head: PERRL, EOMI. Normocephalic. No facial asymmetry noted.  Cardiovasc:RRR, no MRG. No thrills or bruits. Pulses 2+ and symmetric at all distal extremities.  Pulmonary: Lungs clear bilaterally.  Normal respiratory effort. No wheeze or crackles.   Neuro: Alert and oriented  Skin:No rashes noted. Skin warm, dry, intact    Psych: Dressed appropriately for the weather, pleasant and conversant.  Affect, mood & judgment appropriate.    Assessment/Plan:   Nickolas was seen today for gastrophageal reflux.    Diagnoses and all orders for this visit:    PCOS (polycystic ovarian syndrome), new   - Start spironolactone 25 mg twice daily and decrease amlodipine to 5 mg daily, recommend home monitoring of blood pressure, follow-up in one week for blood pressure check. Will need CMP monitored within one month of starting spironolactone and again in 3 months along with testosterone. Recommend weight loss. Given hypoglycemic episodes metformin is not recommended, will defer any additional treatment of PCOS to OB/GYN and endocrinology.   -     COMP METABOLIC PANEL; Future  -     spironolactone (ALDACTONE) 25 MG Tab; Take 1 Tab by mouth 2 times a day.    Female hirsutism, ongoing, uncontrolled, see number 1  -     spironolactone (ALDACTONE) 25 MG Tab; Take 1 Tab by mouth 2 times a day.    Hypoglycemia, uncontrolled   Complicated by PCO S, endocrinology following   -     COMP METABOLIC PANEL; Future    Vitamin D deficiency, unclear control, monitor labs, continue over-the-counter vitamin D supplementation  -     VITAMIN D,25 HYDROXY; Future    Class 2 obesity with serious comorbidity and body mass index (BMI) of 38.0 to 38.9 in adult, unspecified obesity type  -     Discontinue: phentermine (ADIPEX-P) 37.5 MG tablet; Take 1 Tab by mouth every morning before breakfast for 30 days.  -    "  phentermine (ADIPEX-P) 37.5 MG tablet; Take 1 Tab by mouth every morning before breakfast for 30 days.    Hypertension, unspecified type, stable  - Slow discontinuation of amlodipine while initiating spironolactone 25 mg twice daily, maintain close follow-up for blood pressure checks, recommend home monitoring, follow-up in one month, sooner if needed.  -     amLODIPine (NORVASC) 10 MG Tab; Take 0.5 Tabs by mouth every day.      Patient is agreeable to the above plan and voiced understanding. All questions answered.     Please note that this dictation was created using voice recognition software. I have made every reasonable attempt to correct obvious errors, but I expect that there are errors of grammar and possibly content that I did not discover before finalizing the note.      DEVORAH Wall  3/1/2018, 8:31 AM

## 2018-03-01 NOTE — ASSESSMENT & PLAN NOTE
Patient is a history of vitamin D deficiency, she completed per prescription replacement and continues on 2000 international units daily. We will monitor her vitamin D level.

## 2018-03-01 NOTE — ASSESSMENT & PLAN NOTE
Patient has hypertension and continues on amlodipine 10 mg daily, she is recently been diagnosed with PCO S and recommendation is to start spironolactone 25 mg twice daily, we will ask that she decrease her amlodipine dose to 5 mg daily and come in for a blood pressure check in one week, she may then start spironolactone 25 mg twice daily and we will closely monitor her blood pressure adjusting her medications as needed.

## 2018-04-11 ENCOUNTER — HOSPITAL ENCOUNTER (OUTPATIENT)
Dept: LAB | Facility: MEDICAL CENTER | Age: 28
End: 2018-04-11
Attending: NURSE PRACTITIONER
Payer: COMMERCIAL

## 2018-04-11 DIAGNOSIS — E16.2 HYPOGLYCEMIA: ICD-10-CM

## 2018-04-11 DIAGNOSIS — E55.9 VITAMIN D DEFICIENCY: ICD-10-CM

## 2018-04-11 DIAGNOSIS — E28.2 PCOS (POLYCYSTIC OVARIAN SYNDROME): ICD-10-CM

## 2018-04-11 LAB
25(OH)D3 SERPL-MCNC: 27 NG/ML (ref 30–100)
ALBUMIN SERPL BCP-MCNC: 4.4 G/DL (ref 3.2–4.9)
ALBUMIN/GLOB SERPL: 1.5 G/DL
ALP SERPL-CCNC: 48 U/L (ref 30–99)
ALT SERPL-CCNC: 13 U/L (ref 2–50)
ANION GAP SERPL CALC-SCNC: 7 MMOL/L (ref 0–11.9)
AST SERPL-CCNC: 14 U/L (ref 12–45)
BASOPHILS # BLD AUTO: 0.5 % (ref 0–1.8)
BASOPHILS # BLD: 0.05 K/UL (ref 0–0.12)
BILIRUB SERPL-MCNC: 0.3 MG/DL (ref 0.1–1.5)
BUN SERPL-MCNC: 18 MG/DL (ref 8–22)
CALCIUM SERPL-MCNC: 9.4 MG/DL (ref 8.5–10.5)
CHLORIDE SERPL-SCNC: 111 MMOL/L (ref 96–112)
CO2 SERPL-SCNC: 21 MMOL/L (ref 20–33)
CREAT SERPL-MCNC: 0.94 MG/DL (ref 0.5–1.4)
EOSINOPHIL # BLD AUTO: 0.39 K/UL (ref 0–0.51)
EOSINOPHIL NFR BLD: 4.2 % (ref 0–6.9)
ERYTHROCYTE [DISTWIDTH] IN BLOOD BY AUTOMATED COUNT: 45.2 FL (ref 35.9–50)
GLOBULIN SER CALC-MCNC: 3 G/DL (ref 1.9–3.5)
GLUCOSE SERPL-MCNC: 77 MG/DL (ref 65–99)
HCT VFR BLD AUTO: 45 % (ref 37–47)
HGB BLD-MCNC: 14.5 G/DL (ref 12–16)
IMM GRANULOCYTES # BLD AUTO: 0.03 K/UL (ref 0–0.11)
IMM GRANULOCYTES NFR BLD AUTO: 0.3 % (ref 0–0.9)
LYMPHOCYTES # BLD AUTO: 1.75 K/UL (ref 1–4.8)
LYMPHOCYTES NFR BLD: 18.6 % (ref 22–41)
MCH RBC QN AUTO: 27.8 PG (ref 27–33)
MCHC RBC AUTO-ENTMCNC: 32.2 G/DL (ref 33.6–35)
MCV RBC AUTO: 86.4 FL (ref 81.4–97.8)
MONOCYTES # BLD AUTO: 0.43 K/UL (ref 0–0.85)
MONOCYTES NFR BLD AUTO: 4.6 % (ref 0–13.4)
NEUTROPHILS # BLD AUTO: 6.74 K/UL (ref 2–7.15)
NEUTROPHILS NFR BLD: 71.8 % (ref 44–72)
NRBC # BLD AUTO: 0 K/UL
NRBC BLD-RTO: 0 /100 WBC
PLATELET # BLD AUTO: 289 K/UL (ref 164–446)
PMV BLD AUTO: 10.2 FL (ref 9–12.9)
POTASSIUM SERPL-SCNC: 4 MMOL/L (ref 3.6–5.5)
PROT SERPL-MCNC: 7.4 G/DL (ref 6–8.2)
RBC # BLD AUTO: 5.21 M/UL (ref 4.2–5.4)
SODIUM SERPL-SCNC: 139 MMOL/L (ref 135–145)
WBC # BLD AUTO: 9.4 K/UL (ref 4.8–10.8)

## 2018-04-11 PROCEDURE — 85025 COMPLETE CBC W/AUTO DIFF WBC: CPT

## 2018-04-11 PROCEDURE — 36415 COLL VENOUS BLD VENIPUNCTURE: CPT

## 2018-04-11 PROCEDURE — 82306 VITAMIN D 25 HYDROXY: CPT

## 2018-04-11 PROCEDURE — 80053 COMPREHEN METABOLIC PANEL: CPT

## 2018-05-14 ENCOUNTER — OFFICE VISIT (OUTPATIENT)
Dept: URGENT CARE | Facility: CLINIC | Age: 28
End: 2018-05-14
Payer: COMMERCIAL

## 2018-05-14 VITALS
OXYGEN SATURATION: 98 % | HEIGHT: 70 IN | RESPIRATION RATE: 16 BRPM | HEART RATE: 125 BPM | TEMPERATURE: 99.1 F | WEIGHT: 269 LBS | DIASTOLIC BLOOD PRESSURE: 94 MMHG | BODY MASS INDEX: 38.51 KG/M2 | SYSTOLIC BLOOD PRESSURE: 138 MMHG

## 2018-05-14 DIAGNOSIS — J01.90 ACUTE BACTERIAL RHINOSINUSITIS: ICD-10-CM

## 2018-05-14 DIAGNOSIS — J45.20 MILD INTERMITTENT REACTIVE AIRWAY DISEASE WITHOUT COMPLICATION: ICD-10-CM

## 2018-05-14 DIAGNOSIS — B96.89 ACUTE BACTERIAL RHINOSINUSITIS: ICD-10-CM

## 2018-05-14 PROCEDURE — 99214 OFFICE O/P EST MOD 30 MIN: CPT | Performed by: EMERGENCY MEDICINE

## 2018-05-14 RX ORDER — AMOXICILLIN AND CLAVULANATE POTASSIUM 875; 125 MG/1; MG/1
1 TABLET, FILM COATED ORAL 2 TIMES DAILY
Qty: 14 TAB | Refills: 0 | Status: SHIPPED | OUTPATIENT
Start: 2018-05-14 | End: 2018-05-21

## 2018-05-14 ASSESSMENT — ENCOUNTER SYMPTOMS
SWOLLEN GLANDS: 0
SPUTUM PRODUCTION: 0
HEMOPTYSIS: 0
PALPITATIONS: 0
NAUSEA: 0
SORE THROAT: 1
WHEEZING: 0
ABDOMINAL PAIN: 0
COUGH: 1
DIARRHEA: 0
SHORTNESS OF BREATH: 1
VOMITING: 0
SINUS PAIN: 1
SINUS PRESSURE: 1

## 2018-05-14 NOTE — PATIENT INSTRUCTIONS
Use saline nasal irrigation, such as with a Neti Pot, as needed daily for relief of nasal or sinus congestion relief.  Use over-the-counter inhaled nasal steroid (Flonase, Nasonex, Rhinocort, Nasacort) daily; continue for at least 2-3 weeks.  Use over-the-counter oxymetazoline (Afrin) nasal spray as needed for up to 3 days only; follow package instructions for dosing.  Use an oral probiotic daily, such as Culturelle, Align, or yogurt to reduce gastrointestinal symptoms.  Sinusitis, Adult  Sinusitis is soreness and inflammation of your sinuses. Sinuses are hollow spaces in the bones around your face. Your sinuses are located:  · Around your eyes.  · In the middle of your forehead.  · Behind your nose.  · In your cheekbones.  Your sinuses and nasal passages are lined with a stringy fluid (mucus). Mucus normally drains out of your sinuses. When your nasal tissues become inflamed or swollen, the mucus can become trapped or blocked so air cannot flow through your sinuses. This allows bacteria, viruses, and funguses to grow, which leads to infection.  Sinusitis can develop quickly and last for 7?10 days (acute) or for more than 12 weeks (chronic). Sinusitis often develops after a cold.  What are the causes?  This condition is caused by anything that creates swelling in the sinuses or stops mucus from draining, including:  · Allergies.  · Asthma.  · Bacterial or viral infection.  · Abnormally shaped bones between the nasal passages.  · Nasal growths that contain mucus (nasal polyps).  · Narrow sinus openings.  · Pollutants, such as chemicals or irritants in the air.  · A foreign object stuck in the nose.  · A fungal infection. This is rare.  What increases the risk?  The following factors may make you more likely to develop this condition:  · Having allergies or asthma.  · Having had a recent cold or respiratory tract infection.  · Having structural deformities or blockages in your nose or sinuses.  · Having a weak immune  system.  · Doing a lot of swimming or diving.  · Overusing nasal sprays.  · Smoking.  What are the signs or symptoms?  The main symptoms of this condition are pain and a feeling of pressure around the affected sinuses. Other symptoms include:  · Upper toothache.  · Earache.  · Headache.  · Bad breath.  · Decreased sense of smell and taste.  · A cough that may get worse at night.  · Fatigue.  · Fever.  · Thick drainage from your nose. The drainage is often green and it may contain pus (purulent).  · Stuffy nose or congestion.  · Postnasal drip. This is when extra mucus collects in the throat or back of the nose.  · Swelling and warmth over the affected sinuses.  · Sore throat.  · Sensitivity to light.  How is this diagnosed?  This condition is diagnosed based on symptoms, a medical history, and a physical exam. To find out if your condition is acute or chronic, your health care provider may:  · Look in your nose for signs of nasal polyps.  · Tap over the affected sinus to check for signs of infection.  · View the inside of your sinuses using an imaging device that has a light attached (endoscope).  If your health care provider suspects that you have chronic sinusitis, you may also:  · Be tested for allergies.  · Have a sample of mucus taken from your nose (nasal culture) and checked for bacteria.  · Have a mucus sample examined to see if your sinusitis is related to an allergy.  If your sinusitis does not respond to treatment and it lasts longer than 8 weeks, you may have an MRI or CT scan to check your sinuses. These scans also help to determine how severe your infection is.  In rare cases, a bone biopsy may be done to rule out more serious types of fungal sinus disease.  How is this treated?  Treatment for sinusitis depends on the cause and whether your condition is chronic or acute. If a virus is causing your sinusitis, your symptoms will go away on their own within 10 days. You may be given medicines to relieve  your symptoms, including:  · Topical nasal decongestants. They shrink swollen nasal passages and let mucus drain from your sinuses.  · Antihistamines. These drugs block inflammation that is triggered by allergies. This can help to ease swelling in your nose and sinuses.  · Topical nasal corticosteroids. These are nasal sprays that ease inflammation and swelling in your nose and sinuses.  · Nasal saline washes. These rinses can help to get rid of thick mucus in your nose.  If your condition is caused by bacteria, you will be given an antibiotic medicine. If your condition is caused by a fungus, you will be given an antifungal medicine.  Surgery may be needed to correct underlying conditions, such as narrow nasal passages. Surgery may also be needed to remove polyps.  Follow these instructions at home:  Medicines  · Take, use, or apply over-the-counter and prescription medicines only as told by your health care provider. These may include nasal sprays.  · If you were prescribed an antibiotic medicine, take it as told by your health care provider. Do not stop taking the antibiotic even if you start to feel better.  Hydrate and Humidify  · Drink enough water to keep your urine clear or pale yellow. Staying hydrated will help to thin your mucus.  · Use a cool mist humidifier to keep the humidity level in your home above 50%.  · Inhale steam for 10-15 minutes, 3-4 times a day or as told by your health care provider. You can do this in the bathroom while a hot shower is running.  · Limit your exposure to cool or dry air.  Rest  · Rest as much as possible.  · Sleep with your head raised (elevated).  · Make sure to get enough sleep each night.  General instructions  · Apply a warm, moist washcloth to your face 3-4 times a day or as told by your health care provider. This will help with discomfort.  · Wash your hands often with soap and water to reduce your exposure to viruses and other germs. If soap and water are not  available, use hand .  · Do not smoke. Avoid being around people who are smoking (secondhand smoke).  · Keep all follow-up visits as told by your health care provider. This is important.  Contact a health care provider if:  · You have a fever.  · Your symptoms get worse.  · Your symptoms do not improve within 10 days.  Get help right away if:  · You have a severe headache.  · You have persistent vomiting.  · You have pain or swelling around your face or eyes.  · You have vision problems.  · You develop confusion.  · Your neck is stiff.  · You have trouble breathing.  This information is not intended to replace advice given to you by your health care provider. Make sure you discuss any questions you have with your health care provider.  Document Released: 12/18/2006 Document Revised: 08/13/2017 Document Reviewed: 10/12/2016  [employer] Interactive Patient Education © 2017 [employer]    Bronchospasm, Adult  A bronchospasm is when the tubes that carry air in and out of your lungs (airways) spasm or tighten. During a bronchospasm it is hard to breathe. This is because the airways get smaller. A bronchospasm can be triggered by:  · Allergies. These may be to animals, pollen, food, or mold.  · Infection. This is a common cause of bronchospasm.  · Exercise.  · Irritants. These include pollution, cigarette smoke, strong odors, aerosol sprays, and paint fumes.  · Weather changes.  · Stress.  · Being emotional.  Follow these instructions at home:  · Always have a plan for getting help. Know when to call your doctor and local emergency services (911 in the [place] Know where you can get emergency care.  · Only take medicines as told by your doctor.  · If you were prescribed an inhaler or nebulizer machine, ask your doctor how to use it correctly. Always use a spacer with your inhaler if you were given one.  · Stay calm during an attack. Try to relax and breathe more slowly.  · Control your home environment:  ¨ Change  your heating and air conditioning filter at least once a month.  ¨ Limit your use of fireplaces and wood stoves.  ¨ Do not  smoke. Do not  allow smoking in your home.  ¨ Avoid perfumes and fragrances.  ¨ Get rid of pests (such as roaches and mice) and their droppings.  ¨ Throw away plants if you see mold on them.  ¨ Keep your house clean and dust free.  ¨ Replace carpet with wood, tile, or vinyl jasson. Carpet can trap dander and dust.  ¨ Use allergy-proof pillows, mattress covers, and box spring covers.  ¨ Wash bed sheets and blankets every week in hot water. Dry them in a dryer.  ¨ Use blankets that are made of polyester or cotton.  ¨ Wash hands frequently.  Contact a doctor if:  · You have muscle aches.  · You have chest pain.  · The thick spit you spit or cough up (sputum) changes from clear or white to yellow, green, gray, or bloody.  · The thick spit you spit or cough up gets thicker.  · There are problems that may be related to the medicine you are given such as:  ¨ A rash.  ¨ Itching.  ¨ Swelling.  ¨ Trouble breathing.  Get help right away if:  · You feel you cannot breathe or catch your breath.  · You cannot stop coughing.  · Your treatment is not helping you breathe better.  · You have very bad chest pain.  This information is not intended to replace advice given to you by your health care provider. Make sure you discuss any questions you have with your health care provider.  Document Released: 10/15/2010 Document Revised: 05/25/2017 Document Reviewed: 06/10/2014  [employer] Interactive Patient Education © 2017 [employer]

## 2018-05-14 NOTE — PROGRESS NOTES
Subjective:      Nickolas Galdamez is a 28 y.o. female who presents with Sinusitis (sinusitis/ chest congestion/ trouble taking deep breaths/ scratchy throatX 2 weeks )            Sinusitis   This is a new problem. Episode onset: 2 weeks. The problem has been gradually worsening since onset. Maximum temperature: low grade. The fever has been present for 3 to 4 days. The pain is mild. Associated symptoms include congestion, coughing, shortness of breath, sinus pressure and a sore throat. Pertinent negatives include no ear pain or swollen glands. Past treatments include oral decongestants. The treatment provided mild relief.   PMH RAD, sinusitis; recent FBS 60-80, usual.    Review of Systems   Constitutional: Positive for malaise/fatigue.   HENT: Positive for congestion, nosebleeds, sinus pain, sinus pressure and sore throat. Negative for ear pain and hearing loss.    Respiratory: Positive for cough and shortness of breath. Negative for hemoptysis, sputum production and wheezing.    Cardiovascular: Negative for chest pain, palpitations and leg swelling.   Gastrointestinal: Negative for abdominal pain, diarrhea, nausea and vomiting.   Skin: Negative for rash.   Endo/Heme/Allergies: Negative for environmental allergies.     PMH:  has a past medical history of EEG abnormal; Hashimoto's disease; Hyperglycemia; Migraine; Pseudotumor cerebri; and Seizure disorder (HCC). She also has no past medical history of Diabetes.  MEDS:   Current Outpatient Prescriptions:   •  amoxicillin-clavulanate (AUGMENTIN) 875-125 MG Tab, Take 1 Tab by mouth 2 times a day for 7 days., Disp: 14 Tab, Rfl: 0  •  Albuterol Sulfate 108 (90 Base) MCG/ACT AEROSOL POWDER, BREATH ACTIVATED, Inhale 1-2 Puffs by mouth every 6 hours as needed (coughing, wheezing)., Disp: 1 Each, Rfl: 0  •  SAXENDA 18 MG/3ML Solution Pen-injector, , Disp: , Rfl:   •  MIRENA, 52 MG, 20 MCG/24HR IUD, , Disp: , Rfl:   •  spironolactone (ALDACTONE) 25 MG Tab, Take 1 Tab by mouth 2  "times a day., Disp: 60 Tab, Rfl: 2  •  phentermine (ADIPEX-P) 37.5 MG tablet, Take 1 Tab by mouth every morning before breakfast for 30 days., Disp: 30 Tab, Rfl: 0  •  indomethacin (INDOCIN) 25 MG Cap, Take 50 mg by mouth 3 times a day. Taking 1 hour before sex to prevent headache, Disp: , Rfl:   •  vitamin D, Ergocalciferol, (DRISDOL) 31212 units Cap capsule, Take 50,000 Units by mouth every 48 hours., Disp: , Rfl:   •  levothyroxine (SYNTHROID) 200 MCG Tab, Take 1 Tab by mouth Every morning on an empty stomach., Disp: 90 Tab, Rfl: 1  •  topiramate (TOPAMAX) 100 MG Tab, TAKE 1 TAB BY MOUTH 2 TIMES A DAY. (Patient taking differently: Take 100 mg by mouth every day.), Disp: 180 Tab, Rfl: 1  •  amLODIPine (NORVASC) 10 MG Tab, Take 0.5 Tabs by mouth every day., Disp: 90 Tab, Rfl: 1  •  ondansetron (ZOFRAN ODT) 4 MG TABLET DISPERSIBLE, Take 1 Tab by mouth every four hours as needed (give PO if no IV route available)., Disp: 10 Tab, Rfl: 0  •  Cholecalciferol (VITAMIN D) 2000 UNITS Cap, Take 2,000 Units by mouth 2 Times a Day., Disp: , Rfl:   ALLERGIES: No Known Allergies  SURGHX:   Past Surgical History:   Procedure Laterality Date   • GYN SURGERY      precancerous cells with cervical freezing, HPV positive     SOCHX:  reports that she quit smoking about 6 years ago. She started smoking about 13 years ago. She has never used smokeless tobacco. She reports that she drinks about 0.6 oz of alcohol per week . She reports that she does not use drugs.  FH: family history includes Cancer in her mother.       Objective:     /94   Pulse (!) 125   Temp 37.3 °C (99.1 °F)   Resp 16   Ht 1.778 m (5' 10\")   Wt 122 kg (269 lb)   LMP 03/03/2012   SpO2 98%   BMI 38.60 kg/m²      Physical Exam   Constitutional: She appears well-developed and well-nourished. She is cooperative.  Non-toxic appearance. She does not appear ill. No distress.   HENT:   Head: Normocephalic.   Right Ear: Tympanic membrane and ear canal normal. "   Left Ear: Tympanic membrane and ear canal normal.   Nose: Mucosal edema and rhinorrhea present. Right sinus exhibits maxillary sinus tenderness. Left sinus exhibits maxillary sinus tenderness.   Mouth/Throat: Uvula is midline. No trismus in the jaw. No oropharyngeal exudate, posterior oropharyngeal edema or posterior oropharyngeal erythema.   Eyes: Conjunctivae are normal.   Neck: Trachea normal. Neck supple.   Cardiovascular: Regular rhythm and normal heart sounds.  Tachycardia present.  Exam reveals no gallop.    No murmur heard.  No significant pedal edema.   Pulmonary/Chest: No accessory muscle usage. No tachypnea. No respiratory distress. She has no decreased breath sounds. She has no wheezes. She has no rhonchi. She has no rales.   Lymphadenopathy:     She has no cervical adenopathy.   Neurological: She is alert.   Skin: Skin is warm and dry.   Psychiatric: She has a normal mood and affect.               Assessment/Plan:     1. Acute bacterial rhinosinusitis  Recommended nasal saline irrigation daily, OTC inhaled nasal steroid daily.  Recommended supportive care measures, including rest, increasing oral fluid intake and use of over-the-counter medications for relief of symptoms.  Based on duration, worsening:  - amoxicillin-clavulanate (AUGMENTIN) 875-125 MG Tab; Take 1 Tab by mouth 2 times a day for 7 days.  Dispense: 14 Tab; Refill: 0    2. Mild intermittent reactive airway disease without complication  Based on prior use:  - Albuterol Sulfate 108 (90 Base) MCG/ACT AEROSOL POWDER, BREATH ACTIVATED; Inhale 1-2 Puffs by mouth every 6 hours as needed (coughing, wheezing).  Dispense: 1 Each; Refill: 0

## 2018-05-16 ENCOUNTER — SUPERVISING PHYSICIAN REVIEW (OUTPATIENT)
Dept: URGENT CARE | Facility: PHYSICIAN GROUP | Age: 28
End: 2018-05-16

## 2018-06-04 ENCOUNTER — OFFICE VISIT (OUTPATIENT)
Dept: MEDICAL GROUP | Facility: PHYSICIAN GROUP | Age: 28
End: 2018-06-04
Payer: COMMERCIAL

## 2018-06-04 VITALS
TEMPERATURE: 98.7 F | HEART RATE: 66 BPM | SYSTOLIC BLOOD PRESSURE: 128 MMHG | HEIGHT: 70 IN | DIASTOLIC BLOOD PRESSURE: 78 MMHG | WEIGHT: 263.3 LBS | OXYGEN SATURATION: 95 % | RESPIRATION RATE: 16 BRPM | BODY MASS INDEX: 37.69 KG/M2

## 2018-06-04 DIAGNOSIS — E55.9 VITAMIN D DEFICIENCY: ICD-10-CM

## 2018-06-04 DIAGNOSIS — F32.5 MAJOR DEPRESSIVE DISORDER WITH SINGLE EPISODE, IN FULL REMISSION (HCC): Primary | ICD-10-CM

## 2018-06-04 DIAGNOSIS — E66.9 OBESITY (BMI 30-39.9): ICD-10-CM

## 2018-06-04 DIAGNOSIS — E03.9 HYPOTHYROIDISM, UNSPECIFIED TYPE: ICD-10-CM

## 2018-06-04 DIAGNOSIS — E28.2 PCOS (POLYCYSTIC OVARIAN SYNDROME): ICD-10-CM

## 2018-06-04 DIAGNOSIS — Z86.79 HISTORY OF HIGH BLOOD PRESSURE: ICD-10-CM

## 2018-06-04 PROCEDURE — 99214 OFFICE O/P EST MOD 30 MIN: CPT | Performed by: NURSE PRACTITIONER

## 2018-06-04 RX ORDER — PHENTERMINE HYDROCHLORIDE 37.5 MG/1
37.5 TABLET ORAL
Qty: 30 TAB | Refills: 5 | Status: SHIPPED | OUTPATIENT
Start: 2018-06-04 | End: 2018-12-06 | Stop reason: SDUPTHER

## 2018-06-04 RX ORDER — VENLAFAXINE HYDROCHLORIDE 75 MG/1
75 CAPSULE, EXTENDED RELEASE ORAL DAILY
Qty: 30 CAP | Refills: 1 | Status: SHIPPED | OUTPATIENT
Start: 2018-06-04 | End: 2018-08-30 | Stop reason: SDUPTHER

## 2018-06-04 NOTE — PROGRESS NOTES
[facility]  Primary Care Office Visit - Problem-Oriented        History:     Nickolas Galdamez is a 28 y.o. female who is here today to discuss Medication Refill (phentermine); Vitamin D Deficiency; and Depression      Major depressive disorder with single episode, in full remission (HCC)  Patient is a 28-year-old female who is here to discuss depressed mood.  She reports that she has had mild depression for most of her adult life though she has been able to manage this on her own without medications.  At our last office visit we did discuss depression, I felt that she would benefit from taking an antidepressant, she declined.  Today she is here for follow-up, she reports that in the last 2 months since I have seen her her boyfriend of 2 years was found cheating on her.  She, she describes anhedonia, she tells me she is having a hard time caring for her daughter, little motivation, anger.  She denies suicidal or homicidal ideations.  She cites her daughter is a protective factor.  She does feel at this time that she would benefit from an antidepressant.    Obesity (BMI 30-39.9)  Patient is a 28-year-old morbidly obese female currently on phentermine 37.5 mg daily. She has struggled with weight her entire life and has been able to lost about 100lb in the last year with phentermine. She is no longer taking Saxenda due to AE. Due to seizure disorder is recommended that she avoid vigorous exercise and this has complicated her weight loss course. We will refill her phentermine.     PCOS (polycystic ovarian syndrome)  Patient is a 28-year-old female with PCO S. She is working on weight loss. She was started on Spironolactone by her GYN for hirsutism, she has discontinued this medication and does not wish to resume.  She continues to follow with gynecology.    History of high blood pressure  Patient is a 28-year-old female with history of hypertension, she has lost over 100 pounds in the last year, she is no longer  "taking any antihypertensives and her blood pressure in the office today is 128/78.  She will no longer be taking daily antihypertensives, will continue to monitor her blood pressure.    Vitamin D deficiency  Patient is a 28-year-old female with history of vitamin D deficiency, now using over-the-counter vitamin D supplementation, vitamin D level is reviewed and stable at 27.    Hypothyroidism  Patient is a 28-year-old female who was diagnosed with Hashimoto's thyroiditis, she has discontinued her levothyroxine for no apparent reason, she does have very uncontrolled depression and tells me that \"I just do not feel like taking any my medicine.  \"She is due for repeat labs, I have encouraged her to resume her medications and follow-up with endocrinology per        Past Medical History:   Diagnosis Date   • EEG abnormal    • Hashimoto's disease    • Hyperglycemia    • Migraine    • Pseudotumor cerebri    • Seizure disorder (HCC)      Past Surgical History:   Procedure Laterality Date   • GYN SURGERY      precancerous cells with cervical freezing, HPV positive     Social History     Social History   • Marital status: Single     Spouse name: N/A   • Number of children: N/A   • Years of education: N/A     Occupational History   • Not on file.     Social History Main Topics   • Smoking status: Former Smoker     Start date: 1/1/2005     Quit date: 5/3/2012   • Smokeless tobacco: Never Used      Comment: 1/2 pack a day   • Alcohol use 0.6 oz/week     1 Glasses of wine per week      Comment: socially, 1 glass of wine per week   • Drug use: No   • Sexual activity: No     Other Topics Concern   • Not on file     Social History Narrative    ** Merged History Encounter **          History   Smoking Status   • Former Smoker   • Start date: 1/1/2005   • Quit date: 5/3/2012   Smokeless Tobacco   • Never Used     Comment: 1/2 pack a day     Family History   Problem Relation Age of Onset   • Cancer Mother      Cervical     No Known " Allergies    Problem List:     Patient Active Problem List    Diagnosis Date Noted   • Abdominal pain 09/12/2017     Priority: High   • Hypoglycemia 09/11/2017     Priority: High   • Hypothyroidism 01/11/2018     Priority: Low   • History of high blood pressure 01/11/2018     Priority: Low   • Migraine 09/11/2017     Priority: Low   • Vitamin D deficiency 05/04/2017     Priority: Low   • Obesity 04/14/2013     Priority: Low   • Seizure disorder (CMS-HCC) 10/22/2012     Priority: Low   • Major depressive disorder with single episode, in full remission ([facility]) 06/04/2018   • PCOS (polycystic ovarian syndrome) 02/28/2018   • Female hirsutism 02/28/2018   • Hashimoto's thyroiditis 05/04/2017   • Abnormal thyroid blood test 05/02/2017   • Intractable migraine without status migrainosus 04/24/2017   • Chronic daily headache 02/28/2017   • Elevated testosterone level in female 02/28/2017   • Essential hypertension 01/31/2017   • Obesity (BMI 30-39.9) 01/31/2017   • Pseudotumor cerebri 10/15/2012   • History of migraine headaches 10/15/2012         Medications:     Current Outpatient Prescriptions:   •  venlafaxine XR (EFFEXOR XR) 75 MG CAPSULE SR 24 HR, Take 1 Cap by mouth every day., Disp: 30 Cap, Rfl: 1  •  phentermine (ADIPEX-P) 37.5 MG tablet, Take 1 Tab by mouth every morning before breakfast for 180 days., Disp: 30 Tab, Rfl: 5  •  Albuterol Sulfate 108 (90 Base) MCG/ACT AEROSOL POWDER, BREATH ACTIVATED, Inhale 1-2 Puffs by mouth every 6 hours as needed (coughing, wheezing)., Disp: 1 Each, Rfl: 0  •  amLODIPine (NORVASC) 10 MG Tab, Take 0.5 Tabs by mouth every day., Disp: 90 Tab, Rfl: 1  •  SAXENDA 18 MG/3ML Solution Pen-injector, , Disp: , Rfl:   •  MIRENA, 52 MG, 20 MCG/24HR IUD, , Disp: , Rfl:   •  spironolactone (ALDACTONE) 25 MG Tab, Take 1 Tab by mouth 2 times a day., Disp: 60 Tab, Rfl: 2  •  indomethacin (INDOCIN) 25 MG Cap, Take 50 mg by mouth 3 times a day. Taking 1 hour before sex to prevent headache, Disp: ,  "Rfl:   •  ondansetron (ZOFRAN ODT) 4 MG TABLET DISPERSIBLE, Take 1 Tab by mouth every four hours as needed (give PO if no IV route available)., Disp: 10 Tab, Rfl: 0  •  vitamin D, Ergocalciferol, (DRISDOL) 16896 units Cap capsule, Take 50,000 Units by mouth every 48 hours., Disp: , Rfl:   •  levothyroxine (SYNTHROID) 200 MCG Tab, Take 1 Tab by mouth Every morning on an empty stomach., Disp: 90 Tab, Rfl: 1  •  topiramate (TOPAMAX) 100 MG Tab, TAKE 1 TAB BY MOUTH 2 TIMES A DAY. (Patient taking differently: Take 100 mg by mouth every day.), Disp: 180 Tab, Rfl: 1  •  Cholecalciferol (VITAMIN D) 2000 UNITS Cap, Take 2,000 Units by mouth 2 Times a Day., Disp: , Rfl:       Review of Systems:     Pertinent positives as per HPI, all other systems reviewed and WNL   Physical Assessment:     VS: /78   Pulse 66   Temp 37.1 °C (98.7 °F)   Resp 16   Ht 1.778 m (5' 10\")   Wt 119.4 kg (263 lb 4.8 oz)   LMP 03/03/2012   SpO2 95%   Breastfeeding? No   BMI 37.78 kg/m²     General: Well-developed, well-nourished , female, obese      Head: PERRL, EOMI. Normocephalic. No facial asymmetry noted.  Cardiovasc: RRR, no MRG. No thrills or bruits. Pulses 2+ and symmetric at all distal extremities.  Pulmonary: Lungs clear bilaterally.  Normal respiratory effort. No wheeze or crackles.   Neuro: Alert and oriented  Skin:No rashes noted. Skin warm, dry, intact    Psych: Dressed appropriately for the weather, pleasant and conversant.  Affect, mood & judgment appropriate.    PHQ-9 score 16     Assessment/Plan:   Nickolas was seen today for medication refill, vitamin d deficiency and depression.    Diagnoses and all orders for this visit:    Major depressive disorder with single episode, in full remission (HCC), new, uncontrolled   - start effexor 75mg daily, discussed AE of medication, follow up in 4-5 weeks, sooner if needed.   -     venlafaxine XR (EFFEXOR XR) 75 MG CAPSULE SR 24 HR; Take 1 Cap by mouth every day.    Hypothyroidism, " unspecified type, uncontrolled   -Monitor labs, recommend resuming medications, follow-up with endocrinology  -     TSH; Future  -     FREE THYROXINE; Future    Class 2 obesity with serious comorbidity and body mass index (BMI) of 38.0 to 38.9 in adult, unspecified obesity type  -Patient has had significant weight loss with phentermine, over 100 pounds in the last year.  3 months refill was provided,  is reviewed and in accordance with her prescriptions, no signs of abuse.  She will be due for CSA and UDS at follow-up.  -     phentermine (ADIPEX-P) 37.5 MG tablet; Take 1 Tab by mouth every morning before breakfast for 180 days.    PCOS (polycystic ovarian syndrome), uncontrolled  -Working on weight loss, patient has discontinued spironolactone, recommend she follow-up with gynecology    History of high blood pressure  -Antihypertensives have been discontinued, patient has lost a significant amount of weight, BP is normal today without antihypertensives, continue to monitor.     Vitamin D deficiency, stable   - continues on OTC VIT D    Follow up in 4-6 weeks to discuss depression     Patient is agreeable to the above plan and voiced understanding. All questions answered.     Please note that this dictation was created using voice recognition software. I have made every reasonable attempt to correct obvious errors, but I expect that there are errors of grammar and possibly content that I did not discover before finalizing the note.      DEVORAH Wall  6/4/2018, 1:02 PM

## 2018-06-04 NOTE — ASSESSMENT & PLAN NOTE
"Patient is a 28-year-old female who was diagnosed with Hashimoto's thyroiditis, she has discontinued her levothyroxine for no apparent reason, she does have very uncontrolled depression and tells me that \"I just do not feel like taking any my medicine.  \"She is due for repeat labs, I have encouraged her to resume her medications and follow-up with endocrinology per  "

## 2018-06-04 NOTE — ASSESSMENT & PLAN NOTE
Patient is a 28-year-old female who is here to discuss depressed mood.  She reports that she has had mild depression for most of her adult life though she has been able to manage this on her own without medications.  At our last office visit we did discuss depression, I felt that she would benefit from taking an antidepressant, she declined.  Today she is here for follow-up, she reports that in the last 2 months since I have seen her her boyfriend of 2 years was found cheating on her.  She, she describes anhedonia, she tells me she is having a hard time caring for her daughter, little motivation, anger.  She denies suicidal or homicidal ideations.  She cites her daughter is a protective factor.  She does feel at this time that she would benefit from an antidepressant.

## 2018-06-04 NOTE — ASSESSMENT & PLAN NOTE
Patient is a 28-year-old morbidly obese female currently on phentermine 37.5 mg daily. She has struggled with weight her entire life and has been able to lost about 100lb in the last year with phentermine. She is no longer taking Saxenda due to AE. Due to seizure disorder is recommended that she avoid vigorous exercise and this has complicated her weight loss course. We will refill her phentermine.

## 2018-06-04 NOTE — ASSESSMENT & PLAN NOTE
Patient is a 28-year-old female with history of vitamin D deficiency, now using over-the-counter vitamin D supplementation, vitamin D level is reviewed and stable at 27.

## 2018-06-04 NOTE — ASSESSMENT & PLAN NOTE
Patient is a 28-year-old female with PCO S. She is working on weight loss. She was started on Spironolactone by her GYN for hirsutism, she has discontinued this medication and does not wish to resume.  She continues to follow with gynecology.

## 2018-08-30 RX ORDER — VENLAFAXINE HYDROCHLORIDE 75 MG/1
CAPSULE, EXTENDED RELEASE ORAL
Qty: 90 CAP | Refills: 0 | Status: SHIPPED | OUTPATIENT
Start: 2018-08-30 | End: 2018-12-07 | Stop reason: SDUPTHER

## 2018-10-12 ENCOUNTER — HOSPITAL ENCOUNTER (OUTPATIENT)
Dept: LAB | Facility: MEDICAL CENTER | Age: 28
End: 2018-10-12
Attending: OBSTETRICS & GYNECOLOGY
Payer: COMMERCIAL

## 2018-10-12 PROCEDURE — 86696 HERPES SIMPLEX TYPE 2 TEST: CPT

## 2018-10-12 PROCEDURE — 86780 TREPONEMA PALLIDUM: CPT

## 2018-10-12 PROCEDURE — 84402 ASSAY OF FREE TESTOSTERONE: CPT

## 2018-10-12 PROCEDURE — 84146 ASSAY OF PROLACTIN: CPT

## 2018-10-12 PROCEDURE — 36415 COLL VENOUS BLD VENIPUNCTURE: CPT

## 2018-10-12 PROCEDURE — 84143 ASSAY OF 17-HYDROXYPREGNENO: CPT

## 2018-10-12 PROCEDURE — 86038 ANTINUCLEAR ANTIBODIES: CPT

## 2018-10-12 PROCEDURE — 82157 ASSAY OF ANDROSTENEDIONE: CPT

## 2018-10-12 PROCEDURE — 83498 ASY HYDROXYPROGESTERONE 17-D: CPT

## 2018-10-12 PROCEDURE — 82626 DEHYDROEPIANDROSTERONE: CPT

## 2018-10-12 PROCEDURE — 86694 HERPES SIMPLEX NES ANTBDY: CPT

## 2018-10-12 PROCEDURE — 86695 HERPES SIMPLEX TYPE 1 TEST: CPT

## 2018-10-13 LAB
PROLACTIN SERPL-MCNC: 12.08 NG/ML (ref 2.8–26)
TREPONEMA PALLIDUM IGG+IGM AB [PRESENCE] IN SERUM OR PLASMA BY IMMUNOASSAY: NON REACTIVE

## 2018-10-15 LAB
HSV1 GG IGG SER-ACNC: 39.1 IV
HSV1+2 IGG SER IA-ACNC: >22.4 IV
HSV2 GG IGG SER-ACNC: 4.72 IV
NUCLEAR IGG SER QL IA: NORMAL

## 2018-10-18 LAB — TESTOST FREE SERPL-MCNC: 6.3 PG/ML (ref 0.8–7.4)

## 2018-10-19 LAB
17OH-PREG SERPL-MCNC: 70 NG/DL
17OHP SERPL-MCNC: 151 NG/DL
ANDROST SERPL-MCNC: 1.47 NG/ML (ref 0.26–2.14)
DHEA SERPL-MCNC: 1.26 NG/ML (ref 1.33–7.78)

## 2018-10-30 ENCOUNTER — OFFICE VISIT (OUTPATIENT)
Dept: ENDOCRINOLOGY | Facility: MEDICAL CENTER | Age: 28
End: 2018-10-30
Payer: COMMERCIAL

## 2018-10-30 VITALS
OXYGEN SATURATION: 98 % | HEART RATE: 73 BPM | BODY MASS INDEX: 39.37 KG/M2 | DIASTOLIC BLOOD PRESSURE: 80 MMHG | HEIGHT: 70 IN | WEIGHT: 275 LBS | SYSTOLIC BLOOD PRESSURE: 124 MMHG

## 2018-10-30 DIAGNOSIS — E03.8 HYPOTHYROIDISM DUE TO HASHIMOTO'S THYROIDITIS: ICD-10-CM

## 2018-10-30 DIAGNOSIS — E66.09 CLASS 2 OBESITY DUE TO EXCESS CALORIES WITH BODY MASS INDEX (BMI) OF 38.0 TO 38.9 IN ADULT, UNSPECIFIED WHETHER SERIOUS COMORBIDITY PRESENT: ICD-10-CM

## 2018-10-30 DIAGNOSIS — E53.8 VITAMIN B 12 DEFICIENCY: ICD-10-CM

## 2018-10-30 DIAGNOSIS — E55.9 VITAMIN D DEFICIENCY: ICD-10-CM

## 2018-10-30 DIAGNOSIS — D13.7 INSULINOMA: ICD-10-CM

## 2018-10-30 DIAGNOSIS — E16.2 HYPOGLYCEMIA: ICD-10-CM

## 2018-10-30 DIAGNOSIS — E06.3 HYPOTHYROIDISM DUE TO HASHIMOTO'S THYROIDITIS: ICD-10-CM

## 2018-10-30 PROCEDURE — 99214 OFFICE O/P EST MOD 30 MIN: CPT | Performed by: INTERNAL MEDICINE

## 2018-10-30 NOTE — PROGRESS NOTES
Endocrinology Clinic Progress Note  PCP: KARINE Hale    HPI:  Nickolas Galdamez is a 28 y.o. old patient who comes in today for review of endocrine problems.    Hypoglycemia:  She went to the emergency room 2 weeks ago due to hypoglycemia and could not bring it up over 70 and received D 50 to bring up.  She is testing blood sugars when she feels hypoglycemic around 4 to 5 times weekly.  She is eating a diet low in carbohydrates and higher in protein small frequent meals daily.    Hypothyroidism:  Was taking Levothyroxine 200 mcg daily but stopped due to depression and now is back on it.    Vitamin D Deficiency:  She finished the Vitamin D 50,000 units every 3 days loading dose and is now taking over the counter Vitamin D 2000 units daily.    Obesity:  She is not able to exercise due to a seizure disorder.  She stopped the Saxenda in May and has started Phentermine 37.5 mg daily.    ROS:  Constitutional: She has lost around 50 pounds but starting to go back up.  Endo: Denies excessive thirst or frequent urination  All other systems were reviewed and were negative.    Past Medical History:  Patient Active Problem List    Diagnosis Date Noted   • Abdominal pain 09/12/2017     Priority: High   • Hypoglycemia 09/11/2017     Priority: High   • Hypothyroidism 01/11/2018     Priority: Low   • History of high blood pressure 01/11/2018     Priority: Low   • Migraine 09/11/2017     Priority: Low   • Vitamin D deficiency 05/04/2017     Priority: Low   • Obesity 04/14/2013     Priority: Low   • Seizure disorder (CMS-[facility]) 10/22/2012     Priority: Low   • Major depressive disorder with single episode, in full remission ([facility]) 06/04/2018   • PCOS (polycystic ovarian syndrome) 02/28/2018   • Female hirsutism 02/28/2018   • Hashimoto's thyroiditis 05/04/2017   • Abnormal thyroid blood test 05/02/2017   • Intractable migraine without status migrainosus 04/24/2017   • Chronic daily headache 02/28/2017   • Elevated  "testosterone level in female 02/28/2017   • Essential hypertension 01/31/2017   • Obesity (BMI 30-39.9) 01/31/2017   • Pseudotumor cerebri 10/15/2012   • History of migraine headaches 10/15/2012       Medications:    Current Outpatient Prescriptions:   •  Liraglutide -Weight Management (SAXENDA) 18 MG/3ML Solution Pen-injector, Inject 3 mg as instructed every day., Disp: 6 PEN, Rfl: 3  •  venlafaxine XR (EFFEXOR XR) 75 MG CAPSULE SR 24 HR, TAKE 1 CAPSULE BY MOUTH ONCE DAILY, Disp: 90 Cap, Rfl: 0  •  phentermine (ADIPEX-P) 37.5 MG tablet, Take 1 Tab by mouth every morning before breakfast for 180 days., Disp: 30 Tab, Rfl: 5  •  amLODIPine (NORVASC) 10 MG Tab, Take 0.5 Tabs by mouth every day., Disp: 90 Tab, Rfl: 1  •  MIRENA, 52 MG, 20 MCG/24HR IUD, , Disp: , Rfl:   •  levothyroxine (SYNTHROID) 200 MCG Tab, Take 1 Tab by mouth Every morning on an empty stomach., Disp: 90 Tab, Rfl: 1  •  Cholecalciferol (VITAMIN D) 2000 UNITS Cap, Take 2,000 Units by mouth 2 Times a Day., Disp: , Rfl:   •  Albuterol Sulfate 108 (90 Base) MCG/ACT AEROSOL POWDER, BREATH ACTIVATED, Inhale 1-2 Puffs by mouth every 6 hours as needed (coughing, wheezing)., Disp: 1 Each, Rfl: 0  •  SAXENDA 18 MG/3ML Solution Pen-injector, , Disp: , Rfl:   •  spironolactone (ALDACTONE) 25 MG Tab, Take 1 Tab by mouth 2 times a day., Disp: 60 Tab, Rfl: 2  •  indomethacin (INDOCIN) 25 MG Cap, Take 50 mg by mouth 3 times a day. Taking 1 hour before sex to prevent headache, Disp: , Rfl:   •  ondansetron (ZOFRAN ODT) 4 MG TABLET DISPERSIBLE, Take 1 Tab by mouth every four hours as needed (give PO if no IV route available)., Disp: 10 Tab, Rfl: 0    Labs: Reviewed    Physical Examination:  Vital signs: /80   Pulse 73   Ht 1.778 m (5' 10\")   Wt 124.7 kg (275 lb)   SpO2 98%   BMI 39.46 kg/m²  Body mass index is 39.46 kg/m².  General: No apparent distress, cooperative  Eyes: No scleral icterus, no discharge, normal eyelids  Neck: No abnormal masses on " inspection, normal thyroid exam  Resp: Normal effort, clear to auscultation bilaterally  CVS: Regular rate and rhythm, S1 S2 normal, no murmur  Extremities: No lower extremity edema  Abdomen: abdominal obesity present  Musculoskeletal: Normal digits and nails  Skin: No rash on visible skin  Psych: Alert and oriented, normal mood and affect, intact memory and able to make informed decisions.    Assessment and Plan:    1. Hypothyroidism due to Hashimoto's thyroiditis  Continue levothyroxine.    2. Vitamin D deficiency  Continue vitamin D supplementation.    3. Class 2 obesity due to excess calories with body mass index (BMI) of 38.0 to 38.9 in adult, unspecified whether serious comorbidity present  Resume saxenda for weight loss.     4.  Hypoglycemia  Triple phase CAT scan of the pancreas to rule out insulinoma as the contributory factor for her hypoglycemia    Return in about 2 months (around 12/30/2018).    Total face to face time spent with patient equals 40 minutes. 25/40 minutes were spent on counseling the patient about the pathophysiology of pituitary-thyroid axis, pituitary-adrenal axis, pituitary-gonadal axis, pathophysiology of hypoglycemia and the possibility of insulinoma, side effects and benefits of thyroid hormone and Vit D replacement.    Thank you for allowing me to participate in the care of this patient.    Sanford Ayala M.D.    CC:   COBY Hale.    This note was created using voice recognition software (Dragon). The accuracy of the dictation is limited by the abilities of the software. I have reviewed the note prior to signing, however some errors in grammar and context are still possible. If you have any questions related to this note please do not hesitate to contact our office.

## 2018-11-14 ENCOUNTER — APPOINTMENT (OUTPATIENT)
Dept: RADIOLOGY | Facility: MEDICAL CENTER | Age: 28
End: 2018-11-14
Attending: INTERNAL MEDICINE
Payer: COMMERCIAL

## 2018-11-14 DIAGNOSIS — D13.7 INSULINOMA: ICD-10-CM

## 2018-11-14 PROCEDURE — 700117 HCHG RX CONTRAST REV CODE 255: Performed by: INTERNAL MEDICINE

## 2018-11-14 PROCEDURE — 74178 CT ABD&PLV WO CNTR FLWD CNTR: CPT

## 2018-11-14 RX ADMIN — IOHEXOL 100 ML: 350 INJECTION, SOLUTION INTRAVENOUS at 17:55

## 2018-11-19 ENCOUNTER — APPOINTMENT (OUTPATIENT)
Dept: RADIOLOGY | Facility: MEDICAL CENTER | Age: 28
End: 2018-11-19
Attending: EMERGENCY MEDICINE
Payer: COMMERCIAL

## 2018-11-19 ENCOUNTER — OFFICE VISIT (OUTPATIENT)
Dept: URGENT CARE | Facility: PHYSICIAN GROUP | Age: 28
End: 2018-11-19
Payer: COMMERCIAL

## 2018-11-19 ENCOUNTER — HOSPITAL ENCOUNTER (EMERGENCY)
Facility: MEDICAL CENTER | Age: 28
End: 2018-11-19
Attending: EMERGENCY MEDICINE
Payer: COMMERCIAL

## 2018-11-19 VITALS
DIASTOLIC BLOOD PRESSURE: 109 MMHG | HEART RATE: 72 BPM | RESPIRATION RATE: 16 BRPM | SYSTOLIC BLOOD PRESSURE: 165 MMHG | WEIGHT: 279.98 LBS | BODY MASS INDEX: 40.08 KG/M2 | TEMPERATURE: 98.1 F | HEIGHT: 70 IN | OXYGEN SATURATION: 96 %

## 2018-11-19 VITALS
TEMPERATURE: 98.9 F | OXYGEN SATURATION: 98 % | DIASTOLIC BLOOD PRESSURE: 88 MMHG | HEIGHT: 70 IN | SYSTOLIC BLOOD PRESSURE: 130 MMHG | RESPIRATION RATE: 14 BRPM | BODY MASS INDEX: 40.09 KG/M2 | HEART RATE: 78 BPM | WEIGHT: 280 LBS

## 2018-11-19 DIAGNOSIS — K11.21 ACUTE PAROTITIS: ICD-10-CM

## 2018-11-19 DIAGNOSIS — K11.20 PAROTIDITIS: ICD-10-CM

## 2018-11-19 DIAGNOSIS — R22.0 LEFT FACIAL SWELLING: ICD-10-CM

## 2018-11-19 DIAGNOSIS — K12.0 CANKER SORES ORAL: ICD-10-CM

## 2018-11-19 DIAGNOSIS — R59.0 LYMPHADENOPATHY, ANTERIOR CERVICAL: ICD-10-CM

## 2018-11-19 LAB
ANION GAP SERPL CALC-SCNC: 5 MMOL/L (ref 0–11.9)
BASOPHILS # BLD AUTO: 0.6 % (ref 0–1.8)
BASOPHILS # BLD: 0.05 K/UL (ref 0–0.12)
BUN SERPL-MCNC: 13 MG/DL (ref 8–22)
CALCIUM SERPL-MCNC: 9.6 MG/DL (ref 8.5–10.5)
CHLORIDE SERPL-SCNC: 105 MMOL/L (ref 96–112)
CO2 SERPL-SCNC: 27 MMOL/L (ref 20–33)
CREAT SERPL-MCNC: 0.92 MG/DL (ref 0.5–1.4)
EOSINOPHIL # BLD AUTO: 0.36 K/UL (ref 0–0.51)
EOSINOPHIL NFR BLD: 4.3 % (ref 0–6.9)
ERYTHROCYTE [DISTWIDTH] IN BLOOD BY AUTOMATED COUNT: 39.9 FL (ref 35.9–50)
GLUCOSE BLD-MCNC: 60 MG/DL (ref 65–99)
GLUCOSE SERPL-MCNC: 81 MG/DL (ref 65–99)
HCT VFR BLD AUTO: 43.7 % (ref 37–47)
HGB BLD-MCNC: 14.9 G/DL (ref 12–16)
IMM GRANULOCYTES # BLD AUTO: 0.03 K/UL (ref 0–0.11)
IMM GRANULOCYTES NFR BLD AUTO: 0.4 % (ref 0–0.9)
LYMPHOCYTES # BLD AUTO: 1.66 K/UL (ref 1–4.8)
LYMPHOCYTES NFR BLD: 19.6 % (ref 22–41)
MCH RBC QN AUTO: 28.7 PG (ref 27–33)
MCHC RBC AUTO-ENTMCNC: 34.1 G/DL (ref 33.6–35)
MCV RBC AUTO: 84.2 FL (ref 81.4–97.8)
MONOCYTES # BLD AUTO: 0.51 K/UL (ref 0–0.85)
MONOCYTES NFR BLD AUTO: 6 % (ref 0–13.4)
NEUTROPHILS # BLD AUTO: 5.85 K/UL (ref 2–7.15)
NEUTROPHILS NFR BLD: 69.1 % (ref 44–72)
NRBC # BLD AUTO: 0 K/UL
NRBC BLD-RTO: 0 /100 WBC
PLATELET # BLD AUTO: 265 K/UL (ref 164–446)
PMV BLD AUTO: 9.8 FL (ref 9–12.9)
POTASSIUM SERPL-SCNC: 3.8 MMOL/L (ref 3.6–5.5)
RBC # BLD AUTO: 5.19 M/UL (ref 4.2–5.4)
SODIUM SERPL-SCNC: 137 MMOL/L (ref 135–145)
WBC # BLD AUTO: 8.5 K/UL (ref 4.8–10.8)

## 2018-11-19 PROCEDURE — 70491 CT SOFT TISSUE NECK W/DYE: CPT

## 2018-11-19 PROCEDURE — 96365 THER/PROPH/DIAG IV INF INIT: CPT

## 2018-11-19 PROCEDURE — 700105 HCHG RX REV CODE 258: Performed by: EMERGENCY MEDICINE

## 2018-11-19 PROCEDURE — 700111 HCHG RX REV CODE 636 W/ 250 OVERRIDE (IP): Performed by: EMERGENCY MEDICINE

## 2018-11-19 PROCEDURE — 700117 HCHG RX CONTRAST REV CODE 255: Performed by: EMERGENCY MEDICINE

## 2018-11-19 PROCEDURE — 80048 BASIC METABOLIC PNL TOTAL CA: CPT

## 2018-11-19 PROCEDURE — 96375 TX/PRO/DX INJ NEW DRUG ADDON: CPT

## 2018-11-19 PROCEDURE — 36415 COLL VENOUS BLD VENIPUNCTURE: CPT

## 2018-11-19 PROCEDURE — 82962 GLUCOSE BLOOD TEST: CPT

## 2018-11-19 PROCEDURE — 96376 TX/PRO/DX INJ SAME DRUG ADON: CPT

## 2018-11-19 PROCEDURE — 85025 COMPLETE CBC W/AUTO DIFF WBC: CPT

## 2018-11-19 PROCEDURE — 99284 EMERGENCY DEPT VISIT MOD MDM: CPT

## 2018-11-19 PROCEDURE — 99214 OFFICE O/P EST MOD 30 MIN: CPT | Performed by: PHYSICIAN ASSISTANT

## 2018-11-19 RX ORDER — MORPHINE SULFATE 10 MG/ML
4 INJECTION, SOLUTION INTRAMUSCULAR; INTRAVENOUS ONCE
Status: COMPLETED | OUTPATIENT
Start: 2018-11-19 | End: 2018-11-19

## 2018-11-19 RX ORDER — AMOXICILLIN AND CLAVULANATE POTASSIUM 875; 125 MG/1; MG/1
1 TABLET, FILM COATED ORAL 2 TIMES DAILY
Qty: 14 TAB | Refills: 0 | Status: SHIPPED | OUTPATIENT
Start: 2018-11-19 | End: 2018-11-26

## 2018-11-19 RX ORDER — ONDANSETRON 2 MG/ML
4 INJECTION INTRAMUSCULAR; INTRAVENOUS ONCE
Status: COMPLETED | OUTPATIENT
Start: 2018-11-19 | End: 2018-11-19

## 2018-11-19 RX ORDER — METHYLPREDNISOLONE 4 MG/1
TABLET ORAL
Qty: 1 KIT | Refills: 0 | Status: SHIPPED | OUTPATIENT
Start: 2018-11-19 | End: 2018-12-11

## 2018-11-19 RX ORDER — AMOXICILLIN AND CLAVULANATE POTASSIUM 875; 125 MG/1; MG/1
1 TABLET, FILM COATED ORAL 2 TIMES DAILY
Qty: 20 TAB | Refills: 0 | Status: SHIPPED | OUTPATIENT
Start: 2018-11-19 | End: 2018-11-26

## 2018-11-19 RX ORDER — OXYCODONE AND ACETAMINOPHEN 10; 325 MG/1; MG/1
1 TABLET ORAL EVERY 8 HOURS PRN
Qty: 12 TAB | Refills: 0 | Status: SHIPPED | OUTPATIENT
Start: 2018-11-19 | End: 2018-11-23

## 2018-11-19 RX ORDER — AMOXICILLIN AND CLAVULANATE POTASSIUM 875; 125 MG/1; MG/1
1 TABLET, FILM COATED ORAL 2 TIMES DAILY
Qty: 14 TAB | Refills: 0 | Status: SHIPPED | OUTPATIENT
Start: 2018-11-19 | End: 2018-11-19

## 2018-11-19 RX ADMIN — IOHEXOL 75 ML: 350 INJECTION, SOLUTION INTRAVENOUS at 17:05

## 2018-11-19 RX ADMIN — MORPHINE SULFATE 4 MG: 10 INJECTION INTRAVENOUS at 16:42

## 2018-11-19 RX ADMIN — SODIUM CHLORIDE 3 G: 900 INJECTION INTRAVENOUS at 19:26

## 2018-11-19 RX ADMIN — MORPHINE SULFATE 4 MG: 10 INJECTION INTRAVENOUS at 19:27

## 2018-11-19 RX ADMIN — ONDANSETRON 4 MG: 2 INJECTION INTRAMUSCULAR; INTRAVENOUS at 16:42

## 2018-11-19 ASSESSMENT — ENCOUNTER SYMPTOMS
SORE THROAT: 0
NECK PAIN: 1
SENSORY CHANGE: 0
EYE PAIN: 0
DIZZINESS: 0
VOMITING: 0
ABDOMINAL PAIN: 0
CONSTIPATION: 0
TINGLING: 0
NAUSEA: 0
SINUS PAIN: 0
PALPITATIONS: 0
FEVER: 1
SHORTNESS OF BREATH: 0
COUGH: 0
BLURRED VISION: 0
BRUISES/BLEEDS EASILY: 0
HEADACHES: 0
FOCAL WEAKNESS: 0
CHILLS: 0
MYALGIAS: 0
DIARRHEA: 0

## 2018-11-19 ASSESSMENT — PAIN SCALES - GENERAL
PAINLEVEL_OUTOF10: 7
PAINLEVEL_OUTOF10: 7
PAINLEVEL_OUTOF10: 4
PAINLEVEL_OUTOF10: 4

## 2018-11-19 NOTE — PROGRESS NOTES
"Subjective:      Nickolas Miranda is a 28 y.o. female who presents with Edema (painful to swallow/ L side cheeks notice it last night)      HPI:  This is a new problem. Nickolas Miranda is a 28 y.o. female who presents today for evaluation of left-sided facial swelling/pain.  Patient states that she has had a canker sore on the inside of her mouth on the left side for a week or so.  She says she has been treating it at home.  She says that last night all of a sudden she started to feel a little \"warm\" but did not actually check her temperature.  She says that she took some ibuprofen and then went to bed.  She said that she woke up early this morning in her left side of her face was very swollen and painful.  She says that every time she tried to go back to sleep if she rolled on the left her left side she would be yoandy awake.  Besides ibuprofen she has not taken anything for her current symptoms.  She says that it even hurts when she opens her mouth.  She denies chills, chest pain, shortness of breath, difficulty breathing, ear pain, lightheadedness/dizziness, or rash.  She says that she has never had anything like this before.          Review of Systems   Constitutional: Positive for fever (Subjective). Negative for chills and malaise/fatigue.   HENT: Negative for congestion, ear pain, sinus pain, sore throat and tinnitus.         Canker sore inside of left cheek.  Left cheek swelling/pain   Eyes: Negative for blurred vision and pain.   Respiratory: Negative for cough and shortness of breath.    Cardiovascular: Negative for chest pain and palpitations.   Gastrointestinal: Negative for abdominal pain, constipation, diarrhea, nausea and vomiting.   Musculoskeletal: Positive for neck pain (Swollen lymph nodes on left side of neck). Negative for myalgias.   Neurological: Negative for dizziness, tingling, sensory change, focal weakness and headaches.   Endo/Heme/Allergies: Does not bruise/bleed easily. "       PMH:  has a past medical history of EEG abnormal; Hashimoto's disease; Hyperglycemia; Migraine; Pseudotumor cerebri; and Seizure disorder (HCC). She also has no past medical history of Diabetes.  MEDS:   Current Outpatient Prescriptions:   •  MethylPREDNISolone (MEDROL DOSEPAK) 4 MG Tablet Therapy Pack, Take as directed, Disp: 1 Kit, Rfl: 0  •  amoxicillin-clavulanate (AUGMENTIN) 875-125 MG Tab, Take 1 Tab by mouth 2 times a day for 7 days., Disp: 14 Tab, Rfl: 0  •  Liraglutide -Weight Management (SAXENDA) 18 MG/3ML Solution Pen-injector, Inject 3 mg as instructed every day., Disp: 6 PEN, Rfl: 3  •  venlafaxine XR (EFFEXOR XR) 75 MG CAPSULE SR 24 HR, TAKE 1 CAPSULE BY MOUTH ONCE DAILY, Disp: 90 Cap, Rfl: 0  •  phentermine (ADIPEX-P) 37.5 MG tablet, Take 1 Tab by mouth every morning before breakfast for 180 days., Disp: 30 Tab, Rfl: 5  •  Albuterol Sulfate 108 (90 Base) MCG/ACT AEROSOL POWDER, BREATH ACTIVATED, Inhale 1-2 Puffs by mouth every 6 hours as needed (coughing, wheezing)., Disp: 1 Each, Rfl: 0  •  amLODIPine (NORVASC) 10 MG Tab, Take 0.5 Tabs by mouth every day., Disp: 90 Tab, Rfl: 1  •  SAXENDA 18 MG/3ML Solution Pen-injector, , Disp: , Rfl:   •  MIRENA, 52 MG, 20 MCG/24HR IUD, , Disp: , Rfl:   •  spironolactone (ALDACTONE) 25 MG Tab, Take 1 Tab by mouth 2 times a day., Disp: 60 Tab, Rfl: 2  •  indomethacin (INDOCIN) 25 MG Cap, Take 50 mg by mouth 3 times a day. Taking 1 hour before sex to prevent headache, Disp: , Rfl:   •  ondansetron (ZOFRAN ODT) 4 MG TABLET DISPERSIBLE, Take 1 Tab by mouth every four hours as needed (give PO if no IV route available)., Disp: 10 Tab, Rfl: 0  •  levothyroxine (SYNTHROID) 200 MCG Tab, Take 1 Tab by mouth Every morning on an empty stomach., Disp: 90 Tab, Rfl: 1  •  Cholecalciferol (VITAMIN D) 2000 UNITS Cap, Take 2,000 Units by mouth 2 Times a Day., Disp: , Rfl:   ALLERGIES: No Known Allergies  SURGHX:   Past Surgical History:   Procedure Laterality Date   • GYN  "SURGERY      precancerous cells with cervical freezing, HPV positive     SOCHX:  reports that she quit smoking about 6 years ago. She started smoking about 13 years ago. She has never used smokeless tobacco. She reports that she drinks about 0.6 oz of alcohol per week . She reports that she does not use drugs.  FH: Family history was reviewed, no pertinent findings to report     Objective:     /88   Pulse 78   Temp 37.2 °C (98.9 °F) (Temporal)   Resp 14   Ht 1.778 m (5' 10\")   Wt (!) 127 kg (280 lb)   SpO2 98%   BMI 40.18 kg/m²      Physical Exam   Constitutional: She is oriented to person, place, and time. Vital signs are normal. She appears well-developed and well-nourished.   HENT:   Head: Normocephalic and atraumatic.       Right Ear: Tympanic membrane, external ear and ear canal normal. No mastoid tenderness.   Left Ear: Tympanic membrane, external ear and ear canal normal. No mastoid tenderness.   Nose: Nose normal. Right sinus exhibits no maxillary sinus tenderness and no frontal sinus tenderness. Left sinus exhibits no maxillary sinus tenderness and no frontal sinus tenderness.   Mouth/Throat: Oropharynx is clear and moist and mucous membranes are normal. Oral lesions (Canker sore on left buccal mucosa, no surrounding erythema) present. Normal dentition (No dental pain).   Left cheek exhibits swelling.  No induration, ecchymosis, erythema, or warmth appreciated on exam.   Eyes: Pupils are equal, round, and reactive to light. Conjunctivae are normal.   Neck: Normal range of motion.   Cardiovascular: Normal rate, regular rhythm and normal heart sounds.    No murmur heard.  Pulmonary/Chest: Effort normal and breath sounds normal. She has no wheezes.   Lymphadenopathy:     She has cervical adenopathy (Left cervical lymph nodes are very enlarged and are moderately to severely tender to palpation).        Left cervical: Superficial cervical and deep cervical adenopathy present.   Neurological: She is " alert and oriented to person, place, and time.   Skin: Skin is warm and dry. Capillary refill takes less than 2 seconds.   Psychiatric: She has a normal mood and affect. Her behavior is normal. Judgment normal.         Assessment/Plan:     1. Left facial swelling  - MethylPREDNISolone (MEDROL DOSEPAK) 4 MG Tablet Therapy Pack; Take as directed  Dispense: 1 Kit; Refill: 0  - amoxicillin-clavulanate (AUGMENTIN) 875-125 MG Tab; Take 1 Tab by mouth 2 times a day for 7 days.  Dispense: 14 Tab; Refill: 0    2. Canker sores oral    3. Lymphadenopathy, anterior cervical        Discussed with patient that the etiology of her symptoms could be viral as there is no redness or warmth to the area of swelling and there is no obvious infection in the mouth itself.  Advised patient to start the steroid pack today and if there is no improvement in her symptoms to start the continue antibiotics tomorrow.  Discussed reasons to go to the emergency room or to come back to the urgent care for reevaluation.  Patient understands and agrees with the plan.  Work note was given.

## 2018-11-19 NOTE — ED NOTES
Pt ambulates to triage with c/c left facial swelling since waking up this AM.  Pt denies dental pain, sore throat, or SOB.  A&ox4.  Pt to lobby & advised to inform RN of any changes.

## 2018-11-19 NOTE — LETTER
November 19, 2018         Patient: Nickolas Miranda   YOB: 1990   Date of Visit: 11/19/2018           To Whom it May Concern:    Nickolas Miranda was seen in my clinic on 11/19/2018. She may return to work on 11/21/2018.    If you have any questions or concerns, please don't hesitate to call.        Sincerely,           Steffi Panchal P.A.-C.  Electronically Signed

## 2018-11-19 NOTE — ED NOTES
Pt ambulatory to [facility].  Agree with triage assessment.  Pt changed into gown.  Chart up for ERP.

## 2018-11-20 NOTE — ED NOTES
PIV removed and bandaged.  Nickolas Miranda discharged via ambulation with mother - driving home.  Discharge instructions given and reviewed, patient educated to follow up with Otolaryngology, verbalized understanding.  Prescriptions given x 2.  All personal belongings in possession.  No questions at this time.

## 2018-11-20 NOTE — ED PROVIDER NOTES
ED Provider Note    CHIEF COMPLAINT  Chief Complaint   Patient presents with   • Facial Swelling     woke up with this AM, left side       HPI  Nickolas Miranda is a 28 y.o. female who presents to emerge from today with complaints of left facial swelling.  Patient states this started this morning with some tenderness over the left side of her face.  She has noticed a sore in her mouth, thought that this may be due to this and the swelling is over the same side as where the sore in her mucous membrane in her mouth is.  Swelling and pain became more intense today so prompting her to come to the emergency room.  She had no fever chills no chest pain shortness of breath no changes to bowel bladder.    REVIEW OF SYSTEMS  See HPI for further details. All other systems are negative.     PAST MEDICAL HISTORY  Past Medical History:   Diagnosis Date   • EEG abnormal    • Hashimoto's disease    • Hyperglycemia    • Migraine    • Pseudotumor cerebri    • Seizure disorder (HCC)        FAMILY HISTORY  [unfilled]    SOCIAL HISTORY  Social History     Social History   • Marital status: Single     Spouse name: N/A   • Number of children: N/A   • Years of education: N/A     Social History Main Topics   • Smoking status: Former Smoker     Start date: 1/1/2005     Quit date: 5/3/2012   • Smokeless tobacco: Never Used      Comment: 1/2 pack a day   • Alcohol use 0.6 oz/week     1 Glasses of wine per week      Comment: socially, 1 glass of wine per week   • Drug use: No   • Sexual activity: No     Other Topics Concern   • Not on file     Social History Narrative    ** Merged History Encounter **            SURGICAL HISTORY  Past Surgical History:   Procedure Laterality Date   • GYN SURGERY      precancerous cells with cervical freezing, HPV positive       CURRENT MEDICATIONS  Home Medications     Reviewed by Tammi Alfonso R.N. (Registered Nurse) on 11/19/18 at 1318  Med List Status: Not Addressed   Medication Last Dose Status  "  Albuterol Sulfate 108 (90 Base) MCG/ACT AEROSOL POWDER, BREATH ACTIVATED PRN Active   amLODIPine (NORVASC) 10 MG Tab Taking Active   amoxicillin-clavulanate (AUGMENTIN) 875-125 MG Tab  Active   Cholecalciferol (VITAMIN D) 2000 UNITS Cap Taking Active   indomethacin (INDOCIN) 25 MG Cap PRN Active   levothyroxine (SYNTHROID) 200 MCG Tab Taking Active   Liraglutide -Weight Management (SAXENDA) 18 MG/3ML Solution Pen-injector  Active   MethylPREDNISolone (MEDROL DOSEPAK) 4 MG Tablet Therapy Pack  Active   MIRENA, 52 MG, 20 MCG/24HR IUD Taking Active   ondansetron (ZOFRAN ODT) 4 MG TABLET DISPERSIBLE PRN Active   phentermine (ADIPEX-P) 37.5 MG tablet Taking Active   SAXENDA 18 MG/3ML Solution Pen-injector Not Taking Active   spironolactone (ALDACTONE) 25 MG Tab PRN Active   venlafaxine XR (EFFEXOR XR) 75 MG CAPSULE SR 24 HR Taking Active                ALLERGIES  No Known Allergies    PHYSICAL EXAM  VITAL SIGNS: BP (!) 165/109   Pulse 72   Temp 36.7 °C (98.1 °F) (Temporal)   Resp 16   Ht 1.778 m (5' 10\")   Wt (!) 127 kg (279 lb 15.8 oz)   SpO2 96%   BMI 40.17 kg/m²       Constitutional: Well developed, Well nourished,  acute distress, Non-toxic appearance.   HENT: Normocephalic, Atraumatic, Bilateral external ears normal, Oropharynx moist, No oral exudates, Nose normal.  Left buccal mucosa there is a ulceration over the area of swelling there is no dental abscess noted.  She has swelling over the left patient has face extends into the left angle of the jaw where there is lymphadenopathy noted  Eyes: PERRLA, EOMI, Conjunctiva normal, No discharge.   Neck: Normal range of motion, No tenderness, Supple, No stridor.   Lymphatic: Left submandibular lymphadenopathy noted.   Cardiovascular: Normal heart rate, Normal rhythm, No murmurs, No rubs, No gallops.   Thorax & Lungs: Normal breath sounds, No respiratory distress, No wheezing, No chest tenderness.   Abdomen: Bowel sounds normal, Soft, No tenderness, No masses, No " pulsatile masses.   Skin: Warm, Dry, No erythema, No rash.    Extremities: Intact distal pulses, No edema, No tenderness, No cyanosis, No clubbing.   Musculoskeletal: Good range of motion in all major joints. No tenderness to palpation or major deformities noted.   Neurologic: Alert & oriented x 3, Normal motor function, Normal sensory function, No focal deficits noted.   Psychiatric: Affect normal, Judgment normal, Mood normal.         RADIOLOGY/PROCEDURES  CT-SOFT TISSUE NECK WITH   Final Result      1.  Enlargement and inflammatory change involving the left parotid gland consistent with left parotiditis. No stone identified.      2.  There is lymph node enlargement medial and inferior to the left parotid gland consistent with reactive adenopathy.      3.  No evidence of facial abscess.               COURSE & MEDICAL DECISION MAKING  Pertinent Labs & Imaging studies reviewed. (See chart for details)  CT scan performed shows evidence of parotiditis, most likely this is infectious etiology there is no evidence of stone however I did discuss with patient to use lemon drops patient also placed on Augmentin given first dose here through IV of Unasyn.  This may have a viral etiology she has a history of HSV infection her laboratory tests and ulceration over her buccal mucosa which may be consistent with viral infection.  Given referral to ENT Dr. Valentine her follow-up with her primary care physician.  She is placed on Augmentin for and pain medication of Percocet.  She verbalizes understanding instructions need for follow-up  was reviewed.  Discussed opiate regulations and statutes and stated [place] patient understands this is addictive medication not to operate machinery driving with medication no mixture with alcohol keep away from children as well as a properly.  Consent was signed she will follow-up as directed return if further problems.    FINAL IMPRESSION  1.  Acute parotiditis  2.  Lymphadenopathy  3.  Facial  pain second #1/#2         Electronically signed by: Chavo Fuller, 11/19/2018 11:11 PM

## 2018-11-20 NOTE — ED NOTES
Patient medicated for pain per erp order, see MAR.  IV abx infusing, see MAR.  Patient resting on gurney, NA noted, denies difficulty breathing, respirations even and unlabored.

## 2018-12-05 ENCOUNTER — HOSPITAL ENCOUNTER (OUTPATIENT)
Dept: RADIOLOGY | Facility: MEDICAL CENTER | Age: 28
End: 2018-12-05
Attending: PHYSICIAN ASSISTANT
Payer: COMMERCIAL

## 2018-12-05 DIAGNOSIS — E16.2 HYPOGLYCEMIA: ICD-10-CM

## 2018-12-05 DIAGNOSIS — D13.7 INSULINOMA: ICD-10-CM

## 2018-12-05 PROCEDURE — A9585 GADOBUTROL INJECTION: HCPCS | Performed by: PHYSICIAN ASSISTANT

## 2018-12-05 PROCEDURE — 74183 MRI ABD W/O CNTR FLWD CNTR: CPT

## 2018-12-05 PROCEDURE — 700117 HCHG RX CONTRAST REV CODE 255: Performed by: PHYSICIAN ASSISTANT

## 2018-12-05 RX ORDER — GADOBUTROL 604.72 MG/ML
12.5 INJECTION INTRAVENOUS ONCE
Status: COMPLETED | OUTPATIENT
Start: 2018-12-05 | End: 2018-12-05

## 2018-12-05 RX ADMIN — GADOBUTROL 12.5 ML: 604.72 INJECTION INTRAVENOUS at 16:29

## 2018-12-06 ENCOUNTER — PATIENT MESSAGE (OUTPATIENT)
Dept: MEDICAL GROUP | Facility: PHYSICIAN GROUP | Age: 28
End: 2018-12-06

## 2018-12-06 ENCOUNTER — OFFICE VISIT (OUTPATIENT)
Dept: MEDICAL GROUP | Facility: PHYSICIAN GROUP | Age: 28
End: 2018-12-06
Payer: COMMERCIAL

## 2018-12-06 VITALS
DIASTOLIC BLOOD PRESSURE: 92 MMHG | BODY MASS INDEX: 39.37 KG/M2 | HEART RATE: 76 BPM | HEIGHT: 70 IN | OXYGEN SATURATION: 100 % | SYSTOLIC BLOOD PRESSURE: 140 MMHG | TEMPERATURE: 98.2 F | WEIGHT: 275 LBS

## 2018-12-06 DIAGNOSIS — I10 HYPERTENSION, UNSPECIFIED TYPE: ICD-10-CM

## 2018-12-06 DIAGNOSIS — E66.9 OBESITY (BMI 30-39.9): ICD-10-CM

## 2018-12-06 DIAGNOSIS — E16.2 HYPOGLYCEMIA: Primary | ICD-10-CM

## 2018-12-06 PROCEDURE — 99214 OFFICE O/P EST MOD 30 MIN: CPT | Performed by: NURSE PRACTITIONER

## 2018-12-06 RX ORDER — PHENTERMINE HYDROCHLORIDE 37.5 MG/1
37.5 TABLET ORAL
Qty: 30 TAB | Refills: 2 | Status: SHIPPED
Start: 2018-12-06 | End: 2019-03-06

## 2018-12-06 RX ORDER — AMLODIPINE BESYLATE 10 MG/1
5 TABLET ORAL DAILY
Qty: 90 TAB | Refills: 1 | Status: SHIPPED | OUTPATIENT
Start: 2018-12-06 | End: 2019-07-07 | Stop reason: SDUPTHER

## 2018-12-06 NOTE — PROGRESS NOTES
[facility]  Primary Care Office Visit - Problem-Oriented        History:     Nickolas Miranda is a 28 y.o. female who is here today to discuss Follow-Up (GARY ayala) and Medication Refill (phentramine )      Hypoglycemia  Patient continues to have hypoglycemic episodes, she was admitted in September for workup of possible insulinoma. Unfortunately during 72 hour fast her blood sugars did not drop below 74. When asked why she did not follow up with endocrinology she tells me that she is unhappy with the results of this test and having to fast for 72 hours.     She has been back to the ER a few different times for hypoglycemia and unable to bring it up on her own over 70, requires D50. She is testing blood sugars when she feels hypoglycemic around 4 to 5 times weekly.  She is eating a diet low in carbohydrates and higher in protein small frequent meals daily.    She had CT to rule out insulinoma which showed subtle minimally hypervascular areas in the pancreatic head and tail potentially indicating masses.  Recommend further evaluation with pancreas MRI, MRI was non revealing. She is to discuss this with her endocrinologist.     She is here for [facility], generally missing one day of work a week or 4 days in a month to attend office visits, diagnostics, also needs to be covered for ER and hypoglycemic episodes.         Past Medical History:   Diagnosis Date   • EEG abnormal    • Hashimoto's disease    • Hyperglycemia    • Migraine    • Pseudotumor cerebri    • Seizure disorder (HCC)      Past Surgical History:   Procedure Laterality Date   • GYN SURGERY      precancerous cells with cervical freezing, HPV positive     Social History     Social History   • Marital status: Single     Spouse name: N/A   • Number of children: N/A   • Years of education: N/A     Occupational History   • Not on file.     Social History Main Topics   • Smoking status: Former Smoker     Start date: 1/1/2005     Quit date: 5/3/2012   •  Smokeless tobacco: Never Used      Comment: 1/2 pack a day   • Alcohol use 0.6 oz/week     1 Glasses of wine per week      Comment: socially, 1 glass of wine per week   • Drug use: No   • Sexual activity: No     Other Topics Concern   • Not on file     Social History Narrative    ** Merged History Encounter **          History   Smoking Status   • Former Smoker   • Start date: 1/1/2005   • Quit date: 5/3/2012   Smokeless Tobacco   • Never Used     Comment: 1/2 pack a day     Family History   Problem Relation Age of Onset   • Cancer Mother         Cervical     No Known Allergies    Problem List:     Patient Active Problem List    Diagnosis Date Noted   • Abdominal pain 09/12/2017     Priority: High   • Hypoglycemia 09/11/2017     Priority: High   • Hypothyroidism 01/11/2018     Priority: Low   • History of high blood pressure 01/11/2018     Priority: Low   • Migraine 09/11/2017     Priority: Low   • Vitamin D deficiency 05/04/2017     Priority: Low   • Obesity 04/14/2013     Priority: Low   • Seizure disorder (CMS-formerly [facility]) 10/22/2012     Priority: Low   • Major depressive disorder with single episode, in full remission (formerly [facility]) 06/04/2018   • PCOS (polycystic ovarian syndrome) 02/28/2018   • Female hirsutism 02/28/2018   • Hashimoto's thyroiditis 05/04/2017   • Abnormal thyroid blood test 05/02/2017   • Intractable migraine without status migrainosus 04/24/2017   • Chronic daily headache 02/28/2017   • Elevated testosterone level in female 02/28/2017   • Essential hypertension 01/31/2017   • Obesity (BMI 30-39.9) 01/31/2017   • Pseudotumor cerebri 10/15/2012   • History of migraine headaches 10/15/2012         Medications:     Current Outpatient Prescriptions:   •  amLODIPine (NORVASC) 10 MG Tab, Take 0.5 Tabs by mouth every day., Disp: 90 Tab, Rfl: 1  •  phentermine (ADIPEX-P) 37.5 MG tablet, Take 1 Tab by mouth every morning before breakfast for 90 days., Disp: 30 Tab, Rfl: 2  •  Liraglutide -Weight Management (SAXENDA)  "18 MG/3ML Solution Pen-injector, Inject 3 mg as instructed every day. (Patient taking differently: Inject 3 mg as instructed every day. Pt taking 1.8mg per day currently, she is working her way up to 3 mg.), Disp: 6 PEN, Rfl: 3  •  MethylPREDNISolone (MEDROL DOSEPAK) 4 MG Tablet Therapy Pack, Take as directed, Disp: 1 Kit, Rfl: 0  •  venlafaxine XR (EFFEXOR XR) 75 MG CAPSULE SR 24 HR, TAKE 1 CAPSULE BY MOUTH ONCE DAILY (Patient not taking: Reported on 12/6/2018), Disp: 90 Cap, Rfl: 0  •  Albuterol Sulfate 108 (90 Base) MCG/ACT AEROSOL POWDER, BREATH ACTIVATED, Inhale 1-2 Puffs by mouth every 6 hours as needed (coughing, wheezing)., Disp: 1 Each, Rfl: 0  •  SAXENDA 18 MG/3ML Solution Pen-injector, , Disp: , Rfl:   •  MIRENA, 52 MG, 20 MCG/24HR IUD, , Disp: , Rfl:   •  spironolactone (ALDACTONE) 25 MG Tab, Take 1 Tab by mouth 2 times a day., Disp: 60 Tab, Rfl: 2  •  indomethacin (INDOCIN) 25 MG Cap, Take 50 mg by mouth 3 times a day. Taking 1 hour before sex to prevent headache, Disp: , Rfl:   •  ondansetron (ZOFRAN ODT) 4 MG TABLET DISPERSIBLE, Take 1 Tab by mouth every four hours as needed (give PO if no IV route available)., Disp: 10 Tab, Rfl: 0  •  levothyroxine (SYNTHROID) 200 MCG Tab, Take 1 Tab by mouth Every morning on an empty stomach., Disp: 90 Tab, Rfl: 1  •  Cholecalciferol (VITAMIN D) 2000 UNITS Cap, Take 2,000 Units by mouth 2 Times a Day., Disp: , Rfl:       Review of Systems:     Pertinent positives as per HPI, all other systems reviewed and WNL   Physical Assessment:     VS: /92 (BP Location: Left arm, Patient Position: Sitting, BP Cuff Size: Large adult)   Pulse 76   Temp 36.8 °C (98.2 °F)   Ht 1.778 m (5' 10\")   Wt 124.7 kg (275 lb)   SpO2 100%   BMI 39.46 kg/m²     General: Well-developed, well-nourished, female, obese   Head:  Normocephalic. No facial asymmetry noted.  Neuro: Alert and oriented, CNs II-XII intact, no focal deficits.  Skin:No rashes noted. Skin warm, dry, intact   "   Psych: Dressed appropriately for the weather, pleasant and conversant.  Affect, mood & judgment appropriate.    Assessment/Plan:   Nickolas was seen today for follow-up and medication refill.    Diagnoses and all orders for this visit:    Hypoglycemia, ongoing, uncontrolled   - Defer to endo, work up for suspected insulinoma, FMLA completed today.     Hypertension, unspecified type, chronic, controlled on present treatment, monitor   -     amLODIPine (NORVASC) 10 MG Tab; Take 0.5 Tabs by mouth every day.    Obesity (BMI 30-39.9), chronic, controlled on present treatment, monitor   -Patient has had significant weight loss with phentermine, over 100 pounds in the last year.  3 months refill was provided,  is reviewed and in accordance with her prescriptions, no signs of abuse.  She will be due for CSA and UDS at follow-up.  -     phentermine (ADIPEX-P) 37.5 MG tablet; Take 1 Tab by mouth every morning before breakfast for 90 days.    Patient is agreeable to the above plan and voiced understanding. All questions answered.     Please note that this dictation was created using voice recognition software. I have made every reasonable attempt to correct obvious errors, but I expect that there are errors of grammar and possibly content that I did not discover before finalizing the note.      DEVORAH Wall  12/6/2018, 9:46 AM

## 2018-12-06 NOTE — ASSESSMENT & PLAN NOTE
Patient continues to have hypoglycemic episodes, she was admitted in September for workup of possible insulinoma. Unfortunately during 72 hour fast her blood sugars did not drop below 74. When asked why she did not follow up with endocrinology she tells me that she is unhappy with the results of this test and having to fast for 72 hours.     She has been back to the ER a few different times for hypoglycemia and unable to bring it up on her own over 70, requires D50. She is testing blood sugars when she feels hypoglycemic around 4 to 5 times weekly.  She is eating a diet low in carbohydrates and higher in protein small frequent meals daily.    She had CT to rule out insulinoma which showed subtle minimally hypervascular areas in the pancreatic head and tail potentially indicating masses.  Recommend further evaluation with pancreas MRI, MRI was non revealing. She is to discuss this with her endocrinologist.     She is here for [facility], generally missing one day of work a week or 4 days in a month to attend office visits, diagnostics, also needs to be covered for ER and hypoglycemic episodes.

## 2018-12-10 RX ORDER — VENLAFAXINE HYDROCHLORIDE 75 MG/1
75 CAPSULE, EXTENDED RELEASE ORAL DAILY
Qty: 90 CAP | Refills: 1 | Status: SHIPPED | OUTPATIENT
Start: 2018-12-10 | End: 2019-08-12 | Stop reason: SDUPTHER

## 2018-12-10 NOTE — TELEPHONE ENCOUNTER
Was the patient seen in the last year in this department? Yes    Does patient have an active prescription for medications requested? No     Received Request Via: Pharmacy      Pt met protocol?: Yes  pt last ov 12/18

## 2018-12-11 ENCOUNTER — PATIENT MESSAGE (OUTPATIENT)
Dept: MEDICAL GROUP | Facility: PHYSICIAN GROUP | Age: 28
End: 2018-12-11

## 2018-12-11 ENCOUNTER — OFFICE VISIT (OUTPATIENT)
Dept: ENDOCRINOLOGY | Facility: MEDICAL CENTER | Age: 28
End: 2018-12-11
Payer: COMMERCIAL

## 2018-12-11 VITALS
DIASTOLIC BLOOD PRESSURE: 70 MMHG | HEIGHT: 70 IN | SYSTOLIC BLOOD PRESSURE: 108 MMHG | HEART RATE: 93 BPM | OXYGEN SATURATION: 99 % | WEIGHT: 273 LBS | BODY MASS INDEX: 39.08 KG/M2

## 2018-12-11 DIAGNOSIS — E55.9 VITAMIN D DEFICIENCY: ICD-10-CM

## 2018-12-11 DIAGNOSIS — E16.2 HYPOGLYCEMIA: ICD-10-CM

## 2018-12-11 DIAGNOSIS — D13.7 INSULINOMA: ICD-10-CM

## 2018-12-11 DIAGNOSIS — E06.3 HYPOTHYROIDISM DUE TO HASHIMOTO'S THYROIDITIS: ICD-10-CM

## 2018-12-11 DIAGNOSIS — E03.8 HYPOTHYROIDISM DUE TO HASHIMOTO'S THYROIDITIS: ICD-10-CM

## 2018-12-11 DIAGNOSIS — E66.09 CLASS 2 OBESITY DUE TO EXCESS CALORIES WITH BODY MASS INDEX (BMI) OF 39.0 TO 39.9 IN ADULT, UNSPECIFIED WHETHER SERIOUS COMORBIDITY PRESENT: ICD-10-CM

## 2018-12-11 LAB
HBA1C MFR BLD: 5 % (ref ?–5.8)
INT CON NEG: NORMAL
INT CON POS: NORMAL

## 2018-12-11 PROCEDURE — 83036 HEMOGLOBIN GLYCOSYLATED A1C: CPT | Performed by: INTERNAL MEDICINE

## 2018-12-11 PROCEDURE — 99214 OFFICE O/P EST MOD 30 MIN: CPT | Performed by: INTERNAL MEDICINE

## 2018-12-11 NOTE — TELEPHONE ENCOUNTER
From: Nickolas Miranda  To: KARINE Hale  Sent: 12/11/2018 1:19 PM PST  Subject: RE: Prescription Question    I was at work yesterday. So I had to go to the ER at [place]. That doctor was the one that perscribed Keppra. Since I have been on Topamac before he felt it was the better choice. I left a VM with  but have yet to hear back.     ----- Message -----  From: KARINE Hale  Sent: 12/11/18 1:12 PM  To: Nickolas Miranda  Subject: RE: Prescription Question    Also, I see that he mentions keppra in his note and not topamax. Are you able to get ahold of your neurologist who is managing your seizure disorder? I would feel more comfortable if he weighed in and made the decision on which medication and how much to take. Also, where were you seen in the ER? I would like to get the notes.       ----- Message -----   From: Nickolas Miranda   Sent: 12/11/2018 10:55 AM PST   To: KARINE Hale  Subject: Prescription Question    I was back in the ER. I had a seizure. My endocrinologist wants me back on Topamax and he said to get a script rehan

## 2018-12-11 NOTE — PROGRESS NOTES
"Endocrinology Clinic Progress Note  PCP: KARINE Hale    HPI:  Nickolas Miranda is a 28 y.o. old patient who comes in today for review of endocrine problems.    Hypoglycemia:  She states she feels her blood sugars are \"dropping\" more frequently and getting worse in the last month.  She reports 2 days ago that she started to feel her blood sugar drop and going for peanut butter,the next thing she remembers was she was on her bed. She came to about an hour later and she had bitten her tongue and she was covered in bruises from falling and urinated on her self.  She is testing blood sugars when she feels hypoglycemic around  3-4  times weekly. She states any exertion makes her blood sugars drop.  She has been in the 50's and 60's.  She is eating a diet low in carbohydrates and higher in protein with small frequent meals daily.  She has had a history of seizures and was prescribed Kappra but has not started it yet and does not know the dose.     Hypothyroidism:  Currently taking Levothyroxine 200 mcg daily.     Vitamin D Deficiency:  She finished the Vitamin D 50,000 units every 3 days loading dose and is now taking over the counter Vitamin D 4000 units daily.     Obesity:  She is not able to exercise due to a seizure disorder.  She is currently taking Phentermine 37.5 mg daily and Saxenda 3 mg daily.     ROS:  Constitutional: States gained back some of the weight she lost.  Weight has been stable for the last year.  Shaky, light headed, feels exteremly cold and can not talk when blood sugars drop.  HBA1C today was 5.0.  Endo: Denies excessive thirst or frequent urination  All other systems were reviewed and were negative.    Past Medical History:  Patient Active Problem List    Diagnosis Date Noted   • Abdominal pain 09/12/2017     Priority: High   • Hypoglycemia 09/11/2017     Priority: High   • Hypothyroidism 01/11/2018     Priority: Low   • History of high blood pressure 01/11/2018     Priority: " Low   • Migraine 09/11/2017     Priority: Low   • Vitamin D deficiency 05/04/2017     Priority: Low   • Obesity 04/14/2013     Priority: Low   • Seizure disorder (CMS-HCC) 10/22/2012     Priority: Low   • Major depressive disorder with single episode, in full remission (HCC) 06/04/2018   • PCOS (polycystic ovarian syndrome) 02/28/2018   • Female hirsutism 02/28/2018   • Hashimoto's thyroiditis 05/04/2017   • Abnormal thyroid blood test 05/02/2017   • Intractable migraine without status migrainosus 04/24/2017   • Chronic daily headache 02/28/2017   • Elevated testosterone level in female 02/28/2017   • Essential hypertension 01/31/2017   • Obesity (BMI 30-39.9) 01/31/2017   • Pseudotumor cerebri 10/15/2012   • History of migraine headaches 10/15/2012       Medications:    Current Outpatient Prescriptions:   •  LevETIRAcetam (KEPPRA PO), Take  by mouth., Disp: , Rfl:   •  venlafaxine XR (EFFEXOR XR) 75 MG CAPSULE SR 24 HR, Take 1 Cap by mouth every day., Disp: 90 Cap, Rfl: 1  •  phentermine (ADIPEX-P) 37.5 MG tablet, Take 1 Tab by mouth every morning before breakfast for 90 days., Disp: 30 Tab, Rfl: 2  •  amLODIPine (NORVASC) 10 MG Tab, Take 0.5 Tabs by mouth every day., Disp: 90 Tab, Rfl: 1  •  Liraglutide -Weight Management (SAXENDA) 18 MG/3ML Solution Pen-injector, Inject 3 mg as instructed every day. (Patient taking differently: Inject 3 mg as instructed every day. Pt taking 1.8mg per day currently, she is working her way up to 3 mg.), Disp: 6 PEN, Rfl: 3  •  MIRENA, 52 MG, 20 MCG/24HR IUD, , Disp: , Rfl:   •  ondansetron (ZOFRAN ODT) 4 MG TABLET DISPERSIBLE, Take 1 Tab by mouth every four hours as needed (give PO if no IV route available)., Disp: 10 Tab, Rfl: 0  •  levothyroxine (SYNTHROID) 200 MCG Tab, Take 1 Tab by mouth Every morning on an empty stomach., Disp: 90 Tab, Rfl: 1  •  Cholecalciferol (VITAMIN D) 2000 UNITS Cap, Take 2,000 Units by mouth 2 Times a Day., Disp: , Rfl:     Labs: Reviewed    Physical  "Examination:  Vital signs: /70   Pulse 93   Ht 1.778 m (5' 10\")   Wt 123.8 kg (273 lb)   SpO2 99%   BMI 39.17 kg/m²  Body mass index is 39.17 kg/m². Patient's body mass index is 39.17 kg/m². Exercise and nutrition counseling were performed at this visit.  General: No apparent distress, cooperative  Eyes: No scleral icterus, no discharge, normal eyelids  Neck: No abnormal masses on inspection, normal thyroid exam  Resp: Normal effort, clear to auscultation bilaterally  CVS: Regular rate and rhythm, S1 S2 normal, no murmur  Extremities: No lower extremity edema  Abdomen: abdominal obesity present  Musculoskeletal: Normal digits and nails  Skin: No rash on visible skin  Psych: Alert and oriented, normal mood and affect, intact memory and able to make informed decisions.    Assessment and Plan:    1. Hypothyroidism due to Hashimoto's thyroiditis  Continue levothyroxine    2. Hypoglycemia  She continues to have frequent severe hypoglycemic episodes.  CT scan of the abdomen did show approximately 2 cm mass in head of the pancreas and another approximately 2 cm mass in the tail of the pancreas.  This mass very well could be insulinoma.  She will need an urgent referral to gastroenterology Dr. vIory for ERCP to rule out insulinoma.  Personally spoke with Dr. Ivory to update him about her clinical case and the urgency of the situation.  Dr. Ivory was kind enough to accommodate her as soon as possible    3. Vitamin D deficiency  Continue vitamin D.    4. Class 2 obesity due to excess calories with body mass index (BMI) of 39.0 to 39.9 in adult, unspecified whether serious comorbidity present  Continue phentermine and saxenda as adjunct to diet and exercise measures.     Return in about 2 months (around 2/11/2019).    Thank you for allowing me to participate in the care of this patient.    Sanford Ayala M.D.    CC:   Sushma Nunez A.PJesus ManuelROBIE.    This note was created using voice recognition software (Dragon). The " accuracy of the dictation is limited by the abilities of the software. I have reviewed the note prior to signing, however some errors in grammar and context are still possible. If you have any questions related to this note please do not hesitate to contact our office.

## 2018-12-11 NOTE — TELEPHONE ENCOUNTER
From: Nickolas Miranda  To: KARINE Hale  Sent: 12/11/2018 10:55 AM PST  Subject: Prescription Question    I was back in the ER. I had a seizure. My endocrinologist wants me back on Topamax and he said to get a script fromyoi

## 2018-12-12 ENCOUNTER — PATIENT MESSAGE (OUTPATIENT)
Dept: MEDICAL GROUP | Facility: PHYSICIAN GROUP | Age: 28
End: 2018-12-12

## 2018-12-12 ENCOUNTER — TELEPHONE (OUTPATIENT)
Dept: MEDICAL GROUP | Facility: PHYSICIAN GROUP | Age: 28
End: 2018-12-12

## 2018-12-12 DIAGNOSIS — G40.909 SEIZURE DISORDER (HCC): ICD-10-CM

## 2018-12-12 NOTE — TELEPHONE ENCOUNTER
From: Nickolas Miranda  To: KARINE Hale  Sent: 12/12/2018 10:55 AM PST  Subject: RE: Prescription Question    Thank you...    Sincerely,     Your favorite unicorn    ----- Message -----  From: KARINE Hale  Sent: 12/12/18 10:54 AM  To: Nickolas Miranda  Subject: RE: Prescription Question    Yes, I sent in the request on 12/10/18 so just call the pharmacy to fill it.       ----- Message -----   From: Nickolas Miranda   Sent: 12/12/2018 10:17 AM PST   To: KARINE Hale  Subject: RE: Prescription Question    Also did my prescription refill request come to you? We forgot to talk about the antidepressants when I was in there for my FMLA papers. And I only have 3 pills left     ----- Message -----  From: KARINE Hale  Sent: 12/12/18 9:32 AM  To: Nickolas Miranda  Subject: RE: Prescription Question    Done     ----- Message -----   From: Nickolas Miranda   Sent: 12/12/2018 9:28 AM PST   To: KARINE Hale  Subject: RE: Prescription Question    Yes    ----- Message -----  From: KARINE Hale  Sent: 12/12/18 9:24 AM  To: Nickolas Miranda  Subject: RE: Prescription Question    Can I place a referral to general neurology for you?       ----- Message -----   From: Nickolas Miranda   Sent: 12/12/2018 8:58 AM PST   To: KARINE Hale  Subject: RE: Prescription Question    He called me last night to tell me if I had a documented seizure than I am going to loose my drivers license. And he won't give me the percriotuon because he feels I need to see a general Nuerologist. I will just try to manage my blood sugar better.     ----- Message -----  From: KARINE Hale  Sent: 12/12/18 8:53 AM  To: Nickolas Miranda  Subject: RE: Prescription Question    Have you heard from your neurologist? I am out of the office the rest of the week so I will give you an Rx if needed but your neurologist should  really be managing this medication.     ----- Message -----   From: Nickolas Miranda   Sent: 12/11/2018 1:19 PM PST   To: KARINE Hale  Subject: RE: Prescription Question    I was at work yesterday. So I had to go to the ER at [place]. That doctor was the one that perscribed Keppra. Since I have been on Topamac before he felt it was the better choice. I left a VM with  but have yet to hear back.     ----- Message -----  From: KARINE Hale  Sent: 12/11/18 1:12 PM  To: Nickolas Miranda  Subject: RE: Prescription Question    Also, I see that he mentions keppra in his note and not topamax. Are you able to get ahold of your neurologist who is managing your seizure disorder? I would feel more comfortable if he weighed in and made the decision on which medication and how much to take. Also, where were you seen in the ER? I would like to get the notes.       ----- Message -----   From: Nickolas Miranda   Sent: 12/11/2018 10:55 AM PST   To: KARINE Hale  Subject: Prescription Question    I was back in the ER. I had a seizure. My endocrinologist wants me back on Topamax and he said to get a script rehan

## 2018-12-12 NOTE — TELEPHONE ENCOUNTER
----- Message from Nickolas Miranda sent at 12/12/2018  9:28 AM PST -----  Regarding: RE: Prescription Question  Contact: [phone]  Yes    ----- Message -----  From: KARINE Hale  Sent: 12/12/18 9:24 AM  To: Parviztristan DomiisabellaJerri  Subject: RE: Prescription Question    Can I place a referral to general neurology for you?       ----- Message -----     From: Nickolas Harton     Sent: 12/12/2018  8:58 AM PST       To: KARINE Hale  Subject: RE: Prescription Question    He called me last night to tell me if I had a documented seizure than I am going to loose my drivers license. And he won't give me the percriotuon because he feels I need to see a general Nuerologist. I will just try to manage my blood sugar better.      ----- Message -----  From: KARINE Hale  Sent: 12/12/18 8:53 AM  To: Ricardanghia Ruth  Subject: RE: Prescription Question    Have you heard from your neurologist? I am out of the office the rest of the week so I will give you an Rx if needed but your neurologist should really be managing this medication.     ----- Message -----     From: Nickolas Miranda     Sent: 12/11/2018  1:19 PM PST       To: KARINE Hale  Subject: RE: Prescription Question    I was at work yesterday. So I had to go to the ER at [place]. That doctor was the one that perscribed Keppra. Since I have been on Topamac before he felt it was the better choice. I left a VM with  but have yet to hear back.     ----- Message -----  From: KARINE Hale  Sent: 12/11/18 1:12 PM  To: Nickolas Miranda  Subject: RE: Prescription Question    Also, I see that he mentions keppra in his note and not topamax. Are you able to get ahold of your neurologist who is managing your seizure disorder? I would feel more comfortable if he weighed in and made the decision on which medication and how much to take. Also, where were you seen in the ER? I would  like to get the notes.       ----- Message -----     From: Nickolas Miranda     Sent: 12/11/2018 10:55 AM PST       To: KARINE Hale  Subject: Prescription Question    I was back in the ER. I had a seizure. My endocrinologist wants me back on Topamax and he said to get a script fromi

## 2018-12-12 NOTE — TELEPHONE ENCOUNTER
From: Nickolas Miranda  To: KARINE Hale  Sent: 12/12/2018 10:17 AM PST  Subject: RE: Prescription Question    Also did my prescription refill request come to you? We forgot to talk about the antidepressants when I was in there for my FMLA papers. And I only have 3 pills left     ----- Message -----  From: KARINE Hale  Sent: 12/12/18 9:32 AM  To: Nickolas Miranda  Subject: RE: Prescription Question    Done     ----- Message -----   From: Nickolas Miranda   Sent: 12/12/2018 9:28 AM PST   To: KARINE Hale  Subject: RE: Prescription Question    Yes    ----- Message -----  From: KARINE Hale  Sent: 12/12/18 9:24 AM  To: Nickolas Miranda  Subject: RE: Prescription Question    Can I place a referral to general neurology for you?       ----- Message -----   From: Nickolas Miranda   Sent: 12/12/2018 8:58 AM PST   To: KARINE Hale  Subject: RE: Prescription Question    He called me last night to tell me if I had a documented seizure than I am going to loose my drivers license. And he won't give me the percriotuon because he feels I need to see a general Nuerologist. I will just try to manage my blood sugar better.     ----- Message -----  From: KARINE Hale  Sent: 12/12/18 8:53 AM  To: Nickolas Miranda  Subject: RE: Prescription Question    Have you heard from your neurologist? I am out of the office the rest of the week so I will give you an Rx if needed but your neurologist should really be managing this medication.     ----- Message -----   From: Nickolas Miranda   Sent: 12/11/2018 1:19 PM PST   To: KARINE Hale  Subject: RE: Prescription Question    I was at work yesterday. So I had to go to the ER at [place]. That doctor was the one that perscribed Keppra. Since I have been on Topamac before he felt it was the better choice. I left a VM with  but have yet to hear back.     -----  Message -----  From: KARINE Hale  Sent: 12/11/18 1:12 PM  To: Nickolas Miranda  Subject: RE: Prescription Question    Also, I see that he mentions keppra in his note and not topamax. Are you able to get ahold of your neurologist who is managing your seizure disorder? I would feel more comfortable if he weighed in and made the decision on which medication and how much to take. Also, where were you seen in the ER? I would like to get the notes.       ----- Message -----   From: Nickolas Miranda   Sent: 12/11/2018 10:55 AM PST   To: KARINE Hale  Subject: Prescription Question    I was back in the ER. I had a seizure. My endocrinologist wants me back on Topamax and he said to get a script rehan

## 2018-12-12 NOTE — TELEPHONE ENCOUNTER
From: Nickolas Miranda  To: KARINE Hale  Sent: 12/12/2018 9:28 AM PST  Subject: RE: Prescription Question    Yes    ----- Message -----  From: KARINE Hale  Sent: 12/12/18 9:24 AM  To: Nickolas Miranda  Subject: RE: Prescription Question    Can I place a referral to general neurology for you?       ----- Message -----   From: Nickolas Miranda   Sent: 12/12/2018 8:58 AM PST   To: KARINE Hale  Subject: RE: Prescription Question    He called me last night to tell me if I had a documented seizure than I am going to loose my drivers license. And he won't give me the percriotuon because he feels I need to see a general Nuerologist. I will just try to manage my blood sugar better.     ----- Message -----  From: KARINE Hale  Sent: 12/12/18 8:53 AM  To: Nickolas Miranda  Subject: RE: Prescription Question    Have you heard from your neurologist? I am out of the office the rest of the week so I will give you an Rx if needed but your neurologist should really be managing this medication.     ----- Message -----   From: Nickolas Miranda   Sent: 12/11/2018 1:19 PM PST   To: KARINE Hale  Subject: RE: Prescription Question    I was at work yesterday. So I had to go to the ER at [place]. That doctor was the one that perscribed Keppra. Since I have been on Topamac before he felt it was the better choice. I left a VM with  but have yet to hear back.     ----- Message -----  From: KARINE Hale  Sent: 12/11/18 1:12 PM  To: Nickolas Miranda  Subject: RE: Prescription Question    Also, I see that he mentions keppra in his note and not topamax. Are you able to get ahold of your neurologist who is managing your seizure disorder? I would feel more comfortable if he weighed in and made the decision on which medication and how much to take. Also, where were you seen in the ER? I would like to get the notes.        ----- Message -----   From: Nickolas Miranda   Sent: 12/11/2018 10:55 AM PST   To: KARINE Hale  Subject: Prescription Question    I was back in the ER. I had a seizure. My endocrinologist wants me back on Topamax and he said to get a script fromi

## 2018-12-12 NOTE — TELEPHONE ENCOUNTER
From: Nickolas Miranda  To: KARINE Hale  Sent: 12/12/2018 8:58 AM PST  Subject: RE: Prescription Question    He called me last night to tell me if I had a documented seizure than I am going to loose my drivers license. And he won't give me the percriotuon because he feels I need to see a general Nuerologist. I will just try to manage my blood sugar better.     ----- Message -----  From: KARINE Hale  Sent: 12/12/18 8:53 AM  To: Nickolas Miranda  Subject: RE: Prescription Question    Have you heard from your neurologist? I am out of the office the rest of the week so I will give you an Rx if needed but your neurologist should really be managing this medication.     ----- Message -----   From: Nickolas Miranda   Sent: 12/11/2018 1:19 PM PST   To: KARINE Hale  Subject: RE: Prescription Question    I was at work yesterday. So I had to go to the ER at [place]. That doctor was the one that perscribed Keppra. Since I have been on Topamac before he felt it was the better choice. I left a VM with  but have yet to hear back.     ----- Message -----  From: KARINE Hale  Sent: 12/11/18 1:12 PM  To: Nickolas Miranda  Subject: RE: Prescription Question    Also, I see that he mentions keppra in his note and not topamax. Are you able to get ahold of your neurologist who is managing your seizure disorder? I would feel more comfortable if he weighed in and made the decision on which medication and how much to take. Also, where were you seen in the ER? I would like to get the notes.       ----- Message -----   From: Nickolas Miranda   Sent: 12/11/2018 10:55 AM PST   To: Sushma C Resavy, A.P.R.N.  Subject: Prescription Question    I was back in the ER. I had a seizure. My endocrinologist wants me back on Topamax and he said to get a script rehan

## 2018-12-12 NOTE — TELEPHONE ENCOUNTER
Patient apparently had seizure last week and was seen at [facility]. 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 Ayala. Please get notes for review. Referral to neuro placed.

## 2018-12-15 ENCOUNTER — APPOINTMENT (OUTPATIENT)
Dept: RADIOLOGY | Facility: MEDICAL CENTER | Age: 28
End: 2018-12-15
Attending: EMERGENCY MEDICINE
Payer: COMMERCIAL

## 2018-12-15 ENCOUNTER — HOSPITAL ENCOUNTER (EMERGENCY)
Facility: MEDICAL CENTER | Age: 28
End: 2018-12-15
Attending: EMERGENCY MEDICINE
Payer: COMMERCIAL

## 2018-12-15 VITALS
HEART RATE: 96 BPM | DIASTOLIC BLOOD PRESSURE: 100 MMHG | RESPIRATION RATE: 18 BRPM | HEIGHT: 68 IN | TEMPERATURE: 97.6 F | BODY MASS INDEX: 40.4 KG/M2 | SYSTOLIC BLOOD PRESSURE: 137 MMHG | OXYGEN SATURATION: 97 % | WEIGHT: 266.54 LBS

## 2018-12-15 DIAGNOSIS — S50.02XA CONTUSION OF LEFT ELBOW, INITIAL ENCOUNTER: ICD-10-CM

## 2018-12-15 PROCEDURE — 73080 X-RAY EXAM OF ELBOW: CPT | Mod: LT

## 2018-12-15 PROCEDURE — 99283 EMERGENCY DEPT VISIT LOW MDM: CPT

## 2018-12-15 ASSESSMENT — PAIN SCALES - GENERAL
PAINLEVEL_OUTOF10: 8
PAINLEVEL_OUTOF10: 7

## 2018-12-15 ASSESSMENT — PAIN DESCRIPTION - DESCRIPTORS: DESCRIPTORS: PRESSURE;TENDER

## 2018-12-15 NOTE — ED NOTES
RN went through the pts. Discharge paperwork with the pt. And emphasized the importance of following up if her symptoms worsen. The pt. Was very receptive to instructions.     The pt. Left ambulatory.

## 2018-12-15 NOTE — ED PROVIDER NOTES
ED Provider Note    Scribed for Michael Pham M.D. by Jose D Yanez. 12/15/2018, 2:10 PM.    Primary care provider: KARINE Hale  Means of arrival: Walk In  History obtained from: Patient  History limited by: None     CHIEF COMPLAINT  Chief Complaint   Patient presents with   • Elbow Injury     left elbow x 1 week     HPI  Nickolas Ferguson is a 28 y.o. female who presents to the Emergency Department for left elbow injury.   The patient reports she woke up one week ago and noticed bruising over her left elbow. The patient states she believes she possibly had an unwitnessed seizure last week. She reports biting her tongue secondary to the seizure. She denies any urinary or bowel incontinence.   The patient presented to [facility] following her left elbow injury, who evaluated patient with Left Elbow X Ray that the patient reports was negative for fractures.   Since being seen in the [facility], the patient complains of mildly worsening left elbow pain. She rates her left elbow pain as moderate. She states bruising over her left elbow has been constant for the last week. She denies treating her pain with any medications, and denies any alleviating factors.     The patient saw Dr. Maharaj, Neurology, for her history of seizures. The patient has a follow up appointment scheduled with her Neurologist, but has not yet started any seizure medications.   The patient denies any fever, nausea, vomiting, neck pain, back pain, headache, focal weakness, or numbness.     REVIEW OF SYSTEMS  Pertinent positives include left elbow pain, left elbow bruising. Pertinent negatives include no fever, nausea, vomiting, neck pain, back pain, headache, focal weakness, or numbness. As above, all other systems reviewed and are negative.   See HPI for further details.     PAST MEDICAL HISTORY   has a past medical history of EEG abnormal; Hashimoto's disease; Hyperglycemia; Migraine; Pseudotumor cerebri; and Seizure disorder  "(HCC).    SURGICAL HISTORY   has a past surgical history that includes gyn surgery.    SOCIAL HISTORY  Social History   Substance Use Topics   • Smoking status: Former Smoker     Start date: 1/1/2005     Quit date: 5/3/2012   • Smokeless tobacco: Never Used      Comment: 1/2 pack a day   • Alcohol use 0.6 oz/week     1 Glasses of wine per week      Comment: socially, 1 glass of wine per week      History   Drug Use No     FAMILY HISTORY  Family History   Problem Relation Age of Onset   • Cancer Mother         Cervical     CURRENT MEDICATIONS  Home Medications     Reviewed by Jany Almendarez R.N. (Registered Nurse) on 12/15/18 at 1359  Med List Status: Complete   Medication Last Dose Status   amLODIPine (NORVASC) 10 MG Tab 12/15/2018 Active   Cholecalciferol (VITAMIN D) 2000 UNITS Cap 12/15/2018 Active   LevETIRAcetam (KEPPRA PO)  Active   levothyroxine (SYNTHROID) 200 MCG Tab 12/15/2018 Active   Liraglutide -Weight Management (SAXENDA) 18 MG/3ML Solution Pen-injector 12/15/2018 Active   MIRENA, 52 MG, 20 MCG/24HR IUD  Active   ondansetron (ZOFRAN ODT) 4 MG TABLET DISPERSIBLE  Active   phentermine (ADIPEX-P) 37.5 MG tablet 12/15/2018 Active   venlafaxine XR (EFFEXOR XR) 75 MG CAPSULE SR 24 HR 12/15/2018 Active              ALLERGIES  No Known Allergies    PHYSICAL EXAM  VITAL SIGNS: /100   Pulse (!) 112   Temp 36.4 °C (97.6 °F) (Temporal)   Resp 20   Ht 1.727 m (5' 8\")   Wt 120.9 kg (266 lb 8.6 oz)   SpO2 99%   BMI 40.53 kg/m²   Vitals reviewed.  Constitutional: Alert in no apparent distress.  HENT: No signs of trauma, Bilateral external ears normal, Nose normal.   Eyes: Pupils are equal and reactive, Conjunctiva normal, Non-icteric.   Neck: Normal range of motion, No tenderness, Supple, No stridor.   Lymphatic: No lymphadenopathy noted.   Cardiovascular: Regular rate and rhythm, no murmurs.   Thorax & Lungs: Normal breath sounds, No respiratory distress, No wheezing, No chest tenderness.  Abdomen: " Bowel sounds normal, Soft, No tenderness, No peritoneal signs, No masses, No pulsatile masses.  Skin: Bruising of the left elbow that appears consistent with 1 week old.   Back: No bony tenderness, No CVA tenderness.  Extremities: Intact distal pulses, No edema, No tenderness, No cyanosis   Musculoskeletal: Good range of motion in all major joints. No tenderness to palpation or major deformities noted.   Neurologic: Alert , Normal motor function, Normal sensory function, No focal deficits noted.   Psychiatric: Affect normal, Judgment normal, Mood normal.         DIAGNOSTIC STUDIES / PROCEDURES    RADIOLOGY  DX-ELBOW-COMPLETE 3+ LEFT   Final Result      Soft tissue swelling of the posterior elbow. No acute osseous abnormality. No elbow effusion.        The radiologist's interpretation of all radiological studies have been reviewed by me.    COURSE & MEDICAL DECISION MAKING  Nursing notes, VS, PMSFHx reviewed in chart.  Differential diagnoses include but not limited to: contusion, fracture.      2:10 PM Patient seen and examined at bedside.  The patient's left elbow pain is worse. She did have an X Ray one week ago but I am unable to obtain the medical records from outside facility. At this point, there is a possible occult fracture on 1st film, will repeat film for evaluation.     3:03 PM Recheck: Patient re-evaluated at [facility]. The patient was updated of her Left Elbow X Ray results.     The patient is referred to her primary physician for blood pressure management, diabetic screening, and for all other preventative health concerns.      DISPOSITION:  Patient will be discharged home in stable condition.    FOLLOW UP:  [facility], Emergency Dept  [address] [facility] [address] [phone]    If symptoms worsen    [facility] HaleRJesus ManuelN.  [address] [id number] [phone]      As needed    OUTPATIENT MEDICATIONS:  New Prescriptions    No medications on file      FINAL  IMPRESSION  1. Contusion of left elbow, initial encounter          Jose D MÁRQUEZ (Scribe), am scribing for, and in the presence of, Michael Pham M.D..    Electronically signed by: Jose D Yanez (Scribe), 12/15/2018    IMichael M.D. personally performed the services described in this documentation, as scribed by Jose D Yanez in my presence, and it is both accurate and complete.    The note accurately reflects work and decisions made by me.  Michael Pham  12/15/2018  3:12 PM

## 2018-12-15 NOTE — ED TRIAGE NOTES
.  Chief Complaint   Patient presents with   • Elbow Injury     left elbow x 1 week   c/o left lebow pain x 1 week. Pt evaluated at that time however advised to return d/t increased pain and swelling.   Pt also has swelling to tongue after biting it.   Hypoglycemic incident occurred last week. Resulting in this injury

## 2018-12-17 ENCOUNTER — APPOINTMENT (OUTPATIENT)
Dept: ADMISSIONS | Facility: MEDICAL CENTER | Age: 28
End: 2018-12-17
Attending: INTERNAL MEDICINE
Payer: COMMERCIAL

## 2018-12-17 NOTE — OR NURSING
Telephone preadmit done. Pt is aware of npo instructions and will take amlodipine and levothyroxine morning of procedure with small sip of water as per anesthesia protocol.  She will stop phentermine. Dr Langford informed about saxenda injections, and will await his advice regarding this medicine. Pt was also on topamax a few months ago but has weaned off. She will notifiy Dr Ivory of this today. She states she is going to neurologist for evaluation. She states she had seizures long ago, but is vague as to why medicine was weaned off. This RN will notify Dr Ivory's office today. Left message for Dr Ivory's medical assistant regarding hx of seizures and on no meds for this.

## 2018-12-17 NOTE — OR NURSING
"Addendum to note on 12/17/18: Dr Langford returns recommendation that pt get BMP on AM of procedure based upon saxenda injections. Dr bartlett written for BMP pre-procedure on 12/26/18.  Dr Ivory's office calls and states they are aware of pt seizure history and that recent episode that pt had \"was a hypoglycemic\" issue, so will proceed with procedure as scheduled.   "

## 2018-12-19 ENCOUNTER — PATIENT MESSAGE (OUTPATIENT)
Dept: MEDICAL GROUP | Facility: PHYSICIAN GROUP | Age: 28
End: 2018-12-19

## 2018-12-19 ENCOUNTER — HOSPITAL ENCOUNTER (OUTPATIENT)
Dept: LAB | Facility: MEDICAL CENTER | Age: 28
End: 2018-12-19
Attending: INTERNAL MEDICINE
Payer: COMMERCIAL

## 2018-12-19 DIAGNOSIS — E06.3 HYPOTHYROIDISM DUE TO HASHIMOTO'S THYROIDITIS: ICD-10-CM

## 2018-12-19 DIAGNOSIS — E53.8 VITAMIN B 12 DEFICIENCY: ICD-10-CM

## 2018-12-19 DIAGNOSIS — E03.8 HYPOTHYROIDISM DUE TO HASHIMOTO'S THYROIDITIS: ICD-10-CM

## 2018-12-19 DIAGNOSIS — E55.9 VITAMIN D DEFICIENCY: ICD-10-CM

## 2018-12-19 LAB
25(OH)D3 SERPL-MCNC: 26 NG/ML (ref 30–100)
ANION GAP SERPL CALC-SCNC: 13 MMOL/L (ref 0–11.9)
B-HCG SERPL-ACNC: 0.6 MIU/ML (ref 0–5)
BASOPHILS # BLD AUTO: 0.4 % (ref 0–1.8)
BASOPHILS # BLD: 0.03 K/UL (ref 0–0.12)
BUN SERPL-MCNC: 12 MG/DL (ref 8–22)
CALCIUM SERPL-MCNC: 10 MG/DL (ref 8.5–10.5)
CHLORIDE SERPL-SCNC: 106 MMOL/L (ref 96–112)
CO2 SERPL-SCNC: 24 MMOL/L (ref 20–33)
CREAT SERPL-MCNC: 0.8 MG/DL (ref 0.5–1.4)
EOSINOPHIL # BLD AUTO: 0.2 K/UL (ref 0–0.51)
EOSINOPHIL NFR BLD: 2.7 % (ref 0–6.9)
ERYTHROCYTE [DISTWIDTH] IN BLOOD BY AUTOMATED COUNT: 39.9 FL (ref 35.9–50)
GLUCOSE SERPL-MCNC: 68 MG/DL (ref 65–99)
HCT VFR BLD AUTO: 45.8 % (ref 37–47)
HGB BLD-MCNC: 15.4 G/DL (ref 12–16)
IMM GRANULOCYTES # BLD AUTO: 0.02 K/UL (ref 0–0.11)
IMM GRANULOCYTES NFR BLD AUTO: 0.3 % (ref 0–0.9)
LYMPHOCYTES # BLD AUTO: 1.83 K/UL (ref 1–4.8)
LYMPHOCYTES NFR BLD: 24.5 % (ref 22–41)
MCH RBC QN AUTO: 28.2 PG (ref 27–33)
MCHC RBC AUTO-ENTMCNC: 33.6 G/DL (ref 33.6–35)
MCV RBC AUTO: 83.7 FL (ref 81.4–97.8)
MONOCYTES # BLD AUTO: 0.45 K/UL (ref 0–0.85)
MONOCYTES NFR BLD AUTO: 6 % (ref 0–13.4)
NEUTROPHILS # BLD AUTO: 4.93 K/UL (ref 2–7.15)
NEUTROPHILS NFR BLD: 66.1 % (ref 44–72)
NRBC # BLD AUTO: 0 K/UL
NRBC BLD-RTO: 0 /100 WBC
PLATELET # BLD AUTO: 317 K/UL (ref 164–446)
PMV BLD AUTO: 9.9 FL (ref 9–12.9)
POTASSIUM SERPL-SCNC: 3.9 MMOL/L (ref 3.6–5.5)
RBC # BLD AUTO: 5.47 M/UL (ref 4.2–5.4)
SODIUM SERPL-SCNC: 143 MMOL/L (ref 135–145)
T3 SERPL-MCNC: 101 NG/DL (ref 60–181)
T3FREE SERPL-MCNC: 4.1 PG/ML (ref 2.4–4.2)
T4 FREE SERPL-MCNC: 1.04 NG/DL (ref 0.53–1.43)
TSH SERPL DL<=0.005 MIU/L-ACNC: 0.85 UIU/ML (ref 0.38–5.33)
VIT B12 SERPL-MCNC: 560 PG/ML (ref 211–911)
WBC # BLD AUTO: 7.5 K/UL (ref 4.8–10.8)

## 2018-12-19 PROCEDURE — 84439 ASSAY OF FREE THYROXINE: CPT

## 2018-12-19 PROCEDURE — 82306 VITAMIN D 25 HYDROXY: CPT

## 2018-12-19 PROCEDURE — 84702 CHORIONIC GONADOTROPIN TEST: CPT

## 2018-12-19 PROCEDURE — 36415 COLL VENOUS BLD VENIPUNCTURE: CPT

## 2018-12-19 PROCEDURE — 85025 COMPLETE CBC W/AUTO DIFF WBC: CPT

## 2018-12-19 PROCEDURE — 84480 ASSAY TRIIODOTHYRONINE (T3): CPT

## 2018-12-19 PROCEDURE — 80048 BASIC METABOLIC PNL TOTAL CA: CPT

## 2018-12-19 PROCEDURE — 82607 VITAMIN B-12: CPT

## 2018-12-19 PROCEDURE — 84481 FREE ASSAY (FT-3): CPT

## 2018-12-19 PROCEDURE — 84443 ASSAY THYROID STIM HORMONE: CPT

## 2018-12-19 NOTE — LETTER
December 19, 2018        Nickolas Ferguson Ribblett  [address]  [facility] [address]      To whom it may concern:    Nickolas has a medical condition which makes it difficult if not impossible to manage a 40+ hour work week. Such a schedule puts considerable stress on Nickolas and due to her underlying medical condition these long hours can perpetuate life threatening side effects. For her health and wellbeing, please consider adjusting her schedule so that she is not working any more than 40 hours in a week.     If you have any questions or concerns, please don't hesitate to call.        Sincerely,        COBY Hale.    Electronically Signed

## 2018-12-19 NOTE — TELEPHONE ENCOUNTER
From: Nickolas Galdamez  To: KARINE Hale  Sent: 12/19/2018 11:05 AM PST  Subject: RE: Non-Urgent Medical Question    Thank you    ----- Message -----  From: KARINE Hale  Sent: 12/19/18 10:57 AM  To: Nickolas Galdamez  Subject: RE: Non-Urgent Medical Question    Yes, I will have it for you to  at the office today by close of business.     ----- Message -----   From: Nickolas Galdamez   Sent: 12/19/2018 10:29 AM PST   To: KARINE Hale  Subject: Non-Urgent Medical Question    Is there a way that you can write some sort of medical note stating that I am unable to work more than 40hrs a week. 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 8:30am to 7:30pm Monday through Friday and 6 hours on Saturdays. However between my stress level and everything else happening, I am about to fail. Crash and burn. My blood sugar dropped to below 55 twice yesterday alone. I got the whooshing in my ears and the tingling sensation I get before it drops to the point of not being able to come back quickly. Luckily I had the glucose tabs on hand. Even after consuming 2 of the glucose things my blood sugar barely went above 90.

## 2018-12-19 NOTE — TELEPHONE ENCOUNTER
----- Message from Nickolas Galdamez sent at 12/19/2018 10:29 AM PST -----  Regarding: Non-Urgent Medical Question  Contact: [phone]  Is there a way that you can write some sort of medical note stating that I am unable to work more than 40hrs a week. 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 8:30am to 7:30pm Monday through Friday and 6 hours on Saturdays. However between my stress level and everything else happening, I am about to fail. Crash and burn. My blood sugar dropped to below 55 twice yesterday alone. I got the whooshing in my ears and the tingling sensation I get before it drops to the point of not being able to come back quickly. Luckily I had the glucose tabs on hand. Even after consuming 2 of the glucose things my blood sugar barely went above 90.

## 2018-12-26 ENCOUNTER — HOSPITAL ENCOUNTER (OUTPATIENT)
Facility: MEDICAL CENTER | Age: 28
End: 2018-12-26
Attending: INTERNAL MEDICINE | Admitting: INTERNAL MEDICINE
Payer: COMMERCIAL

## 2018-12-26 VITALS
RESPIRATION RATE: 18 BRPM | HEIGHT: 68 IN | TEMPERATURE: 98.4 F | DIASTOLIC BLOOD PRESSURE: 90 MMHG | OXYGEN SATURATION: 96 % | HEART RATE: 98 BPM | BODY MASS INDEX: 40.96 KG/M2 | WEIGHT: 270.28 LBS | SYSTOLIC BLOOD PRESSURE: 147 MMHG

## 2018-12-26 LAB
ANION GAP SERPL CALC-SCNC: 3 MMOL/L (ref 0–11.9)
B-HCG FREE SERPL-ACNC: <5 MIU/ML
BUN SERPL-MCNC: 10 MG/DL (ref 8–22)
CALCIUM SERPL-MCNC: 8.8 MG/DL (ref 8.4–10.2)
CHLORIDE SERPL-SCNC: 107 MMOL/L (ref 96–112)
CO2 SERPL-SCNC: 26 MMOL/L (ref 20–33)
CREAT SERPL-MCNC: 0.75 MG/DL (ref 0.5–1.4)
GLUCOSE BLD-MCNC: 106 MG/DL (ref 65–99)
GLUCOSE BLD-MCNC: 74 MG/DL (ref 65–99)
GLUCOSE SERPL-MCNC: 91 MG/DL (ref 65–99)
IHCGL IHCGL: NEGATIVE MIU/ML
PATHOLOGY CONSULT NOTE: NORMAL
POTASSIUM SERPL-SCNC: 3.6 MMOL/L (ref 3.6–5.5)
SODIUM SERPL-SCNC: 136 MMOL/L (ref 135–145)

## 2018-12-26 PROCEDURE — A9270 NON-COVERED ITEM OR SERVICE: HCPCS

## 2018-12-26 PROCEDURE — 160048 HCHG OR STATISTICAL LEVEL 1-5: Performed by: INTERNAL MEDICINE

## 2018-12-26 PROCEDURE — 700102 HCHG RX REV CODE 250 W/ 637 OVERRIDE(OP)

## 2018-12-26 PROCEDURE — 160025 RECOVERY II MINUTES (STATS): Performed by: INTERNAL MEDICINE

## 2018-12-26 PROCEDURE — 160203 HCHG ENDO MINUTES - 1ST 30 MINS LEVEL 4: Performed by: INTERNAL MEDICINE

## 2018-12-26 PROCEDURE — 84702 CHORIONIC GONADOTROPIN TEST: CPT

## 2018-12-26 PROCEDURE — 700111 HCHG RX REV CODE 636 W/ 250 OVERRIDE (IP)

## 2018-12-26 PROCEDURE — 160035 HCHG PACU - 1ST 60 MINS PHASE I: Performed by: INTERNAL MEDICINE

## 2018-12-26 PROCEDURE — 80048 BASIC METABOLIC PNL TOTAL CA: CPT

## 2018-12-26 PROCEDURE — 160208 HCHG ENDO MINUTES - EA ADDL 1 MIN LEVEL 4: Performed by: INTERNAL MEDICINE

## 2018-12-26 PROCEDURE — 82962 GLUCOSE BLOOD TEST: CPT

## 2018-12-26 PROCEDURE — 36415 COLL VENOUS BLD VENIPUNCTURE: CPT

## 2018-12-26 PROCEDURE — 160046 HCHG PACU - 1ST 60 MINS PHASE II: Performed by: INTERNAL MEDICINE

## 2018-12-26 PROCEDURE — 88172 CYTP DX EVAL FNA 1ST EA SITE: CPT

## 2018-12-26 PROCEDURE — 160009 HCHG ANES TIME/MIN: Performed by: INTERNAL MEDICINE

## 2018-12-26 PROCEDURE — 88312 SPECIAL STAINS GROUP 1: CPT

## 2018-12-26 PROCEDURE — 700101 HCHG RX REV CODE 250

## 2018-12-26 PROCEDURE — 88307 TISSUE EXAM BY PATHOLOGIST: CPT

## 2018-12-26 PROCEDURE — 88173 CYTOPATH EVAL FNA REPORT: CPT | Mod: 91

## 2018-12-26 PROCEDURE — 88305 TISSUE EXAM BY PATHOLOGIST: CPT

## 2018-12-26 PROCEDURE — 160002 HCHG RECOVERY MINUTES (STAT): Performed by: INTERNAL MEDICINE

## 2018-12-26 PROCEDURE — 160036 HCHG PACU - EA ADDL 30 MINS PHASE I: Performed by: INTERNAL MEDICINE

## 2018-12-26 RX ORDER — MIDAZOLAM HYDROCHLORIDE 1 MG/ML
INJECTION INTRAMUSCULAR; INTRAVENOUS
Status: COMPLETED
Start: 2018-12-26 | End: 2018-12-26

## 2018-12-26 RX ORDER — HYDROMORPHONE HYDROCHLORIDE 1 MG/ML
0.4 INJECTION, SOLUTION INTRAMUSCULAR; INTRAVENOUS; SUBCUTANEOUS
Status: DISCONTINUED | OUTPATIENT
Start: 2018-12-26 | End: 2018-12-26 | Stop reason: HOSPADM

## 2018-12-26 RX ORDER — SODIUM CHLORIDE, SODIUM LACTATE, POTASSIUM CHLORIDE, CALCIUM CHLORIDE 600; 310; 30; 20 MG/100ML; MG/100ML; MG/100ML; MG/100ML
INJECTION, SOLUTION INTRAVENOUS CONTINUOUS
Status: DISCONTINUED | OUTPATIENT
Start: 2018-12-26 | End: 2018-12-26 | Stop reason: HOSPADM

## 2018-12-26 RX ORDER — HYDROMORPHONE HYDROCHLORIDE 1 MG/ML
0.2 INJECTION, SOLUTION INTRAMUSCULAR; INTRAVENOUS; SUBCUTANEOUS
Status: DISCONTINUED | OUTPATIENT
Start: 2018-12-26 | End: 2018-12-26 | Stop reason: HOSPADM

## 2018-12-26 RX ORDER — ONDANSETRON 2 MG/ML
4 INJECTION INTRAMUSCULAR; INTRAVENOUS
Status: DISCONTINUED | OUTPATIENT
Start: 2018-12-26 | End: 2018-12-26 | Stop reason: HOSPADM

## 2018-12-26 RX ORDER — DIPHENHYDRAMINE HYDROCHLORIDE 50 MG/ML
12.5 INJECTION INTRAMUSCULAR; INTRAVENOUS
Status: DISCONTINUED | OUTPATIENT
Start: 2018-12-26 | End: 2018-12-26 | Stop reason: HOSPADM

## 2018-12-26 RX ORDER — SODIUM CHLORIDE, SODIUM LACTATE, POTASSIUM CHLORIDE, CALCIUM CHLORIDE 600; 310; 30; 20 MG/100ML; MG/100ML; MG/100ML; MG/100ML
1000 INJECTION, SOLUTION INTRAVENOUS
Status: COMPLETED | OUTPATIENT
Start: 2018-12-26 | End: 2018-12-26

## 2018-12-26 RX ORDER — HYDRALAZINE HYDROCHLORIDE 20 MG/ML
5 INJECTION INTRAMUSCULAR; INTRAVENOUS
Status: DISCONTINUED | OUTPATIENT
Start: 2018-12-26 | End: 2018-12-26 | Stop reason: HOSPADM

## 2018-12-26 RX ORDER — OXYCODONE HCL 5 MG/5 ML
5 SOLUTION, ORAL ORAL
Status: DISCONTINUED | OUTPATIENT
Start: 2018-12-26 | End: 2018-12-26 | Stop reason: HOSPADM

## 2018-12-26 RX ORDER — MIDAZOLAM HYDROCHLORIDE 1 MG/ML
1 INJECTION INTRAMUSCULAR; INTRAVENOUS
Status: DISCONTINUED | OUTPATIENT
Start: 2018-12-26 | End: 2018-12-26 | Stop reason: HOSPADM

## 2018-12-26 RX ORDER — HALOPERIDOL 5 MG/ML
1 INJECTION INTRAMUSCULAR
Status: DISCONTINUED | OUTPATIENT
Start: 2018-12-26 | End: 2018-12-26 | Stop reason: HOSPADM

## 2018-12-26 RX ORDER — MEPERIDINE HYDROCHLORIDE 25 MG/ML
12.5 INJECTION INTRAMUSCULAR; INTRAVENOUS; SUBCUTANEOUS
Status: DISCONTINUED | OUTPATIENT
Start: 2018-12-26 | End: 2018-12-26 | Stop reason: HOSPADM

## 2018-12-26 RX ORDER — HYDROMORPHONE HYDROCHLORIDE 1 MG/ML
0.1 INJECTION, SOLUTION INTRAMUSCULAR; INTRAVENOUS; SUBCUTANEOUS
Status: DISCONTINUED | OUTPATIENT
Start: 2018-12-26 | End: 2018-12-26 | Stop reason: HOSPADM

## 2018-12-26 RX ORDER — OXYCODONE HCL 5 MG/5 ML
SOLUTION, ORAL ORAL
Status: COMPLETED
Start: 2018-12-26 | End: 2018-12-26

## 2018-12-26 RX ORDER — OXYCODONE HCL 5 MG/5 ML
10 SOLUTION, ORAL ORAL
Status: DISCONTINUED | OUTPATIENT
Start: 2018-12-26 | End: 2018-12-26 | Stop reason: HOSPADM

## 2018-12-26 RX ORDER — HYDRALAZINE HYDROCHLORIDE 20 MG/ML
INJECTION INTRAMUSCULAR; INTRAVENOUS
Status: COMPLETED
Start: 2018-12-26 | End: 2018-12-26

## 2018-12-26 RX ADMIN — OXYCODONE HYDROCHLORIDE 5 MG: 5 SOLUTION ORAL at 10:15

## 2018-12-26 RX ADMIN — MIDAZOLAM HYDROCHLORIDE 1 MG: 1 INJECTION, SOLUTION INTRAMUSCULAR; INTRAVENOUS at 09:22

## 2018-12-26 RX ADMIN — MIDAZOLAM HYDROCHLORIDE 1 MG: 1 INJECTION INTRAMUSCULAR; INTRAVENOUS at 09:22

## 2018-12-26 RX ADMIN — FENTANYL CITRATE 25 MCG: 50 INJECTION, SOLUTION INTRAMUSCULAR; INTRAVENOUS at 09:35

## 2018-12-26 RX ADMIN — FENTANYL CITRATE 25 MCG: 50 INJECTION, SOLUTION INTRAMUSCULAR; INTRAVENOUS at 09:40

## 2018-12-26 RX ADMIN — SODIUM CHLORIDE, SODIUM LACTATE, POTASSIUM CHLORIDE, CALCIUM CHLORIDE 1000 ML: 600; 310; 30; 20 INJECTION, SOLUTION INTRAVENOUS at 07:05

## 2018-12-26 RX ADMIN — FENTANYL CITRATE 25 MCG: 50 INJECTION, SOLUTION INTRAMUSCULAR; INTRAVENOUS at 09:22

## 2018-12-26 RX ADMIN — FENTANYL CITRATE 25 MCG: 50 INJECTION, SOLUTION INTRAMUSCULAR; INTRAVENOUS at 10:00

## 2018-12-26 RX ADMIN — HYDRALAZINE HYDROCHLORIDE 5 MG: 20 INJECTION, SOLUTION INTRAMUSCULAR; INTRAVENOUS at 10:10

## 2018-12-26 ASSESSMENT — PAIN SCALES - GENERAL
PAINLEVEL_OUTOF10: 5
PAINLEVEL_OUTOF10: 4
PAINLEVEL_OUTOF10: 0
PAINLEVEL_OUTOF10: 5
PAINLEVEL_OUTOF10: 0
PAINLEVEL_OUTOF10: 5

## 2018-12-26 NOTE — OR NURSING
1056: To stage ll. Up to chair and dressed w/ CNA assist.   1114: Home care instructions reviewed w/ pt and family. Questions, concerns addressed. Meets criteria for discharge.

## 2018-12-26 NOTE — PROCEDURES
Pre-procedure Diagnoses:   Abnormal abdominal CT scan [R93.5]   Bilious vomiting with nausea [R11.14]   Hypoglycemia [E16.2]   Female hirsutism [L68.0]   Post-procedure Diagnoses:   Mass of pancreas [K86.9]   Procedures:   UPPER ENDOSCOPIC ULTRASOUND (EUS) GUIDED FINE-NEEDLE ASPIRATION BIOPSIES (FNA) OF PANCREAS [54306 (CPT®)]   ESOPHAGOGASTRODUODENOSCOPY OR UPPER GI ENDOSCOPY WITH MUCOSAL BIOPSIES [29526 (CPT®)]     Endoscopist: Kleber Ivory MD, [facility], [facility] [place]    Anesthesiologist: Dr. Elham Ybarra    Consent: Risks, benefits, and alternatives were discussed with patient. Consenting person was given an opportunity to ask questions and discuss other options. Risks including but not limited to pancreatitis, hypoglycemia, hypertension, hypotension, perforation, infection, bleeding, missed lesion(s), cardiac and/or pulmonary event, aspiration, stroke, possible need for surgery and/or interventional radiology, hospitalization possibly prolonged, discomfort, unsuccessful and/or incomplete procedure, indefinite diagnosis, ineffective therapy and/or persistent symptoms, possible need for repeat procedures and/or additional testings, damage to adjacent organs and/or vascular structures, medication reaction, disability, death, and other adverse events possibly life-threatening. Discussion was undertaken with Layman's terms. Consenting person stated understanding and acceptance of these risks, and wished to proceed. Informed consent was given in clear state of mind.    Endoscopic procedures in detail: Diagnostic endoscope was inserted from mouth into second portion of the duodenum, retroflexion was performed in the stomach.  Gastric and duodenal biopsies were obtained.  There was suction of insufflated air and stomach fluid contents upon removal.      Curvilinear echoendoscope was inserted from mouth to second portion of the duodenum.  Pancreas was examined with identification of normal vascular landmarks including superior  mesenteric artery, superior mesenteric vein, portal vein, celiac artery, portal vein confluence, and splenorenal junction.  Biliary duct was imaged and traced from intrapancreatic portion to hepatic hilum.  Celiac axis was imaged to look for echogenic lymph nodes.    Fine-needle aspiration biopsies were obtained via a transgastric approach from pancreas tail after Doppler studies had identified a vessel-free path for all needle biopsies.  Three passes were obtained with a 22-gauge SharkCore, multiple cytology slides were created and core or sampling remnants were sent to pathology.     Fine-needle aspiration biopsies were obtained via a transduodenal approach from pancreas head after Doppler studies had identified a vessel-free path for all needle biopsies.  Seven passes were obtained (five passes with 22-gauge SharkCore and two with 19-gauge SharkCore), multiple cytology slides were created and core or sampling remnants were sent to pathology.  There was suction of insufflated air and stomach fluid contents upon removal.    Modifer 22 for EUS-FNA due to using three different needles, biopsying two different sites and eight needle passes.    Procedure times:  - In-room 08:21  - Start 08:35  - Completed 09:01  - Out of room per nursing.    Esophagogastroduodenoscopy Findings:  - Esophagus: endoscopically unremarkable.   - Stomach: endoscopically unremarkable, gastric biopsies obtained to evaluate H.pylori status.  - Duodenum: endoscopically unremarkable to 2nd portion, duodenal biopsies obtained to evaluate for celiac sprue.    Endoscopic Ultrasound Findings:  - Pancreas: ill-defined 24.0x12.4mm pancreas head hypoechoic lesion and mixed echotexture in tail without definitive mass, fine-needle aspirated.  - Celiac axis: no echogenic lymph nodes.  - Fine-Needle aspiration: pancreas head and tail FNA'd as per above.    Impression:  1. Ill-defined pancreas head lesion, FNA'd  2. Pancreas tail, FNA'd  3. Gastric and  duodenal biopsies obtained.    Recommendations:   1.  Routine post-endoscopy anesthesia recovery care.  Discharge patient when she is awake, alert and comfortable, when recovery criteria are met.  Aspiration and fall precautions x 24 hours.   2. Follow-up with endocrinologist Dr. Sanford Ayala as scheduled in early January of 2019.  3. I spoke to patient, ride and recovery nurse about impression, diagnosis and recommendations.

## 2018-12-26 NOTE — OR NURSING
0910 received from endo  resp spont crying pt states from emotion  abd soft  Medicated for anxiety  1000  Continuing to medicate for rae abd pain  1030 still c/o 6/10 abd pain  Will wait for DR Ivory before discharge  1025 up to bathroom with assist  1050 Dr Ivory here  Informed of pt abd pain  OK to transfer to stage 2  Meets discharge criteria

## 2018-12-26 NOTE — DISCHARGE INSTRUCTIONS
ENDOSCOPY HOME CARE INSTRUCTIONS    GASTROSCOPY OR ERCP  1. Don't eat or drink anything for about an hour after the test. You can then resume your regular diet.  2. Don't drive or drink alcohol for 24 hours. The medication you received will make you too drowsy.  3. Don't take any coffee, tea, or aspirin products until after you see your doctor. These can harm the lining of your stomach.  4. If you begin to vomit bloody material, or develop black or bloody stools, call your doctor as soon as possible.  5. If you have any neck, chest, abdominal pain or temp of 100 degrees, call your doctor.  6. See your doctor   7. Additional instructions:   8. Prescriptions: ***    Dr Ivory [phone]    You should call 911 if you develop problems with breathing or chest pain.  If any questions arise, call your doctor. If your doctor is not available, please feel free to call [phone]. You can also call the HEALTH HOTLINE open 24 hours/day, 7 days/week and speak to a nurse at [phone], or toll free [phone].    Depression / Suicide Risk    As you are discharged from this [facility] facility, it is important to learn how to keep safe from harming yourself.    Recognize the warning signs:  · Abrupt changes in personality, positive or negative- including increase in energy   · Giving away possessions  · Change in eating patterns- significant weight changes-  positive or negative  · Change in sleeping patterns- unable to sleep or sleeping all the time   · Unwillingness or inability to communicate  · Depression  · Unusual sadness, discouragement and loneliness  · Talk of wanting to die  · Neglect of personal appearance   · Rebelliousness- reckless behavior  · Withdrawal from people/activities they love  · Confusion- inability to concentrate     If you or a loved one observes any of these behaviors or has concerns about self-harm, here's what you can do:  · Talk about it- your feelings and reasons for harming  yourself  · Remove any means that you might use to hurt yourself (examples: pills, rope, extension cords, firearm)  · Get professional help from the community (Mental Health, Substance Abuse, psychological counseling)  · Do not be alone:Call your Safe Contact- someone whom you trust who will be there for you.  · Call your local CRISIS HOTLINE 262-2500 or 107-564-3173  · Call your local Children's Mobile Crisis Response Team [place] (760) 818-6164 or www.VenueBook  · Call the toll free National Suicide Prevention Hotlines   · National Suicide Prevention Lifeline 713-480-XYIX (8017)  · National Hope Line Network 800-SUICIDE (952-9276)    I acknowledge receipt and understanding of these Home Care Instructions.

## 2018-12-26 NOTE — PROGRESS NOTES
Risks, benefits, and alternatives were discussed with patient.  Consenting person was given an opportunity to ask questions and discuss other options.  Risks including but not limited to pancreatitis, hypoglycemia, hypertension, hypotension, perforation, infection, bleeding, missed lesion(s), cardiac and/or pulmonary event, aspiration, stroke, possible need for surgery and/or interventional radiology, hospitalization possibly prolonged, discomfort, unsuccessful and/or incomplete procedure, indefinite diagnosis, ineffective therapy and/or persistent symptoms, possible need for repeat procedures and/or additional testings, damage to adjacent organs and/or vascular structures, medication reaction, disability, death, and other adverse events possibly life-threatening.  Discussion was undertaken with Layman's terms.  Consenting person stated understanding and acceptance of these risks, and wished to proceed.  Informed consent was given in clear state of mind.

## 2019-01-07 DIAGNOSIS — D13.7 INSULINOMA: ICD-10-CM

## 2019-01-07 DIAGNOSIS — E16.2 HYPOGLYCEMIA: ICD-10-CM

## 2019-01-07 NOTE — PROGRESS NOTES
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  She agrees and understands

## 2019-01-09 ENCOUNTER — APPOINTMENT (OUTPATIENT)
Dept: ENDOCRINOLOGY | Facility: MEDICAL CENTER | Age: 29
End: 2019-01-09
Payer: COMMERCIAL

## 2019-01-14 ENCOUNTER — TELEPHONE (OUTPATIENT)
Dept: ENDOCRINOLOGY | Facility: MEDICAL CENTER | Age: 29
End: 2019-01-14

## 2019-01-14 DIAGNOSIS — E16.2 HYPOGLYCEMIA: ICD-10-CM

## 2019-01-14 DIAGNOSIS — D13.7 INSULINOMA: ICD-10-CM

## 2019-01-14 NOTE — TELEPHONE ENCOUNTER
Pt called stating [facility] is not wanting to get her in until April and was rude over the phone. They dont think what she is being seen for is that urgent and would like to go somewhere else. I spoke with Dr. Ayala and he put a new  Urgent referral for [facility] as well as another urgent referral to [facility]. Left message for pt letting her know and to call if any questions or concerns.

## 2019-01-17 ENCOUNTER — TELEPHONE (OUTPATIENT)
Dept: ENDOCRINOLOGY | Facility: MEDICAL CENTER | Age: 29
End: 2019-01-17

## 2019-01-18 ENCOUNTER — PATIENT MESSAGE (OUTPATIENT)
Dept: MEDICAL GROUP | Facility: PHYSICIAN GROUP | Age: 29
End: 2019-01-18

## 2019-01-18 ENCOUNTER — OFFICE VISIT (OUTPATIENT)
Dept: NEUROLOGY | Facility: MEDICAL CENTER | Age: 29
End: 2019-01-18
Payer: COMMERCIAL

## 2019-01-18 VITALS
SYSTOLIC BLOOD PRESSURE: 112 MMHG | DIASTOLIC BLOOD PRESSURE: 70 MMHG | BODY MASS INDEX: 41.22 KG/M2 | HEART RATE: 78 BPM | HEIGHT: 68 IN | TEMPERATURE: 98.5 F | OXYGEN SATURATION: 99 % | WEIGHT: 272 LBS | RESPIRATION RATE: 16 BRPM

## 2019-01-18 DIAGNOSIS — E16.2 HYPOGLYCEMIA: ICD-10-CM

## 2019-01-18 DIAGNOSIS — G43.019 INTRACTABLE MIGRAINE WITHOUT AURA AND WITHOUT STATUS MIGRAINOSUS: ICD-10-CM

## 2019-01-18 DIAGNOSIS — G93.2 PSEUDOTUMOR CEREBRI: ICD-10-CM

## 2019-01-18 PROCEDURE — 99215 OFFICE O/P EST HI 40 MIN: CPT | Performed by: PSYCHIATRY & NEUROLOGY

## 2019-01-18 RX ORDER — TOPIRAMATE 100 MG/1
100 TABLET, FILM COATED ORAL
Qty: 90 TAB | Refills: 1 | Status: SHIPPED | OUTPATIENT
Start: 2019-01-18 | End: 2019-07-12

## 2019-01-18 NOTE — PROGRESS NOTES
NEUROLOGY NOTE    Referring Physician  Cecilia Garcia      CHIEF COMPLAINT:    Lost follow up with us since 2017  Last GTCS Dec 2018  From the age of 16YO to 24YO-- followed by Dr Maharaj for pseudotumor cerebri  Dr Maharaj then referred the patient to another neurologist--another neurologist also told her that she does not have pseudotumor cereberi any more?  Insurance denied weight loss surgery  Headache worsened again this morning    Chief Complaint   Patient presents with   • Follow-Up     Seizure disorder       PRESENT ILLNESS:     Lost follow up with us since 2017  Last GTCS Dec 2018    From the age of 16YO to 24YO-- followed by Dr Maharaj for pseudotumor cerebri  Dr Maharaj then referred the patient to another neurologist--another neurologist also told her that she does not have pseudotumor cereberi any more?  Insurance denied weight loss surgery      Headache intractable-- since teenager  1. Location--occipital  2. Characteristics--throbbing  3. Associated--light , blurred vision , dizziness, black spots  4. Worsening--stress, intercourse, altitude  5. Relieving factors-- Excedrin migraine, topamax 50mg HS   6. Family History-not she could recall  7. Every 3 day- could last 1-2 days   8. Severity-- can not work   9. Sleep-- not able to sleep  10. Coffee intake-- not coffee addict      PAST MEDICAL HISTORY:  Past Medical History:   Diagnosis Date   • Cancer (HCC) 2008    pre-cancerous cervical cells--froze the cervix   • EEG abnormal    • Hashimoto's disease    • Hyperglycemia    • Hypertension    • Migraine    • Pseudotumor cerebri    • Seizure disorder (HCC)        PAST SURGICAL HISTORY:  Past Surgical History:   Procedure Laterality Date   • GASTROSCOPY N/A 12/26/2018    Procedure: GASTROSCOPY;  Surgeon: Kleber Ivory M.D.;  Location: SUR[facility]ERY [facility];  Service: Gastroenterology   • GASTROSCOPY WITH BIOPSY N/A 12/26/2018    Procedure: GASTROSCOPY WITH BIOPSY - GASTRIC;  Surgeon: Kleber ERAZO  DARBY Ivory;  Location: [facility];  Service: Gastroenterology   • EGD W/ENDOSCOPIC ULTRASOUND N/A 12/26/2018    Procedure: EGD W/ENDOSCOPIC ULTRASOUND - UPPER, CURVILINEAR;  Surgeon: Kleber Ivory M.D.;  Location: [facility];  Service: Gastroenterology   • EGD WITH ASP/BX N/A 12/26/2018    Procedure: EGD WITH ASP/BX - GUIDED FNA PANCREATIC HEAD AND/OR TAIL LESION;  Surgeon: Kleber Ivory M.D.;  Location: [facility];  Service: Gastroenterology   • GYN SURGERY      precancerous cells with cervical freezing, HPV positive       FAMILY HISTORY:  Family History   Problem Relation Age of Onset   • Cancer Mother         Cervical       SOCIAL HISTORY:  Social History     Social History   • Marital status: Single     Spouse name: N/A   • Number of children: N/A   • Years of education: N/A     Occupational History   • Not on file.     Social History Main Topics   • Smoking status: Former Smoker     Start date: 1/1/2005     Quit date: 5/3/2012   • Smokeless tobacco: Never Used      Comment: 1/2 pack a day   • Alcohol use 0.6 oz/week     1 Glasses of wine per week      Comment: socially, 1 glass of wine per week   • Drug use: No   • Sexual activity: No     Other Topics Concern   • Not on file     Social History Narrative    ** Merged History Encounter **          ALLERGIES:  No Known Allergies  TOBHX  History   Smoking Status   • Former Smoker   • Start date: 1/1/2005   • Quit date: 5/3/2012   Smokeless Tobacco   • Never Used     Comment: 1/2 pack a day     ALCHX  History   Alcohol Use   • 0.6 oz/week   • 1 Glasses of wine per week     Comment: socially, 1 glass of wine per week     DRUGHX  History   Drug Use No           MEDICATIONS:  Current Outpatient Prescriptions   Medication   • venlafaxine XR (EFFEXOR XR) 75 MG CAPSULE SR 24 HR   • amLODIPine (NORVASC) 10 MG Tab   • Liraglutide -Weight Management (SAXENDA) 18 MG/3ML Solution Pen-injector   • MIRENA, 52 MG, 20 MCG/24HR IUD   •  levothyroxine (SYNTHROID) 200 MCG Tab   • Cholecalciferol (VITAMIN D) 2000 UNITS Cap   • phentermine (ADIPEX-P) 37.5 MG tablet     No current facility-administered medications for this visit.        REVIEW OF SYSTEM:    Constitutional: Denies fevers, Denies weight changes   Eyes: Denies changes in vision, no eye pain   Ears/Nose/Throat/Mouth: Denies nasal congestion or sore throat   Cardiovascular: HTN  Respiratory: Denies SOB.   Gastrointestinal/Hepatic: Denies abdominal pain, nausea, vomiting, diarrhea, constipation or GI bleeding   Genitourinary: Denies bladder dysfunction, dysuria or frequency   Musculoskeletal/Rheum: Denies joint pain and swelling   Skin/Breast: Denies rash, denies breast lumps or discharge   Neurological: seizure, pseudotumor cereberri  Psychiatric: denies mood disorder   Endocrine: denies hx of diabetes or thyroid dysfunction   Heme/Oncology/Lymph Nodes: Denies enlarged lymph nodes, denies brusing or known bleeding disorder   Allergic/Immunologic: Denies hx of allergies   Thyroid -- borderline ,-- thyroid disease runs in the family        PHYSICAL AND NEUROLOGICAL EXMAINATIONS:  VITAL SIGNS: There were no vitals taken for this visit.  CURRENT WEIGHT:   BMI: There is no height or weight on file to calculate BMI.  PREVIOUS WEIGHTS:  Wt Readings from Last 25 Encounters:   12/26/18 122.6 kg (270 lb 4.5 oz)   12/15/18 120.9 kg (266 lb 8.6 oz)   12/11/18 123.8 kg (273 lb)   12/06/18 124.7 kg (275 lb)   11/19/18 (!) 127 kg (279 lb 15.8 oz)   11/19/18 (!) 127 kg (280 lb)   10/30/18 124.7 kg (275 lb)   06/04/18 119.4 kg (263 lb 4.8 oz)   05/14/18 122 kg (269 lb)   02/28/18 121.1 kg (267 lb)   01/29/18 (!) 126.1 kg (278 lb)   01/11/18 (!) 126.1 kg (278 lb)   01/03/18 (!) 126.9 kg (279 lb 11.2 oz)   12/28/17 (!) 125.1 kg (275 lb 12.8 oz)   12/04/17 122.5 kg (270 lb)   09/12/17 (!) 126.6 kg (279 lb)   08/30/17 (!) 127.9 kg (282 lb)   08/16/17 (!) 129.3 kg (285 lb)   08/08/17 (!) 127.9 kg (282 lb)    07/27/17 (!) 129.7 kg (286 lb)   06/08/17 (!) 135.2 kg (298 lb)   05/02/17 (!) 136.1 kg (300 lb)   04/24/17 (!) 138.3 kg (305 lb)   02/28/17 (!) 139.7 kg (308 lb)   02/20/17 (!) 142 kg (313 lb)       General appearance of patient: WDWN(+) NAD(+)    EYES  o Fundus : Papilledem(-) Exudates(-) Hemorrhage(-)  Nervous System  Orientation to time, place and person(+)  Memory normal(+)  Language: aphasia(-)  Knowledge: past(+) Current(+)  Attention(+)  Cranial Nerves  • Nerve 2: intact  • Nerve 3,4,6: intact  • Nerve 5 : intact  • Nerve 7: intact  • Nerve 8: intact  • Nerve 9 & 10: intact  • Nerve 11: intact  • Nerve 12: intact  Muscle Power and muscle tone: symmetric, normal in upper and lower  Sensory System: Pin sensation intact(+)  Reflexes: symmetric throughout  Cerebellar Function FNP normal   Gait : Steady(+) TandemGait steady(+)  Heart and Vascular  Peripheral Vasucular system : Edema (-) Swelling(-)  RHB, Breathing sound clear  abdomen bowel sound normoactive  Extremities freely moveable  Joints no contracture       NEUROIMAGING: I reviewed the MRI/CT of brain         IMPRESSION:    1.  Hx of pseudotumor cereberi from age of 18 YO to 24YO--- taken cared by Dr Maharaj years ago-- the most recent spinal tap had opening pressure Opening pressure 180 mm CSF 1/2018  2.  Hx of seizure -- the last seizure was Dec 2018- some doctors believed these seizures were related with low blood sugar?( per patient's statement)  3.  Hx of HTN, Thyroid Disease ( Borderline)   4.  Intractable headache associated with speech problems  5.  Vit D deficiency  6.  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  Headache worsened after exercise and after sexual activity    PLAN/RECOMMENDATIONS:    The patient lost follow up with [facility] neurology team since the end of 2017  We offered spinal  tap in 1/2018-- which was not remarkable    The patient has plans to see endocrinologist in [place]-- regarding the low blood sugar?and endocrine problems    At times, doctors thought her seizures might be secondary to low blood sugar    In the past, topamax helped the headache--- but sex and light could make her headache worse    We will give the patient Topamax back 100mg before sleep for headache prevention  If interest in Botox for headache, please notify the       We could provide the following tests here  1. Repeat LP and opening pressure with Hx of pseudotumor cereberi  2. Repeat EEG with recent breakthrough seizures        We do explain to the patient, we do not know the diagnosis  yet  It is reasonable for the patient to seek for secondary opinions in [place]    ________________________________________________________________________    Advise exercise muscle and brain  Avoid processed foods-- focus on natural foods  Avoid sugar  Weight Loss      SIGNATURE:  Chel Sandra    CC:  KARINE Hale.

## 2019-01-18 NOTE — PATIENT INSTRUCTIONS
IMPRESSION:    1.  Hx of pseudotumor cereberi from age of 18 YO to 24YO--- taken cared by Dr Maharaj years ago-- the most recent spinal tap had opening pressure Opening pressure 180 mm CSF 1/2018  2.  Hx of seizure -- the last seizure was Dec 2018- some doctors believed these seizures were related with low blood sugar?( per patient's statement)  3.  Hx of HTN, Thyroid Disease ( Borderline)   4.  Intractable headache associated with speech problems  5.  Vit D deficiency  6.  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  Headache worsened after exercise and after sexual activity    PLAN/RECOMMENDATIONS:    The patient lost follow up with [facility] neurology team since the end of 2017  We offered spinal tap in 1/2018-- which was not remarkable    The patient has plans to see endocrinologist in [place]-- regarding the low blood sugar?and endocrine problems    At times, doctors thought her seizures might be secondary to low blood sugar    In the past, topamax helped the headache--- but sex and light could make her headache worse    We will give the patient Topamax back 100mg before sleep for headache prevention  If interest in Botox for headache, please notify the       We could provide the following tests here  1. Repeat LP and opening pressure with Hx of pseudotumor cereberi  2. Repeat EEG with recent breakthrough seizures        We do explain to the patient, we do not know the diagnosis  yet  It is reasonable for the patient to seek for secondary opinions in [place]    ________________________________________________________________________    Advise exercise muscle and brain  Avoid processed foods-- focus on natural foods  Avoid sugar  Weight Loss      SIGNATURE:  Chel Sandra    CC:  KARINE Hale.

## 2019-01-21 ENCOUNTER — TELEPHONE (OUTPATIENT)
Dept: ENDOCRINOLOGY | Facility: MEDICAL CENTER | Age: 29
End: 2019-01-21

## 2019-01-22 ENCOUNTER — HOSPITAL ENCOUNTER (OUTPATIENT)
Dept: LAB | Facility: MEDICAL CENTER | Age: 29
End: 2019-01-22
Attending: PSYCHIATRY & NEUROLOGY
Payer: COMMERCIAL

## 2019-01-22 ENCOUNTER — TELEPHONE (OUTPATIENT)
Dept: NEUROLOGY | Facility: MEDICAL CENTER | Age: 29
End: 2019-01-22

## 2019-01-22 ENCOUNTER — HOSPITAL ENCOUNTER (OUTPATIENT)
Dept: LAB | Facility: MEDICAL CENTER | Age: 29
End: 2019-01-22
Attending: NURSE PRACTITIONER
Payer: COMMERCIAL

## 2019-01-22 DIAGNOSIS — E03.9 HYPOTHYROIDISM, UNSPECIFIED TYPE: ICD-10-CM

## 2019-01-22 DIAGNOSIS — G43.019 INTRACTABLE MIGRAINE WITHOUT AURA AND WITHOUT STATUS MIGRAINOSUS: ICD-10-CM

## 2019-01-22 DIAGNOSIS — G93.2 PSEUDOTUMOR CEREBRI: ICD-10-CM

## 2019-01-22 DIAGNOSIS — G43.709 CHRONIC MIGRAINE W/O AURA W/O STATUS MIGRAINOSUS, NOT INTRACTABLE: ICD-10-CM

## 2019-01-22 DIAGNOSIS — E16.2 HYPOGLYCEMIA: ICD-10-CM

## 2019-01-22 LAB
APTT PPP: 27.4 SEC (ref 24.7–36)
BASOPHILS # BLD AUTO: 0.5 % (ref 0–1.8)
BASOPHILS # BLD: 0.04 K/UL (ref 0–0.12)
EOSINOPHIL # BLD AUTO: 0.23 K/UL (ref 0–0.51)
EOSINOPHIL NFR BLD: 3.1 % (ref 0–6.9)
ERYTHROCYTE [DISTWIDTH] IN BLOOD BY AUTOMATED COUNT: 45.2 FL (ref 35.9–50)
HCT VFR BLD AUTO: 41.4 % (ref 37–47)
HGB BLD-MCNC: 13.5 G/DL (ref 12–16)
IMM GRANULOCYTES # BLD AUTO: 0.06 K/UL (ref 0–0.11)
IMM GRANULOCYTES NFR BLD AUTO: 0.8 % (ref 0–0.9)
INR PPP: 1 (ref 0.87–1.13)
LYMPHOCYTES # BLD AUTO: 1.62 K/UL (ref 1–4.8)
LYMPHOCYTES NFR BLD: 21.5 % (ref 22–41)
MCH RBC QN AUTO: 28.6 PG (ref 27–33)
MCHC RBC AUTO-ENTMCNC: 32.6 G/DL (ref 33.6–35)
MCV RBC AUTO: 87.7 FL (ref 81.4–97.8)
MONOCYTES # BLD AUTO: 0.39 K/UL (ref 0–0.85)
MONOCYTES NFR BLD AUTO: 5.2 % (ref 0–13.4)
NEUTROPHILS # BLD AUTO: 5.2 K/UL (ref 2–7.15)
NEUTROPHILS NFR BLD: 68.9 % (ref 44–72)
NRBC # BLD AUTO: 0 K/UL
NRBC BLD-RTO: 0 /100 WBC
PLATELET # BLD AUTO: 269 K/UL (ref 164–446)
PMV BLD AUTO: 9.8 FL (ref 9–12.9)
PROTHROMBIN TIME: 13.3 SEC (ref 12–14.6)
RBC # BLD AUTO: 4.72 M/UL (ref 4.2–5.4)
T4 FREE SERPL-MCNC: 0.77 NG/DL (ref 0.53–1.43)
TSH SERPL DL<=0.005 MIU/L-ACNC: 2.83 UIU/ML (ref 0.38–5.33)
WBC # BLD AUTO: 7.5 K/UL (ref 4.8–10.8)

## 2019-01-22 PROCEDURE — 84443 ASSAY THYROID STIM HORMONE: CPT

## 2019-01-22 PROCEDURE — 85730 THROMBOPLASTIN TIME PARTIAL: CPT

## 2019-01-22 PROCEDURE — 84439 ASSAY OF FREE THYROXINE: CPT

## 2019-01-22 PROCEDURE — 36415 COLL VENOUS BLD VENIPUNCTURE: CPT

## 2019-01-22 PROCEDURE — 85610 PROTHROMBIN TIME: CPT

## 2019-01-22 PROCEDURE — 85025 COMPLETE CBC W/AUTO DIFF WBC: CPT

## 2019-01-22 NOTE — TELEPHONE ENCOUNTER
Nickolas PERKINS [facility]   Phone Number: [phone]             They said your office was booked out until May for an EEG. Is there a way we can schedule it sooner outside of your office? Do I can try to get it done sooner?      Please advise sent via Coub    Order faxed to [facility]

## 2019-01-24 ENCOUNTER — HOSPITAL ENCOUNTER (OUTPATIENT)
Facility: MEDICAL CENTER | Age: 29
End: 2019-01-24
Attending: PSYCHIATRY & NEUROLOGY
Payer: COMMERCIAL

## 2019-01-24 ENCOUNTER — HOSPITAL ENCOUNTER (OUTPATIENT)
Dept: RADIOLOGY | Facility: MEDICAL CENTER | Age: 29
End: 2019-01-24
Attending: PSYCHIATRY & NEUROLOGY
Payer: COMMERCIAL

## 2019-01-24 DIAGNOSIS — G43.019 INTRACTABLE MIGRAINE WITHOUT AURA AND WITHOUT STATUS MIGRAINOSUS: ICD-10-CM

## 2019-01-24 DIAGNOSIS — G93.2 PSEUDOTUMOR CEREBRI: ICD-10-CM

## 2019-01-24 DIAGNOSIS — E16.2 HYPOGLYCEMIA: ICD-10-CM

## 2019-01-24 LAB
BURR CELLS/RBC NFR CSF MANUAL: 0 %
CLARITY CSF: CLEAR
COLOR CSF: COLORLESS
COLOR SPUN CSF: COLORLESS
CSF COMMENTS 1658: NORMAL
GLUCOSE CSF-MCNC: 52 MG/DL (ref 40–80)
LYMPHOCYTES NFR CSF: 59 %
MONONUC CELLS NFR CSF: 13 %
NEUTROPHILS NFR CSF: 26 %
OTHER CELLS CSF: 2 %
PROT CSF-MCNC: 50 MG/DL (ref 15–45)
RBC # CSF: 391 CELLS/UL
SPECIMEN VOL CSF: 11 ML
TUBE # CSF: 3
TUBE # CSF: 3
WBC # CSF: 3 CELLS/UL (ref 0–10)

## 2019-01-24 PROCEDURE — 82040 ASSAY OF SERUM ALBUMIN: CPT

## 2019-01-24 PROCEDURE — 82784 ASSAY IGA/IGD/IGG/IGM EACH: CPT

## 2019-01-24 PROCEDURE — 82042 OTHER SOURCE ALBUMIN QUAN EA: CPT

## 2019-01-24 PROCEDURE — 84157 ASSAY OF PROTEIN OTHER: CPT

## 2019-01-24 PROCEDURE — 89051 BODY FLUID CELL COUNT: CPT

## 2019-01-24 PROCEDURE — 83916 OLIGOCLONAL BANDS: CPT

## 2019-01-24 PROCEDURE — 82945 GLUCOSE OTHER FLUID: CPT

## 2019-01-24 PROCEDURE — 62270 DX LMBR SPI PNXR: CPT

## 2019-01-24 PROCEDURE — 83873 ASSAY OF CSF PROTEIN: CPT

## 2019-01-25 ENCOUNTER — PATIENT MESSAGE (OUTPATIENT)
Dept: MEDICAL GROUP | Facility: PHYSICIAN GROUP | Age: 29
End: 2019-01-25

## 2019-01-25 ENCOUNTER — OFFICE VISIT (OUTPATIENT)
Dept: NEUROLOGY | Facility: MEDICAL CENTER | Age: 29
End: 2019-01-25
Payer: COMMERCIAL

## 2019-01-25 VITALS
TEMPERATURE: 98.2 F | WEIGHT: 276 LBS | OXYGEN SATURATION: 99 % | HEART RATE: 99 BPM | DIASTOLIC BLOOD PRESSURE: 76 MMHG | HEIGHT: 68 IN | BODY MASS INDEX: 41.83 KG/M2 | SYSTOLIC BLOOD PRESSURE: 122 MMHG

## 2019-01-25 DIAGNOSIS — G43.809 CERVICOGENIC MIGRAINE: ICD-10-CM

## 2019-01-25 PROCEDURE — 64400 NJX AA&/STRD TRIGEMINAL NRV: CPT | Mod: 50 | Performed by: PHYSICIAN ASSISTANT

## 2019-01-25 PROCEDURE — S0020 INJECTION, BUPIVICAINE HYDRO: HCPCS | Performed by: PHYSICIAN ASSISTANT

## 2019-01-25 PROCEDURE — 64450 NJX AA&/STRD OTHER PN/BRANCH: CPT | Mod: 50,51 | Performed by: PHYSICIAN ASSISTANT

## 2019-01-25 PROCEDURE — 64405 NJX AA&/STRD GR OCPL NRV: CPT | Mod: 50,51 | Performed by: PHYSICIAN ASSISTANT

## 2019-01-25 RX ORDER — BUPIVACAINE HYDROCHLORIDE 5 MG/ML
20 INJECTION, SOLUTION EPIDURAL; INTRACAUDAL ONCE
Status: COMPLETED | OUTPATIENT
Start: 2019-01-25 | End: 2019-01-25

## 2019-01-25 RX ORDER — TRIAMCINOLONE ACETONIDE 40 MG/ML
80 INJECTION, SUSPENSION INTRA-ARTICULAR; INTRAMUSCULAR ONCE
Status: COMPLETED | OUTPATIENT
Start: 2019-01-25 | End: 2019-01-25

## 2019-01-25 RX ADMIN — BUPIVACAINE HYDROCHLORIDE 20 ML: 5 INJECTION, SOLUTION EPIDURAL; INTRACAUDAL at 16:14

## 2019-01-25 RX ADMIN — TRIAMCINOLONE ACETONIDE 80 MG: 40 INJECTION, SUSPENSION INTRA-ARTICULAR; INTRAMUSCULAR at 16:14

## 2019-01-25 NOTE — PROGRESS NOTES
Est patient of Dr. Sandra here for initial ONB injections.    Patient has cervicogenic tenderness and headache located in back of head, along temples, in forehead in area of eyes.    Options were discussed with patient and we will proceed with the following set of injections:        BONB procedure:    I performed a bilateral occipital nerve block in clinic today, injecting bupivacaine [5] cc and triamcinolone [40] mg in both sides.      I also injected bilateral trigeminal nerves in clinic today, injecting bupivacaine 1.5 cc  and 3.5 cc bupivicaine in bilateral lesser occipital nerves.    Prior to performing the procedure, the risks and side-effects were discussed with patient including risk for infection, pain, loss of hair, worsening of symptoms.    The patient tolerated the procedure well; there were no complications except that patient found the injections to be very painful.    Pt was encouraged to schedule a follow up with their regular neurologist.

## 2019-01-26 LAB
ALB CSF/SERPL: 4.3 RATIO (ref 0–9)
ALBUMIN CSF-MCNC: 19 MG/DL (ref 0–35)
ALBUMIN SERPL-MCNC: 4370 MG/DL (ref 3500–5200)
IGG CSF-MCNC: 1.9 MG/DL (ref 0–6)
IGG SERPL-MCNC: 901 MG/DL (ref 768–1632)
IGG SYNTH RATE SER+CSF CALC-MRATE: <0 MG/D
IGG/ALB CLEAR SER+CSF-RTO: 0.49 RATIO (ref 0.28–0.66)
IGG/ALB CSF: 0.1 RATIO (ref 0.09–0.25)

## 2019-01-27 LAB
MBP CSF-MCNC: 2.68 NG/ML (ref 0–5.5)
OLIGOCLONAL BANDS CSF ELPH-IMP: NORMAL
OLIGOCLONAL BANDS CSF IEF: 0 BANDS (ref 0–1)
OLIGOCLONAL BANDS.IT SER+CSF QL: NEGATIVE

## 2019-01-28 ENCOUNTER — PATIENT MESSAGE (OUTPATIENT)
Dept: MEDICAL GROUP | Facility: PHYSICIAN GROUP | Age: 29
End: 2019-01-28

## 2019-01-28 NOTE — TELEPHONE ENCOUNTER
From: Nickolas Galdamez  To: KARINE Hale  Sent: 2019 9:29 AM PST  Subject: RE: Non-Urgent Medical Question    Just to match mine. The one to 2 times per week. 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 Sorry to be a pain..     ----- Message -----  From: KARINE Hale  Sent: 19 8:41 AM  To: Nickolas Galdamez  Subject: RE: Non-Urgent Medical Question    Yes - please drop them by the clinic and be sure to have the correct name and  on the forms.   I will need to know how many times she needs to take off work in a month to care for you.       ----- Message -----   From: Nickolas Galdamez   Sent: 2019 9:58 AM PST   To: KARINE Hale  Subject: Non-Urgent Medical Question    Is there any way that you could fill out FMLA papers for my mom. I have had to rely on here for most of my transportation. And she has had to miss work.     Have a good day

## 2019-01-30 ENCOUNTER — TELEPHONE (OUTPATIENT)
Dept: MEDICAL GROUP | Facility: PHYSICIAN GROUP | Age: 29
End: 2019-01-30

## 2019-01-30 NOTE — TELEPHONE ENCOUNTER
Please let patient know that her mom's FMLA paperwork is complete. Please fax to the number on form and scan a copy to chart.

## 2019-01-31 DIAGNOSIS — E03.8 HYPOTHYROIDISM DUE TO HASHIMOTO'S THYROIDITIS: ICD-10-CM

## 2019-01-31 DIAGNOSIS — E06.3 HYPOTHYROIDISM DUE TO HASHIMOTO'S THYROIDITIS: ICD-10-CM

## 2019-02-04 RX ORDER — LEVOTHYROXINE SODIUM 0.2 MG/1
TABLET ORAL
Qty: 90 TAB | Refills: 1 | Status: SHIPPED | OUTPATIENT
Start: 2019-02-04 | End: 2019-10-30

## 2019-02-05 ENCOUNTER — TELEPHONE (OUTPATIENT)
Dept: NEUROLOGY | Facility: MEDICAL CENTER | Age: 29
End: 2019-02-05

## 2019-02-06 NOTE — TELEPHONE ENCOUNTER
Please tell the patient    Spinal tap normal  Opening pressure   Opening pressure is 20 cm H2O    No signs of pseutotumor cereberi at this moment

## 2019-02-07 NOTE — TELEPHONE ENCOUNTER
Please tell the patient     Spinal tap normal  Opening pressure   Opening pressure is 20 cm H2O     No signs of pseutotumor cereberi at this moment.     Spoke to patient gave above information. Patient has EEG scheduled @ [facility] in 2 weeks. I asked her to get CD of testing. She will call when everything is completed to make appointment.

## 2019-04-19 ENCOUNTER — APPOINTMENT (OUTPATIENT)
Dept: RADIOLOGY | Facility: MEDICAL CENTER | Age: 29
End: 2019-04-19
Attending: EMERGENCY MEDICINE
Payer: COMMERCIAL

## 2019-04-19 ENCOUNTER — HOSPITAL ENCOUNTER (EMERGENCY)
Facility: MEDICAL CENTER | Age: 29
End: 2019-04-19
Attending: EMERGENCY MEDICINE
Payer: COMMERCIAL

## 2019-04-19 VITALS
BODY MASS INDEX: 41.47 KG/M2 | SYSTOLIC BLOOD PRESSURE: 153 MMHG | RESPIRATION RATE: 20 BRPM | OXYGEN SATURATION: 92 % | WEIGHT: 279.98 LBS | HEIGHT: 69 IN | TEMPERATURE: 98.6 F | DIASTOLIC BLOOD PRESSURE: 112 MMHG | HEART RATE: 60 BPM

## 2019-04-19 DIAGNOSIS — K62.89 RECTAL PAIN: ICD-10-CM

## 2019-04-19 DIAGNOSIS — R19.7 DIARRHEA, UNSPECIFIED TYPE: ICD-10-CM

## 2019-04-19 DIAGNOSIS — R10.84 GENERALIZED ABDOMINAL PAIN: ICD-10-CM

## 2019-04-19 LAB
ALBUMIN SERPL BCP-MCNC: 4.3 G/DL (ref 3.2–4.9)
ALBUMIN/GLOB SERPL: 1.7 G/DL
ALP SERPL-CCNC: 52 U/L (ref 30–99)
ALT SERPL-CCNC: 14 U/L (ref 2–50)
ANION GAP SERPL CALC-SCNC: 7 MMOL/L (ref 0–11.9)
APPEARANCE UR: CLEAR
AST SERPL-CCNC: 13 U/L (ref 12–45)
BACTERIA GENITAL QL WET PREP: NORMAL
BASOPHILS # BLD AUTO: 0.5 % (ref 0–1.8)
BASOPHILS # BLD: 0.04 K/UL (ref 0–0.12)
BILIRUB SERPL-MCNC: 0.3 MG/DL (ref 0.1–1.5)
BILIRUB UR QL STRIP.AUTO: NEGATIVE
BUN SERPL-MCNC: 14 MG/DL (ref 8–22)
C TRACH DNA SPEC QL NAA+PROBE: NEGATIVE
CALCIUM SERPL-MCNC: 8.7 MG/DL (ref 8.5–10.5)
CHLORIDE SERPL-SCNC: 106 MMOL/L (ref 96–112)
CO2 SERPL-SCNC: 25 MMOL/L (ref 20–33)
COLOR UR: YELLOW
CREAT SERPL-MCNC: 0.81 MG/DL (ref 0.5–1.4)
EOSINOPHIL # BLD AUTO: 0.21 K/UL (ref 0–0.51)
EOSINOPHIL NFR BLD: 2.7 % (ref 0–6.9)
ERYTHROCYTE [DISTWIDTH] IN BLOOD BY AUTOMATED COUNT: 43.2 FL (ref 35.9–50)
GLOBULIN SER CALC-MCNC: 2.6 G/DL (ref 1.9–3.5)
GLUCOSE SERPL-MCNC: 85 MG/DL (ref 65–99)
GLUCOSE UR STRIP.AUTO-MCNC: NEGATIVE MG/DL
HCG UR QL: NEGATIVE
HCT VFR BLD AUTO: 44.2 % (ref 37–47)
HGB BLD-MCNC: 14.7 G/DL (ref 12–16)
IMM GRANULOCYTES # BLD AUTO: 0.07 K/UL (ref 0–0.11)
IMM GRANULOCYTES NFR BLD AUTO: 0.9 % (ref 0–0.9)
KETONES UR STRIP.AUTO-MCNC: NEGATIVE MG/DL
LEUKOCYTE ESTERASE UR QL STRIP.AUTO: NEGATIVE
LIPASE SERPL-CCNC: 9 U/L (ref 11–82)
LYMPHOCYTES # BLD AUTO: 1.66 K/UL (ref 1–4.8)
LYMPHOCYTES NFR BLD: 21.4 % (ref 22–41)
MCH RBC QN AUTO: 29.2 PG (ref 27–33)
MCHC RBC AUTO-ENTMCNC: 33.3 G/DL (ref 33.6–35)
MCV RBC AUTO: 87.7 FL (ref 81.4–97.8)
MICRO URNS: NORMAL
MONOCYTES # BLD AUTO: 0.4 K/UL (ref 0–0.85)
MONOCYTES NFR BLD AUTO: 5.2 % (ref 0–13.4)
N GONORRHOEA DNA SPEC QL NAA+PROBE: NEGATIVE
NEUTROPHILS # BLD AUTO: 5.38 K/UL (ref 2–7.15)
NEUTROPHILS NFR BLD: 69.3 % (ref 44–72)
NITRITE UR QL STRIP.AUTO: NEGATIVE
NRBC # BLD AUTO: 0 K/UL
NRBC BLD-RTO: 0 /100 WBC
PH UR STRIP.AUTO: 7 [PH]
PLATELET # BLD AUTO: 298 K/UL (ref 164–446)
PMV BLD AUTO: 9.4 FL (ref 9–12.9)
POTASSIUM SERPL-SCNC: 3.9 MMOL/L (ref 3.6–5.5)
PROT SERPL-MCNC: 6.9 G/DL (ref 6–8.2)
PROT UR QL STRIP: NEGATIVE MG/DL
RBC # BLD AUTO: 5.04 M/UL (ref 4.2–5.4)
RBC UR QL AUTO: NEGATIVE
SIGNIFICANT IND 70042: NORMAL
SITE SITE: NORMAL
SODIUM SERPL-SCNC: 138 MMOL/L (ref 135–145)
SOURCE SOURCE: NORMAL
SP GR UR REFRACTOMETRY: 1.01
SP GR UR STRIP.AUTO: 1.01
SPECIMEN SOURCE: NORMAL
UROBILINOGEN UR STRIP.AUTO-MCNC: 0.2 MG/DL
WBC # BLD AUTO: 7.8 K/UL (ref 4.8–10.8)

## 2019-04-19 PROCEDURE — 700117 HCHG RX CONTRAST REV CODE 255: Performed by: EMERGENCY MEDICINE

## 2019-04-19 PROCEDURE — 81025 URINE PREGNANCY TEST: CPT

## 2019-04-19 PROCEDURE — 700105 HCHG RX REV CODE 258: Performed by: EMERGENCY MEDICINE

## 2019-04-19 PROCEDURE — 83690 ASSAY OF LIPASE: CPT

## 2019-04-19 PROCEDURE — 96375 TX/PRO/DX INJ NEW DRUG ADDON: CPT

## 2019-04-19 PROCEDURE — 99285 EMERGENCY DEPT VISIT HI MDM: CPT

## 2019-04-19 PROCEDURE — 85025 COMPLETE CBC W/AUTO DIFF WBC: CPT

## 2019-04-19 PROCEDURE — 96374 THER/PROPH/DIAG INJ IV PUSH: CPT

## 2019-04-19 PROCEDURE — 87591 N.GONORRHOEAE DNA AMP PROB: CPT

## 2019-04-19 PROCEDURE — 700111 HCHG RX REV CODE 636 W/ 250 OVERRIDE (IP): Performed by: EMERGENCY MEDICINE

## 2019-04-19 PROCEDURE — 80053 COMPREHEN METABOLIC PANEL: CPT

## 2019-04-19 PROCEDURE — 87491 CHLMYD TRACH DNA AMP PROBE: CPT

## 2019-04-19 PROCEDURE — 74177 CT ABD & PELVIS W/CONTRAST: CPT

## 2019-04-19 PROCEDURE — 81003 URINALYSIS AUTO W/O SCOPE: CPT

## 2019-04-19 RX ORDER — LOPERAMIDE HYDROCHLORIDE 2 MG/1
2 CAPSULE ORAL 4 TIMES DAILY PRN
Qty: 30 CAP | Refills: 0 | Status: SHIPPED | OUTPATIENT
Start: 2019-04-19 | End: 2019-10-30

## 2019-04-19 RX ORDER — SODIUM CHLORIDE 9 MG/ML
1000 INJECTION, SOLUTION INTRAVENOUS ONCE
Status: COMPLETED | OUTPATIENT
Start: 2019-04-19 | End: 2019-04-19

## 2019-04-19 RX ORDER — ONDANSETRON 4 MG/1
4 TABLET, ORALLY DISINTEGRATING ORAL EVERY 6 HOURS PRN
Qty: 10 TAB | Refills: 0 | Status: SHIPPED | OUTPATIENT
Start: 2019-04-19 | End: 2019-10-27

## 2019-04-19 RX ORDER — ONDANSETRON 2 MG/ML
4 INJECTION INTRAMUSCULAR; INTRAVENOUS ONCE
Status: COMPLETED | OUTPATIENT
Start: 2019-04-19 | End: 2019-04-19

## 2019-04-19 RX ORDER — HYDROCORTISONE ACETATE 25 MG/1
25 SUPPOSITORY RECTAL EVERY 12 HOURS
Qty: 10 SUPPOSITORY | Refills: 0 | Status: SHIPPED | OUTPATIENT
Start: 2019-04-19 | End: 2019-10-30

## 2019-04-19 RX ORDER — KETOROLAC TROMETHAMINE 30 MG/ML
30 INJECTION, SOLUTION INTRAMUSCULAR; INTRAVENOUS ONCE
Status: COMPLETED | OUTPATIENT
Start: 2019-04-19 | End: 2019-04-19

## 2019-04-19 RX ADMIN — IOHEXOL 100 ML: 350 INJECTION, SOLUTION INTRAVENOUS at 12:31

## 2019-04-19 RX ADMIN — KETOROLAC TROMETHAMINE 30 MG: 30 INJECTION, SOLUTION INTRAMUSCULAR at 12:37

## 2019-04-19 RX ADMIN — SODIUM CHLORIDE 1000 ML: 9 INJECTION, SOLUTION INTRAVENOUS at 11:28

## 2019-04-19 RX ADMIN — ONDANSETRON 4 MG: 2 INJECTION INTRAMUSCULAR; INTRAVENOUS at 12:37

## 2019-04-19 NOTE — ED NOTES
MD at bedside prior to d/c. Pt given d/c instruction and verbalized understanding. 3 rx's sent to pharmacy. Pt ambulatory to lobby, gait steady. Pt took all belongings from room.

## 2019-04-19 NOTE — ED NOTES
"Pt has multiple complaints: HA, nausea with abdominal pain x 2 weeks, vaginal d/c (describes this as spotting with non odorous mucus), and diarrhea (states unsure if she has hemorrhoid d/t pain when having BM).     Pt also states this AM blood sugar was 53 (hx of hypoglycemia, has seen endocrinologist in regards to this). Pt states she was able to get BS up to 75 prior to coming to ED. Pt also states she had appt with [facility] endocrinologist today but was unable to go. States \"I can't drive over there by myself like this.\"     Pt a/o x4, speaking in full sentences.    "

## 2019-04-19 NOTE — ED PROVIDER NOTES
ED Provider Note    Scribed for Yesica Munoz M.D. by Merced Bustillo. 4/19/2019  11:13 AM    Primary care provider: KARINE Hale  Means of arrival: walk-in  History obtained from: patient  History limited by: none    CHIEF COMPLAINT  Chief Complaint   Patient presents with   • Headache   • Diarrhea   • Vaginal Discharge   • Nausea       HPI  Muriya Homa Galdamez is a 29 y.o. female who presents to the Emergency Department for evaluation of difficulties with her bowels that have been present for 2 weeks. Patient reports that she has been experiencing intermittent episodes of diarrhea and constipation with associated pain with bowel movements, nausea, abdominal pain and distention. She states she will go 2-3 days with liquid diarrhea, then experience 2-3 days of constipation. She is still able to tolerate liquids and solid foods PO, however, states she becomes bloated whenever she consumes anything. Patient has a history of GI problems following with GI specialist as well as endocrinology, particularly to try and find the cause of intermittent episodes of non diabetic hypoglycemia she. Patient has been experiencing difficulties with her blood sugar for several years. Initially her GI and endocrinologist here in [place] felt this was secondary to insulinomas, however, this was ruled out with recent MRI imaging. Patient has been evaluated for Crohn's and IBS, both being ruled out. She was supposed to follow up with Endocrinology at [facility] today, however, was unable to make it secondary to her current symptoms.    Patient is also complaining of a headache today. She has a history of migraine headaches as well as a seizure disorder, and follows with a Neurologist for a diagnosis of pseudotumor cerebri. Patient reports that she was initially followed by Dr. Ralph, who diagnosed her when she was a teenager, however, was recently told by a new neurologist that she no longer has pseudotumor cerbri. Per  patient, she was informed that her seizures may be secondary to her intermittent episodes of hypoglycemia.  She did not experience a seizure this morning when she noted her blood sugar to be low. Patient takes daily Topomax to manage these seizures, last one occurring December 2018.   Patient is also reporting mild bright red vaginal spotting as well as mucous like vaginal discharge that has been present for the last 2 weeks as well.  No complaints of dysuria, hematuria, fever, travel outside the country or recent camping.    REVIEW OF SYSTEMS  HEENT:  No ear pain, congestion, or sore throat   EYES: no discharge, redness, or vision changes  CARDIAC: no chest pain, no palpitations    PULMONARY: no dyspnea, cough, or congestion   GI: positive alternating diarrhea and constipation, nausea, abdominal pain and distention, denies vomiting  : positive vaginal spotting and discharge, no dysuria, back pain, or hematuria   Neuro: positive headache, no weakness, numbness, aphasia, seizures  Musculoskeletal: no swelling, deformity, pain, or joint swelling  Endocrine: no fevers, sweating, or weight loss   SKIN: no rash, erythema, or contusions     See history of present illness. All other systems are negative. C.    PAST MEDICAL HISTORY   has a past medical history of Cancer (HCC) (2008); EEG abnormal; Hashimoto's disease; Hyperglycemia; Hypertension; Migraine; Pseudotumor cerebri; and Seizure disorder (HCC).    SURGICAL HISTORY   has a past surgical history that includes gyn surgery; gastroscopy (N/A, 12/26/2018); gastroscopy with biopsy (N/A, 12/26/2018); egd w/endoscopic ultrasound (N/A, 12/26/2018); and egd with asp/bx (N/A, 12/26/2018).    SOCIAL HISTORY  Social History   Substance Use Topics   • Smoking status: Former Smoker     Start date: 1/1/2005     Quit date: 5/3/2012   • Smokeless tobacco: Never Used      Comment: 1/2 pack a day   • Alcohol use 0.6 oz/week     1 Glasses of wine per week      Comment: socially, 1  "glass of wine per week      History   Drug Use No       FAMILY HISTORY  Family History   Problem Relation Age of Onset   • Cancer Mother         Cervical       CURRENT MEDICATIONS  No current facility-administered medications for this encounter.     Current Outpatient Prescriptions:   •  ondansetron (ZOFRAN ODT) 4 MG TABLET DISPERSIBLE, Take 1 Tab by mouth every 6 hours as needed., Disp: 10 Tab, Rfl: 0  •  loperamide (IMODIUM) 2 MG Cap, Take 1 Cap by mouth 4 times a day as needed for Diarrhea., Disp: 30 Cap, Rfl: 0  •  hydrocortisone (ANUSOL-HC) 25 MG Suppos, Insert 1 Suppository in rectum every 12 hours., Disp: 10 Suppository, Rfl: 0  •  levothyroxine (SYNTHROID) 200 MCG Tab, TAKE 1 TABLET BY MOUTH ONCE DAILY IN THE MORNING ON  AN  EMPTY  STOMACH, Disp: 90 Tab, Rfl: 1  •  topiramate (TOPAMAX) 100 MG Tab, Take 1 Tab by mouth every bedtime., Disp: 90 Tab, Rfl: 1  •  venlafaxine XR (EFFEXOR XR) 75 MG CAPSULE SR 24 HR, Take 1 Cap by mouth every day., Disp: 90 Cap, Rfl: 1  •  amLODIPine (NORVASC) 10 MG Tab, Take 0.5 Tabs by mouth every day., Disp: 90 Tab, Rfl: 1  •  Liraglutide -Weight Management (SAXENDA) 18 MG/3ML Solution Pen-injector, Inject 3 mg as instructed every day. (Patient taking differently: Inject 3 mg as instructed every day. Pt taking 1.8mg per day currently, she is working her way up to 3 mg.), Disp: 6 PEN, Rfl: 3  •  MIRENA, 52 MG, 20 MCG/24HR IUD, , Disp: , Rfl:   •  Cholecalciferol (VITAMIN D) 2000 UNITS Cap, Take 2,000 Units by mouth 2 Times a Day., Disp: , Rfl:     ALLERGIES  No Known Allergies    PHYSICAL EXAM  VITAL SIGNS: /112   Pulse 76   Temp 36.9 °C (98.4 °F) (Temporal)   Resp 16   Ht 1.753 m (5' 9\")   Wt (!) 127 kg (279 lb 15.8 oz)   SpO2 99%   BMI 41.35 kg/m²     Constitutional: Well developed, Well nourished, tearful.  HEENT: Normocephalic, Atraumatic,  external ears normal, pharynx pink,  Mucous  Membranes dry, No rhinorrhea or mucosal edema  Eyes: PERRL, EOMI, Conjunctiva " normal, No discharge.   Neck: Normal range of motion, No tenderness, Supple, No stridor.   Lymphatic: No lymphadenopathy    Cardiovascular: Regular Rate and Rhythm, No murmurs,  rubs, or gallops.   Thorax & Lungs: Lungs clear to auscultation bilaterally, No respiratory distress, No wheezes, rhales or rhonchi, No chest wall tenderness.   Abdomen: obese but Soft, diffusely tender, non distended,  No pulsatile masses., no rebound guarding or peritoneal signs.   Skin: Warm, Dry, No erythema, No rash,   :  Yellowish drainage in the vaginal vault, otherwise normal pelvic exam.  Neurologic: Alert & oriented x 3, Normal motor function, Normal sensory function, No focal deficits noted. Normal reflexes. Normal Cranial Nerves.  Extremities: Equal, intact distal pulses, No cyanosis, clubbing or edema,  No tenderness.   Musculoskeletal: Good range of motion in all major joints. No tenderness to palpation or major deformities noted.     DIAGNOSTIC STUDIES / PROCEDURES    LABS  Results for orders placed or performed during the hospital encounter of 04/19/19   URINALYSIS,CULTURE IF INDICATED   Result Value Ref Range    Color Yellow     Character Clear     Specific Gravity 1.010 <1.035    Ph 7.0 5.0 - 8.0    Glucose Negative Negative mg/dL    Ketones Negative Negative mg/dL    Protein Negative Negative mg/dL    Bilirubin Negative Negative    Urobilinogen, Urine 0.2 Negative    Nitrite Negative Negative    Leukocyte Esterase Negative Negative    Occult Blood Negative Negative    Micro Urine Req see below    BETA-HCG QUALITATIVE URINE   Result Value Ref Range    Beta-Hcg Urine Negative Negative   REFRACTOMETER SG   Result Value Ref Range    Specific Gravity 1.010    CBC WITH DIFFERENTIAL   Result Value Ref Range    WBC 7.8 4.8 - 10.8 K/uL    RBC 5.04 4.20 - 5.40 M/uL    Hemoglobin 14.7 12.0 - 16.0 g/dL    Hematocrit 44.2 37.0 - 47.0 %    MCV 87.7 81.4 - 97.8 fL    MCH 29.2 27.0 - 33.0 pg    MCHC 33.3 (L) 33.6 - 35.0 g/dL    RDW 43.2  35.9 - 50.0 fL    Platelet Count 298 164 - 446 K/uL    MPV 9.4 9.0 - 12.9 fL    Neutrophils-Polys 69.30 44.00 - 72.00 %    Lymphocytes 21.40 (L) 22.00 - 41.00 %    Monocytes 5.20 0.00 - 13.40 %    Eosinophils 2.70 0.00 - 6.90 %    Basophils 0.50 0.00 - 1.80 %    Immature Granulocytes 0.90 0.00 - 0.90 %    Nucleated RBC 0.00 /100 WBC    Neutrophils (Absolute) 5.38 2.00 - 7.15 K/uL    Lymphs (Absolute) 1.66 1.00 - 4.80 K/uL    Monos (Absolute) 0.40 0.00 - 0.85 K/uL    Eos (Absolute) 0.21 0.00 - 0.51 K/uL    Baso (Absolute) 0.04 0.00 - 0.12 K/uL    Immature Granulocytes (abs) 0.07 0.00 - 0.11 K/uL    NRBC (Absolute) 0.00 K/uL   COMP METABOLIC PANEL   Result Value Ref Range    Sodium 138 135 - 145 mmol/L    Potassium 3.9 3.6 - 5.5 mmol/L    Chloride 106 96 - 112 mmol/L    Co2 25 20 - 33 mmol/L    Anion Gap 7.0 0.0 - 11.9    Glucose 85 65 - 99 mg/dL    Bun 14 8 - 22 mg/dL    Creatinine 0.81 0.50 - 1.40 mg/dL    Calcium 8.7 8.5 - 10.5 mg/dL    AST(SGOT) 13 12 - 45 U/L    ALT(SGPT) 14 2 - 50 U/L    Alkaline Phosphatase 52 30 - 99 U/L    Total Bilirubin 0.3 0.1 - 1.5 mg/dL    Albumin 4.3 3.2 - 4.9 g/dL    Total Protein 6.9 6.0 - 8.2 g/dL    Globulin 2.6 1.9 - 3.5 g/dL    A-G Ratio 1.7 g/dL   LIPASE   Result Value Ref Range    Lipase 9 (L) 11 - 82 U/L   WET PREP   Result Value Ref Range    Significant Indicator NEG     Source GEN     Site VAGINAL     Wet Prep For Parasites       Rare yeast.  WBCs seen.  No clue cells seen.  No motile Trichomonas seen.     CHLAMYDIA & GC BY PCR   Result Value Ref Range    Source Other    ESTIMATED GFR   Result Value Ref Range    GFR If African American >60 >60 mL/min/1.73 m 2    GFR If Non African American >60 >60 mL/min/1.73 m 2       All labs reviewed by me.    RADIOLOGY  CT-ABDOMEN-PELVIS WITH   Final Result      No evidence of bowel obstruction or acute appendicitis.   A few scattered colon diverticula.   No hydronephrosis.   Mild splenomegaly.        The radiologist's interpretation of  all radiological studies have been reviewed by me.    COURSE & MEDICAL DECISION MAKING  Nursing notes, VS, PMSFHx reviewed in chart.    11:13 AM Patient seen and examined at bedside. Patient will be treated with IV NS for clinical dehydration with dry mucous membranes and diarrhea. Ordered abdomen pelvis CT, wet prep, chlamydia, CBC, CMP, lipase, beta HCG qual, UA, refractometer to evaluate her symptoms. The differential diagnoses include but are not limited to: IBD, gastroenteritis, colitis, dehydrated. Informed the patient that I would evaluate with lab work and imaging for any acute process. She understands and agrees with treatment plan.    12:51 PM Patient's work up today is relatively normal. Her blood sugar is normal at this time. Lab work indicates no infection, with a normal WBC. Liver and kidney function is normal. No indications for colitis or diverticulitis. No indication of infection in her uterus either. I informed the patient of this. I explained that because this is a prolonged problem, I would order for a stool sample to be evaluated on an outpatient basis. Otherwise advised her to consume frequent small meals to keep her blood sugar stabilized, take OTC medications for the diarrhea, and continue hydration at home and Endocrinology follow up already established. Patient understands and agrees with treatment plan.     The patient will return for new or worsening symptoms and is stable at the time of discharge.    DISPOSITION:  Patient will be discharged home in stable condition.    FOLLOW UP:  Sushma Nunez A.P.R.N.  [address] [facility] [id number] [phone]    Call in 2 days  for recheck, As needed, If symptoms worsen      OUTPATIENT MEDICATIONS:  Discharge Medication List as of 4/19/2019 12:57 PM      START taking these medications    Details   ondansetron (ZOFRAN ODT) 4 MG TABLET DISPERSIBLE Take 1 Tab by mouth every 6 hours as needed., Disp-10 Tab, R-0, Normal      loperamide (IMODIUM) 2  MG Cap Take 1 Cap by mouth 4 times a day as needed for Diarrhea., Disp-30 Cap, R-0, Normal      hydrocortisone (ANUSOL-HC) 25 MG Suppos Insert 1 Suppository in rectum every 12 hours., Disp-10 Suppository, R-0, Normal           FINAL IMPRESSION  1. Diarrhea, unspecified type    2. Generalized abdominal pain    3. Rectal pain          Merced MÁRQUEZ (Scribe), am scribing for, and in the presence of, Yesica Munoz M.D..    Electronically signed by: Merced Bustillo (Scribe), 4/19/2019    IYesica M.D. personally performed the services described in this documentation, as scribed by Merced Bustillo in my presence, and it is both accurate and complete.    The note accurately reflects work and decisions made by me.  Yesica Munoz  4/19/2019  5:15 PM

## 2019-04-19 NOTE — ED TRIAGE NOTES
Pt amb to triage.  Chief Complaint   Patient presents with   • Headache   • Diarrhea   • Vaginal Discharge   • Nausea     Pt hypertensive in triage.  Reports she took her BP meds this am.

## 2019-07-05 ENCOUNTER — OFFICE VISIT (OUTPATIENT)
Dept: MEDICAL GROUP | Facility: PHYSICIAN GROUP | Age: 29
End: 2019-07-05
Payer: COMMERCIAL

## 2019-07-05 VITALS
SYSTOLIC BLOOD PRESSURE: 122 MMHG | TEMPERATURE: 98.6 F | HEART RATE: 68 BPM | OXYGEN SATURATION: 97 % | RESPIRATION RATE: 14 BRPM | DIASTOLIC BLOOD PRESSURE: 78 MMHG | WEIGHT: 278 LBS | BODY MASS INDEX: 41.18 KG/M2 | HEIGHT: 69 IN

## 2019-07-05 DIAGNOSIS — I10 ESSENTIAL HYPERTENSION: ICD-10-CM

## 2019-07-05 DIAGNOSIS — E16.2 HYPOGLYCEMIA: ICD-10-CM

## 2019-07-05 DIAGNOSIS — E03.9 HYPOTHYROIDISM, UNSPECIFIED TYPE: ICD-10-CM

## 2019-07-05 DIAGNOSIS — Z09 HOSPITAL DISCHARGE FOLLOW-UP: ICD-10-CM

## 2019-07-05 DIAGNOSIS — G40.909 SEIZURE DISORDER (HCC): ICD-10-CM

## 2019-07-05 PROCEDURE — 99214 OFFICE O/P EST MOD 30 MIN: CPT | Performed by: NURSE PRACTITIONER

## 2019-07-05 NOTE — PROGRESS NOTES
CC: [facility] paperwork for hypoglycemia, establish care    HISTORY OF THE PRESENT ILLNESS: Patient is a 29 y.o. female. This pleasant patient is here today to establish care and for evaluation management of the following health problems.  Patient's previous primary care provider is Sushma CLOUD.        Hypoglycemia  Patient continues to struggle with frequent hypoglycemic episodes.  Presenting today for [facility] paperwork update for hypoglycemia. She is generally missing 1 day of work a week or 4 days in a month to attend office visits, diagnostics, also needs to be covered for ER and hypoglycemic episodes.  Managed by endocrinology.  Last seen by endocrinology in 12/2018.  At that time CT scan of the abdomen showed approximately a 2 cm mass in the head of the pancreas and another approximately 2 cm mass in the tail of the pancreas.  Patient had been referred to gastroenterology to rule out insulinoma.  Patient had subsequent EGD and ultrasound with needle biopsies.  Pathology was negative for evidence of tumor or insulinoma.  Patient was then subsequently referred to endocrinology specialty at [facility].  Patient reports she could not be scheduled for many months, and therefore was also referred to [facility].  Patient reports she was also told she could not get in for many months.  Patient would have been seen in April, but she never did make the appointment because she was discouraged that she was going to have to wait 3 months.  She does report she has an upcoming appointment in about 3 months at [facility].  In the meantime, patient continues with episodes of hypoglycemia to the point of passing out.  She also has a history of seizures and is being followed by neurology.  I did advise patient to return to her local endocrinologist while waiting to get in with [facility].  Patient reports she would like to be referred to a different endocrinologist in the [place] area for a second opinion.  I did explain to patient that going  to specialist in [place] would be considered a second opinion.  Patient disagrees and would like to establish with a different endocrinologist in the [place] area.    Seizure disorder (CMS-HCC)  History of seizures.  History of pseudotumor cerebri from age 17 to 23 years old.  Does have spells of passing out associated with headache and hypoglycemia.  Chronic headache.  Managed by neurology.  Received ONB for headache.  Advised by neurology to avoid processed foods, avoid sugar, weight loss.  Patient discontinued Topamax for unknown reason.    Essential hypertension  This is a chronic health problem that is well controlled with current medications and lifestyle measures.  Taking amlodipine 10 mg daily.  Blood pressure today is 122/78.  Patient has hypoglycemia and reports that blood pressures are elevated during episodes of hypoglycemia.  Does report occasional palpitations every couple weeks associated with nausea and headache. The patient denies chest pain, shortness of breath, vision changes, epistaxis, or dyspnea on exertion.  Reviewed lifestyle measures including weight loss, DASH diet.    Hypothyroidism  This is a chronic health problem that is well controlled with current medications and lifestyle measures.  Taking levothyroxine 200 mcg daily.  Last TSH normal.  Patient denies changes in bowel movements, skin or hair changes, brain fog.    Component      Latest Ref Rng & Units 12/19/2018 1/22/2019           2:16 PM  9:17 AM   TSH      0.380 - 5.330 uIU/mL 0.850 2.830       Hospital discharge follow-up  Patient reports that she was evaluated in [place] emergency room approximately 2 weeks ago for vaginal discharge and abdominal cramping with diarrhea.  She reports she was prescribed Cipro and recently finished antibiotic.  She does report slight yellow vaginal discharge.  Denies abdominal pain, vaginal itching, malaise, fever.  Will get ER records and will reevaluate patient next week.      Allergies: Patient has  no known allergies.    Current Outpatient Prescriptions Ordered in [facility]   Medication Sig Dispense Refill   • levothyroxine (SYNTHROID) 200 MCG Tab TAKE 1 TABLET BY MOUTH ONCE DAILY IN THE MORNING ON  AN  EMPTY  STOMACH 90 Tab 1   • venlafaxine XR (EFFEXOR XR) 75 MG CAPSULE SR 24 HR Take 1 Cap by mouth every day. 90 Cap 1   • amLODIPine (NORVASC) 10 MG Tab Take 0.5 Tabs by mouth every day. 90 Tab 1   • MIRENA, 52 MG, 20 MCG/24HR IUD      • Cholecalciferol (VITAMIN D) 2000 UNITS Cap Take 2,000 Units by mouth 2 Times a Day.     • Liraglutide -Weight Management (SAXENDA) 18 MG/3ML Solution Pen-injector Inject 3 mg as instructed every day. 6 PEN 3   • ondansetron (ZOFRAN ODT) 4 MG TABLET DISPERSIBLE Take 1 Tab by mouth every 6 hours as needed. 10 Tab 0   • loperamide (IMODIUM) 2 MG Cap Take 1 Cap by mouth 4 times a day as needed for Diarrhea. 30 Cap 0   • hydrocortisone (ANUSOL-HC) 25 MG Suppos Insert 1 Suppository in rectum every 12 hours. 10 Suppository 0   • topiramate (TOPAMAX) 100 MG Tab Take 1 Tab by mouth every bedtime. (Patient not taking: Reported on 7/5/2019) 90 Tab 1     No current Epic-ordered facility-administered medications on file.        Past Medical History:   Diagnosis Date   • Cancer (HCC) 2008    pre-cancerous cervical cells--froze the cervix   • EEG abnormal    • Hashimoto's disease    • Hyperglycemia    • Hypertension    • Migraine    • Pseudotumor cerebri    • Seizure disorder (HCC)        Past Surgical History:   Procedure Laterality Date   • GASTROSCOPY N/A 12/26/2018    Procedure: GASTROSCOPY;  Surgeon: Kleber Ivory M.D.;  Location: [facility];  Service: Gastroenterology   • GASTROSCOPY WITH BIOPSY N/A 12/26/2018    Procedure: GASTROSCOPY WITH BIOPSY - GASTRIC;  Surgeon: Kleber Ivory M.D.;  Location: [facility];  Service: Gastroenterology   • EGD W/ENDOSCOPIC ULTRASOUND N/A 12/26/2018    Procedure: EGD W/ENDOSCOPIC ULTRASOUND - UPPER, CURVILINEAR;  Surgeon:  "Kleber Ivory M.D.;  Location: SURGERY [facility];  Service: Gastroenterology   • EGD WITH ASP/BX N/A 12/26/2018    Procedure: EGD WITH ASP/BX - GUIDED FNA PANCREATIC HEAD AND/OR TAIL LESION;  Surgeon: Kleber Ivory M.D.;  Location: SURGERY [facility];  Service: Gastroenterology   • GYN SURGERY      precancerous cells with cervical freezing, HPV positive       Social History   Substance Use Topics   • Smoking status: Former Smoker     Start date: 1/1/2005     Quit date: 5/3/2012   • Smokeless tobacco: Never Used      Comment: 1/2 pack a day   • Alcohol use 0.6 oz/week     1 Glasses of wine per week      Comment: socially, 1 glass of wine per week       Family History   Problem Relation Age of Onset   • Cancer Mother         Cervical       ROS:   As in HPI, otherwise negative for chest pain, dyspnea, abdominal pain, dysuria, blood in stool, fever           Exam: /78 (BP Location: Right arm, Patient Position: Sitting, BP Cuff Size: Adult long)   Pulse 68   Temp 37 °C (98.6 °F)   Resp 14   Ht 1.753 m (5' 9\")   Wt (!) 126.1 kg (278 lb)   SpO2 97%  Body mass index is 41.05 kg/m².    General: Alert, pleasant, obese habitus, well nourished, well developed female in NAD  HEENT: Normocephalic. Eyes conjunctiva clear lids without ptosis, pupils equal and reactive to light, ears normal shape and contour, canals are clear bilaterally, tympanic membranes are pearly gray with good light reflex, nasal mucosa without erythema and drainage, oropharynx is without erythema, edema or exudates.   Neck: Supple without bruit. Thyroid is not enlarged.  Pulmonary: Clear to ausculation.  Normal effort. No rales, ronchi, or wheezing.  Cardiovascular: Normal rate and rhythm without murmur. Carotid and radial pulses are intact and equal bilaterally.  No lower extremity edema.  Abdomen: Soft, nontender, nondistended. Normal bowel sounds. Liver and spleen are not palpable  Neurologic: Grossly nonfocal  Lymph: No " cervical or supraclavicular lymph nodes are palpable  Skin: Warm and dry.    Musculoskeletal: Normal gait.   Psych: Normal mood and affect. Alert and oriented. Judgment and insight is normal.    Please note that this dictation was created using voice recognition software. I have made every reasonable attempt to correct obvious errors, but I expect that there are errors of grammar and possibly content that I did not discover before finalizing the note.      Assessment/Plan  1. Hypoglycemia  Continue with lifestyle measures including small frequent meals.  Will refer to different endocrinologist for 2nd opinion per patient's request.  Strongly advised patient to keep consultation appointment with specialist to endocrinology at [place].  ER precautions reviewed with patient.  I will complete [facility] paperwork after office visit today and will fax to employer early next week.  - REFERRAL TO ENDOCRINOLOGY    2. Seizure disorder (CMS-HCC)  History of seizure disorder.  Uncertain of current seizure activity.  Patient will continue follow-up with neurology.    3. Essential hypertension  Continue with amlodipine and lifestyle measures.  Did advise patient that spikes in blood pressure during hypoglycemic events are the body's way of compensating.  Avoiding spikes in blood pressure would be to have better control of her hyperglycemia.   Patient reporting palpitations with associated nausea.  Consider Holter monitor or referral to cardiology.  We will continue to monitor the meantime.  ER precautions reviewed with patient.    4. Hypothyroidism, unspecified type  Continue with levothyroxine 200 mcg daily.    5. Hospital discharge follow-up  Patient uncertain of what her diagnosis was at the ER.  She has completed antibiotics and does report some vaginal discharge.  Will get records from ER and patient will return next week for further follow-up.     Patient will return to clinic in 1 week for ER follow-up and other remaining  chronic health problems.  Patient will return to clinic sooner if needed.

## 2019-07-07 DIAGNOSIS — I10 HYPERTENSION, UNSPECIFIED TYPE: ICD-10-CM

## 2019-07-08 DIAGNOSIS — E66.09 CLASS 2 OBESITY DUE TO EXCESS CALORIES WITH BODY MASS INDEX (BMI) OF 38.0 TO 38.9 IN ADULT, UNSPECIFIED WHETHER SERIOUS COMORBIDITY PRESENT: ICD-10-CM

## 2019-07-08 PROBLEM — G43.809 CERVICOGENIC MIGRAINE: Status: RESOLVED | Noted: 2017-09-11 | Resolved: 2019-07-08

## 2019-07-08 PROBLEM — Z09 HOSPITAL DISCHARGE FOLLOW-UP: Status: ACTIVE | Noted: 2019-07-08

## 2019-07-08 PROBLEM — G43.919 INTRACTABLE MIGRAINE WITHOUT STATUS MIGRAINOSUS: Status: RESOLVED | Noted: 2017-04-24 | Resolved: 2019-07-08

## 2019-07-08 PROBLEM — L68.0 FEMALE HIRSUTISM: Status: RESOLVED | Noted: 2018-02-28 | Resolved: 2019-07-08

## 2019-07-08 PROBLEM — R10.9 ABDOMINAL PAIN: Status: RESOLVED | Noted: 2017-09-12 | Resolved: 2019-07-08

## 2019-07-08 PROBLEM — Z86.79 HISTORY OF HIGH BLOOD PRESSURE: Status: RESOLVED | Noted: 2018-01-11 | Resolved: 2019-07-08

## 2019-07-08 PROBLEM — R79.89 ELEVATED TESTOSTERONE LEVEL IN FEMALE: Status: RESOLVED | Noted: 2017-02-28 | Resolved: 2019-07-08

## 2019-07-08 PROBLEM — R79.89 ABNORMAL THYROID BLOOD TEST: Status: RESOLVED | Noted: 2017-05-02 | Resolved: 2019-07-08

## 2019-07-08 RX ORDER — AMLODIPINE BESYLATE 10 MG/1
TABLET ORAL
Qty: 90 TAB | Refills: 1 | Status: SHIPPED | OUTPATIENT
Start: 2019-07-08 | End: 2019-10-30

## 2019-07-08 NOTE — ASSESSMENT & PLAN NOTE
Patient reports that she was evaluated in [place] emergency room approximately 2 weeks ago for vaginal discharge and abdominal cramping with diarrhea.  She reports she was prescribed Cipro and recently finished antibiotic.  She does report slight yellow vaginal discharge.  Denies abdominal pain, vaginal itching, malaise, fever.  Will get ER records and will reevaluate patient next week.

## 2019-07-08 NOTE — ASSESSMENT & PLAN NOTE
This is a chronic health problem that is well controlled with current medications and lifestyle measures.  Taking levothyroxine 200 mcg daily.  Last TSH normal.  Patient denies changes in bowel movements, skin or hair changes, brain fog.    Component      Latest Ref Rng & Units 12/19/2018 1/22/2019           2:16 PM  9:17 AM   TSH      0.380 - 5.330 uIU/mL 0.850 2.830

## 2019-07-08 NOTE — ASSESSMENT & PLAN NOTE
Patient continues to struggle with frequent hypoglycemic episodes.  Presenting today for [facility] paperwork update for hypoglycemia. She is generally missing 1 day of work a week or 4 days in a month to attend office visits, diagnostics, also needs to be covered for ER and hypoglycemic episodes.  Managed by endocrinology.  Last seen by endocrinology in 12/2018.  At that time CT scan of the abdomen showed approximately a 2 cm mass in the head of the pancreas and another approximately 2 cm mass in the tail of the pancreas.  Patient had been referred to gastroenterology to rule out insulinoma.  Patient had subsequent EGD and ultrasound with needle biopsies.  Pathology was negative for evidence of tumor or insulinoma.  Patient was then subsequently referred to endocrinology specialty at [facility].  Patient reports she could not be scheduled for many months, and therefore was also referred to [facility].  Patient reports she was also told she could not get in for many months.  Patient would have been seen in April, but she never did make the appointment because she was discouraged that she was going to have to wait 3 months.  She does report she has an upcoming appointment in about 3 months at [facility].  In the meantime, patient continues with episodes of hypoglycemia to the point of passing out.  She also has a history of seizures and is being followed by neurology.  I did advise patient to return to her local endocrinologist while waiting to get in with [facility].  Patient reports she would like to be referred to a different endocrinologist in the [place] area for a second opinion.  I did explain to patient that going to specialist in [place] would be considered a second opinion.  Patient disagrees and would like to establish with a different endocrinologist in the [place] area.

## 2019-07-08 NOTE — ASSESSMENT & PLAN NOTE
History of seizures.  History of pseudotumor cerebri from age 17 to 23 years old.  Does have spells of passing out associated with headache and hypoglycemia.  Chronic headache.  Managed by neurology.  Received ONB for headache.  Advised by neurology to avoid processed foods, avoid sugar, weight loss.  Patient discontinued Topamax for unknown reason.

## 2019-07-08 NOTE — ASSESSMENT & PLAN NOTE
This is a chronic health problem that is well controlled with current medications and lifestyle measures.  Taking amlodipine 10 mg daily.  Blood pressure today is 122/78.  Patient has hypoglycemia and reports that blood pressures are elevated during episodes of hypoglycemia.  Does report occasional palpitations every couple weeks associated with nausea and headache. The patient denies chest pain, shortness of breath, vision changes, epistaxis, or dyspnea on exertion.  Reviewed lifestyle measures including weight loss, DASH diet.

## 2019-07-12 ENCOUNTER — TELEPHONE (OUTPATIENT)
Dept: ENDOCRINOLOGY | Facility: MEDICAL CENTER | Age: 29
End: 2019-07-12

## 2019-07-12 ENCOUNTER — APPOINTMENT (OUTPATIENT)
Dept: RADIOLOGY | Facility: MEDICAL CENTER | Age: 29
End: 2019-07-12
Attending: EMERGENCY MEDICINE
Payer: COMMERCIAL

## 2019-07-12 ENCOUNTER — HOSPITAL ENCOUNTER (EMERGENCY)
Facility: MEDICAL CENTER | Age: 29
End: 2019-07-12
Attending: EMERGENCY MEDICINE
Payer: COMMERCIAL

## 2019-07-12 VITALS
DIASTOLIC BLOOD PRESSURE: 98 MMHG | HEART RATE: 57 BPM | RESPIRATION RATE: 17 BRPM | SYSTOLIC BLOOD PRESSURE: 141 MMHG | BODY MASS INDEX: 38.85 KG/M2 | HEIGHT: 70 IN | WEIGHT: 271.39 LBS | TEMPERATURE: 97.4 F | OXYGEN SATURATION: 96 %

## 2019-07-12 DIAGNOSIS — R55 SYNCOPE, UNSPECIFIED SYNCOPE TYPE: ICD-10-CM

## 2019-07-12 DIAGNOSIS — G43.909 MIGRAINE WITHOUT STATUS MIGRAINOSUS, NOT INTRACTABLE, UNSPECIFIED MIGRAINE TYPE: ICD-10-CM

## 2019-07-12 LAB
ALBUMIN SERPL BCP-MCNC: 4.3 G/DL (ref 3.2–4.9)
ALBUMIN/GLOB SERPL: 1.6 G/DL
ALP SERPL-CCNC: 58 U/L (ref 30–99)
ALT SERPL-CCNC: 11 U/L (ref 2–50)
ANION GAP SERPL CALC-SCNC: 11 MMOL/L (ref 0–11.9)
AST SERPL-CCNC: 13 U/L (ref 12–45)
BASOPHILS # BLD AUTO: 0.5 % (ref 0–1.8)
BASOPHILS # BLD: 0.03 K/UL (ref 0–0.12)
BILIRUB SERPL-MCNC: 0.3 MG/DL (ref 0.1–1.5)
BUN SERPL-MCNC: 12 MG/DL (ref 8–22)
CALCIUM SERPL-MCNC: 8.9 MG/DL (ref 8.5–10.5)
CHLORIDE SERPL-SCNC: 109 MMOL/L (ref 96–112)
CO2 SERPL-SCNC: 23 MMOL/L (ref 20–33)
CREAT SERPL-MCNC: 0.78 MG/DL (ref 0.5–1.4)
EOSINOPHIL # BLD AUTO: 0.19 K/UL (ref 0–0.51)
EOSINOPHIL NFR BLD: 3 % (ref 0–6.9)
ERYTHROCYTE [DISTWIDTH] IN BLOOD BY AUTOMATED COUNT: 41.2 FL (ref 35.9–50)
GLOBULIN SER CALC-MCNC: 2.7 G/DL (ref 1.9–3.5)
GLUCOSE SERPL-MCNC: 80 MG/DL (ref 65–99)
HCG SERPL QL: NEGATIVE
HCT VFR BLD AUTO: 44.6 % (ref 37–47)
HGB BLD-MCNC: 14.6 G/DL (ref 12–16)
IMM GRANULOCYTES # BLD AUTO: 0.03 K/UL (ref 0–0.11)
IMM GRANULOCYTES NFR BLD AUTO: 0.5 % (ref 0–0.9)
LACTATE BLD-SCNC: 1.3 MMOL/L (ref 0.5–2)
LYMPHOCYTES # BLD AUTO: 1.73 K/UL (ref 1–4.8)
LYMPHOCYTES NFR BLD: 27.6 % (ref 22–41)
MCH RBC QN AUTO: 28 PG (ref 27–33)
MCHC RBC AUTO-ENTMCNC: 32.7 G/DL (ref 33.6–35)
MCV RBC AUTO: 85.6 FL (ref 81.4–97.8)
MONOCYTES # BLD AUTO: 0.41 K/UL (ref 0–0.85)
MONOCYTES NFR BLD AUTO: 6.5 % (ref 0–13.4)
NEUTROPHILS # BLD AUTO: 3.87 K/UL (ref 2–7.15)
NEUTROPHILS NFR BLD: 61.9 % (ref 44–72)
NRBC # BLD AUTO: 0 K/UL
NRBC BLD-RTO: 0 /100 WBC
PLATELET # BLD AUTO: 281 K/UL (ref 164–446)
PMV BLD AUTO: 9.5 FL (ref 9–12.9)
POTASSIUM SERPL-SCNC: 3.9 MMOL/L (ref 3.6–5.5)
PROT SERPL-MCNC: 7 G/DL (ref 6–8.2)
RBC # BLD AUTO: 5.21 M/UL (ref 4.2–5.4)
SODIUM SERPL-SCNC: 143 MMOL/L (ref 135–145)
TROPONIN T SERPL-MCNC: <6 NG/L (ref 6–19)
WBC # BLD AUTO: 6.3 K/UL (ref 4.8–10.8)

## 2019-07-12 PROCEDURE — 83605 ASSAY OF LACTIC ACID: CPT

## 2019-07-12 PROCEDURE — 80053 COMPREHEN METABOLIC PANEL: CPT

## 2019-07-12 PROCEDURE — 85025 COMPLETE CBC W/AUTO DIFF WBC: CPT

## 2019-07-12 PROCEDURE — 99284 EMERGENCY DEPT VISIT MOD MDM: CPT

## 2019-07-12 PROCEDURE — 84703 CHORIONIC GONADOTROPIN ASSAY: CPT

## 2019-07-12 PROCEDURE — A9270 NON-COVERED ITEM OR SERVICE: HCPCS | Performed by: EMERGENCY MEDICINE

## 2019-07-12 PROCEDURE — 700111 HCHG RX REV CODE 636 W/ 250 OVERRIDE (IP): Performed by: EMERGENCY MEDICINE

## 2019-07-12 PROCEDURE — 84484 ASSAY OF TROPONIN QUANT: CPT

## 2019-07-12 PROCEDURE — 700102 HCHG RX REV CODE 250 W/ 637 OVERRIDE(OP): Performed by: EMERGENCY MEDICINE

## 2019-07-12 PROCEDURE — 96374 THER/PROPH/DIAG INJ IV PUSH: CPT

## 2019-07-12 PROCEDURE — 96375 TX/PRO/DX INJ NEW DRUG ADDON: CPT

## 2019-07-12 PROCEDURE — 70450 CT HEAD/BRAIN W/O DYE: CPT

## 2019-07-12 RX ORDER — BUTALBITAL, ACETAMINOPHEN AND CAFFEINE 50; 325; 40 MG/1; MG/1; MG/1
1-2 TABLET ORAL EVERY 4 HOURS PRN
Qty: 18 TAB | Refills: 0 | Status: SHIPPED | OUTPATIENT
Start: 2019-07-12 | End: 2019-07-15

## 2019-07-12 RX ORDER — KETOROLAC TROMETHAMINE 30 MG/ML
30 INJECTION, SOLUTION INTRAMUSCULAR; INTRAVENOUS ONCE
Status: COMPLETED | OUTPATIENT
Start: 2019-07-12 | End: 2019-07-12

## 2019-07-12 RX ORDER — DIPHENHYDRAMINE HYDROCHLORIDE 50 MG/ML
25 INJECTION INTRAMUSCULAR; INTRAVENOUS ONCE
Status: COMPLETED | OUTPATIENT
Start: 2019-07-12 | End: 2019-07-12

## 2019-07-12 RX ORDER — NAPROXEN 500 MG/1
500 TABLET ORAL 2 TIMES DAILY WITH MEALS
Qty: 30 TAB | Refills: 0 | Status: SHIPPED | OUTPATIENT
Start: 2019-07-12 | End: 2019-10-30

## 2019-07-12 RX ORDER — BUTALBITAL, ACETAMINOPHEN AND CAFFEINE 50; 325; 40 MG/1; MG/1; MG/1
2 TABLET ORAL ONCE
Status: COMPLETED | OUTPATIENT
Start: 2019-07-12 | End: 2019-07-12

## 2019-07-12 RX ORDER — METOCLOPRAMIDE 10 MG/1
10 TABLET ORAL ONCE
Status: COMPLETED | OUTPATIENT
Start: 2019-07-12 | End: 2019-07-12

## 2019-07-12 RX ADMIN — BUTALBITAL, ACETAMINOPHEN, AND CAFFEINE 2 TABLET: 50; 325; 40 TABLET ORAL at 14:18

## 2019-07-12 RX ADMIN — DIPHENHYDRAMINE HYDROCHLORIDE 25 MG: 50 INJECTION INTRAMUSCULAR; INTRAVENOUS at 12:38

## 2019-07-12 RX ADMIN — KETOROLAC TROMETHAMINE 30 MG: 30 INJECTION, SOLUTION INTRAMUSCULAR; INTRAVENOUS at 14:18

## 2019-07-12 RX ADMIN — METOCLOPRAMIDE HYDROCHLORIDE 10 MG: 10 TABLET ORAL at 12:37

## 2019-07-12 ASSESSMENT — LIFESTYLE VARIABLES: DO YOU DRINK ALCOHOL: NO

## 2019-07-12 NOTE — ED PROVIDER NOTES
"ED Provider Note    ED Provider Note    Primary care provider: KARINE Hernández  Means of arrival: walk in  History obtained from: Patient    CHIEF COMPLAINT  Chief Complaint   Patient presents with   • Syncope   • Headache     Seen at 11:52 AM.   [facility]  Ricardanghia Homa Galdamez is a 29 y.o. female who presents to the Emergency Department status post a possible 2-hour syncopal event.  Patient states for the past week she is felt \"off\".  Earlier in the week she has been having episodes of nausea/vomiting.  This morning she woke up at 7 with a posterior migraine headache.  She ate breakfast and felt better and started to take a shower at 730.  During the shower she felt \"dizzy\".  She was woken up at 930 in the shower by her daughter.  She is unsure what happened and does not know she hit her head or not.  After being awoken by her daughter in the shower she felt \"bad\" and she still had her migraine headache.  She immediately got dressed and took her daughter to her grandma's house and then proceeded to request that her mother bring her to the emergency department.    Currently the patient still has her headache with associated photophobia.  She has blurred vision which she states is a chronic problem.  She denies nausea/vomiting or fever/chills at home.  She denies heart palpitations, chest pain, or shortness of breath.    Of note the patient states she has not felt well for quite some time.  She is currently being cared for by an endocrinologist neurologist and primary care doctor as an outpatient.  It appears they are working differential is an insulinoma versus a seizure disorder.  Her endocrinologist is currently trying to refer her to [place] for higher level of care.    No family history of endocrine cancers.    REVIEW OF SYSTEMS  See HPI,   Remainder of ROS negative.     PAST MEDICAL HISTORY   has a past medical history of Cancer (HCC) (2008); EEG abnormal; Hashimoto's disease; Hyperglycemia; " "Hypertension; Hypoglycemia; Migraine; Pseudotumor cerebri; and Seizure disorder (HCC).    SURGICAL HISTORY   has a past surgical history that includes gyn surgery; gastroscopy (N/A, 12/26/2018); gastroscopy with biopsy (N/A, 12/26/2018); egd w/endoscopic ultrasound (N/A, 12/26/2018); and egd with asp/bx (N/A, 12/26/2018).    SOCIAL HISTORY  Social History   Substance Use Topics   • Smoking status: Former Smoker     Start date: 1/1/2005     Quit date: 5/3/2012   • Smokeless tobacco: Never Used      Comment: 1/2 pack a day   • Alcohol use 0.6 oz/week     1 Glasses of wine per week      Comment: socially, 1 glass of wine per week      History   Drug Use No     Comment: THC       FAMILY HISTORY  Family History   Problem Relation Age of Onset   • Cancer Mother         Cervical       CURRENT MEDICATIONS  Reviewed.  See Encounter Summary.     ALLERGIES  No Known Allergies    PHYSICAL EXAM  VITAL SIGNS: /98   Pulse (!) 57   Temp 36.3 °C (97.4 °F) (Temporal)   Resp 17   Ht 1.778 m (5' 10\")   Wt 123.1 kg (271 lb 6.2 oz)   SpO2 96%   BMI 38.94 kg/m²   Constitutional: Awake, alert tearful.  HENT: Normocephalic, Bilateral external ears normal. Nose normal.   Eyes: Conjunctiva normal, non-icteric, EOMI.    Thorax & Lungs: Easy unlabored respirations, Clear to ascultation bilaterally.  Cardiovascular: Regular rate, Regular rhythm, No murmurs, rubs or gallops.  Abdomen:  Soft, nontender, nondistended, normal active bowel sounds.   :    Skin: Visualized skin is  Dry, No erythema, No rash.   Musculoskeletal:   No cyanosis, clubbing or edema.  No nuchal rigidity.  Neurologic: Alert, Grossly non-focal.  Cranial nerves II through XII are intact.  Psychiatric: Anxious affect, crying on and off throughout the interview/being out of exam.  Lymphatic:  No cervical LAD    EKG   12 lead Interpreted by me  Rhythm:  Normal sinus rhythm   Rate:   Axis: normal  Ectopy: none  Conduction: normal  ST Segments: no acute change  T " Waves: no acute change  Clinical Impression: Normal EKG without acute changes     RADIOLOGY  CT-HEAD W/O   Final Result      No acute intracranial abnormality is identified.            COURSE & MEDICAL DECISION MAKING  Pertinent Labs & Imaging studies reviewed. (See chart for details)    Differential diagnoses include but are not limited to: Hypoglycemia, seizure disorder, increased intracranial pressure, cardiac arrhythmia, cardiomyopathy, vasovagal, migraine headache    11:52 AM - Medical record reviewed, patient seen and examined at bedside.    12:22 PM: Patient interviewed and examined at bedside.  Very broad differential at this time.  This appears to be an acute exacerbation of her chronic problem.  Her outpatient providers are trying to refer her to [place].  Her presentation very likely could be vasovagal episode with a migraine headache.  We will give the patient Reglan and Benadryl to try to abort her migraine.  CMP, CBC, troponin, lactic acid ordered to work-up the etiology of her syncopal event.  CT head also ordered.    2:11 PM: Head CT, CBC, and CMP were within normal limits which is reassuring and goes along with the thought that her syncopal event was secondary to vasovagal event.  Patient is still being bothered by her migraine.  Patient will be given Toradol and fioricet for her headache.     3:10 PM-patient states her headache has resolved almost completely.  She feels comfortable going home at this time.      Decision Making:  This is a 29 y.o. year old female who presents after syncopal event at home.  When she presented she was also having an migraine headache.  It appears her syncopal episode was secondary to a vasovagal event.  EKG, CMP, CBC, and head CT were all normal and reassuring providing further evidence that this is likely a vasovagal event.  Patient has a complicated past medical history which is being worked up by her outpatient providers for a possible seizure disorder versus an  insulinoma.  Given her reassuring work-up there appears to be no reason for the patient needing to be admitted.  Her migraine headache was aborted in the emergency department.  Return precautions were given to the patient she was instructed to follow-up with her outpatient physicians as soon as possible.    Discharge Medications:  Discharge Medication List as of 7/12/2019  3:17 PM      START taking these medications    Details   acetaminophen/caffeine/butalbital 325-40-50 mg (FIORICET) -40 MG Tab Take 1-2 Tabs by mouth every four hours as needed for Headache for up to 3 days., Disp-18 Tab, R-0, Print Rx Paper, For 3 days      naproxen (NAPROSYN) 500 MG Tab Take 1 Tab by mouth 2 times a day, with meals., Disp-30 Tab, R-0, Normal             The patient was discharged home (see d/c instructions) and parent was told to return immediately for any signs or symptoms listed, or any worsening at all.  The patient's parent verbally agreed to the discharge precautions and follow-up plan which is documented in EPIC.    FINAL IMPRESSION  1. Migraine without status migrainosus, not intractable, unspecified migraine type    2. Syncope, unspecified syncope type

## 2019-07-12 NOTE — ED TRIAGE NOTES
"Ambulates to triage  Chief Complaint   Patient presents with   • Syncope   • Headache     Pt said she took a shower at 0730 this morning, and the next thing she remembers is her daughter waking her up at 0930.  Has not been feeling well \"for a while.\"  Hx of migraine, does not know if she hit her head, has had a headache on and off the last few days.  No changes in meds.  "

## 2019-07-12 NOTE — TELEPHONE ENCOUNTER
1. Caller Name: Nickolas Galdamez                                           Call Back Number: [phone] (home)         Patient approves a detailed voicemail message: N\A    Patient was found unconscious by her daughter this morning and would like to come in for an appointment. I advised to go to the ER for urgent care. I made an appointment for her to come in on the 15 th of July. She has been feeling dizzy, her blood pressure has been running high. At work she feels confused and she'll stutter a lot. She's isn't sure what's causing her to feel this way and would like to be seen ASAP.

## 2019-07-15 ENCOUNTER — OFFICE VISIT (OUTPATIENT)
Dept: ENDOCRINOLOGY | Facility: MEDICAL CENTER | Age: 29
End: 2019-07-15
Payer: COMMERCIAL

## 2019-07-15 VITALS
HEART RATE: 82 BPM | SYSTOLIC BLOOD PRESSURE: 118 MMHG | HEIGHT: 70 IN | DIASTOLIC BLOOD PRESSURE: 76 MMHG | BODY MASS INDEX: 38.08 KG/M2 | OXYGEN SATURATION: 96 % | WEIGHT: 266 LBS

## 2019-07-15 DIAGNOSIS — E66.9 OBESITY (BMI 35.0-39.9 WITHOUT COMORBIDITY): ICD-10-CM

## 2019-07-15 DIAGNOSIS — E55.9 VITAMIN D DEFICIENCY: ICD-10-CM

## 2019-07-15 DIAGNOSIS — R55 VASOVAGAL SYNCOPE: ICD-10-CM

## 2019-07-15 DIAGNOSIS — E03.8 HYPOTHYROIDISM DUE TO HASHIMOTO'S THYROIDITIS: ICD-10-CM

## 2019-07-15 DIAGNOSIS — E06.3 HYPOTHYROIDISM DUE TO HASHIMOTO'S THYROIDITIS: ICD-10-CM

## 2019-07-15 PROCEDURE — 95250 CONT GLUC MNTR PHYS/QHP EQP: CPT | Performed by: INTERNAL MEDICINE

## 2019-07-15 PROCEDURE — 99214 OFFICE O/P EST MOD 30 MIN: CPT | Performed by: INTERNAL MEDICINE

## 2019-07-15 NOTE — PROGRESS NOTES
Endocrinology Clinic Progress Note  PCP: KARINE Hernández    HPI:  Nickolas Galdamez is a 29 y.o. old patient who comes in today for follow up to the emergency department on 7/12/19.   Apparently the patient was having a migraine headache the morning of 7/12/19 and had a syncopal episode in which her daughter found her in the shower unresponsive 2 hours later.  The patient does have a history of hypoglycemia, her blood sugar in the emergency department was 80.  She states that she did not check blood sugar at home prior to syncopal episode but she did eat breakfast.   States she has woken up confused on numerous morning, sometimes she checks her blood sugars and the lowest was around 64   Patient also seen for hypothyroid in which she is on Levothyroxine 200 mcg per day, vitamin d deficiency in which she is on 2000 iu supplementation of vitamin d.   Obesity: states she started back on the Saxenda 3mg per day, could not tolerate Phentermine.     Please note that I have I done extensive work-up to rule out insulinoma for her in the past.  The CT scan of the abdomen did show nonspecific pancreatic lesions however the ERCP did not show any lesions suggestive of insulinoma.  With that being said I am also not satisfied with the quality of the ERCP done here in the area.  She continues to have this syncopal episodes with transient loss of consciousness frequently.  She has had these episodes several times in front of her 6-year-old daughter was also very scared as she does not know when her mother is going to pass out.  Is also worried that she may lose her job if this keeps happening.  I had referred her to [facility] for detailed investigations to rule out insulinoma however just around the time of her appointment she ended up in the emergency room with another syncopal episodes and could not make it.  We will make attempts to reestablish her again with [facility].    Based on the descriptions that she has provided varying around the time of this episodes of blood pressure drops her heart starts to race and she feels pulsations in her neck prior to passing out I suspect if she is having some sort of vasovagal episodes which may be very simple to treat.      ROS:  Constitutional: No weight loss  Cardiac: No palpitations or racing heart  Resp: No shortness of breath  Neuro: Multiple syncopal episodes no numbness or tinging in feet  Endo: No heat or cold intolerance, no polyuria or polydipsia  All other systems were reviewed and were negative.    Past Medical History:  Patient Active Problem List    Diagnosis Date Noted   • Hypoglycemia 09/11/2017     Priority: High   • Hypothyroidism 01/11/2018     Priority: Low   • Vitamin D deficiency 05/04/2017     Priority: Low   • Obesity 04/14/2013     Priority: Low   • Seizure disorder (CMS-HCC) 10/22/2012     Priority: Low   • Hospital discharge follow-up 07/08/2019   • Major depressive disorder with single episode, in full remission ([facility]) 06/04/2018   • PCOS (polycystic ovarian syndrome) 02/28/2018   • Hashimoto's thyroiditis 05/04/2017   • Chronic daily headache 02/28/2017   • Essential hypertension 01/31/2017   • Obesity (BMI 30-39.9) 01/31/2017   • Pseudotumor cerebri 10/15/2012       Past Surgical History:  Past Surgical History:   Procedure Laterality Date   • GASTROSCOPY N/A 12/26/2018    Procedure: GASTROSCOPY;  Surgeon: Kleber Ivory M.D.;  Location: [facility];  Service: Gastroenterology   • GASTROSCOPY WITH BIOPSY N/A 12/26/2018    Procedure: GASTROSCOPY WITH BIOPSY - GASTRIC;  Surgeon: Kleber Ivory M.D.;  Location: [facility];  Service: Gastroenterology   • EGD W/ENDOSCOPIC ULTRASOUND N/A 12/26/2018    Procedure: EGD W/ENDOSCOPIC ULTRASOUND - UPPER, CURVILINEAR;  Surgeon: Kleber Ivory M.D.;  Location: [facility];  Service: Gastroenterology   • EGD WITH ASP/BX  N/A 12/26/2018    Procedure: EGD WITH ASP/BX - GUIDED FNA PANCREATIC HEAD AND/OR TAIL LESION;  Surgeon: Kleber Ivory M.D.;  Location: [facility];  Service: Gastroenterology   • GYN SURGERY      precancerous cells with cervical freezing, HPV positive       Allergies:  Patient has no known allergies.    Social History:  Social History     Social History   • Marital status: Single     Spouse name: N/A   • Number of children: N/A   • Years of education: N/A     Occupational History   • Not on file.     Social History Main Topics   • Smoking status: Former Smoker     Start date: 1/1/2005     Quit date: 5/3/2012   • Smokeless tobacco: Never Used      Comment: 1/2 pack a day   • Alcohol use 0.6 oz/week     1 Glasses of wine per week      Comment: socially, 1 glass of wine per week   • Drug use: No      Comment: THC   • Sexual activity: No     Other Topics Concern   • Not on file     Social History Narrative    ** Merged History Encounter **            Family History:  Family History   Problem Relation Age of Onset   • Cancer Mother         Cervical       Medications:    Current Outpatient Prescriptions:   •  amLODIPine (NORVASC) 10 MG Tab, TAKE 1/2 (ONE-HALF) TABLET BY MOUTH ONCE DAILY, Disp: 90 Tab, Rfl: 1  •  Liraglutide -Weight Management (SAXENDA) 18 MG/3ML Solution Pen-injector, Inject 3 mg as instructed every day. (Patient taking differently: Inject 2.4 mg as instructed every day.), Disp: 6 PEN, Rfl: 3  •  levothyroxine (SYNTHROID) 200 MCG Tab, TAKE 1 TABLET BY MOUTH ONCE DAILY IN THE MORNING ON  AN  EMPTY  STOMACH, Disp: 90 Tab, Rfl: 1  •  venlafaxine XR (EFFEXOR XR) 75 MG CAPSULE SR 24 HR, Take 1 Cap by mouth every day., Disp: 90 Cap, Rfl: 1  •  MIRENA, 52 MG, 20 MCG/24HR IUD, , Disp: , Rfl:   •  Cholecalciferol (VITAMIN D) 2000 UNITS Cap, Take 2,000 Units by mouth 2 Times a Day., Disp: , Rfl:   •  acetaminophen/caffeine/butalbital 325-40-50 mg (FIORICET) -40 MG Tab, Take 1-2 Tabs by mouth  "every four hours as needed for Headache for up to 3 days., Disp: 18 Tab, Rfl: 0  •  naproxen (NAPROSYN) 500 MG Tab, Take 1 Tab by mouth 2 times a day, with meals., Disp: 30 Tab, Rfl: 0  •  ondansetron (ZOFRAN ODT) 4 MG TABLET DISPERSIBLE, Take 1 Tab by mouth every 6 hours as needed., Disp: 10 Tab, Rfl: 0  •  loperamide (IMODIUM) 2 MG Cap, Take 1 Cap by mouth 4 times a day as needed for Diarrhea., Disp: 30 Cap, Rfl: 0  •  hydrocortisone (ANUSOL-HC) 25 MG Suppos, Insert 1 Suppository in rectum every 12 hours., Disp: 10 Suppository, Rfl: 0    Labs: Reviewed    Physical Examination:  Vital signs: /76   Pulse 82   Ht 1.778 m (5' 10\")   Wt 120.7 kg (266 lb)   SpO2 96%   BMI 38.17 kg/m²  Body mass index is 38.17 kg/m².  General: No apparent distress, cooperative  Eyes: No scleral icterus or discharge  ENMT: Normal on external inspection of nose, lips, normal thyroid exam  Neck: No abnormal masses on inspection  Resp: Normal effort, clear to auscultation bilaterally   CVS: Regular rate and rhythm, S1 S2 normal, no murmur   Extremities: No edema  Abdomen: abdominal obesity present  Neuro: Alert and oriented  Skin: No rash  Psych: Normal mood and affect, intact memory and able to make informed decisions    Assessment and Plan:  1. Hypothyroidism due to Hashimoto's thyroiditis  Continue current dose of levothyroxine    2. Vitamin D deficiency  Continue  vitamin D supplementation    3. Obesity  Paradoxically, the Saxenda that she is using for weight loss although can increase the predisposition to hypoglycemia but she states that her episodes are much less when on this drug    4. Vasovagal episodes:   Origin referral to cardiology for further evaluation.  In parallel I am actively working to get her to [facility] system so that they can do detailed in satisfactory work-up for insulinoma.      Today in the office we have also installed the continuous glucose monitoring system to document unrecognized hypoglycemic " episodes.    Return in about 2 months (around 9/15/2019).    Thank you for allowing me to participate in the care of this patient.    Sanford Ayala  07/15/19    CC:   COBY Hernández.    This note was created using voice recognition software (Dragon). The accuracy of the dictation is limited by the abilities of the software. I have reviewed the note prior to signing, however some errors in grammar and context are still possible. If you have any questions related to this note please do not hesitate to contact our office.   This note was scribed by Rossana Schmitz RN, CDE

## 2019-08-13 NOTE — TELEPHONE ENCOUNTER
Was the patient seen in the last year in this department? Yes    Does patient have an active prescription for medications requested? No     Received Request Via: Pharmacy    Pt met protocol?: Yes     Last OV 07/05/19

## 2019-08-14 ENCOUNTER — HOSPITAL ENCOUNTER (EMERGENCY)
Facility: MEDICAL CENTER | Age: 29
End: 2019-08-14
Attending: EMERGENCY MEDICINE
Payer: COMMERCIAL

## 2019-08-14 VITALS
HEART RATE: 61 BPM | DIASTOLIC BLOOD PRESSURE: 96 MMHG | HEIGHT: 69 IN | RESPIRATION RATE: 20 BRPM | SYSTOLIC BLOOD PRESSURE: 139 MMHG | BODY MASS INDEX: 39.69 KG/M2 | OXYGEN SATURATION: 94 % | TEMPERATURE: 96.7 F | WEIGHT: 268 LBS

## 2019-08-14 DIAGNOSIS — R51.9 ACUTE NONINTRACTABLE HEADACHE, UNSPECIFIED HEADACHE TYPE: ICD-10-CM

## 2019-08-14 DIAGNOSIS — K52.9 GASTROENTERITIS: ICD-10-CM

## 2019-08-14 LAB
ALBUMIN SERPL BCP-MCNC: 4.6 G/DL (ref 3.2–4.9)
ALBUMIN/GLOB SERPL: 1.5 G/DL
ALP SERPL-CCNC: 63 U/L (ref 30–99)
ALT SERPL-CCNC: 12 U/L (ref 2–50)
ANION GAP SERPL CALC-SCNC: 10 MMOL/L (ref 0–11.9)
AST SERPL-CCNC: 13 U/L (ref 12–45)
BASOPHILS # BLD AUTO: 0.4 % (ref 0–1.8)
BASOPHILS # BLD: 0.03 K/UL (ref 0–0.12)
BILIRUB SERPL-MCNC: 0.5 MG/DL (ref 0.1–1.5)
BUN SERPL-MCNC: 15 MG/DL (ref 8–22)
CALCIUM SERPL-MCNC: 9.3 MG/DL (ref 8.5–10.5)
CHLORIDE SERPL-SCNC: 107 MMOL/L (ref 96–112)
CO2 SERPL-SCNC: 22 MMOL/L (ref 20–33)
CREAT SERPL-MCNC: 1.03 MG/DL (ref 0.5–1.4)
EOSINOPHIL # BLD AUTO: 0.16 K/UL (ref 0–0.51)
EOSINOPHIL NFR BLD: 2.3 % (ref 0–6.9)
ERYTHROCYTE [DISTWIDTH] IN BLOOD BY AUTOMATED COUNT: 41.1 FL (ref 35.9–50)
GLOBULIN SER CALC-MCNC: 3.1 G/DL (ref 1.9–3.5)
GLUCOSE BLD-MCNC: 101 MG/DL (ref 65–99)
GLUCOSE SERPL-MCNC: 87 MG/DL (ref 65–99)
HCT VFR BLD AUTO: 46.6 % (ref 37–47)
HGB BLD-MCNC: 15.9 G/DL (ref 12–16)
IMM GRANULOCYTES # BLD AUTO: 0.03 K/UL (ref 0–0.11)
IMM GRANULOCYTES NFR BLD AUTO: 0.4 % (ref 0–0.9)
LYMPHOCYTES # BLD AUTO: 1.25 K/UL (ref 1–4.8)
LYMPHOCYTES NFR BLD: 17.6 % (ref 22–41)
MCH RBC QN AUTO: 29.3 PG (ref 27–33)
MCHC RBC AUTO-ENTMCNC: 34.1 G/DL (ref 33.6–35)
MCV RBC AUTO: 85.8 FL (ref 81.4–97.8)
MONOCYTES # BLD AUTO: 0.54 K/UL (ref 0–0.85)
MONOCYTES NFR BLD AUTO: 7.6 % (ref 0–13.4)
NEUTROPHILS # BLD AUTO: 5.09 K/UL (ref 2–7.15)
NEUTROPHILS NFR BLD: 71.7 % (ref 44–72)
NRBC # BLD AUTO: 0 K/UL
NRBC BLD-RTO: 0 /100 WBC
PLATELET # BLD AUTO: 268 K/UL (ref 164–446)
PMV BLD AUTO: 9.7 FL (ref 9–12.9)
POTASSIUM SERPL-SCNC: 3.9 MMOL/L (ref 3.6–5.5)
PROT SERPL-MCNC: 7.7 G/DL (ref 6–8.2)
RBC # BLD AUTO: 5.43 M/UL (ref 4.2–5.4)
SODIUM SERPL-SCNC: 139 MMOL/L (ref 135–145)
WBC # BLD AUTO: 7.1 K/UL (ref 4.8–10.8)

## 2019-08-14 PROCEDURE — 99284 EMERGENCY DEPT VISIT MOD MDM: CPT

## 2019-08-14 PROCEDURE — 700105 HCHG RX REV CODE 258: Performed by: EMERGENCY MEDICINE

## 2019-08-14 PROCEDURE — 82962 GLUCOSE BLOOD TEST: CPT

## 2019-08-14 PROCEDURE — 96374 THER/PROPH/DIAG INJ IV PUSH: CPT

## 2019-08-14 PROCEDURE — 85025 COMPLETE CBC W/AUTO DIFF WBC: CPT

## 2019-08-14 PROCEDURE — 96376 TX/PRO/DX INJ SAME DRUG ADON: CPT

## 2019-08-14 PROCEDURE — 96375 TX/PRO/DX INJ NEW DRUG ADDON: CPT

## 2019-08-14 PROCEDURE — 80053 COMPREHEN METABOLIC PANEL: CPT

## 2019-08-14 PROCEDURE — 700111 HCHG RX REV CODE 636 W/ 250 OVERRIDE (IP): Performed by: EMERGENCY MEDICINE

## 2019-08-14 RX ORDER — ONDANSETRON 2 MG/ML
4 INJECTION INTRAMUSCULAR; INTRAVENOUS ONCE
Status: COMPLETED | OUTPATIENT
Start: 2019-08-14 | End: 2019-08-14

## 2019-08-14 RX ORDER — DIPHENHYDRAMINE HYDROCHLORIDE 50 MG/ML
50 INJECTION INTRAMUSCULAR; INTRAVENOUS ONCE
Status: COMPLETED | OUTPATIENT
Start: 2019-08-14 | End: 2019-08-14

## 2019-08-14 RX ORDER — PROMETHAZINE HYDROCHLORIDE 25 MG/1
25 TABLET ORAL EVERY 6 HOURS PRN
Qty: 15 TAB | Refills: 0 | Status: SHIPPED | OUTPATIENT
Start: 2019-08-14 | End: 2019-10-30

## 2019-08-14 RX ORDER — PROMETHAZINE HYDROCHLORIDE 25 MG/1
25 SUPPOSITORY RECTAL EVERY 6 HOURS PRN
Qty: 5 SUPPOSITORY | Refills: 0 | Status: SHIPPED | OUTPATIENT
Start: 2019-08-14 | End: 2019-10-30

## 2019-08-14 RX ORDER — MORPHINE SULFATE 4 MG/ML
4 INJECTION, SOLUTION INTRAMUSCULAR; INTRAVENOUS ONCE
Status: COMPLETED | OUTPATIENT
Start: 2019-08-14 | End: 2019-08-14

## 2019-08-14 RX ORDER — SODIUM CHLORIDE 9 MG/ML
1000 INJECTION, SOLUTION INTRAVENOUS ONCE
Status: COMPLETED | OUTPATIENT
Start: 2019-08-14 | End: 2019-08-14

## 2019-08-14 RX ORDER — VENLAFAXINE HYDROCHLORIDE 75 MG/1
CAPSULE, EXTENDED RELEASE ORAL
Qty: 90 CAP | Refills: 1 | Status: SHIPPED | OUTPATIENT
Start: 2019-08-14 | End: 2019-10-30

## 2019-08-14 RX ADMIN — SODIUM CHLORIDE 1000 ML: 9 INJECTION, SOLUTION INTRAVENOUS at 09:12

## 2019-08-14 RX ADMIN — DIPHENHYDRAMINE HYDROCHLORIDE 50 MG: 50 INJECTION INTRAMUSCULAR; INTRAVENOUS at 09:13

## 2019-08-14 RX ADMIN — MORPHINE SULFATE 4 MG: 4 INJECTION INTRAVENOUS at 09:13

## 2019-08-14 RX ADMIN — MORPHINE SULFATE 4 MG: 4 INJECTION INTRAVENOUS at 11:06

## 2019-08-14 RX ADMIN — ONDANSETRON 4 MG: 2 INJECTION INTRAMUSCULAR; INTRAVENOUS at 09:13

## 2019-08-14 ASSESSMENT — ENCOUNTER SYMPTOMS
PHOTOPHOBIA: 1
DIARRHEA: 1
NAUSEA: 1
VOMITING: 1
BLURRED VISION: 0
CHILLS: 0
FEVER: 0
SHORTNESS OF BREATH: 0

## 2019-08-14 ASSESSMENT — LIFESTYLE VARIABLES: DO YOU DRINK ALCOHOL: NO

## 2019-08-14 NOTE — ED NOTES
Pt discharged to home. Pt was given follow up instructions and prescriptions for phenergan suppository and oral. Pt verbalized understanding of all instructions for discharge and is ambulatory out of ED with steady gait, friends driving pt home.

## 2019-08-14 NOTE — ED PROVIDER NOTES
ED Provider Note    Scribed for Jr Kern M.D. by John Irving. 8/14/2019, 8:56 AM.    Primary care provider: KARINE Hernández  Means of arrival: walk-in  History obtained from: patient  History limited by: none    CHIEF COMPLAINT  Chief Complaint   Patient presents with   • Nausea/Vomiting/Diarrhea     X 2days   • Head Pain     Started this morning       HPI  Nickolas Galdamez is a 29 y.o. female who presents to the Emergency Department with complaints of persistent worsening migraine that started this morning. The patient states that she has felt sick for the last two days. She states that her symptoms are consistent with what she has experienced in the past with her migraines. She ranks her pain as an 8 out of 10 in severity. She associates nausea, vomiting, and diarrhea. She states that she took Zofran at home this morning, but it has not helped. The patient associates a history of hypertension, seizure, and migraine. She denies any known medical allergies.    REVIEW OF SYSTEMS  Review of Systems   Constitutional: Negative for chills and fever.   Eyes: Positive for photophobia. Negative for blurred vision.   Respiratory: Negative for shortness of breath.    Cardiovascular: Negative for chest pain.   Gastrointestinal: Positive for diarrhea, nausea and vomiting.   Neurological:        Migraine   All other systems reviewed and are negative.      PAST MEDICAL HISTORY   has a past medical history of Cancer (HCC) (2008), EEG abnormal, Hashimoto's disease, Hyperglycemia, Hypertension, Hypoglycemia, Migraine, Pseudotumor cerebri, and Seizure disorder (HCC).    SURGICAL HISTORY   has a past surgical history that includes gyn surgery; gastroscopy (N/A, 12/26/2018); gastroscopy with biopsy (N/A, 12/26/2018); egd w/endoscopic ultrasound (N/A, 12/26/2018); and egd with asp/bx (N/A, 12/26/2018).    SOCIAL HISTORY  Social History     Tobacco Use   • Smoking status: Former Smoker     Start  "date: 2005     Last attempt to quit: 5/3/2012     Years since quittin.2   • Smokeless tobacco: Never Used   • Tobacco comment: 1/2 pack a day   Substance Use Topics   • Alcohol use: Yes     Alcohol/week: 0.6 oz     Types: 1 Glasses of wine per week     Comment: socially, 1 glass of wine per week   • Drug use: No     Comment: THC      Social History     Substance and Sexual Activity   Drug Use No    Comment: THC       FAMILY HISTORY  Family History   Problem Relation Age of Onset   • Cancer Mother         Cervical       CURRENT MEDICATIONS  Home Medications     Reviewed by Irene Mcnulty R.N. (Registered Nurse) on 19 at 0742  Med List Status: Partial   Medication Last Dose Status   amLODIPine (NORVASC) 10 MG Tab 2019 Active   Cholecalciferol (VITAMIN D) 2000 UNITS Cap  Active   hydrocortisone (ANUSOL-HC) 25 MG Suppos  Active   levothyroxine (SYNTHROID) 200 MCG Tab 2019 Active   Liraglutide -Weight Management (SAXENDA) 18 MG/3ML Solution Pen-injector  Active   loperamide (IMODIUM) 2 MG Cap  Active   MIRENA, 52 MG, 20 MCG/24HR IUD  Active   naproxen (NAPROSYN) 500 MG Tab  Active   ondansetron (ZOFRAN ODT) 4 MG TABLET DISPERSIBLE  Active   venlafaxine XR (EFFEXOR XR) 75 MG CAPSULE SR 24 HR 2019 Active                ALLERGIES  No Known Allergies    PHYSICAL EXAM  VITAL SIGNS: /99   Pulse 80   Temp 35.9 °C (96.7 °F) (Temporal)   Resp 20   Ht 1.753 m (5' 9\")   SpO2 99%   BMI 39.28 kg/m²     Constitutional: Well developed, Well nourished, No acute distress, Non-toxic appearance. Tearful, anxious appearing  HENT: Normocephalic, Atraumatic, Bilateral external ears normal, oropharynx dry, No oral exudates, Nose normal. Dry mucous membranes  Eyes:conjunctiva is normal, there are no signs of exudate.   Neck: Supple, no meningeal signs. full range of motion tenderness to the bilateral paraspinal muscles  Lymphatic: No lymphadenopathy noted.   Cardiovascular: Regular rate and " rhythm without murmurs gallops or rubs.   Thorax & Lungs: No respiratory distress. Breathing comfortably. Lungs are clear to auscultation bilaterally, there are no wheezes no rales. Chest wall is nontender.  Abdomen: Soft, nontender, nondistended. Bowel sounds are present.   Skin: Warm, Dry, No erythema,   Back: No tenderness, No CVA tenderness.  Musculoskeletal: Good range of motion in all major joints. No tenderness to palpation or major deformities noted. Intact distal pulses, no clubbing, no cyanosis, no edema,   Neurologic: Alert & oriented x 3, Moving all extremities. No gross abnormalities.    Psychiatric: Affect normal, Judgment normal, Mood normal.     LABS  Results for orders placed or performed during the hospital encounter of 08/14/19   CBC WITH DIFFERENTIAL   Result Value Ref Range    WBC 7.1 4.8 - 10.8 K/uL    RBC 5.43 (H) 4.20 - 5.40 M/uL    Hemoglobin 15.9 12.0 - 16.0 g/dL    Hematocrit 46.6 37.0 - 47.0 %    MCV 85.8 81.4 - 97.8 fL    MCH 29.3 27.0 - 33.0 pg    MCHC 34.1 33.6 - 35.0 g/dL    RDW 41.1 35.9 - 50.0 fL    Platelet Count 268 164 - 446 K/uL    MPV 9.7 9.0 - 12.9 fL    Neutrophils-Polys 71.70 44.00 - 72.00 %    Lymphocytes 17.60 (L) 22.00 - 41.00 %    Monocytes 7.60 0.00 - 13.40 %    Eosinophils 2.30 0.00 - 6.90 %    Basophils 0.40 0.00 - 1.80 %    Immature Granulocytes 0.40 0.00 - 0.90 %    Nucleated RBC 0.00 /100 WBC    Neutrophils (Absolute) 5.09 2.00 - 7.15 K/uL    Lymphs (Absolute) 1.25 1.00 - 4.80 K/uL    Monos (Absolute) 0.54 0.00 - 0.85 K/uL    Eos (Absolute) 0.16 0.00 - 0.51 K/uL    Baso (Absolute) 0.03 0.00 - 0.12 K/uL    Immature Granulocytes (abs) 0.03 0.00 - 0.11 K/uL    NRBC (Absolute) 0.00 K/uL   COMP METABOLIC PANEL   Result Value Ref Range    Sodium 139 135 - 145 mmol/L    Potassium 3.9 3.6 - 5.5 mmol/L    Chloride 107 96 - 112 mmol/L    Co2 22 20 - 33 mmol/L    Anion Gap 10.0 0.0 - 11.9    Glucose 87 65 - 99 mg/dL    Bun 15 8 - 22 mg/dL    Creatinine 1.03 0.50 - 1.40 mg/dL     Calcium 9.3 8.5 - 10.5 mg/dL    AST(SGOT) 13 12 - 45 U/L    ALT(SGPT) 12 2 - 50 U/L    Alkaline Phosphatase 63 30 - 99 U/L    Total Bilirubin 0.5 0.1 - 1.5 mg/dL    Albumin 4.6 3.2 - 4.9 g/dL    Total Protein 7.7 6.0 - 8.2 g/dL    Globulin 3.1 1.9 - 3.5 g/dL    A-G Ratio 1.5 g/dL   ESTIMATED GFR   Result Value Ref Range    GFR If African American >60 >60 mL/min/1.73 m 2    GFR If Non African American >60 >60 mL/min/1.73 m 2   ACCU-CHEK GLUCOSE   Result Value Ref Range    Glucose - Accu-Ck 101 (H) 65 - 99 mg/dL       All labs reviewed by me.    COURSE & MEDICAL DECISION MAKING  Pertinent Labs & Imaging studies reviewed. (See chart for details)    8:56 AM - Patient seen and examined at bedside. Patient will be treated with NS infusion 1,000 mL, morphine 4 mg IV, Zofran 4 mg IV, Benadryl 4 mg IV. Ordered CBC with Differential, CMP, Accu-Chek Glucose to evaluate her symptoms. The differential diagnoses include but are not limited to: Migraine headache with musculature spasm vs gastroenteritis    10:53 AM I reevaluated the patient at bedside. The patient has improved to 6 out of 10 from an 8. We will give 4 mg of morphine IV and then she feels comfortable going home.    HYDRATION: Based on the patient's presentation of Acute Diarrhea and Dehydration the patient was given IV fluids. IV Hydration was used because oral hydration was not adequate alone. Upon recheck following hydration, the patient was improved.       Decision Making:  Patient presents ED for evaluation.  Clinically the patient was complaining of the vomiting and diarrhea and did appear to be dehydrated so I did start the patient a liter bolus normal saline.  From the standpoint of the migrainous headache she states that this feels like a typical migraine but bad 8/10 type pain.  Started with IV of the fluid bolus as well as morphine pain medications and Benadryl which does help with migraines.  Upon reevaluation she is improving did request a secondary  dose of morphine.  At this point I feel that her vomiting diarrhea is most likely secondary to a gastroenteritis.  And that this most likely triggered her migraine headache.  The patient has medications at home and at this point feels improved after the second dose of morphine and can be discharged home.  Is working for her to follow-up with her primary care physician current treating physician for further outpatient treatment care should return as needed.    The patient will return for new or worsening symptoms and is stable at the time of discharge.    The patient is referred to a primary physician for blood pressure management, diabetic screening, and for all other preventative health concerns.    DISPOSITION:  Patient will be discharged home in stable condition.    FOLLOW UP:  Nara Hercules, VANCER.N.  [address] [place] [id number] [phone]    Schedule an appointment as soon as possible for a visit in 1 week  For re-check, Return if any symptoms worsen      OUTPATIENT MEDICATIONS:  Discharge Medication List as of 8/14/2019 12:08 PM      START taking these medications    Details   promethazine (PHENERGAN) 25 MG Suppos Insert 1 Suppository in rectum every 6 hours as needed for Nausea/Vomiting., Disp-5 Suppository, R-0, Print Rx Paper      promethazine (PHENERGAN) 25 MG Tab Take 1 Tab by mouth every 6 hours as needed for Nausea/Vomiting., Disp-15 Tab, R-0, Print Rx Paper      venlafaxine XR (EFFEXOR XR) 75 MG CAPSULE SR 24 HR TAKE 1 CAPSULE BY MOUTH ONCE DAILY, Disp-90 Cap, R-1, Normal                FINAL IMPRESSION  1. Gastroenteritis    2. Acute nonintractable headache, unspecified headache type          I, John Irving (Salazar), am scribing for, and in the presence of, Jr Kern M.D..    Electronically signed by: John Irving (Salazar), 8/14/2019    IJr M.D. personally performed the services described in this documentation, as scribed by John Irving in  my presence, and it is both accurate and complete.    The note accurately reflects work and decisions made by me.  Jr Kern  8/14/2019  3:15 PM

## 2019-08-14 NOTE — ED TRIAGE NOTES
.  Chief Complaint   Patient presents with   • Nausea/Vomiting/Diarrhea     X 2days   • Head Pain     Started this morning      Pt ambulate to triage with above complaint. Pt states she took Zofran this am with some relief, took Imodium yesterday with no relief of diarrhea. Woke up this morning with HA originating from base of skull, C/O 8/10 pain.    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    Pt returned to [facility], educated on triage process.

## 2019-08-29 ENCOUNTER — APPOINTMENT (OUTPATIENT)
Dept: RADIOLOGY | Facility: MEDICAL CENTER | Age: 29
End: 2019-08-29
Attending: EMERGENCY MEDICINE
Payer: COMMERCIAL

## 2019-08-29 ENCOUNTER — HOSPITAL ENCOUNTER (EMERGENCY)
Facility: MEDICAL CENTER | Age: 29
End: 2019-08-29
Attending: EMERGENCY MEDICINE
Payer: COMMERCIAL

## 2019-08-29 VITALS
OXYGEN SATURATION: 97 % | RESPIRATION RATE: 18 BRPM | WEIGHT: 264.11 LBS | HEART RATE: 65 BPM | TEMPERATURE: 97.5 F | SYSTOLIC BLOOD PRESSURE: 108 MMHG | HEIGHT: 69 IN | DIASTOLIC BLOOD PRESSURE: 80 MMHG | BODY MASS INDEX: 39.12 KG/M2

## 2019-08-29 DIAGNOSIS — K62.89 RECTAL PAIN: ICD-10-CM

## 2019-08-29 DIAGNOSIS — R19.7 DIARRHEA, UNSPECIFIED TYPE: ICD-10-CM

## 2019-08-29 LAB
ALBUMIN SERPL BCP-MCNC: 4.6 G/DL (ref 3.2–4.9)
ALBUMIN/GLOB SERPL: 1.6 G/DL
ALP SERPL-CCNC: 67 U/L (ref 30–99)
ALT SERPL-CCNC: 12 U/L (ref 2–50)
ANION GAP SERPL CALC-SCNC: 9 MMOL/L (ref 0–11.9)
APPEARANCE UR: CLEAR
AST SERPL-CCNC: 14 U/L (ref 12–45)
BACTERIA #/AREA URNS HPF: ABNORMAL /HPF
BASOPHILS # BLD AUTO: 0.4 % (ref 0–1.8)
BASOPHILS # BLD: 0.04 K/UL (ref 0–0.12)
BILIRUB SERPL-MCNC: 0.4 MG/DL (ref 0.1–1.5)
BILIRUB UR QL STRIP.AUTO: NEGATIVE
BUN SERPL-MCNC: 12 MG/DL (ref 8–22)
CALCIUM SERPL-MCNC: 9.1 MG/DL (ref 8.5–10.5)
CHLORIDE SERPL-SCNC: 108 MMOL/L (ref 96–112)
CO2 SERPL-SCNC: 21 MMOL/L (ref 20–33)
COLOR UR: YELLOW
CREAT SERPL-MCNC: 0.86 MG/DL (ref 0.5–1.4)
CRP SERPL HS-MCNC: 0.11 MG/DL (ref 0–0.75)
EOSINOPHIL # BLD AUTO: 0.27 K/UL (ref 0–0.51)
EOSINOPHIL NFR BLD: 2.9 % (ref 0–6.9)
EPI CELLS #/AREA URNS HPF: NEGATIVE /HPF
ERYTHROCYTE [DISTWIDTH] IN BLOOD BY AUTOMATED COUNT: 40 FL (ref 35.9–50)
GLOBULIN SER CALC-MCNC: 2.8 G/DL (ref 1.9–3.5)
GLUCOSE SERPL-MCNC: 87 MG/DL (ref 65–99)
GLUCOSE UR STRIP.AUTO-MCNC: NEGATIVE MG/DL
HCG SERPL QL: NEGATIVE
HCT VFR BLD AUTO: 46.4 % (ref 37–47)
HGB BLD-MCNC: 15.4 G/DL (ref 12–16)
HYALINE CASTS #/AREA URNS LPF: ABNORMAL /LPF
IMM GRANULOCYTES # BLD AUTO: 0.05 K/UL (ref 0–0.11)
IMM GRANULOCYTES NFR BLD AUTO: 0.5 % (ref 0–0.9)
KETONES UR STRIP.AUTO-MCNC: NEGATIVE MG/DL
LEUKOCYTE ESTERASE UR QL STRIP.AUTO: ABNORMAL
LIPASE SERPL-CCNC: 11 U/L (ref 11–82)
LYMPHOCYTES # BLD AUTO: 1.87 K/UL (ref 1–4.8)
LYMPHOCYTES NFR BLD: 20.2 % (ref 22–41)
MCH RBC QN AUTO: 28.2 PG (ref 27–33)
MCHC RBC AUTO-ENTMCNC: 33.2 G/DL (ref 33.6–35)
MCV RBC AUTO: 85 FL (ref 81.4–97.8)
MICRO URNS: ABNORMAL
MONOCYTES # BLD AUTO: 0.56 K/UL (ref 0–0.85)
MONOCYTES NFR BLD AUTO: 6.1 % (ref 0–13.4)
NEUTROPHILS # BLD AUTO: 6.46 K/UL (ref 2–7.15)
NEUTROPHILS NFR BLD: 69.9 % (ref 44–72)
NITRITE UR QL STRIP.AUTO: NEGATIVE
NRBC # BLD AUTO: 0 K/UL
NRBC BLD-RTO: 0 /100 WBC
PH UR STRIP.AUTO: 5.5 [PH] (ref 5–8)
PLATELET # BLD AUTO: 282 K/UL (ref 164–446)
PMV BLD AUTO: 9.6 FL (ref 9–12.9)
POTASSIUM SERPL-SCNC: 3.8 MMOL/L (ref 3.6–5.5)
PROT SERPL-MCNC: 7.4 G/DL (ref 6–8.2)
PROT UR QL STRIP: NEGATIVE MG/DL
RBC # BLD AUTO: 5.46 M/UL (ref 4.2–5.4)
RBC # URNS HPF: ABNORMAL /HPF
RBC UR QL AUTO: NEGATIVE
SODIUM SERPL-SCNC: 138 MMOL/L (ref 135–145)
SP GR UR STRIP.AUTO: 1.01
UROBILINOGEN UR STRIP.AUTO-MCNC: 0.2 MG/DL
WBC # BLD AUTO: 9.3 K/UL (ref 4.8–10.8)
WBC #/AREA URNS HPF: ABNORMAL /HPF

## 2019-08-29 PROCEDURE — 700111 HCHG RX REV CODE 636 W/ 250 OVERRIDE (IP): Performed by: EMERGENCY MEDICINE

## 2019-08-29 PROCEDURE — 84703 CHORIONIC GONADOTROPIN ASSAY: CPT

## 2019-08-29 PROCEDURE — 74177 CT ABD & PELVIS W/CONTRAST: CPT

## 2019-08-29 PROCEDURE — 700117 HCHG RX CONTRAST REV CODE 255: Performed by: EMERGENCY MEDICINE

## 2019-08-29 PROCEDURE — 86140 C-REACTIVE PROTEIN: CPT

## 2019-08-29 PROCEDURE — 96375 TX/PRO/DX INJ NEW DRUG ADDON: CPT

## 2019-08-29 PROCEDURE — 96374 THER/PROPH/DIAG INJ IV PUSH: CPT

## 2019-08-29 PROCEDURE — 85025 COMPLETE CBC W/AUTO DIFF WBC: CPT

## 2019-08-29 PROCEDURE — 36415 COLL VENOUS BLD VENIPUNCTURE: CPT

## 2019-08-29 PROCEDURE — 83690 ASSAY OF LIPASE: CPT

## 2019-08-29 PROCEDURE — 80053 COMPREHEN METABOLIC PANEL: CPT

## 2019-08-29 PROCEDURE — 99284 EMERGENCY DEPT VISIT MOD MDM: CPT

## 2019-08-29 PROCEDURE — 81001 URINALYSIS AUTO W/SCOPE: CPT

## 2019-08-29 RX ORDER — CHOLESTYRAMINE 4 G/9G
1 POWDER, FOR SUSPENSION ORAL 2 TIMES DAILY
Qty: 60 EACH | Refills: 0 | Status: SHIPPED | OUTPATIENT
Start: 2019-08-29 | End: 2019-09-28

## 2019-08-29 RX ORDER — ONDANSETRON 2 MG/ML
4 INJECTION INTRAMUSCULAR; INTRAVENOUS ONCE
Status: COMPLETED | OUTPATIENT
Start: 2019-08-29 | End: 2019-08-29

## 2019-08-29 RX ORDER — MORPHINE SULFATE 4 MG/ML
4 INJECTION, SOLUTION INTRAMUSCULAR; INTRAVENOUS ONCE
Status: COMPLETED | OUTPATIENT
Start: 2019-08-29 | End: 2019-08-29

## 2019-08-29 RX ADMIN — MORPHINE SULFATE 4 MG: 4 INJECTION INTRAVENOUS at 10:20

## 2019-08-29 RX ADMIN — IOHEXOL 100 ML: 350 INJECTION, SOLUTION INTRAVENOUS at 11:51

## 2019-08-29 RX ADMIN — IOHEXOL 25 ML: 240 INJECTION, SOLUTION INTRATHECAL; INTRAVASCULAR; INTRAVENOUS; ORAL at 11:51

## 2019-08-29 RX ADMIN — ONDANSETRON 4 MG: 2 INJECTION INTRAMUSCULAR; INTRAVENOUS at 10:20

## 2019-08-29 NOTE — ED NOTES
Assist RN  Pt told RN she doesn't want to lay down d/t it makes her uncomfortable and makes her pain worse/puts pressure in her rectum area and abdomen. Pt hooked up to BP/pulse ox at this time. Pt instructed that she can lay down and roll on her side when she gets tired from standing, told RN that she is good just standing at this moment.

## 2019-08-29 NOTE — ED TRIAGE NOTES
Pt ambulated to triage with   Chief Complaint   Patient presents with   • Diarrhea     for the last month has been here multiple times d/t diarrhea, treated and gets better but than starts again.  pt in significant pain to buttock   • Rectal Pain     Pt in so much pain that she is not able to sit, pt reports that when it came back it is worse than before.  Pt upset and emotional about situation.   Pt Informed regarding triage process and verbalized understanding to inform triage tech or RN for any changes in condition. Placed in lobby.

## 2019-08-29 NOTE — ED NOTES
D/C home with written and verbal instructions re: Rx, activity, f/u.  Verbalizes understanding. Given stool cup and educated on how to give a stool sample.  Ambulated out

## 2019-08-29 NOTE — ED NOTES
PIV line established. Blood drawn and sent to the lab. Pt medicated per MAR. Aware urine and stool sample needed.

## 2019-08-29 NOTE — ED PROVIDER NOTES
ED Provider Note    Scribed for Yesica Munoz M.D. by Fortino Toure. 8/29/2019  9:39 AM    Primary care provider: KARINE Hernández  Means of arrival: walk in  History obtained from: patient  History limited by: none    CHIEF COMPLAINT  Chief Complaint   Patient presents with   • Diarrhea     for the last month has been here multiple times d/t diarrhea, treated and gets better but than starts again.  pt in significant pain to buttock   • Rectal Pain       HPI  Nickolas Galdamez is a 29 y.o. female who presents to the Emergency Department complaining of persistent and intermittent diarrhea for the last 1.5 months. Patient reports associated vomiting, bloody stool, rectal pain, suprapubic abdominal pain. She states that she has a complex medical history and has an appointment establish with a specialist at [facility] in October. Patient reports that she has been evaluated twice in the last month for these complaints with no remarkable CT findings. She states that she is provided with medications that improve the diarrhea temporarily, but her symptoms continue to recur. Patient reports that she has had recurrent diarrhea over the last few days, prompting her to come to the ED for reevaluation. She reports a history of cancer, seizures, Pseudotumor cerebri. Patient denies fever, dysuria, history of Chron's, family bowel history.      REVIEW OF SYSTEMS  HEENT:  No ear pain, congestion, or sore throat   EYES: no discharge, redness, or vision changes  CARDIAC: no chest pain, no palpitations    PULMONARY: no dyspnea, cough, or congestion   GI: Vomiting, diarrhea, abdominal pain, blood in stool, rectal pain.   : no dysuria, back pain, or hematuria   Neuro: no weakness, numbness, aphasia, or headache  Musculoskeletal: no swelling, deformity, pain, or joint swelling  Endocrine: no fevers, sweating, or weight loss   SKIN: no rash, erythema, or contusions     See history of present illness. All other  systems are negative. C.    PAST MEDICAL HISTORY   has a past medical history of Cancer (HCC) (2008), EEG abnormal, Hashimoto's disease, Hyperglycemia, Hypertension, Hypoglycemia, Migraine, Pseudotumor cerebri, and Seizure disorder (HCC).    SURGICAL HISTORY   has a past surgical history that includes gyn surgery; gastroscopy (N/A, 2018); gastroscopy with biopsy (N/A, 2018); egd w/endoscopic ultrasound (N/A, 2018); and egd with asp/bx (N/A, 2018).    SOCIAL HISTORY  Social History     Tobacco Use   • Smoking status: Former Smoker     Start date: 2005     Last attempt to quit: 5/3/2012     Years since quittin.3   • Smokeless tobacco: Never Used   • Tobacco comment: 1/2 pack a day   Substance Use Topics   • Alcohol use: Yes     Alcohol/week: 0.6 oz     Types: 1 Glasses of wine per week     Comment: socially, 1 glass of wine per week   • Drug use: No     Comment: THC      Social History     Substance and Sexual Activity   Drug Use No    Comment: THC       FAMILY HISTORY  Family History   Problem Relation Age of Onset   • Cancer Mother         Cervical       CURRENT MEDICATIONS  Home Medications     Reviewed by Padma Meza R.N. (Registered Nurse) on 19 at 0905  Med List Status: Partial   Medication Last Dose Status   amLODIPine (NORVASC) 10 MG Tab  Active   Cholecalciferol (VITAMIN D) 2000 UNITS Cap  Active   hydrocortisone (ANUSOL-HC) 25 MG Suppos  Active   levothyroxine (SYNTHROID) 200 MCG Tab  Active   Liraglutide -Weight Management (SAXENDA) 18 MG/3ML Solution Pen-injector  Active   loperamide (IMODIUM) 2 MG Cap  Active   MIRENA, 52 MG, 20 MCG/24HR IUD  Active   naproxen (NAPROSYN) 500 MG Tab  Active   ondansetron (ZOFRAN ODT) 4 MG TABLET DISPERSIBLE  Active   promethazine (PHENERGAN) 25 MG Suppos  Active   promethazine (PHENERGAN) 25 MG Tab  Active   venlafaxine XR (EFFEXOR XR) 75 MG CAPSULE SR 24 HR  Active                ALLERGIES  No Known Allergies    PHYSICAL  "EXAM  VITAL SIGNS: /116   Pulse 61   Temp 36.4 °C (97.5 °F) (Temporal)   Resp 14   Ht 1.753 m (5' 9\")   Wt 119.8 kg (264 lb 1.8 oz)   SpO2 95%   BMI 39.00 kg/m²     Constitutional: Well developed, Well nourished, No acute distress, Non-toxic appearance.   HEENT: Normocephalic, Atraumatic,  external ears normal, pharynx pink,  Mucous  Membranes moist, No rhinorrhea or mucosal edema  Eyes: PERRL, EOMI, Conjunctiva normal, No discharge.   Neck: Normal range of motion, No tenderness, Supple, No stridor.   Lymphatic: No lymphadenopathy    Cardiovascular: Regular Rate and Rhythm, No murmurs,  rubs, or gallops.   Thorax & Lungs: Lungs clear to auscultation bilaterally, No respiratory distress, No wheezes, rhales or rhonchi, No chest wall tenderness.   Abdomen: Bowel sounds normal, Soft, non distended,  No pulsatile masses., no rebound guarding or peritoneal signs. Lower suprapubic pelvic pain.   Skin: Warm, Dry, No erythema, No rash,   Back:  No CVA tenderness,  No spinal tenderness, bony crepitance, step offs, or instability.   Neurologic: Alert & oriented x 3, Normal motor function, Normal sensory function, No focal deficits noted. Normal reflexes. Normal Cranial Nerves.  Extremities: Equal, intact distal pulses, No cyanosis, clubbing or edema,  No tenderness.   Musculoskeletal: Good range of motion in all major joints. No tenderness to palpation or major deformities noted.   Rectal: Internal rectal exam deferred due to pain. External exam reveals no anal tears or hemorrhoids.     DIAGNOSTIC STUDIES / PROCEDURES    LABS  Results for orders placed or performed during the hospital encounter of 08/29/19   CBC WITH DIFFERENTIAL   Result Value Ref Range    WBC 9.3 4.8 - 10.8 K/uL    RBC 5.46 (H) 4.20 - 5.40 M/uL    Hemoglobin 15.4 12.0 - 16.0 g/dL    Hematocrit 46.4 37.0 - 47.0 %    MCV 85.0 81.4 - 97.8 fL    MCH 28.2 27.0 - 33.0 pg    MCHC 33.2 (L) 33.6 - 35.0 g/dL    RDW 40.0 35.9 - 50.0 fL    Platelet Count " 282 164 - 446 K/uL    MPV 9.6 9.0 - 12.9 fL    Neutrophils-Polys 69.90 44.00 - 72.00 %    Lymphocytes 20.20 (L) 22.00 - 41.00 %    Monocytes 6.10 0.00 - 13.40 %    Eosinophils 2.90 0.00 - 6.90 %    Basophils 0.40 0.00 - 1.80 %    Immature Granulocytes 0.50 0.00 - 0.90 %    Nucleated RBC 0.00 /100 WBC    Neutrophils (Absolute) 6.46 2.00 - 7.15 K/uL    Lymphs (Absolute) 1.87 1.00 - 4.80 K/uL    Monos (Absolute) 0.56 0.00 - 0.85 K/uL    Eos (Absolute) 0.27 0.00 - 0.51 K/uL    Baso (Absolute) 0.04 0.00 - 0.12 K/uL    Immature Granulocytes (abs) 0.05 0.00 - 0.11 K/uL    NRBC (Absolute) 0.00 K/uL   CRP QUANTITIVE (NON-CARDIAC)   Result Value Ref Range    Stat C-Reactive Protein 0.11 0.00 - 0.75 mg/dL   LIPASE   Result Value Ref Range    Lipase 11 11 - 82 U/L   Comp Metabolic Panel   Result Value Ref Range    Sodium 138 135 - 145 mmol/L    Potassium 3.8 3.6 - 5.5 mmol/L    Chloride 108 96 - 112 mmol/L    Co2 21 20 - 33 mmol/L    Anion Gap 9.0 0.0 - 11.9    Glucose 87 65 - 99 mg/dL    Bun 12 8 - 22 mg/dL    Creatinine 0.86 0.50 - 1.40 mg/dL    Calcium 9.1 8.5 - 10.5 mg/dL    AST(SGOT) 14 12 - 45 U/L    ALT(SGPT) 12 2 - 50 U/L    Alkaline Phosphatase 67 30 - 99 U/L    Total Bilirubin 0.4 0.1 - 1.5 mg/dL    Albumin 4.6 3.2 - 4.9 g/dL    Total Protein 7.4 6.0 - 8.2 g/dL    Globulin 2.8 1.9 - 3.5 g/dL    A-G Ratio 1.6 g/dL   URINALYSIS,CULTURE IF INDICATED   Result Value Ref Range    Color Yellow     Character Clear     Specific Gravity 1.012 <1.035    Ph 5.5 5.0 - 8.0    Glucose Negative Negative mg/dL    Ketones Negative Negative mg/dL    Protein Negative Negative mg/dL    Bilirubin Negative Negative    Urobilinogen, Urine 0.2 Negative    Nitrite Negative Negative    Leukocyte Esterase Trace (A) Negative    Occult Blood Negative Negative    Micro Urine Req Microscopic    HCG QUAL SERUM   Result Value Ref Range    Beta-Hcg Qualitative Serum Negative Negative   ESTIMATED GFR   Result Value Ref Range    GFR If   ">60 >60 mL/min/1.73 m 2    GFR If Non African American >60 >60 mL/min/1.73 m 2   URINE MICROSCOPIC (W/UA)   Result Value Ref Range    WBC 0-2 /hpf    RBC 0-2 /hpf    Bacteria Few (A) None /hpf    Epithelial Cells Negative /hpf    Hyaline Cast 0-2 /lpf    All labs reviewed by me.    RADIOLOGY  CT-ABDOMEN-PELVIS WITH   Final Result      No evidence of bowel obstruction or acute appendicitis.   Trace pelvic free fluid.   No hydronephrosis.   Mild splenomegaly.      The radiologist's interpretation of all radiological studies have been reviewed by me.    COURSE & MEDICAL DECISION MAKING  Nursing notes, VS, PMSFHx reviewed in chart.    9:39 AM Patient seen and examined at bedside. Discussed her evaluation in the ED. Review of records indicates she has not had a CT since April. Discussed repeat CT with the patient and consult with GI. Patient verbalizes understanding and agreement to this plan of care. Patient will be treated with Morphine 4 mg, Zofran 4 mg. Ordered CT abdomen pelvis, Neutral fat, Occult blood stool, Urinalysis, HCG qual serum, CMP, Stool WBCs, Culture stool, Complete O&P, CBC with differential, CRP quant, Lipase to evaluate her symptoms. The differential diagnoses include but are not limited to: colitis, IBS, infectious bowel processes, endometriosis, diverticulitis, ovarian cysts    12:40 PM - Paged GI.     12:56 PM - I discussed the patient's case and the above findings with Dr. Montes (GI) who recommends cholestyramine trials and follow up in clinic. I updated the patient on the plan of care and follow up instructions. Discussed patient's condition and treatment plan. Patient will be discharged with instructions and provided with strict return precautions. Instructed to return to Emergency Department immediately if any new or worsening symptoms.    Discharge vitals: /80   Pulse 65   Temp 36.4 °C (97.5 °F) (Temporal)   Resp 18   Ht 1.753 m (5' 9\")   Wt 119.8 kg (264 lb 1.8 oz)   SpO2 97%   " BMI 39.00 kg/m²      The patient will return for new or worsening symptoms and is stable at the time of discharge.    The patient is referred to a primary physician for blood pressure management, diabetic screening, and for all other preventative health concerns.    DISPOSITION:  Patient will be discharged home in stable condition.    FOLLOW UP:  Andres Morales M.D.  [address] [id number] [phone]    Call in 1 day  to establish care, for recheck      OUTPATIENT MEDICATIONS:  Discharge Medication List as of 8/29/2019  1:16 PM      START taking these medications    Details   cholestyramine (QUESTRAN) 4 g packet Take 4 g by mouth 2 times a day for 30 days.Before mealsDisp-60 Each, R-0, Normal           FINAL IMPRESSION  1. Diarrhea, unspecified type    2. Rectal pain          Fortino MÁRQUEZ (Scribe), am scribing for, and in the presence of, Yesica Munoz M.D..    Electronically signed by: Fortino Toure (Dayoibe), 8/29/2019    IYesica M.D. personally performed the services described in this documentation, as scribed by Fortino Toure in my presence, and it is both accurate and complete. C    The note accurately reflects work and decisions made by me.  Yesica Munoz  8/29/2019  2:43 PM

## 2019-09-10 ENCOUNTER — OFFICE VISIT (OUTPATIENT)
Dept: MEDICAL GROUP | Facility: PHYSICIAN GROUP | Age: 29
End: 2019-09-10
Payer: COMMERCIAL

## 2019-09-10 VITALS
DIASTOLIC BLOOD PRESSURE: 72 MMHG | BODY MASS INDEX: 39.55 KG/M2 | SYSTOLIC BLOOD PRESSURE: 126 MMHG | HEART RATE: 91 BPM | WEIGHT: 267 LBS | HEIGHT: 69 IN | RESPIRATION RATE: 14 BRPM | OXYGEN SATURATION: 99 % | TEMPERATURE: 97.7 F

## 2019-09-10 DIAGNOSIS — M54.2 NECK PAIN, ACUTE: ICD-10-CM
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
## 2019-09-10 DIAGNOSIS — M54.50 ACUTE MIDLINE LOW BACK PAIN WITHOUT SCIATICA: ICD-10-CM

## 2019-09-10 DIAGNOSIS — M54.12 CERVICAL RADICULOPATHY: ICD-10-CM

## 2019-09-10 PROCEDURE — 99214 OFFICE O/P EST MOD 30 MIN: CPT | Performed by: NURSE PRACTITIONER

## 2019-09-10 RX ORDER — CYCLOBENZAPRINE HCL 10 MG
10 TABLET ORAL 3 TIMES DAILY PRN
Qty: 30 TAB | Refills: 0 | Status: SHIPPED | OUTPATIENT
Start: 2019-09-10 | End: 2019-10-04 | Stop reason: SDUPTHER
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
## 2019-09-10 NOTE — LETTER
September 10, 2019    To Whom It May Concern:         This is confirmation that Nickolas Galdamez attended her scheduled appointment with KARINE Hernández on 9/10/19.    Patient is restricted to light duty and maximum of 6 hours a day at work due to acute medical condition.         If you have any questions please do not hesitate to call me at the phone number listed below.    Sincerely,          MIKY Hernández.RJesus ManuelN.  [phone]

## 2019-09-12 ENCOUNTER — OFFICE VISIT (OUTPATIENT)
Dept: MEDICAL GROUP | Facility: PHYSICIAN GROUP | Age: 29
End: 2019-09-12
Payer: COMMERCIAL

## 2019-09-12 VITALS
WEIGHT: 267 LBS | SYSTOLIC BLOOD PRESSURE: 124 MMHG | OXYGEN SATURATION: 97 % | BODY MASS INDEX: 39.55 KG/M2 | TEMPERATURE: 98.3 F | HEIGHT: 69 IN | DIASTOLIC BLOOD PRESSURE: 82 MMHG | RESPIRATION RATE: 14 BRPM | HEART RATE: 82 BPM

## 2019-09-12 DIAGNOSIS — M54.12 CERVICAL RADICULOPATHY: ICD-10-CM

## 2019-09-12 DIAGNOSIS — M54.50 ACUTE MIDLINE LOW BACK PAIN WITHOUT SCIATICA: ICD-10-CM

## 2019-09-12 DIAGNOSIS — I10 ESSENTIAL HYPERTENSION: ICD-10-CM

## 2019-09-12 PROCEDURE — 99213 OFFICE O/P EST LOW 20 MIN: CPT | Performed by: NURSE PRACTITIONER
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
## 2019-09-19 ENCOUNTER — PATIENT MESSAGE (OUTPATIENT)
Dept: MEDICAL GROUP | Facility: PHYSICIAN GROUP | Age: 29
End: 2019-09-19

## 2019-09-19 DIAGNOSIS — I51.7 ENLARGED HEART: ICD-10-CM

## 2019-09-24 NOTE — PATIENT COMMUNICATION
Will you check on the [facility] paperwork and then update patient?

## 2019-10-04 ENCOUNTER — OFFICE VISIT (OUTPATIENT)
Dept: MEDICAL GROUP | Facility: PHYSICIAN GROUP | Age: 29
End: 2019-10-04
Payer: COMMERCIAL

## 2019-10-04 VITALS
DIASTOLIC BLOOD PRESSURE: 74 MMHG | SYSTOLIC BLOOD PRESSURE: 124 MMHG | OXYGEN SATURATION: 97 % | HEIGHT: 69 IN | RESPIRATION RATE: 14 BRPM | BODY MASS INDEX: 39.84 KG/M2 | TEMPERATURE: 97.6 F | WEIGHT: 269 LBS | HEART RATE: 87 BPM

## 2019-10-04 DIAGNOSIS — E16.2 HYPOGLYCEMIA: ICD-10-CM

## 2019-10-04 DIAGNOSIS — M54.12 CERVICAL RADICULOPATHY: ICD-10-CM

## 2019-10-04 DIAGNOSIS — M54.2 NECK PAIN, ACUTE: ICD-10-CM

## 2019-10-04 DIAGNOSIS — M54.2 NECK PAIN: ICD-10-CM

## 2019-10-04 DIAGNOSIS — Z23 INFLUENZA VACCINE NEEDED: ICD-10-CM

## 2019-10-04 DIAGNOSIS — M54.50 ACUTE MIDLINE LOW BACK PAIN WITHOUT SCIATICA: ICD-10-CM

## 2019-10-04 DIAGNOSIS — F41.9 ANXIETY: ICD-10-CM

## 2019-10-04 DIAGNOSIS — F32.5 MAJOR DEPRESSIVE DISORDER WITH SINGLE EPISODE, IN FULL REMISSION (HCC): ICD-10-CM

## 2019-10-04 PROCEDURE — 90686 IIV4 VACC NO PRSV 0.5 ML IM: CPT | Performed by: NURSE PRACTITIONER

## 2019-10-04 PROCEDURE — 99214 OFFICE O/P EST MOD 30 MIN: CPT | Mod: 25 | Performed by: NURSE PRACTITIONER

## 2019-10-04 PROCEDURE — 90471 IMMUNIZATION ADMIN: CPT | Performed by: NURSE PRACTITIONER

## 2019-10-04 RX ORDER — CYCLOBENZAPRINE HCL 10 MG
10 TABLET ORAL 3 TIMES DAILY PRN
Qty: 60 TAB | Refills: 1 | Status: SHIPPED | OUTPATIENT
Start: 2019-10-04 | End: 2019-10-30

## 2019-10-04 RX ORDER — HYDROXYZINE 50 MG/1
50 TABLET, FILM COATED ORAL 3 TIMES DAILY PRN
Qty: 30 TAB | Refills: 2 | Status: SHIPPED | OUTPATIENT
Start: 2019-10-04 | End: 2019-10-30
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
## 2019-10-04 NOTE — LETTER
[facility]  KARINE Hernández  [address]   [place] [address]  Fax: [phone]   Authorization for Release/Disclosure of   Protected Health Information   Name: WILLIE FLANAGAN : 1990 SSN: [id number]   Address: [address] [place] [address]  [place] [address] Phone:    [phone] (home)    I authorize the entity listed below to release/disclose the PHI below to:   [facility]/KARINE Hernández and KARINE Hernández   Provider or Entity Name:       [facility]     Address                       [place], [place]  City, State, [facility]   Phone:      Fax:     Reason for request: continuity of care   Information to be released:    [  ] LAST COLONOSCOPY,  including any PATH REPORT and follow-up  [  ] LAST FIT/COLOGUARD RESULT [  ] LAST DEXA  [  ] LAST MAMMOGRAM  [  ] LAST PAP  [  ] LAST LABS [  ] RETINA EXAM REPORT  [  ] IMMUNIZATION RECORDS  [  ] Release all info      [  ] Check here and initial the line next to each item to release ALL health information INCLUDING  _____ Care and treatment for drug and / or alcohol abuse  _____ HIV testing, infection status, or AIDS  _____ Genetic Testing    DATES OF SERVICE OR TIME PERIOD TO BE DISCLOSED: _____________  I understand and acknowledge that:  * This Authorization may be revoked at any time by you in writing, except if your health information has already been used or disclosed.  * Your health information that will be used or disclosed as a result of you signing this authorization could be re-disclosed by the recipient. If this occurs, your re-disclosed health information may no longer be protected by State or Federal laws.  * You may refuse to sign this Authorization. Your refusal will not affect your ability to obtain treatment.  * This Authorization becomes effective upon signing and will  on (date) __________.      If no date is indicated, this Authorization will  one (1) year from the signature date.       Name: Nickolas Ferguson Denice    Signature:   Date:     10/4/2019       PLEASE FAX REQUESTED RECORDS BACK TO: [phone]
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
## 2019-10-07 NOTE — ASSESSMENT & PLAN NOTE
Patient is 29-year-old female with history of pseudotumor cerebri, hypoglycemia of unknown etiology, hypothyroid, hypertension.  She does see endocrinology, Dr. Ayala.  She was referred by him to see endocrinology at [facility].  She has consultation appointment with them early next week.

## 2019-10-15 ENCOUNTER — PATIENT MESSAGE (OUTPATIENT)
Dept: MEDICAL GROUP | Facility: PHYSICIAN GROUP | Age: 29
End: 2019-10-15

## 2019-10-15 ENCOUNTER — OFFICE VISIT (OUTPATIENT)
Dept: URGENT CARE | Facility: PHYSICIAN GROUP | Age: 29
End: 2019-10-15
Payer: COMMERCIAL

## 2019-10-15 ENCOUNTER — APPOINTMENT (OUTPATIENT)
Dept: RADIOLOGY | Facility: IMAGING CENTER | Age: 29
End: 2019-10-15
Attending: NURSE PRACTITIONER
Payer: COMMERCIAL

## 2019-10-15 VITALS
BODY MASS INDEX: 39.84 KG/M2 | WEIGHT: 269 LBS | DIASTOLIC BLOOD PRESSURE: 72 MMHG | HEIGHT: 69 IN | RESPIRATION RATE: 14 BRPM | TEMPERATURE: 97.8 F | HEART RATE: 71 BPM | OXYGEN SATURATION: 96 % | SYSTOLIC BLOOD PRESSURE: 136 MMHG

## 2019-10-15 DIAGNOSIS — G93.2 PSEUDOTUMOR CEREBRI: ICD-10-CM

## 2019-10-15 DIAGNOSIS — M54.2 NECK PAIN: ICD-10-CM

## 2019-10-15 DIAGNOSIS — I51.7 ENLARGED HEART: ICD-10-CM

## 2019-10-15 DIAGNOSIS — M54.12 CERVICAL RADICULOPATHY: ICD-10-CM

## 2019-10-15 DIAGNOSIS — E66.9 OBESITY (BMI 35.0-39.9 WITHOUT COMORBIDITY): ICD-10-CM

## 2019-10-15 DIAGNOSIS — M62.838 CERVICAL PARASPINOUS MUSCLE SPASM: ICD-10-CM

## 2019-10-15 PROCEDURE — 99215 OFFICE O/P EST HI 40 MIN: CPT | Mod: 25 | Performed by: PHYSICIAN ASSISTANT

## 2019-10-15 PROCEDURE — 71046 X-RAY EXAM CHEST 2 VIEWS: CPT | Mod: TC,FY | Performed by: PHYSICIAN ASSISTANT

## 2019-10-15 PROCEDURE — 72040 X-RAY EXAM NECK SPINE 2-3 VW: CPT | Mod: TC,FY | Performed by: PHYSICIAN ASSISTANT

## 2019-10-15 RX ORDER — KETOROLAC TROMETHAMINE 30 MG/ML
30 INJECTION, SOLUTION INTRAMUSCULAR; INTRAVENOUS ONCE
Status: COMPLETED | OUTPATIENT
Start: 2019-10-15 | End: 2019-10-15

## 2019-10-15 RX ORDER — TIZANIDINE 4 MG/1
4 TABLET ORAL EVERY 8 HOURS PRN
Qty: 15 TAB | Refills: 0 | Status: SHIPPED | OUTPATIENT
Start: 2019-10-15 | End: 2019-10-20

## 2019-10-15 RX ADMIN — KETOROLAC TROMETHAMINE 30 MG: 30 INJECTION, SOLUTION INTRAMUSCULAR; INTRAVENOUS at 13:31

## 2019-10-15 ASSESSMENT — ENCOUNTER SYMPTOMS
FOCAL WEAKNESS: 0
NECK PAIN: 1
FEVER: 0
TINGLING: 0
SENSORY CHANGE: 0
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
## 2019-10-15 NOTE — PATIENT INSTRUCTIONS
Cervical Strain and Sprain Rehab  Ask your health care provider which exercises are safe for you. Do exercises exactly as told by your health care provider and adjust them as directed. It is normal to feel mild stretching, pulling, tightness, or discomfort as you do these exercises, but you should stop right away if you feel sudden pain or your pain gets worse. Do not begin these exercises until told by your health care provider.  Stretching and range of motion exercises  These exercises warm up your muscles and joints and improve the movement and flexibility of your neck. These exercises also help to relieve pain, numbness, and tingling.  Exercise A: Cervical side bend  1. Using good posture, sit on a stable chair or stand up.  2. Without moving your shoulders, slowly tilt your left / right ear to your shoulder until you feel a stretch in your neck muscles. You should be looking straight ahead.  3. Hold for __________ seconds.  4. Repeat with the other side of your neck.  Repeat __________ times. Complete this exercise __________ times a day.  Exercise B: Cervical rotation  1. Using good posture, sit on a stable chair or stand up.  2. Slowly turn your head to the side as if you are looking over your left / right shoulder.  ¨ Keep your eyes level with the ground.  ¨ Stop when you feel a stretch along the side and the back of your neck.  3. Hold for __________ seconds.  4. Repeat this by turning to your other side.  Repeat __________ times. Complete this exercise __________ times a day.  Exercise C: Thoracic extension and pectoral stretch  1. Roll a towel or a small blanket so it is about 4 inches (10 cm) in diameter.  2. Lie down on your back on a firm surface.  3. Put the towel lengthwise, under your spine in the middle of your back. It should not be not under your shoulder blades. The towel should line up with your spine from your middle back to your lower back.  4. Put your hands behind your head and let your  elbows fall out to your sides.  5. Hold for __________ seconds.  Repeat __________ times. Complete this exercise __________ times a day.  Strengthening exercises  These exercises build strength and endurance in your neck. Endurance is the ability to use your muscles for a long time, even after your muscles get tired.  Exercise D: Upper cervical flexion, isometric  1. Lie on your back with a thin pillow behind your head and a small rolled-up towel under your neck.  2. Gently tuck your chin toward your chest and nod your head down to look toward your feet. Do not lift your head off the pillow.  3. Hold for __________ seconds.  4. Release the tension slowly. Relax your neck muscles completely before you repeat this exercise.  Repeat __________ times. Complete this exercise __________ times a day.  Exercise E: Cervical extension, isometric  1. Stand about 6 inches (15 cm) away from a wall, with your back facing the wall.  2. Place a soft object, about 6-8 inches (15-20 cm) in diameter, between the back of your head and the wall. A soft object could be a small pillow, a ball, or a folded towel.  3. Gently tilt your head back and press into the soft object. Keep your jaw and forehead relaxed.  4. Hold for __________ seconds.  5. Release the tension slowly. Relax your neck muscles completely before you repeat this exercise.  Repeat __________ times. Complete this exercise __________ times a day.  Posture and body mechanics     Body mechanics refers to the movements and positions of your body while you do your daily activities. Posture is part of body mechanics. Good posture and healthy body mechanics can help to relieve stress in your body's tissues and joints. Good posture means that your spine is in its natural S-curve position (your spine is neutral), your shoulders are pulled back slightly, and your head is not tipped forward. The following are general guidelines for applying improved posture and body mechanics to your  everyday activities.  Standing  · When standing, keep your spine neutral and keep your feet about hip-width apart. Keep a slight bend in your knees. Your ears, shoulders, and hips should line up.  · When you do a task in which you  one place for a long time, place one foot up on a stable object that is 2-4 inches (5-10 cm) high, such as a footstool. This helps keep your spine neutral.  Sitting  · When sitting, keep your spine neutral and your keep feet flat on the floor. Use a footrest, if necessary, and keep your thighs parallel to the floor. Avoid rounding your shoulders, and avoid tilting your head forward.  · When working at a desk or a computer, keep your desk at a height where your hands are slightly lower than your elbows. Slide your chair under your desk so you are close enough to maintain good posture.  · When working at a computer, place your monitor at a height where you are looking straight ahead and you do not have to tilt your head forward or downward to look at the screen.  Resting  When lying down and resting, avoid positions that are most painful for you. Try to support your neck in a neutral position. You can use a contour pillow or a small rolled-up towel. Your pillow should support your neck but not push on it.  This information is not intended to replace advice given to you by your health care provider. Make sure you discuss any questions you have with your health care provider.  Document Released: 12/18/2006 Document Revised: 08/24/2017 Document Reviewed: 11/23/2016  [employer] Interactive Patient Education © 2017 [employer]

## 2019-10-15 NOTE — TELEPHONE ENCOUNTER
From: Nickolas Galdamez  To: KARINE Hernández  Sent: 10/15/2019 10:58 AM PDT  Subject: Non-Urgent Medical Question    I am at my physical therapist appointment and I had another muscle flare up after a big pop while getting the xrays this morning. He wants me to do some sort of steroid pack because I cant even do PT     ----- Message -----  From: KARINE Hernández  Sent: 10/14/19 3:12 PM  To: Nickolas Galdamez  Subject: RE: Non-Urgent Medical Question    Hi Nickolas,  I am looking at the MRI report you attached. Yes, it does look like some mild narrowing of the spinal canal. Though it doesn't necessarily mean it's pushing on your spinal nerve. Best to follow up with [facility] for more thorough assessment and treatment options.  Do you have appointment with [facility] on 10/28? I have openings on Wednesday this week if you want to bring in papers for extension of FMLA. Or you could drop them by without an appointment. Would need them by Thursday as I am leaving town on Friday for a week.    I'm so sorry that your trip to [facility] wasn't fruitful.  I am waiting for the provider's note, then I can refer you to neurology. We can try [facility]? Or do you have some place in mind?    In regards to old imaging showing enlarged heart - let's get a current chest xray to see if that's the case. If it is enlarged, then will likely refer to cardiology and possibly order more tests.  I will order chest xray. You can get it done at [facility] or come in and  order and take it to [facility] to have done.     Irais Donaldson    ----- Message -----   From: Nickolas Galdamez   Sent: 10/14/2019 9:15 AM PDT    To: KARINE Hernández  Subject: RE: Non-Urgent Medical Question    Dr. Colbert that is seeing me at [facility] to clear me to return to work isnt going to be back into the office until 10/28 now. I am going to have to extend my leave. I got results  from them in the mail that concern me. If I am reading this right I have bone spurs in apart of my neck mildly pushing I to my spinal cord correct?? Picture attached.     Also David Johnson was a bust. He believes my problems are neurological. He didnt think anything endocrine was the causing factor. Just symptoms. He advised me to see a specialist elzbieta. I do not want to see anyone at [facility] because I dont want them to already lavel me with things. Is there a medical center you know of that specializes in neuro that would take me? I am just so upset about the entire trip in general. The Dr was such a jerk and made me feel like I was a hypochondriac. I am really trying hard not to be discouraged and check out again from getting help, but I cant take much more. I just want to know what's wrong. Also I think I may have an enlarged heart issue. I came across some paper back from 2009, and again in 2013 that says from the scan of my chest is shows a mildly enlarged heart. Could that be anything big?    ----- Message -----  From: VANCE HernándezRJOANNE  Sent: 9/21/19 4:22 PM  To: Nickolas Galdamez  Subject: RE: Non-Urgent Medical Question    I'm so glad you got in so soon.  The steroids should really help.  Take care,  Irais    ----- Message -----   From: Nickolas Galdamez   Sent: 9/20/2019 5:17 PM PDT   To: VANCE HernándezRJOANNE  Subject: RE: Non-Urgent Medical Question    I have no fractures. They said torn ligaments and muscle spasms causing the  neck. So just keep with physical therapy and I have to go back to se them in 6 weeks to see if my curve is back and the soft tissue damage has healed. They gave me this crazy steroid pack to day. They wouldn't do any pain killers. I am going to try to tough it out, but if it gets bad again I will make an appt with you.     ----- Message -----  From: Nara Hercules A.P.R.N.  Sent: 9/19/19 9:01 AM  To: Nickolas Ferguson  Denice  Subject: RE: Non-Urgent Medical Question    Yes, I got them and completed them yesterday.  Hopefully, they should have them now.  How was your appointment with [facility]  Irais    ----- Message -----   From: Nickolas Galdamez   Sent: 9/19/2019 6:16 AM PDT   To: KARINE Hernández  Subject: Non-Urgent Medical Question    2 faxes should have been sent to you. One from [facility] source and one from [place] for my short term disability for me to get paid. Have you received them yet? I am so sorry to be a nuisance

## 2019-10-22 ENCOUNTER — PATIENT MESSAGE (OUTPATIENT)
Dept: MEDICAL GROUP | Facility: PHYSICIAN GROUP | Age: 29
End: 2019-10-22

## 2019-10-27 ENCOUNTER — HOSPITAL ENCOUNTER (EMERGENCY)
Facility: MEDICAL CENTER | Age: 29
End: 2019-10-27
Attending: EMERGENCY MEDICINE
Payer: COMMERCIAL

## 2019-10-27 VITALS
OXYGEN SATURATION: 98 % | HEART RATE: 68 BPM | TEMPERATURE: 97.5 F | DIASTOLIC BLOOD PRESSURE: 76 MMHG | HEIGHT: 70 IN | RESPIRATION RATE: 16 BRPM | WEIGHT: 262.35 LBS | BODY MASS INDEX: 37.56 KG/M2 | SYSTOLIC BLOOD PRESSURE: 126 MMHG

## 2019-10-27 DIAGNOSIS — R19.7 NAUSEA VOMITING AND DIARRHEA: ICD-10-CM

## 2019-10-27 DIAGNOSIS — R11.2 NAUSEA VOMITING AND DIARRHEA: ICD-10-CM

## 2019-10-27 LAB
ALBUMIN SERPL BCP-MCNC: 4.8 G/DL (ref 3.2–4.9)
ALBUMIN/GLOB SERPL: 1.6 G/DL
ALP SERPL-CCNC: 66 U/L (ref 30–99)
ALT SERPL-CCNC: 15 U/L (ref 2–50)
ANION GAP SERPL CALC-SCNC: 13 MMOL/L (ref 0–11.9)
AST SERPL-CCNC: 17 U/L (ref 12–45)
BASOPHILS # BLD AUTO: 0.6 % (ref 0–1.8)
BASOPHILS # BLD: 0.05 K/UL (ref 0–0.12)
BILIRUB SERPL-MCNC: 0.4 MG/DL (ref 0.1–1.5)
BUN SERPL-MCNC: 10 MG/DL (ref 8–22)
CALCIUM SERPL-MCNC: 9.1 MG/DL (ref 8.5–10.5)
CHLORIDE SERPL-SCNC: 109 MMOL/L (ref 96–112)
CO2 SERPL-SCNC: 19 MMOL/L (ref 20–33)
CREAT SERPL-MCNC: 0.81 MG/DL (ref 0.5–1.4)
EOSINOPHIL # BLD AUTO: 0.3 K/UL (ref 0–0.51)
EOSINOPHIL NFR BLD: 3.7 % (ref 0–6.9)
ERYTHROCYTE [DISTWIDTH] IN BLOOD BY AUTOMATED COUNT: 39.8 FL (ref 35.9–50)
GLOBULIN SER CALC-MCNC: 3 G/DL (ref 1.9–3.5)
GLUCOSE SERPL-MCNC: 99 MG/DL (ref 65–99)
HCG SERPL QL: NEGATIVE
HCT VFR BLD AUTO: 48.4 % (ref 37–47)
HGB BLD-MCNC: 16.4 G/DL (ref 12–16)
IMM GRANULOCYTES # BLD AUTO: 0.04 K/UL (ref 0–0.11)
IMM GRANULOCYTES NFR BLD AUTO: 0.5 % (ref 0–0.9)
LIPASE SERPL-CCNC: 17 U/L (ref 11–82)
LYMPHOCYTES # BLD AUTO: 1.37 K/UL (ref 1–4.8)
LYMPHOCYTES NFR BLD: 16.8 % (ref 22–41)
MCH RBC QN AUTO: 29.1 PG (ref 27–33)
MCHC RBC AUTO-ENTMCNC: 33.9 G/DL (ref 33.6–35)
MCV RBC AUTO: 85.8 FL (ref 81.4–97.8)
MONOCYTES # BLD AUTO: 0.47 K/UL (ref 0–0.85)
MONOCYTES NFR BLD AUTO: 5.8 % (ref 0–13.4)
NEUTROPHILS # BLD AUTO: 5.94 K/UL (ref 2–7.15)
NEUTROPHILS NFR BLD: 72.6 % (ref 44–72)
NRBC # BLD AUTO: 0 K/UL
NRBC BLD-RTO: 0 /100 WBC
PLATELET # BLD AUTO: 326 K/UL (ref 164–446)
PMV BLD AUTO: 9.7 FL (ref 9–12.9)
POTASSIUM SERPL-SCNC: 3.8 MMOL/L (ref 3.6–5.5)
PROT SERPL-MCNC: 7.8 G/DL (ref 6–8.2)
RBC # BLD AUTO: 5.64 M/UL (ref 4.2–5.4)
SODIUM SERPL-SCNC: 141 MMOL/L (ref 135–145)
WBC # BLD AUTO: 8.2 K/UL (ref 4.8–10.8)

## 2019-10-27 PROCEDURE — 99285 EMERGENCY DEPT VISIT HI MDM: CPT

## 2019-10-27 PROCEDURE — 80053 COMPREHEN METABOLIC PANEL: CPT

## 2019-10-27 PROCEDURE — 96374 THER/PROPH/DIAG INJ IV PUSH: CPT

## 2019-10-27 PROCEDURE — 700111 HCHG RX REV CODE 636 W/ 250 OVERRIDE (IP)

## 2019-10-27 PROCEDURE — 96375 TX/PRO/DX INJ NEW DRUG ADDON: CPT

## 2019-10-27 PROCEDURE — 84703 CHORIONIC GONADOTROPIN ASSAY: CPT

## 2019-10-27 PROCEDURE — 85025 COMPLETE CBC W/AUTO DIFF WBC: CPT

## 2019-10-27 PROCEDURE — 700111 HCHG RX REV CODE 636 W/ 250 OVERRIDE (IP): Performed by: EMERGENCY MEDICINE

## 2019-10-27 PROCEDURE — 83690 ASSAY OF LIPASE: CPT

## 2019-10-27 PROCEDURE — 700105 HCHG RX REV CODE 258: Performed by: EMERGENCY MEDICINE

## 2019-10-27 RX ORDER — LORAZEPAM 2 MG/ML
1 INJECTION INTRAMUSCULAR ONCE
Status: COMPLETED | OUTPATIENT
Start: 2019-10-27 | End: 2019-10-27

## 2019-10-27 RX ORDER — KETOROLAC TROMETHAMINE 30 MG/ML
30 INJECTION, SOLUTION INTRAMUSCULAR; INTRAVENOUS ONCE
Status: COMPLETED | OUTPATIENT
Start: 2019-10-27 | End: 2019-10-27

## 2019-10-27 RX ORDER — KETOROLAC TROMETHAMINE 30 MG/ML
INJECTION, SOLUTION INTRAMUSCULAR; INTRAVENOUS
Status: COMPLETED
Start: 2019-10-27 | End: 2019-10-27

## 2019-10-27 RX ORDER — ONDANSETRON 4 MG/1
4 TABLET, ORALLY DISINTEGRATING ORAL EVERY 8 HOURS PRN
Qty: 10 TAB | Refills: 0 | Status: SHIPPED | OUTPATIENT
Start: 2019-10-27 | End: 2019-10-30

## 2019-10-27 RX ORDER — SODIUM CHLORIDE 9 MG/ML
1000 INJECTION, SOLUTION INTRAVENOUS ONCE
Status: COMPLETED | OUTPATIENT
Start: 2019-10-27 | End: 2019-10-27

## 2019-10-27 RX ORDER — ONDANSETRON 2 MG/ML
4 INJECTION INTRAMUSCULAR; INTRAVENOUS ONCE
Status: COMPLETED | OUTPATIENT
Start: 2019-10-27 | End: 2019-10-27

## 2019-10-27 RX ORDER — PROCHLORPERAZINE EDISYLATE 5 MG/ML
10 INJECTION INTRAMUSCULAR; INTRAVENOUS ONCE
Status: COMPLETED | OUTPATIENT
Start: 2019-10-27 | End: 2019-10-27

## 2019-10-27 RX ADMIN — SODIUM CHLORIDE 1000 ML: 9 INJECTION, SOLUTION INTRAVENOUS at 07:17

## 2019-10-27 RX ADMIN — ONDANSETRON 4 MG: 2 INJECTION INTRAMUSCULAR; INTRAVENOUS at 07:26

## 2019-10-27 RX ADMIN — LORAZEPAM 1 MG: 2 INJECTION INTRAMUSCULAR; INTRAVENOUS at 07:26

## 2019-10-27 RX ADMIN — PROCHLORPERAZINE EDISYLATE 10 MG: 5 INJECTION INTRAMUSCULAR; INTRAVENOUS at 08:33

## 2019-10-27 RX ADMIN — KETOROLAC TROMETHAMINE 30 MG: 30 INJECTION, SOLUTION INTRAMUSCULAR; INTRAVENOUS at 09:21

## 2019-10-27 ASSESSMENT — LIFESTYLE VARIABLES: DO YOU DRINK ALCOHOL: NO
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
## 2019-10-30 ENCOUNTER — HOSPITAL ENCOUNTER (OUTPATIENT)
Facility: MEDICAL CENTER | Age: 29
End: 2019-11-01
Attending: EMERGENCY MEDICINE | Admitting: HOSPITALIST
Payer: COMMERCIAL

## 2019-10-30 DIAGNOSIS — M62.838 MUSCLE SPASMS OF NECK: ICD-10-CM

## 2019-10-30 DIAGNOSIS — M54.2 NECK PAIN: ICD-10-CM

## 2019-10-30 LAB
ALBUMIN SERPL BCP-MCNC: 4.5 G/DL (ref 3.2–4.9)
ALBUMIN/GLOB SERPL: 1.9 G/DL
ALP SERPL-CCNC: 52 U/L (ref 30–99)
ALT SERPL-CCNC: 13 U/L (ref 2–50)
ANION GAP SERPL CALC-SCNC: 7 MMOL/L (ref 0–11.9)
AST SERPL-CCNC: 18 U/L (ref 12–45)
BASOPHILS # BLD AUTO: 0.4 % (ref 0–1.8)
BASOPHILS # BLD: 0.03 K/UL (ref 0–0.12)
BILIRUB SERPL-MCNC: 0.4 MG/DL (ref 0.1–1.5)
BUN SERPL-MCNC: 18 MG/DL (ref 8–22)
CALCIUM SERPL-MCNC: 8.6 MG/DL (ref 8.5–10.5)
CHLORIDE SERPL-SCNC: 106 MMOL/L (ref 96–112)
CO2 SERPL-SCNC: 26 MMOL/L (ref 20–33)
CREAT SERPL-MCNC: 0.84 MG/DL (ref 0.5–1.4)
CRP SERPL HS-MCNC: 0.4 MG/L (ref 0–7.5)
CRP SERPL HS-MCNC: 0.5 MG/L (ref 0–7.5)
EOSINOPHIL # BLD AUTO: 0.08 K/UL (ref 0–0.51)
EOSINOPHIL NFR BLD: 0.9 % (ref 0–6.9)
ERYTHROCYTE [DISTWIDTH] IN BLOOD BY AUTOMATED COUNT: 40.9 FL (ref 35.9–50)
ERYTHROCYTE [SEDIMENTATION RATE] IN BLOOD BY WESTERGREN METHOD: 4 MM/HOUR (ref 0–20)
GLOBULIN SER CALC-MCNC: 2.4 G/DL (ref 1.9–3.5)
GLUCOSE SERPL-MCNC: 85 MG/DL (ref 65–99)
HCT VFR BLD AUTO: 42.6 % (ref 37–47)
HGB BLD-MCNC: 14.5 G/DL (ref 12–16)
IMM GRANULOCYTES # BLD AUTO: 0.03 K/UL (ref 0–0.11)
IMM GRANULOCYTES NFR BLD AUTO: 0.4 % (ref 0–0.9)
LYMPHOCYTES # BLD AUTO: 2.64 K/UL (ref 1–4.8)
LYMPHOCYTES NFR BLD: 31 % (ref 22–41)
MCH RBC QN AUTO: 29.2 PG (ref 27–33)
MCHC RBC AUTO-ENTMCNC: 34 G/DL (ref 33.6–35)
MCV RBC AUTO: 85.9 FL (ref 81.4–97.8)
MONOCYTES # BLD AUTO: 0.56 K/UL (ref 0–0.85)
MONOCYTES NFR BLD AUTO: 6.6 % (ref 0–13.4)
NEUTROPHILS # BLD AUTO: 5.17 K/UL (ref 2–7.15)
NEUTROPHILS NFR BLD: 60.7 % (ref 44–72)
NRBC # BLD AUTO: 0 K/UL
NRBC BLD-RTO: 0 /100 WBC
PLATELET # BLD AUTO: 250 K/UL (ref 164–446)
PMV BLD AUTO: 9.2 FL (ref 9–12.9)
POTASSIUM SERPL-SCNC: 4.2 MMOL/L (ref 3.6–5.5)
PROT SERPL-MCNC: 6.9 G/DL (ref 6–8.2)
RBC # BLD AUTO: 4.96 M/UL (ref 4.2–5.4)
SODIUM SERPL-SCNC: 139 MMOL/L (ref 135–145)
WBC # BLD AUTO: 8.5 K/UL (ref 4.8–10.8)

## 2019-10-30 PROCEDURE — 96376 TX/PRO/DX INJ SAME DRUG ADON: CPT

## 2019-10-30 PROCEDURE — 99219 PR INITIAL OBSERVATION CARE,LEVL II: CPT | Performed by: HOSPITALIST

## 2019-10-30 PROCEDURE — 96375 TX/PRO/DX INJ NEW DRUG ADDON: CPT

## 2019-10-30 PROCEDURE — 700101 HCHG RX REV CODE 250: Performed by: HOSPITALIST

## 2019-10-30 PROCEDURE — 700105 HCHG RX REV CODE 258: Performed by: HOSPITALIST

## 2019-10-30 PROCEDURE — 700111 HCHG RX REV CODE 636 W/ 250 OVERRIDE (IP): Performed by: HOSPITALIST

## 2019-10-30 PROCEDURE — 700102 HCHG RX REV CODE 250 W/ 637 OVERRIDE(OP): Performed by: HOSPITALIST

## 2019-10-30 PROCEDURE — 80053 COMPREHEN METABOLIC PANEL: CPT

## 2019-10-30 PROCEDURE — G0378 HOSPITAL OBSERVATION PER HR: HCPCS

## 2019-10-30 PROCEDURE — 85025 COMPLETE CBC W/AUTO DIFF WBC: CPT

## 2019-10-30 PROCEDURE — 86141 C-REACTIVE PROTEIN HS: CPT

## 2019-10-30 PROCEDURE — 700111 HCHG RX REV CODE 636 W/ 250 OVERRIDE (IP): Performed by: EMERGENCY MEDICINE

## 2019-10-30 PROCEDURE — 99285 EMERGENCY DEPT VISIT HI MDM: CPT

## 2019-10-30 PROCEDURE — 700105 HCHG RX REV CODE 258: Performed by: EMERGENCY MEDICINE

## 2019-10-30 PROCEDURE — 96365 THER/PROPH/DIAG IV INF INIT: CPT

## 2019-10-30 PROCEDURE — 85652 RBC SED RATE AUTOMATED: CPT

## 2019-10-30 PROCEDURE — A9270 NON-COVERED ITEM OR SERVICE: HCPCS | Performed by: HOSPITALIST

## 2019-10-30 PROCEDURE — 700101 HCHG RX REV CODE 250: Performed by: EMERGENCY MEDICINE

## 2019-10-30 RX ORDER — ONDANSETRON 2 MG/ML
4 INJECTION INTRAMUSCULAR; INTRAVENOUS ONCE
Status: COMPLETED | OUTPATIENT
Start: 2019-10-30 | End: 2019-10-30

## 2019-10-30 RX ORDER — PROMETHAZINE HYDROCHLORIDE 25 MG/1
12.5-25 TABLET ORAL EVERY 4 HOURS PRN
Status: DISCONTINUED | OUTPATIENT
Start: 2019-10-30 | End: 2019-11-01 | Stop reason: HOSPADM

## 2019-10-30 RX ORDER — DEXAMETHASONE SODIUM PHOSPHATE 4 MG/ML
6 INJECTION, SOLUTION INTRA-ARTICULAR; INTRALESIONAL; INTRAMUSCULAR; INTRAVENOUS; SOFT TISSUE ONCE
Status: COMPLETED | OUTPATIENT
Start: 2019-10-30 | End: 2019-10-30

## 2019-10-30 RX ORDER — KETOROLAC TROMETHAMINE 30 MG/ML
INJECTION, SOLUTION INTRAMUSCULAR; INTRAVENOUS
Status: DISPENSED
Start: 2019-10-30 | End: 2019-10-30

## 2019-10-30 RX ORDER — OXYCODONE HYDROCHLORIDE 5 MG/1
5 TABLET ORAL
Status: DISCONTINUED | OUTPATIENT
Start: 2019-10-30 | End: 2019-11-01 | Stop reason: HOSPADM

## 2019-10-30 RX ORDER — PROMETHAZINE HYDROCHLORIDE 25 MG/1
12.5-25 SUPPOSITORY RECTAL EVERY 4 HOURS PRN
Status: DISCONTINUED | OUTPATIENT
Start: 2019-10-30 | End: 2019-11-01 | Stop reason: HOSPADM

## 2019-10-30 RX ORDER — ONDANSETRON 2 MG/ML
INJECTION INTRAMUSCULAR; INTRAVENOUS
Status: DISPENSED
Start: 2019-10-30 | End: 2019-10-30

## 2019-10-30 RX ORDER — VENLAFAXINE HYDROCHLORIDE 75 MG/1
75 CAPSULE, EXTENDED RELEASE ORAL DAILY
Status: DISCONTINUED | OUTPATIENT
Start: 2019-10-30 | End: 2019-11-01 | Stop reason: HOSPADM

## 2019-10-30 RX ORDER — AMLODIPINE BESYLATE 5 MG/1
10 TABLET ORAL DAILY
Status: DISCONTINUED | OUTPATIENT
Start: 2019-10-30 | End: 2019-11-01 | Stop reason: HOSPADM

## 2019-10-30 RX ORDER — MORPHINE SULFATE 4 MG/ML
INJECTION, SOLUTION INTRAMUSCULAR; INTRAVENOUS
Status: DISPENSED
Start: 2019-10-30 | End: 2019-10-30

## 2019-10-30 RX ORDER — TIZANIDINE 4 MG/1
4 TABLET ORAL EVERY 6 HOURS PRN
Status: ON HOLD | COMMUNITY
End: 2019-11-01 | Stop reason: SDUPTHER

## 2019-10-30 RX ORDER — BISACODYL 10 MG
10 SUPPOSITORY, RECTAL RECTAL
Status: DISCONTINUED | OUTPATIENT
Start: 2019-10-30 | End: 2019-11-01 | Stop reason: HOSPADM

## 2019-10-30 RX ORDER — ONDANSETRON 2 MG/ML
4 INJECTION INTRAMUSCULAR; INTRAVENOUS EVERY 4 HOURS PRN
Status: DISCONTINUED | OUTPATIENT
Start: 2019-10-30 | End: 2019-11-01 | Stop reason: HOSPADM

## 2019-10-30 RX ORDER — HYDROMORPHONE HYDROCHLORIDE 1 MG/ML
1 INJECTION, SOLUTION INTRAMUSCULAR; INTRAVENOUS; SUBCUTANEOUS ONCE
Status: COMPLETED | OUTPATIENT
Start: 2019-10-30 | End: 2019-10-30

## 2019-10-30 RX ORDER — KETOROLAC TROMETHAMINE 30 MG/ML
30 INJECTION, SOLUTION INTRAMUSCULAR; INTRAVENOUS ONCE
Status: COMPLETED | OUTPATIENT
Start: 2019-10-30 | End: 2019-10-30

## 2019-10-30 RX ORDER — VENLAFAXINE HYDROCHLORIDE 75 MG/1
75 CAPSULE, EXTENDED RELEASE ORAL DAILY
COMMUNITY
End: 2020-06-15 | Stop reason: SDUPTHER

## 2019-10-30 RX ORDER — PROMETHAZINE HYDROCHLORIDE 25 MG/1
25 SUPPOSITORY RECTAL EVERY 6 HOURS PRN
COMMUNITY
End: 2020-03-13

## 2019-10-30 RX ORDER — LEVOTHYROXINE SODIUM 0.2 MG/1
200 TABLET ORAL
COMMUNITY
End: 2020-05-15

## 2019-10-30 RX ORDER — MORPHINE SULFATE 4 MG/ML
4 INJECTION, SOLUTION INTRAMUSCULAR; INTRAVENOUS ONCE
Status: COMPLETED | OUTPATIENT
Start: 2019-10-30 | End: 2019-10-30

## 2019-10-30 RX ORDER — LEVOTHYROXINE SODIUM 0.2 MG/1
200 TABLET ORAL
Status: DISCONTINUED | OUTPATIENT
Start: 2019-10-30 | End: 2019-11-01 | Stop reason: HOSPADM

## 2019-10-30 RX ORDER — OXYCODONE HYDROCHLORIDE 10 MG/1
10 TABLET ORAL
Status: DISCONTINUED | OUTPATIENT
Start: 2019-10-30 | End: 2019-11-01 | Stop reason: HOSPADM

## 2019-10-30 RX ORDER — ONDANSETRON 4 MG/1
4 TABLET, ORALLY DISINTEGRATING ORAL EVERY 4 HOURS PRN
Status: DISCONTINUED | OUTPATIENT
Start: 2019-10-30 | End: 2019-11-01 | Stop reason: HOSPADM

## 2019-10-30 RX ORDER — LIDOCAINE 50 MG/G
2 PATCH TOPICAL EVERY 24 HOURS
Status: DISCONTINUED | OUTPATIENT
Start: 2019-10-30 | End: 2019-11-01 | Stop reason: HOSPADM

## 2019-10-30 RX ORDER — PROCHLORPERAZINE EDISYLATE 5 MG/ML
5-10 INJECTION INTRAMUSCULAR; INTRAVENOUS EVERY 4 HOURS PRN
Status: DISCONTINUED | OUTPATIENT
Start: 2019-10-30 | End: 2019-11-01 | Stop reason: HOSPADM

## 2019-10-30 RX ORDER — ACETAMINOPHEN 325 MG/1
650 TABLET ORAL EVERY 6 HOURS PRN
Status: DISCONTINUED | OUTPATIENT
Start: 2019-10-30 | End: 2019-11-01 | Stop reason: HOSPADM

## 2019-10-30 RX ORDER — AMLODIPINE BESYLATE 10 MG/1
10 TABLET ORAL DAILY
COMMUNITY
End: 2020-06-17

## 2019-10-30 RX ORDER — AMOXICILLIN 250 MG
2 CAPSULE ORAL 2 TIMES DAILY
Status: DISCONTINUED | OUTPATIENT
Start: 2019-10-30 | End: 2019-11-01 | Stop reason: HOSPADM

## 2019-10-30 RX ORDER — NAPROXEN 500 MG/1
500 TABLET ORAL 2 TIMES DAILY PRN
COMMUNITY
End: 2020-03-13

## 2019-10-30 RX ORDER — HYDROMORPHONE HYDROCHLORIDE 1 MG/ML
0.5 INJECTION, SOLUTION INTRAMUSCULAR; INTRAVENOUS; SUBCUTANEOUS
Status: DISCONTINUED | OUTPATIENT
Start: 2019-10-30 | End: 2019-11-01 | Stop reason: HOSPADM

## 2019-10-30 RX ORDER — CYCLOBENZAPRINE HCL 10 MG
10 TABLET ORAL 3 TIMES DAILY PRN
Status: ON HOLD | COMMUNITY
End: 2019-11-01

## 2019-10-30 RX ORDER — TIZANIDINE 4 MG/1
4 TABLET ORAL EVERY 6 HOURS PRN
Status: DISCONTINUED | OUTPATIENT
Start: 2019-10-30 | End: 2019-11-01 | Stop reason: HOSPADM

## 2019-10-30 RX ORDER — POLYETHYLENE GLYCOL 3350 17 G/17G
1 POWDER, FOR SOLUTION ORAL
Status: DISCONTINUED | OUTPATIENT
Start: 2019-10-30 | End: 2019-11-01 | Stop reason: HOSPADM

## 2019-10-30 RX ORDER — SODIUM CHLORIDE 9 MG/ML
INJECTION, SOLUTION INTRAVENOUS CONTINUOUS
Status: ACTIVE | OUTPATIENT
Start: 2019-10-30 | End: 2019-10-31

## 2019-10-30 RX ORDER — KETOROLAC TROMETHAMINE 30 MG/ML
30 INJECTION, SOLUTION INTRAMUSCULAR; INTRAVENOUS EVERY 6 HOURS PRN
Status: DISCONTINUED | OUTPATIENT
Start: 2019-10-30 | End: 2019-11-01 | Stop reason: HOSPADM

## 2019-10-30 RX ADMIN — HYDROMORPHONE HYDROCHLORIDE 1 MG: 1 INJECTION, SOLUTION INTRAMUSCULAR; INTRAVENOUS; SUBCUTANEOUS at 08:15

## 2019-10-30 RX ADMIN — HYDROMORPHONE HYDROCHLORIDE 0.5 MG: 1 INJECTION, SOLUTION INTRAMUSCULAR; INTRAVENOUS; SUBCUTANEOUS at 20:00

## 2019-10-30 RX ADMIN — SODIUM CHLORIDE: 9 INJECTION, SOLUTION INTRAVENOUS at 16:48

## 2019-10-30 RX ADMIN — HYDROMORPHONE HYDROCHLORIDE 1 MG: 1 INJECTION, SOLUTION INTRAMUSCULAR; INTRAVENOUS; SUBCUTANEOUS at 09:16

## 2019-10-30 RX ADMIN — ONDANSETRON 4 MG: 2 INJECTION INTRAMUSCULAR; INTRAVENOUS at 07:36

## 2019-10-30 RX ADMIN — OXYCODONE HYDROCHLORIDE 10 MG: 10 TABLET ORAL at 21:53

## 2019-10-30 RX ADMIN — KETOROLAC TROMETHAMINE 30 MG: 30 INJECTION, SOLUTION INTRAMUSCULAR; INTRAVENOUS at 16:35

## 2019-10-30 RX ADMIN — TIZANIDINE 4 MG: 4 TABLET ORAL at 16:35

## 2019-10-30 RX ADMIN — KETAMINE HYDROCHLORIDE 25 MG: 10 INJECTION, SOLUTION INTRAMUSCULAR; INTRAVENOUS at 11:12

## 2019-10-30 RX ADMIN — KETOROLAC TROMETHAMINE 30 MG: 30 INJECTION, SOLUTION INTRAMUSCULAR; INTRAVENOUS at 07:37

## 2019-10-30 RX ADMIN — METHOCARBAMOL 1000 MG: 100 INJECTION INTRAMUSCULAR; INTRAVENOUS at 09:53

## 2019-10-30 RX ADMIN — MORPHINE SULFATE 4 MG: 4 INJECTION INTRAVENOUS at 07:38

## 2019-10-30 RX ADMIN — PROMETHAZINE HYDROCHLORIDE 25 MG: 25 TABLET ORAL at 15:16

## 2019-10-30 RX ADMIN — OXYCODONE HYDROCHLORIDE 10 MG: 10 TABLET ORAL at 15:15

## 2019-10-30 RX ADMIN — DEXAMETHASONE SODIUM PHOSPHATE 6 MG: 4 INJECTION, SOLUTION INTRA-ARTICULAR; INTRALESIONAL; INTRAMUSCULAR; INTRAVENOUS; SOFT TISSUE at 09:51

## 2019-10-30 RX ADMIN — ONDANSETRON 4 MG: 2 INJECTION INTRAMUSCULAR; INTRAVENOUS at 11:50

## 2019-10-30 RX ADMIN — LIDOCAINE 1 PATCH: 50 PATCH CUTANEOUS at 16:00

## 2019-10-30 ASSESSMENT — LIFESTYLE VARIABLES
EVER HAD A DRINK FIRST THING IN THE MORNING TO STEADY YOUR NERVES TO GET RID OF A HANGOVER: NO
EVER_SMOKED: NEVER
DOES PATIENT WANT TO STOP DRINKING: NO
ALCOHOL_USE: YES
EVER FELT BAD OR GUILTY ABOUT YOUR DRINKING: NO
TOTAL SCORE: 0
CONSUMPTION TOTAL: INCOMPLETE
DO YOU DRINK ALCOHOL: NO
HAVE PEOPLE ANNOYED YOU BY CRITICIZING YOUR DRINKING: NO
TOTAL SCORE: 0
HAVE YOU EVER FELT YOU SHOULD CUT DOWN ON YOUR DRINKING: NO
TOTAL SCORE: 0

## 2019-10-30 ASSESSMENT — PATIENT HEALTH QUESTIONNAIRE - PHQ9
2. FEELING DOWN, DEPRESSED, IRRITABLE, OR HOPELESS: NOT AT ALL
SUM OF ALL RESPONSES TO PHQ9 QUESTIONS 1 AND 2: 0
2. FEELING DOWN, DEPRESSED, IRRITABLE, OR HOPELESS: NOT AT ALL
SUM OF ALL RESPONSES TO PHQ9 QUESTIONS 1 AND 2: 0
1. LITTLE INTEREST OR PLEASURE IN DOING THINGS: NOT AT ALL
1. LITTLE INTEREST OR PLEASURE IN DOING THINGS: NOT AT ALL

## 2019-10-30 ASSESSMENT — ENCOUNTER SYMPTOMS
DIZZINESS: 0
NECK PAIN: 1
PALPITATIONS: 0
ORTHOPNEA: 0
COUGH: 0
SPUTUM PRODUCTION: 0
VOMITING: 1
NAUSEA: 1
HEMOPTYSIS: 0
CHILLS: 0

## 2019-10-30 ASSESSMENT — COGNITIVE AND FUNCTIONAL STATUS - GENERAL
SUGGESTED CMS G CODE MODIFIER DAILY ACTIVITY: CH
MOBILITY SCORE: 24
DAILY ACTIVITIY SCORE: 24
SUGGESTED CMS G CODE MODIFIER MOBILITY: CH

## 2019-10-30 ASSESSMENT — PAIN DESCRIPTION - DESCRIPTORS: DESCRIPTORS: BURNING

## 2019-10-30 NOTE — ED NOTES
"RN answers call light, patient states she went to look to the right to reach for something and heard and felt a \"pop\" in her neck and now the pain is more severe than before   Her neuro exam is WDL, no extremely weakness and no numbness   Will make MD aware   "

## 2019-10-30 NOTE — ED NOTES
Pt aware awaiting pharmacy to clearance on medications, in no acute distress, denies other needs at this time.

## 2019-10-30 NOTE — ED NOTES
Hourly rounding completed  Patient is up to date on current plan of care  Patient is sitting in bed, still tearful, watching TV   Patient denies any need for use of the restroom, denies need for repositioning, denies need for any comfort care at this time

## 2019-10-30 NOTE — ED NOTES
Spoke with ED MD about these reported changes. ED MD gave a verbal order to repeat dose of 1mg IV Dilaudid

## 2019-10-30 NOTE — ED NOTES
Hourly rounding completed  Patient is up to date on current plan of care  Patient is sitting up in bed, tearful from the pain   Patient denies any need for use of the restroom, denies need for repositioning, denies need for any comfort care at this time

## 2019-10-30 NOTE — ED NOTES
RN and MD bedside     Patient reporting that she threw up and the pain became uncontrollable again

## 2019-10-30 NOTE — ED NOTES
ED MD bedside     Hourly rounding completed  Patient is up to date on current plan of care  Patient is resting comfortably in bed   Patient denies any need for use of the restroom, denies need for repositioning, denies need for any comfort care at this time

## 2019-10-30 NOTE — ED NOTES
Pt wishes to hold home medications until nausea under control, medicated per MAR, in no acute distress, await ready bed.

## 2019-10-30 NOTE — ED TRIAGE NOTES
Nickolas Galdamez  29 y.o.  female  Chief Complaint   Patient presents with   • Neck Pain     Present to triage c/o neck pain ( chronic ). Patient states that she had 20 injections on her neck last Monday at [facility] and last night pain worse feeling burning sensation on her head.

## 2019-10-30 NOTE — ED NOTES
This RN received hand off report on patient from Jorge WHITTAKER   This RN's primary care of patient began at this time

## 2019-10-30 NOTE — ED NOTES
Hourly rounding completed  Patient is up to date on current plan of care  Patient is resting in bed, still tearful  Patient denies any need for use of the restroom, denies need for repositioning, denies need for any comfort care at this time     Mother at bedside with patient

## 2019-10-30 NOTE — ED NOTES
RN noted an Error was made in Cabinet Override. Morphine 4mg, Toradol 30 mg, and Zofran 4mg were removed from the [facility] during a downtime as a cabinet override @ 0745 under the WRONG patient's chart. Medications returned to the [facility] under incorrect Patient's chart and removed correctly as an override under this patient's chart.
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
## 2019-10-30 NOTE — ED NOTES
Assumed care of pt from prior RN. Pt remains on monitor, in no acute distress, reports nausea with worsening pain after brief relief with ketamine per MAR. ERP at bedside to reeval patient, pt medicated per MAR, MAEx4, AAOx4, in no acute distress, anticipate admission, await dispo.

## 2019-10-30 NOTE — ED NOTES
Hourly rounding completed  Patient is up to date on current plan of care  Patient is sitting in bed, still tearful  Patient denies any need for use of the restroom, denies need for repositioning, denies need for any comfort care at this time

## 2019-10-30 NOTE — ED NOTES
Labs drawn & sent.  Pt tearful, c/o increased neck pain after coughing.  Pt amb to the restroom & back w/ a slow but steady gait.

## 2019-10-30 NOTE — ED NOTES
Hand off report given to Larissa WHITTAKER   Patient chart and current status reviewed with oncoming RN and all questions answered  This RN's primary care of patient terminated at this time

## 2019-10-31 PROBLEM — G47.00 INSOMNIA: Status: ACTIVE | Noted: 2019-10-31

## 2019-10-31 PROCEDURE — 700111 HCHG RX REV CODE 636 W/ 250 OVERRIDE (IP): Performed by: PHYSICIAN ASSISTANT

## 2019-10-31 PROCEDURE — A9270 NON-COVERED ITEM OR SERVICE: HCPCS | Performed by: HOSPITALIST

## 2019-10-31 PROCEDURE — G0378 HOSPITAL OBSERVATION PER HR: HCPCS

## 2019-10-31 PROCEDURE — A9270 NON-COVERED ITEM OR SERVICE: HCPCS | Performed by: PHYSICIAN ASSISTANT

## 2019-10-31 PROCEDURE — 700101 HCHG RX REV CODE 250: Performed by: HOSPITALIST

## 2019-10-31 PROCEDURE — 99225 PR SUBSEQUENT OBSERVATION CARE,LEVEL II: CPT | Performed by: INTERNAL MEDICINE

## 2019-10-31 PROCEDURE — 96376 TX/PRO/DX INJ SAME DRUG ADON: CPT

## 2019-10-31 PROCEDURE — 700111 HCHG RX REV CODE 636 W/ 250 OVERRIDE (IP): Performed by: HOSPITALIST

## 2019-10-31 PROCEDURE — 97165 OT EVAL LOW COMPLEX 30 MIN: CPT

## 2019-10-31 PROCEDURE — 700105 HCHG RX REV CODE 258: Performed by: HOSPITALIST

## 2019-10-31 PROCEDURE — 700102 HCHG RX REV CODE 250 W/ 637 OVERRIDE(OP): Performed by: HOSPITALIST

## 2019-10-31 PROCEDURE — 700102 HCHG RX REV CODE 250 W/ 637 OVERRIDE(OP): Performed by: PHYSICIAN ASSISTANT

## 2019-10-31 PROCEDURE — 700102 HCHG RX REV CODE 250 W/ 637 OVERRIDE(OP): Performed by: INTERNAL MEDICINE

## 2019-10-31 PROCEDURE — A9270 NON-COVERED ITEM OR SERVICE: HCPCS | Performed by: INTERNAL MEDICINE

## 2019-10-31 RX ORDER — DEXAMETHASONE SODIUM PHOSPHATE 4 MG/ML
4 INJECTION, SOLUTION INTRA-ARTICULAR; INTRALESIONAL; INTRAMUSCULAR; INTRAVENOUS; SOFT TISSUE EVERY 6 HOURS
Status: COMPLETED | OUTPATIENT
Start: 2019-10-31 | End: 2019-10-31

## 2019-10-31 RX ORDER — TRAZODONE HYDROCHLORIDE 100 MG/1
100 TABLET ORAL
Status: DISCONTINUED | OUTPATIENT
Start: 2019-10-31 | End: 2019-11-01 | Stop reason: HOSPADM

## 2019-10-31 RX ORDER — ALPRAZOLAM 0.5 MG/1
0.5 TABLET ORAL 3 TIMES DAILY PRN
Status: DISCONTINUED | OUTPATIENT
Start: 2019-10-31 | End: 2019-11-01 | Stop reason: HOSPADM

## 2019-10-31 RX ADMIN — DEXAMETHASONE SODIUM PHOSPHATE 4 MG: 4 INJECTION, SOLUTION INTRA-ARTICULAR; INTRALESIONAL; INTRAMUSCULAR; INTRAVENOUS; SOFT TISSUE at 09:32

## 2019-10-31 RX ADMIN — ACETAMINOPHEN 650 MG: 325 TABLET, FILM COATED ORAL at 15:58

## 2019-10-31 RX ADMIN — SENNOSIDES AND DOCUSATE SODIUM 2 TABLET: 8.6; 5 TABLET ORAL at 04:44

## 2019-10-31 RX ADMIN — TIZANIDINE 4 MG: 4 TABLET ORAL at 15:57

## 2019-10-31 RX ADMIN — HYDROMORPHONE HYDROCHLORIDE 0.5 MG: 1 INJECTION, SOLUTION INTRAMUSCULAR; INTRAVENOUS; SUBCUTANEOUS at 22:23

## 2019-10-31 RX ADMIN — ALPRAZOLAM 0.5 MG: 0.5 TABLET ORAL at 18:41

## 2019-10-31 RX ADMIN — OXYCODONE HYDROCHLORIDE 10 MG: 10 TABLET ORAL at 02:25

## 2019-10-31 RX ADMIN — TIZANIDINE 4 MG: 4 TABLET ORAL at 08:29

## 2019-10-31 RX ADMIN — DEXAMETHASONE SODIUM PHOSPHATE 4 MG: 4 INJECTION, SOLUTION INTRA-ARTICULAR; INTRALESIONAL; INTRAMUSCULAR; INTRAVENOUS; SOFT TISSUE at 12:35

## 2019-10-31 RX ADMIN — ALPRAZOLAM 0.5 MG: 0.5 TABLET ORAL at 08:31

## 2019-10-31 RX ADMIN — AMLODIPINE BESYLATE 10 MG: 5 TABLET ORAL at 04:45

## 2019-10-31 RX ADMIN — OXYCODONE HYDROCHLORIDE 10 MG: 10 TABLET ORAL at 15:58

## 2019-10-31 RX ADMIN — LIDOCAINE 1 PATCH: 50 PATCH CUTANEOUS at 12:34

## 2019-10-31 RX ADMIN — OXYCODONE HYDROCHLORIDE 10 MG: 10 TABLET ORAL at 19:52

## 2019-10-31 RX ADMIN — SODIUM CHLORIDE: 9 INJECTION, SOLUTION INTRAVENOUS at 02:28

## 2019-10-31 RX ADMIN — OXYCODONE HYDROCHLORIDE 10 MG: 10 TABLET ORAL at 12:35

## 2019-10-31 RX ADMIN — DEXAMETHASONE SODIUM PHOSPHATE 4 MG: 4 INJECTION, SOLUTION INTRA-ARTICULAR; INTRALESIONAL; INTRAMUSCULAR; INTRAVENOUS; SOFT TISSUE at 18:40

## 2019-10-31 RX ADMIN — HYDROMORPHONE HYDROCHLORIDE 0.5 MG: 1 INJECTION, SOLUTION INTRAMUSCULAR; INTRAVENOUS; SUBCUTANEOUS at 07:05

## 2019-10-31 RX ADMIN — SENNOSIDES AND DOCUSATE SODIUM 2 TABLET: 8.6; 5 TABLET ORAL at 18:48

## 2019-10-31 RX ADMIN — TRAZODONE HYDROCHLORIDE 100 MG: 100 TABLET ORAL at 22:23

## 2019-10-31 RX ADMIN — LEVOTHYROXINE SODIUM 200 MCG: 200 TABLET ORAL at 04:45

## 2019-10-31 RX ADMIN — ONDANSETRON 4 MG: 4 TABLET, ORALLY DISINTEGRATING ORAL at 08:29

## 2019-10-31 RX ADMIN — VENLAFAXINE HYDROCHLORIDE 75 MG: 75 CAPSULE, EXTENDED RELEASE ORAL at 04:45

## 2019-10-31 ASSESSMENT — ACTIVITIES OF DAILY LIVING (ADL): TOILETING: INDEPENDENT

## 2019-10-31 ASSESSMENT — ENCOUNTER SYMPTOMS
PALPITATIONS: 0
DIZZINESS: 0
DIAPHORESIS: 0
WEAKNESS: 0
FOCAL WEAKNESS: 0
ABDOMINAL PAIN: 0
MEMORY LOSS: 0
BACK PAIN: 0
MYALGIAS: 0
HEADACHES: 0
FLANK PAIN: 0
SHORTNESS OF BREATH: 0
INSOMNIA: 1
CHILLS: 0
FEVER: 0
NERVOUS/ANXIOUS: 0
NAUSEA: 0

## 2019-10-31 ASSESSMENT — COGNITIVE AND FUNCTIONAL STATUS - GENERAL
DAILY ACTIVITIY SCORE: 24
SUGGESTED CMS G CODE MODIFIER DAILY ACTIVITY: CH
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
## 2019-10-31 NOTE — CARE PLAN
Problem: Communication  Goal: The ability to communicate needs accurately and effectively will improve  Outcome: PROGRESSING AS EXPECTED  Plan of care discussed.     Problem: Safety  Goal: Will remain free from injury  Outcome: PROGRESSING AS EXPECTED  Bed alarm on. Close to nurses station     Problem: Pain Management  Goal: Pain level will decrease to patient's comfort goal  Outcome: PROGRESSING AS EXPECTED  Prn pain med frequency discussed
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
## 2019-10-31 NOTE — THERAPY
"Occupational Therapy Evaluation completed.   Functional Status:  Supervised with ADLs and txfs  Plan of Care: Patient with no further skilled OT needs in the acute care setting at this time  Discharge Recommendations:  Currently anticipate no further skilled therapy needs once patient is discharged from the inpatient setting.    See \"Rehab Therapy-Acute\" Patient Summary Report for complete documentation.    "

## 2019-10-31 NOTE — PROGRESS NOTES
Pt alert and oriented times four. Dilaudid administered for pain over posterior neck and upper back. Denies chest pain and dififculty breathing, nausea, dizziness, numbness and tingling. Plan of care discussed with her. Call light within reach. Will monitor.    0455 No acute events overnight. Slept well. Pain has improved. Cooperative and very appreciative of cares. Will monitor.

## 2019-11-01 VITALS
HEART RATE: 60 BPM | HEIGHT: 70 IN | WEIGHT: 270.06 LBS | DIASTOLIC BLOOD PRESSURE: 110 MMHG | TEMPERATURE: 97 F | BODY MASS INDEX: 38.66 KG/M2 | RESPIRATION RATE: 17 BRPM | OXYGEN SATURATION: 99 % | SYSTOLIC BLOOD PRESSURE: 149 MMHG

## 2019-11-01 PROCEDURE — 700101 HCHG RX REV CODE 250: Performed by: HOSPITALIST

## 2019-11-01 PROCEDURE — 700102 HCHG RX REV CODE 250 W/ 637 OVERRIDE(OP): Performed by: HOSPITALIST

## 2019-11-01 PROCEDURE — 700111 HCHG RX REV CODE 636 W/ 250 OVERRIDE (IP): Performed by: HOSPITALIST

## 2019-11-01 PROCEDURE — 99217 PR OBSERVATION CARE DISCHARGE: CPT | Performed by: INTERNAL MEDICINE

## 2019-11-01 PROCEDURE — A9270 NON-COVERED ITEM OR SERVICE: HCPCS | Performed by: HOSPITALIST

## 2019-11-01 PROCEDURE — G0378 HOSPITAL OBSERVATION PER HR: HCPCS

## 2019-11-01 PROCEDURE — 96376 TX/PRO/DX INJ SAME DRUG ADON: CPT

## 2019-11-01 RX ORDER — TRAMADOL HYDROCHLORIDE 50 MG/1
50 TABLET ORAL EVERY 4 HOURS PRN
Status: DISCONTINUED | OUTPATIENT
Start: 2019-11-01 | End: 2019-11-01 | Stop reason: HOSPADM

## 2019-11-01 RX ORDER — TRAMADOL HYDROCHLORIDE 50 MG/1
50 TABLET ORAL EVERY 4 HOURS PRN
Qty: 40 TAB | Refills: 0 | Status: SHIPPED | OUTPATIENT
Start: 2019-11-01 | End: 2019-11-08

## 2019-11-01 RX ORDER — TIZANIDINE 4 MG/1
4 TABLET ORAL EVERY 6 HOURS PRN
Qty: 80 TAB | Refills: 0 | Status: SHIPPED | OUTPATIENT
Start: 2019-11-01 | End: 2019-11-03

## 2019-11-01 RX ADMIN — POLYETHYLENE GLYCOL 3350 1 PACKET: 17 POWDER, FOR SOLUTION ORAL at 12:43

## 2019-11-01 RX ADMIN — KETOROLAC TROMETHAMINE 30 MG: 30 INJECTION, SOLUTION INTRAMUSCULAR; INTRAVENOUS at 07:31

## 2019-11-01 RX ADMIN — LIDOCAINE 1 PATCH: 50 PATCH CUTANEOUS at 13:15

## 2019-11-01 RX ADMIN — SENNOSIDES AND DOCUSATE SODIUM 2 TABLET: 8.6; 5 TABLET ORAL at 04:27

## 2019-11-01 RX ADMIN — OXYCODONE HYDROCHLORIDE 10 MG: 10 TABLET ORAL at 03:02

## 2019-11-01 RX ADMIN — LEVOTHYROXINE SODIUM 200 MCG: 200 TABLET ORAL at 04:27

## 2019-11-01 RX ADMIN — AMLODIPINE BESYLATE 10 MG: 5 TABLET ORAL at 04:27

## 2019-11-01 RX ADMIN — TIZANIDINE 4 MG: 4 TABLET ORAL at 04:05

## 2019-11-01 RX ADMIN — VENLAFAXINE HYDROCHLORIDE 75 MG: 75 CAPSULE, EXTENDED RELEASE ORAL at 04:27

## 2019-11-01 NOTE — DISCHARGE PLANNING
Care Transition Team Assessment    The information provided for this assessment was provided by pt and chart review. Pt confirmed that the information on facesheet was accurate. Pt lives on the second story of the apartment complex. Pt lives their daughter. Pt states to have a good support system. Pt's insurance covers medications. Pt uses the [facility] pharmacy in [place].     Pt has no needs upon d/c.    Information Source  Orientation : Oriented x 4  Information Given By: Patient  Informant's Name: (Nickolas Galdamez)  Who is responsible for making decisions for patient? : Patient    Elopement Risk  Legal Hold: No  Ambulatory or Self Mobile in Wheelchair: Yes  Disoriented: No  Psychiatric Symptoms: None  History of Wandering: No  Elopement this Admit: No  Vocalizing Wanting to Leave: No  Displays Behaviors, Body Language Wanting to Leave: No-Not at Risk for Elopement  Elopement Risk: Not at Risk for Elopement    Interdisciplinary Discharge Planning  Patient or legal guardian wants to designate a caregiver (see row info): No  Housing / Facility: [address] [facility]""  Prior Services: None    Discharge Preparedness  What is your plan after discharge?: Home with help  What are your discharge supports?: Child  Prior Functional Level: Independent with Activities of Daily Living  Difficulity with ADLs: None  Difficulity with IADLs: None    Functional Assesment  Prior Functional Level: Independent with Activities of Daily Living    Finances  Financial Barriers to Discharge: No  Prescription Coverage: Yes    Vision / Hearing Impairment  Vision Impairment : No  Right Eye Vision: Wears Glasses  Left Eye Vision: Wears Glasses  Hearing Impairment : No    Domestic Abuse  Have you ever been the victim of abuse or violence?: No  Physical Abuse or Sexual Abuse: No  Verbal Abuse or Emotional Abuse: No  Possible Abuse Reported to:: Not Applicable    Psychological Assessment  Non-compliant with Treatment: No    Discharge Risks or  Barriers  Discharge risks or barriers?: No    Anticipated Discharge Information  Anticipated discharge disposition: Home

## 2019-11-01 NOTE — THERAPY
PT order received; pt reporting she has been mobilizing ad david. No skilled need. Will DC order.

## 2019-11-01 NOTE — CARE PLAN
Problem: Safety  Goal: Will remain free from injury  Outcome: PROGRESSING AS EXPECTED     Problem: Bowel/Gastric:  Goal: Normal bowel function is maintained or improved  Outcome: PROGRESSING AS EXPECTED     Problem: Pain Management  Goal: Pain level will decrease to patient's comfort goal  Outcome: PROGRESSING AS EXPECTED  Note:   PRN meds as ordered
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
## 2019-11-01 NOTE — DISCHARGE INSTRUCTIONS
Acute Pain, Adult  Acute pain is a type of pain that may last for just a few days or as long as six months. It is often related to an illness, injury, or medical procedure. Acute pain may be mild, moderate, or severe. It usually goes away once your injury has healed or you are no longer ill.  Pain can make it hard for you to do daily activities. It can cause anxiety and lead to other problems if left untreated. Treatment depends on the cause and severity of your acute pain.  Follow these instructions at home:  · Check your pain level as told by your health care provider.  · Take over-the-counter and prescription medicines only as told by your health care provider.  · If you are taking prescription pain medicine:  ¨ Ask your health care provider about taking a stool softener or laxative to prevent constipation.  ¨ Do not stop taking the medicine suddenly. Talk to your health care provider about how and when to discontinue prescription pain medicine.  ¨ If your pain is severe, do not take more pills than instructed by your health care provider.  ¨ Do not take other over-the-counter pain medicines in addition to this medicine unless told by your health care provider.  ¨ Do not drive or operate heavy machinery while taking prescription pain medicine.  · Apply ice or heat as told by your health care provider. These may reduce swelling and pain.  · Ask your health care provider if other strategies such as distraction, relaxation, or physical therapies can help your pain.  · Keep all follow-up visits as told by your health care provider. This is important.  Contact a health care provider if:  · You have pain that is not controlled by medicine.  · Your pain does not improve or gets worse.  · You have side effects from pain medicines, such as vomiting or confusion.  Get help right away if:  · You have severe pain.  · You have trouble breathing.  · You lose consciousness.  · You have chest pain or pressure that lasts for more  than a few minutes. Along with the chest pain you may:  ¨ Have pain or discomfort in one or both arms, your back, neck, jaw, or stomach.  ¨ Have shortness of breath.  ¨ Break out in a cold sweat.  ¨ Feel nauseous.  ¨ Become light-headed.  These symptoms may represent a serious problem that is an emergency. Do not wait to see if the symptoms will go away. Get medical help right away. Call your local emergency services (911 in the [place] Do not drive yourself to the hospital.   This information is not intended to replace advice given to you by your health care provider. Make sure you discuss any questions you have with your health care provider.  Document Released: 01/01/2017 Document Revised: 05/26/2017 Document Reviewed: 01/01/2017  [employer] Interactive Patient Education © 2017 [employer]  Discharge Instructions    Discharged to home by car with relative. Discharged via wheelchair, hospital escort: Yes.  Special equipment needed: Not Applicable    Be sure to schedule a follow-up appointment with your primary care doctor or any specialists as instructed.     Discharge Plan:   Influenza Vaccine Indication: Not indicated: Previously immunized this influenza season and > 8 years of age    I understand that a diet low in cholesterol, fat, and sodium is recommended for good health. Unless I have been given specific instructions below for another diet, I accept this instruction as my diet prescription.   Other diet: Regular healthy diet    Special Instructions:   No lifting > 10 pounds   Avoid repetitive arm movements   Walk often   Ice the neck often, heat occasionally   Diet as tolerated   Stool softener as needed   No driving if taking narcotic pain medications   Call with questions [phone]    Depression / Suicide Risk    As you are discharged from this [facility] facility, it is important to learn how to keep safe from harming yourself.    Recognize the warning signs:  · Abrupt changes in personality, positive  or negative- including increase in energy   · Giving away possessions  · Change in eating patterns- significant weight changes-  positive or negative  · Change in sleeping patterns- unable to sleep or sleeping all the time   · Unwillingness or inability to communicate  · Depression  · Unusual sadness, discouragement and loneliness  · Talk of wanting to die  · Neglect of personal appearance   · Rebelliousness- reckless behavior  · Withdrawal from people/activities they love  · Confusion- inability to concentrate     If you or a loved one observes any of these behaviors or has concerns about self-harm, here's what you can do:  · Talk about it- your feelings and reasons for harming yourself  · Remove any means that you might use to hurt yourself (examples: pills, rope, extension cords, firearm)  · Get professional help from the community (Mental Health, Substance Abuse, psychological counseling)  · Do not be alone:Call your Safe Contact- someone whom you trust who will be there for you.  · Call your local CRISIS HOTLINE 144-9807 or 415-396-2606  · Call your local Children's Mobile Crisis Response Team [place] (600) 387-1626 or www.[place]  · Call the toll free National Suicide Prevention Hotlines   · National Suicide Prevention Lifeline 042-161-JXML (8684)  · National Hope Line Network 800-SUICIDE (982-6133)

## 2019-11-01 NOTE — CARE PLAN
Problem: Communication  Goal: The ability to communicate needs accurately and effectively will improve  11/1/2019 0758 by Kaylie Da Silva R.N.  Note:   Updated on POC and ready to go home today  11/1/2019 0756 by Kaylie Da Silva R.N.  Outcome: PROGRESSING AS EXPECTED  Note:   Updated on POC, ready to go home today  10/31/2019 2011 by Kaylie Da Silva R.N.  Outcome: PROGRESSING AS EXPECTED  Note:   Updated on POC all questions answered     Problem: Safety  Goal: Will remain free from injury  Outcome: PROGRESSING AS EXPECTED  Note:   Up self calls for assistance     Problem: Discharge Barriers/Planning  Goal: Patient's continuum of care needs will be met  11/1/2019 0758 by Kaylie Da Silva R.N.  Outcome: PROGRESSING AS EXPECTED  Note:   Patient ready to discharge today home with no needs  11/1/2019 0756 by Kaylie Da Silva R.N.  Outcome: PROGRESSING AS EXPECTED  Note:   Patient discharging home with no needs today     Problem: Pain Management  Goal: Pain level will decrease to patient's comfort goal  11/1/2019 0758 by Kaylie Da Silva R.N.  Outcome: PROGRESSING AS EXPECTED  Note:   Pain better controlled by oral medication  11/1/2019 0756 by Kaylie Da Silva R.N.  Outcome: PROGRESSING AS EXPECTED  Note:   Pain more controlled by IV medications  10/31/2019 2011 by Kaylie Da Silva R.N.  Outcome: PROGRESSING SLOWER THAN EXPECTED  Note:   Medicated per MAR, cold pack and lidocaine patch applied.      Problem: Psychosocial Needs:  Goal: Level of anxiety will decrease  Outcome: PROGRESSING AS EXPECTED  Note:   Less anxious today
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
## 2019-11-01 NOTE — CARE PLAN
Problem: Communication  Goal: The ability to communicate needs accurately and effectively will improve  Outcome: PROGRESSING AS EXPECTED  Note:   Updated on POC all questions answered     Problem: Pain Management  Goal: Pain level will decrease to patient's comfort goal  Outcome: PROGRESSING SLOWER THAN EXPECTED  Note:   Medicated per MAR, cold pack and lidocaine patch applied.

## 2019-11-01 NOTE — PROGRESS NOTES
Reviewed all discharge instructions with patient all questions answered. Discharge home with family with no needs with follow up with PCP.

## 2019-11-04 ENCOUNTER — HOSPITAL ENCOUNTER (EMERGENCY)
Facility: MEDICAL CENTER | Age: 29
End: 2019-11-04
Attending: EMERGENCY MEDICINE
Payer: COMMERCIAL

## 2019-11-04 ENCOUNTER — APPOINTMENT (OUTPATIENT)
Dept: RADIOLOGY | Facility: MEDICAL CENTER | Age: 29
End: 2019-11-04
Attending: EMERGENCY MEDICINE
Payer: COMMERCIAL

## 2019-11-04 VITALS
SYSTOLIC BLOOD PRESSURE: 130 MMHG | HEART RATE: 76 BPM | HEIGHT: 70 IN | DIASTOLIC BLOOD PRESSURE: 90 MMHG | RESPIRATION RATE: 16 BRPM | BODY MASS INDEX: 37.34 KG/M2 | OXYGEN SATURATION: 96 % | TEMPERATURE: 97.9 F | WEIGHT: 260.8 LBS

## 2019-11-04 DIAGNOSIS — R10.9 ABDOMINAL PAIN, UNSPECIFIED ABDOMINAL LOCATION: ICD-10-CM

## 2019-11-04 LAB
ALBUMIN SERPL BCP-MCNC: 5.3 G/DL (ref 3.2–4.9)
ALBUMIN/GLOB SERPL: 1.9 G/DL
ALP SERPL-CCNC: 63 U/L (ref 30–99)
ALT SERPL-CCNC: 8 U/L (ref 2–50)
ANION GAP SERPL CALC-SCNC: 11 MMOL/L (ref 0–11.9)
APPEARANCE UR: CLEAR
AST SERPL-CCNC: 19 U/L (ref 12–45)
BASOPHILS # BLD AUTO: 0 % (ref 0–1.8)
BASOPHILS # BLD: 0 K/UL (ref 0–0.12)
BILIRUB SERPL-MCNC: 0.6 MG/DL (ref 0.1–1.5)
BILIRUB UR QL STRIP.AUTO: NEGATIVE
BUN SERPL-MCNC: 11 MG/DL (ref 8–22)
CALCIUM SERPL-MCNC: 9.5 MG/DL (ref 8.5–10.5)
CHLORIDE SERPL-SCNC: 103 MMOL/L (ref 96–112)
CO2 SERPL-SCNC: 24 MMOL/L (ref 20–33)
COLOR UR: YELLOW
CREAT SERPL-MCNC: 1.04 MG/DL (ref 0.5–1.4)
EOSINOPHIL # BLD AUTO: 0.43 K/UL (ref 0–0.51)
EOSINOPHIL NFR BLD: 4 % (ref 0–6.9)
ERYTHROCYTE [DISTWIDTH] IN BLOOD BY AUTOMATED COUNT: 40.5 FL (ref 35.9–50)
GLOBULIN SER CALC-MCNC: 2.8 G/DL (ref 1.9–3.5)
GLUCOSE SERPL-MCNC: 98 MG/DL (ref 65–99)
GLUCOSE UR STRIP.AUTO-MCNC: NEGATIVE MG/DL
HCG SERPL QL: NEGATIVE
HCT VFR BLD AUTO: 50.9 % (ref 37–47)
HGB BLD-MCNC: 17.2 G/DL (ref 12–16)
KETONES UR STRIP.AUTO-MCNC: ABNORMAL MG/DL
LACTATE BLD-SCNC: 1 MMOL/L (ref 0.5–2)
LACTATE BLD-SCNC: 3.4 MMOL/L (ref 0.5–2)
LEUKOCYTE ESTERASE UR QL STRIP.AUTO: NEGATIVE
LIPASE SERPL-CCNC: 15 U/L (ref 11–82)
LYMPHOCYTES # BLD AUTO: 2.25 K/UL (ref 1–4.8)
LYMPHOCYTES NFR BLD: 21 % (ref 22–41)
MANUAL DIFF BLD: NORMAL
MCH RBC QN AUTO: 29.1 PG (ref 27–33)
MCHC RBC AUTO-ENTMCNC: 33.8 G/DL (ref 33.6–35)
MCV RBC AUTO: 86 FL (ref 81.4–97.8)
MICRO URNS: ABNORMAL
MONOCYTES # BLD AUTO: 0.32 K/UL (ref 0–0.85)
MONOCYTES NFR BLD AUTO: 3 % (ref 0–13.4)
MORPHOLOGY BLD-IMP: NORMAL
NEUTROPHILS # BLD AUTO: 7.7 K/UL (ref 2–7.15)
NEUTROPHILS NFR BLD: 72 % (ref 44–72)
NITRITE UR QL STRIP.AUTO: NEGATIVE
NRBC # BLD AUTO: 0 K/UL
NRBC BLD-RTO: 0 /100 WBC
PH UR STRIP.AUTO: 5.5 [PH] (ref 5–8)
PLATELET # BLD AUTO: 369 K/UL (ref 164–446)
PLATELET BLD QL SMEAR: NORMAL
PMV BLD AUTO: 9.5 FL (ref 9–12.9)
POTASSIUM SERPL-SCNC: 3.7 MMOL/L (ref 3.6–5.5)
PROT SERPL-MCNC: 8.1 G/DL (ref 6–8.2)
PROT UR QL STRIP: NEGATIVE MG/DL
RBC # BLD AUTO: 5.92 M/UL (ref 4.2–5.4)
RBC BLD AUTO: NORMAL
RBC UR QL AUTO: NEGATIVE
SODIUM SERPL-SCNC: 138 MMOL/L (ref 135–145)
SP GR UR STRIP.AUTO: 1.02
UROBILINOGEN UR STRIP.AUTO-MCNC: 0.2 MG/DL
WBC # BLD AUTO: 10.7 K/UL (ref 4.8–10.8)

## 2019-11-04 PROCEDURE — 85027 COMPLETE CBC AUTOMATED: CPT

## 2019-11-04 PROCEDURE — 99285 EMERGENCY DEPT VISIT HI MDM: CPT

## 2019-11-04 PROCEDURE — 74177 CT ABD & PELVIS W/CONTRAST: CPT

## 2019-11-04 PROCEDURE — 96375 TX/PRO/DX INJ NEW DRUG ADDON: CPT

## 2019-11-04 PROCEDURE — 36415 COLL VENOUS BLD VENIPUNCTURE: CPT

## 2019-11-04 PROCEDURE — 700105 HCHG RX REV CODE 258: Performed by: EMERGENCY MEDICINE

## 2019-11-04 PROCEDURE — 84703 CHORIONIC GONADOTROPIN ASSAY: CPT

## 2019-11-04 PROCEDURE — 700111 HCHG RX REV CODE 636 W/ 250 OVERRIDE (IP): Performed by: EMERGENCY MEDICINE

## 2019-11-04 PROCEDURE — 96374 THER/PROPH/DIAG INJ IV PUSH: CPT

## 2019-11-04 PROCEDURE — 80053 COMPREHEN METABOLIC PANEL: CPT

## 2019-11-04 PROCEDURE — 83690 ASSAY OF LIPASE: CPT

## 2019-11-04 PROCEDURE — 700117 HCHG RX CONTRAST REV CODE 255: Performed by: EMERGENCY MEDICINE

## 2019-11-04 PROCEDURE — 85007 BL SMEAR W/DIFF WBC COUNT: CPT

## 2019-11-04 PROCEDURE — 81003 URINALYSIS AUTO W/O SCOPE: CPT

## 2019-11-04 PROCEDURE — 83605 ASSAY OF LACTIC ACID: CPT | Mod: 91

## 2019-11-04 RX ORDER — METOCLOPRAMIDE HYDROCHLORIDE 5 MG/ML
10 INJECTION INTRAMUSCULAR; INTRAVENOUS ONCE
Status: COMPLETED | OUTPATIENT
Start: 2019-11-04 | End: 2019-11-04

## 2019-11-04 RX ORDER — DICYCLOMINE HCL 20 MG
20 TABLET ORAL 4 TIMES DAILY PRN
Qty: 20 TAB | Refills: 0 | Status: SHIPPED
Start: 2019-11-04 | End: 2020-03-13

## 2019-11-04 RX ORDER — HYDROCORTISONE ACETATE 25 MG/1
25 SUPPOSITORY RECTAL EVERY 12 HOURS PRN
Qty: 12 SUPPOSITORY | Refills: 0 | Status: SHIPPED | OUTPATIENT
Start: 2019-11-04 | End: 2020-06-17

## 2019-11-04 RX ORDER — METOCLOPRAMIDE 10 MG/1
10 TABLET ORAL 4 TIMES DAILY PRN
Qty: 20 TAB | Refills: 0 | Status: SHIPPED
Start: 2019-11-04 | End: 2020-03-13

## 2019-11-04 RX ORDER — SODIUM CHLORIDE 9 MG/ML
1000 INJECTION, SOLUTION INTRAVENOUS ONCE
Status: COMPLETED | OUTPATIENT
Start: 2019-11-04 | End: 2019-11-04

## 2019-11-04 RX ORDER — HALOPERIDOL 5 MG/ML
1 INJECTION INTRAMUSCULAR ONCE
Status: COMPLETED | OUTPATIENT
Start: 2019-11-04 | End: 2019-11-04

## 2019-11-04 RX ORDER — MORPHINE SULFATE 4 MG/ML
4 INJECTION, SOLUTION INTRAMUSCULAR; INTRAVENOUS ONCE
Status: COMPLETED | OUTPATIENT
Start: 2019-11-04 | End: 2019-11-04

## 2019-11-04 RX ORDER — DIPHENHYDRAMINE HYDROCHLORIDE 50 MG/ML
12.5 INJECTION INTRAMUSCULAR; INTRAVENOUS ONCE
Status: COMPLETED | OUTPATIENT
Start: 2019-11-04 | End: 2019-11-04

## 2019-11-04 RX ADMIN — DIPHENHYDRAMINE HYDROCHLORIDE 12.5 MG: 50 INJECTION INTRAMUSCULAR; INTRAVENOUS at 10:15

## 2019-11-04 RX ADMIN — METOCLOPRAMIDE 10 MG: 5 INJECTION, SOLUTION INTRAMUSCULAR; INTRAVENOUS at 08:42

## 2019-11-04 RX ADMIN — MORPHINE SULFATE 4 MG: 4 INJECTION INTRAVENOUS at 08:42

## 2019-11-04 RX ADMIN — SODIUM CHLORIDE 1000 ML: 9 INJECTION, SOLUTION INTRAVENOUS at 09:40

## 2019-11-04 RX ADMIN — IOHEXOL 100 ML: 350 INJECTION, SOLUTION INTRAVENOUS at 09:17

## 2019-11-04 RX ADMIN — HALOPERIDOL LACTATE 1 MG: 5 INJECTION, SOLUTION INTRAMUSCULAR at 10:15

## 2019-11-04 ASSESSMENT — ENCOUNTER SYMPTOMS
DIAPHORESIS: 1
NAUSEA: 1
ABDOMINAL PAIN: 1
BLOOD IN STOOL: 1
VOMITING: 1

## 2019-11-04 NOTE — ED TRIAGE NOTES
Chief Complaint   Patient presents with   • Abdominal Pain     hx constipation, used enema with good relief     Also c/o epigastric pain with palpation

## 2019-11-04 NOTE — ED NOTES
Pt discharged home, verbalizes understanding of discharge instruction and followup care. Pt ambulatory to exit.
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
## 2019-11-06 ENCOUNTER — OFFICE VISIT (OUTPATIENT)
Dept: MEDICAL GROUP | Facility: PHYSICIAN GROUP | Age: 29
End: 2019-11-06
Payer: COMMERCIAL

## 2019-11-06 VITALS
RESPIRATION RATE: 14 BRPM | HEIGHT: 69 IN | OXYGEN SATURATION: 97 % | WEIGHT: 268 LBS | TEMPERATURE: 97.6 F | SYSTOLIC BLOOD PRESSURE: 130 MMHG | BODY MASS INDEX: 39.69 KG/M2 | DIASTOLIC BLOOD PRESSURE: 70 MMHG | HEART RATE: 74 BPM

## 2019-11-06 DIAGNOSIS — I10 ESSENTIAL HYPERTENSION: ICD-10-CM

## 2019-11-06 DIAGNOSIS — F32.5 MAJOR DEPRESSIVE DISORDER WITH SINGLE EPISODE, IN FULL REMISSION (HCC): ICD-10-CM

## 2019-11-06 DIAGNOSIS — M54.12 CERVICAL RADICULOPATHY: ICD-10-CM

## 2019-11-06 DIAGNOSIS — M54.50 ACUTE MIDLINE LOW BACK PAIN WITHOUT SCIATICA: ICD-10-CM

## 2019-11-06 PROCEDURE — 99214 OFFICE O/P EST MOD 30 MIN: CPT | Performed by: NURSE PRACTITIONER

## 2019-11-06 RX ORDER — HYDROXYZINE 50 MG/1
50 TABLET, FILM COATED ORAL 3 TIMES DAILY PRN
COMMUNITY
End: 2020-05-15

## 2019-11-06 RX ORDER — TIZANIDINE 4 MG/1
4 TABLET ORAL EVERY 6 HOURS PRN
COMMUNITY
End: 2020-03-13

## 2019-11-06 NOTE — ASSESSMENT & PLAN NOTE
Patient reports elevated blood pressure readings when she is having increased pain.  Taking amlodipine 5 mg daily.  The patient denies chest pain, shortness of breath, headache, vision changes, epistaxis, or dyspnea on exertion.  She will monitor blood pressure at home.  Will increase amlodipine to 10 mg if blood pressures are consistently greater than 140/90.

## 2019-11-06 NOTE — PROGRESS NOTES
CC: Hypertension, paperwork to return to work    HISTORY OF THE PRESENT ILLNESS: Patient is a 29 y.o. female. This pleasant patient is here today for evaluation and management of the following health problems.      Essential hypertension  Patient reports elevated blood pressure readings when she is having increased pain.  Taking amlodipine 5 mg daily.  The patient denies chest pain, shortness of breath, headache, vision changes, epistaxis, or dyspnea on exertion.  She will monitor blood pressure at home.  Will increase amlodipine to 10 mg if blood pressures are consistently greater than 140/90.    Cervical radiculopathy  Patient reports that neck pain has improved, though still present.  Physical therapy has helped.  Managed by [facility].  Received trigger point injections, which severely aggravated her pain, which has now gone back to baseline.      Patient also continues to have lower back pain.    Does report some cognitive changes, such as putting diesel fuel in her gas tank, though it is not a diesel engine.    Dr. Colbert at [facility] has ordered an MRI of her lower back and brain.  She is to have these done later today.    Patient feels well enough to return to work.  She is able to lift her arms above her head and turn her head side to side.  Still has some limitation with neck flexion and extension.  Back pain slowly improving.  Will allow patient to return to work, paperwork completed.  She will return to work without limitations, but will need intermittent time off for appointments and flareups of pain.    Major depressive disorder with single episode, in full remission (HCC)  Chronic health problem, well-controlled with medication and lifestyle measures.  Taking venlafaxine XR 75 mg daily.  Last appointment patient was expressing periods of anxiety.  Hydroxyzine was prescribed.  She reports hydroxyzine has helped a great deal with anxiety episodes.  Tolerating medication, denies adverse  effects.    Patient is eager to start working again.  Feels like she needs a purpose during the day.      Allergies: Patient has no known allergies.    Current Outpatient Medications Ordered in Epic   Medication Sig Dispense Refill   • tizanidine (ZANAFLEX) 4 MG Tab Take 4 mg by mouth every 6 hours as needed.     • hydrOXYzine HCl (ATARAX) 50 MG Tab Take 50 mg by mouth 3 times a day as needed for Itching.     • metoclopramide (REGLAN) 10 MG Tab Take 1 Tab by mouth 4 times a day as needed (prn nausea). 20 Tab 0   • dicyclomine (BENTYL) 20 MG Tab Take 1 Tab by mouth 4 times a day as needed (abdominal pain). 20 Tab 0   • hydrocortisone (ANUSOL-HC) 25 MG Suppos Insert 1 Suppository in rectum every 12 hours as needed (prn pain or itching). 12 Suppository 0   • tramadol (ULTRAM) 50 MG Tab Take 1 Tab by mouth every four hours as needed for up to 7 days. 40 Tab 0   • amLODIPine (NORVASC) 10 MG Tab Take 10 mg by mouth every day.     • levothyroxine (SYNTHROID) 200 MCG Tab Take 200 mcg by mouth Every morning on an empty stomach.     • naproxen (NAPROSYN) 500 MG Tab Take 500 mg by mouth 2 times a day as needed (Pain).     • promethazine (PHENERGAN) 25 MG Suppos Insert 25 mg in rectum every 6 hours as needed for Nausea/Vomiting.     • venlafaxine XR (EFFEXOR XR) 75 MG CAPSULE SR 24 HR Take 75 mg by mouth every day.     • MIRENA, 52 MG, 20 MCG/24HR IUD 1 Each by Intrauterine route Once.       No current Epic-ordered facility-administered medications on file.        Past Medical History:   Diagnosis Date   • Cancer (HCC) 2008    pre-cancerous cervical cells--froze the cervix   • EEG abnormal    • Hashimoto's disease    • Hyperglycemia    • Hypertension    • Hypoglycemia    • Migraine    • Pseudotumor cerebri    • Seizure disorder (HCC)        Past Surgical History:   Procedure Laterality Date   • GASTROSCOPY N/A 12/26/2018    Procedure: GASTROSCOPY;  Surgeon: Kleber Ivory M.D.;  Location: SURGERY [facility];  Service:  "Gastroenterology   • GASTROSCOPY WITH BIOPSY N/A 2018    Procedure: GASTROSCOPY WITH BIOPSY - GASTRIC;  Surgeon: Kleber Ivory M.D.;  Location: SURGERY [facility];  Service: Gastroenterology   • EGD W/ENDOSCOPIC ULTRASOUND N/A 2018    Procedure: EGD W/ENDOSCOPIC ULTRASOUND - UPPER, CURVILINEAR;  Surgeon: Kleber Ivory M.D.;  Location: SURGERY [facility];  Service: Gastroenterology   • EGD WITH ASP/BX N/A 2018    Procedure: EGD WITH ASP/BX - GUIDED FNA PANCREATIC HEAD AND/OR TAIL LESION;  Surgeon: Kleber Ivory M.D.;  Location: SURGERY [facility];  Service: Gastroenterology   • GYN SURGERY      precancerous cells with cervical freezing, HPV positive       Social History     Tobacco Use   • Smoking status: Former Smoker     Start date: 2005     Last attempt to quit: 5/3/2012     Years since quittin.5   • Smokeless tobacco: Never Used   • Tobacco comment: 1/2 pack a day   Substance Use Topics   • Alcohol use: Yes     Alcohol/week: 0.6 oz     Types: 1 Glasses of wine per week     Comment: socially, 1 glass of wine per week   • Drug use: No     Comment: THC       Family History   Problem Relation Age of Onset   • Cancer Mother         Cervical       ROS:   As in HPI, otherwise negative for chest pain, dyspnea, abdominal pain, dysuria, blood in stool, fever          Exam: /70 (BP Location: Left arm, Patient Position: Sitting, BP Cuff Size: Adult)   Pulse 74   Temp 36.4 °C (97.6 °F)   Resp 14   Ht 1.753 m (5' 9\")   Wt 121.6 kg (268 lb)   SpO2 97%  Body mass index is 39.58 kg/m².    General: Alert, pleasant, well nourished, well developed female in NAD  Neck: Supple without bruit. Thyroid is not enlarged.  Pulmonary: Clear to ausculation.  Normal effort. No rales, ronchi, or wheezing.  Cardiovascular: Normal rate and rhythm without murmur. Carotid and radial pulses are intact and equal bilaterally.  No lower extremity edema.  Neurologic: Grossly nonfocal  Skin: " Warm and dry.   Musculoskeletal: Normal gait.  Cervical spine with full rotation, mildly limited flexion and extension.  Low back with mild tenderness to palpation paraspinals.  Psych: Normal mood and affect. Alert and oriented. Judgment and insight is normal.    Please note that this dictation was created using voice recognition software. I have made every reasonable attempt to correct obvious errors, but I expect that there are errors of grammar and possibly content that I did not discover before finalizing the note.      Assessment/Plan  1. Essential hypertension  Patient will monitor blood pressure.  If consistently greater than 140/90, she will increase amlodipine to 10 mg a day.  ER precautions reviewed with patient.    2. Cervical radiculopathy  Neck pain improving.  Trigger point injections severely aggravated pain.  Continue follow-up with [facility], getting LIBRADO next week.  Will refer for more physical therapy sessions.    Paperwork to release patient back to work completed today.  [facility] paperwork also completed allowing patient to miss work for flares and appointments.  - REFERRAL TO PHYSICAL THERAPY Reason for Therapy: Eval/Treat/Report    3. Acute midline low back pain without sciatica    - REFERRAL TO PHYSICAL THERAPY Reason for Therapy: Eval/Treat/Report    4. Major depressive disorder with single episode, in full remission (HCC)  Continue with venlafaxine routinely and hydroxyzine as needed.    Patient will return to clinic in 1 month or sooner if needed.      Patient was seen for 30 minutes face to face of which > 50% of appointment time was spent on counseling and coordination of care regarding the above.

## 2019-11-08 NOTE — ASSESSMENT & PLAN NOTE
Chronic health problem, well-controlled with medication and lifestyle measures.  Taking venlafaxine XR 75 mg daily.  Last appointment patient was expressing periods of anxiety.  Hydroxyzine was prescribed.  She reports hydroxyzine has helped a great deal with anxiety episodes.  Tolerating medication, denies adverse effects.    Patient is eager to start working again.  Feels like she needs a purpose during the day.

## 2019-11-08 NOTE — ASSESSMENT & PLAN NOTE
Patient reports that neck pain has improved, though still present.  Physical therapy has helped.  Managed by [facility].  Received trigger point injections, which severely aggravated her pain, which has now gone back to baseline.      Patient also continues to have lower back pain.    Does report some cognitive changes, such as putting diesel fuel in her gas tank, though it is not a diesel engine.    Dr. Colbert at [facility] has ordered an MRI of her lower back and brain.  She is to have these done later today.    Patient feels well enough to return to work.  She is able to lift her arms above her head and turn her head side to side.  Still has some limitation with neck flexion and extension.  Back pain slowly improving.  Will allow patient to return to work, paperwork completed.  She will return to work without limitations, but will need intermittent time off for appointments and flareups of pain.

## 2019-11-27 ENCOUNTER — HOSPITAL ENCOUNTER (EMERGENCY)
Facility: MEDICAL CENTER | Age: 29
End: 2019-11-27
Attending: EMERGENCY MEDICINE
Payer: COMMERCIAL

## 2019-11-27 VITALS
TEMPERATURE: 97.9 F | HEIGHT: 68 IN | RESPIRATION RATE: 18 BRPM | OXYGEN SATURATION: 97 % | SYSTOLIC BLOOD PRESSURE: 120 MMHG | HEART RATE: 83 BPM | WEIGHT: 271.83 LBS | BODY MASS INDEX: 41.2 KG/M2 | DIASTOLIC BLOOD PRESSURE: 85 MMHG

## 2019-11-27 DIAGNOSIS — M54.9 CHRONIC BILATERAL BACK PAIN, UNSPECIFIED BACK LOCATION: ICD-10-CM

## 2019-11-27 DIAGNOSIS — G89.29 CHRONIC BILATERAL BACK PAIN, UNSPECIFIED BACK LOCATION: ICD-10-CM

## 2019-11-27 LAB
APPEARANCE UR: CLEAR
BACTERIA #/AREA URNS HPF: ABNORMAL /HPF
BILIRUB UR QL STRIP.AUTO: NEGATIVE
COLOR UR: YELLOW
EPI CELLS #/AREA URNS HPF: ABNORMAL /HPF
GLUCOSE UR STRIP.AUTO-MCNC: NEGATIVE MG/DL
HCG UR QL: NEGATIVE
KETONES UR STRIP.AUTO-MCNC: NEGATIVE MG/DL
LEUKOCYTE ESTERASE UR QL STRIP.AUTO: ABNORMAL
MICRO URNS: ABNORMAL
NITRITE UR QL STRIP.AUTO: NEGATIVE
PH UR STRIP.AUTO: 6 [PH] (ref 5–8)
PROT UR QL STRIP: NEGATIVE MG/DL
RBC # URNS HPF: ABNORMAL /HPF
RBC UR QL AUTO: NEGATIVE
SP GR UR REFRACTOMETRY: 1.02
UROBILINOGEN UR STRIP.AUTO-MCNC: 0.2 MG/DL
WBC #/AREA URNS HPF: ABNORMAL /HPF

## 2019-11-27 PROCEDURE — 99284 EMERGENCY DEPT VISIT MOD MDM: CPT

## 2019-11-27 PROCEDURE — 96375 TX/PRO/DX INJ NEW DRUG ADDON: CPT

## 2019-11-27 PROCEDURE — 700101 HCHG RX REV CODE 250: Performed by: EMERGENCY MEDICINE

## 2019-11-27 PROCEDURE — 96374 THER/PROPH/DIAG INJ IV PUSH: CPT

## 2019-11-27 PROCEDURE — 81025 URINE PREGNANCY TEST: CPT

## 2019-11-27 PROCEDURE — 81001 URINALYSIS AUTO W/SCOPE: CPT

## 2019-11-27 PROCEDURE — 700111 HCHG RX REV CODE 636 W/ 250 OVERRIDE (IP): Performed by: EMERGENCY MEDICINE

## 2019-11-27 RX ORDER — DEXAMETHASONE SODIUM PHOSPHATE 4 MG/ML
12 INJECTION, SOLUTION INTRA-ARTICULAR; INTRALESIONAL; INTRAMUSCULAR; INTRAVENOUS; SOFT TISSUE ONCE
Status: COMPLETED | OUTPATIENT
Start: 2019-11-27 | End: 2019-11-27

## 2019-11-27 RX ORDER — LIDOCAINE 50 MG/G
1 PATCH TOPICAL ONCE
Status: DISCONTINUED | OUTPATIENT
Start: 2019-11-27 | End: 2019-11-27 | Stop reason: HOSPADM

## 2019-11-27 RX ORDER — TIZANIDINE 4 MG/1
4 TABLET ORAL EVERY 6 HOURS PRN
Qty: 30 TAB | Refills: 0 | Status: SHIPPED | OUTPATIENT
Start: 2019-11-27 | End: 2020-06-24

## 2019-11-27 RX ORDER — KETOROLAC TROMETHAMINE 30 MG/ML
15 INJECTION, SOLUTION INTRAMUSCULAR; INTRAVENOUS ONCE
Status: COMPLETED | OUTPATIENT
Start: 2019-11-27 | End: 2019-11-27

## 2019-11-27 RX ORDER — LIDOCAINE 50 MG/G
1 PATCH TOPICAL EVERY 24 HOURS
Qty: 5 PATCH | Refills: 0 | Status: SHIPPED | OUTPATIENT
Start: 2019-11-27 | End: 2020-05-15

## 2019-11-27 RX ORDER — KETOROLAC TROMETHAMINE 10 MG/1
10 TABLET, FILM COATED ORAL EVERY 4 HOURS PRN
Qty: 15 TAB | Refills: 0 | Status: SHIPPED | OUTPATIENT
Start: 2019-11-27 | End: 2020-05-15

## 2019-11-27 RX ORDER — MORPHINE SULFATE 4 MG/ML
4 INJECTION, SOLUTION INTRAMUSCULAR; INTRAVENOUS ONCE
Status: COMPLETED | OUTPATIENT
Start: 2019-11-27 | End: 2019-11-27

## 2019-11-27 RX ADMIN — LIDOCAINE 1 PATCH: 50 PATCH TOPICAL at 08:59

## 2019-11-27 RX ADMIN — KETAMINE HYDROCHLORIDE 25 MG: 10 INJECTION, SOLUTION INTRAMUSCULAR; INTRAVENOUS at 10:36

## 2019-11-27 RX ADMIN — KETOROLAC TROMETHAMINE 15 MG: 30 INJECTION, SOLUTION INTRAMUSCULAR at 07:04

## 2019-11-27 RX ADMIN — DEXAMETHASONE SODIUM PHOSPHATE 12 MG: 4 INJECTION, SOLUTION INTRA-ARTICULAR; INTRALESIONAL; INTRAMUSCULAR; INTRAVENOUS; SOFT TISSUE at 06:47

## 2019-11-27 RX ADMIN — MORPHINE SULFATE 4 MG: 4 INJECTION INTRAVENOUS at 08:12

## 2019-11-27 ASSESSMENT — PAIN DESCRIPTION - DESCRIPTORS: DESCRIPTORS: SHARP

## 2019-11-27 ASSESSMENT — LIFESTYLE VARIABLES: DO YOU DRINK ALCOHOL: NO
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
## 2019-11-27 NOTE — ED NOTES
"Agree with triage assessment, no changes noted. Pt reports \"I have bone spurs and they are going to an MRI at [facility] to try and avoid surgery. I haven't slept for two days and its a problem.\" Pt reports pain of 8 out of 10. Pt hooked to monitor. Pt denies any further needs at this time. Call light within reach. Bed in low locked position. Comfort measures provided.     "
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
## 2019-12-10 ENCOUNTER — HOSPITAL ENCOUNTER (EMERGENCY)
Facility: MEDICAL CENTER | Age: 29
End: 2019-12-11
Attending: EMERGENCY MEDICINE
Payer: COMMERCIAL

## 2019-12-10 ENCOUNTER — APPOINTMENT (OUTPATIENT)
Dept: RADIOLOGY | Facility: MEDICAL CENTER | Age: 29
End: 2019-12-10
Attending: EMERGENCY MEDICINE
Payer: COMMERCIAL

## 2019-12-10 DIAGNOSIS — R19.7 NAUSEA VOMITING AND DIARRHEA: ICD-10-CM

## 2019-12-10 DIAGNOSIS — R11.2 NAUSEA VOMITING AND DIARRHEA: ICD-10-CM

## 2019-12-10 DIAGNOSIS — R10.9 ABDOMINAL PAIN, UNSPECIFIED ABDOMINAL LOCATION: ICD-10-CM

## 2019-12-10 LAB
ALBUMIN SERPL BCP-MCNC: 4.5 G/DL (ref 3.2–4.9)
ALBUMIN/GLOB SERPL: 1.6 G/DL
ALP SERPL-CCNC: 63 U/L (ref 30–99)
ALT SERPL-CCNC: 16 U/L (ref 2–50)
ANION GAP SERPL CALC-SCNC: 14 MMOL/L (ref 0–11.9)
APPEARANCE UR: CLEAR
AST SERPL-CCNC: 15 U/L (ref 12–45)
BASOPHILS # BLD AUTO: 0.3 % (ref 0–1.8)
BASOPHILS # BLD: 0.03 K/UL (ref 0–0.12)
BILIRUB SERPL-MCNC: 0.4 MG/DL (ref 0.1–1.5)
BILIRUB UR QL STRIP.AUTO: NEGATIVE
BUN SERPL-MCNC: 8 MG/DL (ref 8–22)
CALCIUM SERPL-MCNC: 9.3 MG/DL (ref 8.4–10.2)
CHLORIDE SERPL-SCNC: 104 MMOL/L (ref 96–112)
CO2 SERPL-SCNC: 19 MMOL/L (ref 20–33)
COLOR UR: YELLOW
CREAT SERPL-MCNC: 0.75 MG/DL (ref 0.5–1.4)
EOSINOPHIL # BLD AUTO: 0.21 K/UL (ref 0–0.51)
EOSINOPHIL NFR BLD: 2.2 % (ref 0–6.9)
ERYTHROCYTE [DISTWIDTH] IN BLOOD BY AUTOMATED COUNT: 40.8 FL (ref 35.9–50)
GLOBULIN SER CALC-MCNC: 2.9 G/DL (ref 1.9–3.5)
GLUCOSE SERPL-MCNC: 87 MG/DL (ref 65–99)
GLUCOSE UR STRIP.AUTO-MCNC: NEGATIVE MG/DL
HCG SERPL QL: NEGATIVE
HCT VFR BLD AUTO: 45.6 % (ref 37–47)
HGB BLD-MCNC: 15.5 G/DL (ref 12–16)
IMM GRANULOCYTES # BLD AUTO: 0.07 K/UL (ref 0–0.11)
IMM GRANULOCYTES NFR BLD AUTO: 0.7 % (ref 0–0.9)
KETONES UR STRIP.AUTO-MCNC: 15 MG/DL
LEUKOCYTE ESTERASE UR QL STRIP.AUTO: NEGATIVE
LIPASE SERPL-CCNC: 17 U/L (ref 7–58)
LYMPHOCYTES # BLD AUTO: 1.47 K/UL (ref 1–4.8)
LYMPHOCYTES NFR BLD: 15.1 % (ref 22–41)
MCH RBC QN AUTO: 29 PG (ref 27–33)
MCHC RBC AUTO-ENTMCNC: 34 G/DL (ref 33.6–35)
MCV RBC AUTO: 85.4 FL (ref 81.4–97.8)
MICRO URNS: ABNORMAL
MONOCYTES # BLD AUTO: 0.49 K/UL (ref 0–0.85)
MONOCYTES NFR BLD AUTO: 5 % (ref 0–13.4)
NEUTROPHILS # BLD AUTO: 7.48 K/UL (ref 2–7.15)
NEUTROPHILS NFR BLD: 76.7 % (ref 44–72)
NITRITE UR QL STRIP.AUTO: NEGATIVE
NRBC # BLD AUTO: 0 K/UL
NRBC BLD-RTO: 0 /100 WBC
PH UR STRIP.AUTO: 6 [PH] (ref 5–8)
PLATELET # BLD AUTO: 279 K/UL (ref 164–446)
PMV BLD AUTO: 9.6 FL (ref 9–12.9)
POTASSIUM SERPL-SCNC: 3.6 MMOL/L (ref 3.6–5.5)
PROT SERPL-MCNC: 7.4 G/DL (ref 6–8.2)
PROT UR QL STRIP: NEGATIVE MG/DL
RBC # BLD AUTO: 5.34 M/UL (ref 4.2–5.4)
RBC UR QL AUTO: NEGATIVE
SODIUM SERPL-SCNC: 137 MMOL/L (ref 135–145)
SP GR UR STRIP.AUTO: 1.01
WBC # BLD AUTO: 9.8 K/UL (ref 4.8–10.8)

## 2019-12-10 PROCEDURE — 83690 ASSAY OF LIPASE: CPT

## 2019-12-10 PROCEDURE — 700105 HCHG RX REV CODE 258: Performed by: EMERGENCY MEDICINE

## 2019-12-10 PROCEDURE — 99285 EMERGENCY DEPT VISIT HI MDM: CPT

## 2019-12-10 PROCEDURE — 85025 COMPLETE CBC W/AUTO DIFF WBC: CPT

## 2019-12-10 PROCEDURE — 96375 TX/PRO/DX INJ NEW DRUG ADDON: CPT

## 2019-12-10 PROCEDURE — 700111 HCHG RX REV CODE 636 W/ 250 OVERRIDE (IP): Performed by: EMERGENCY MEDICINE

## 2019-12-10 PROCEDURE — 84703 CHORIONIC GONADOTROPIN ASSAY: CPT

## 2019-12-10 PROCEDURE — 81003 URINALYSIS AUTO W/O SCOPE: CPT

## 2019-12-10 PROCEDURE — 80053 COMPREHEN METABOLIC PANEL: CPT

## 2019-12-10 PROCEDURE — 96374 THER/PROPH/DIAG INJ IV PUSH: CPT

## 2019-12-10 RX ORDER — LORAZEPAM 2 MG/ML
0.5 INJECTION INTRAMUSCULAR ONCE
Status: COMPLETED | OUTPATIENT
Start: 2019-12-10 | End: 2019-12-10

## 2019-12-10 RX ORDER — ONDANSETRON 2 MG/ML
4 INJECTION INTRAMUSCULAR; INTRAVENOUS ONCE
Status: COMPLETED | OUTPATIENT
Start: 2019-12-10 | End: 2019-12-10

## 2019-12-10 RX ORDER — SODIUM CHLORIDE 9 MG/ML
1000 INJECTION, SOLUTION INTRAVENOUS ONCE
Status: COMPLETED | OUTPATIENT
Start: 2019-12-10 | End: 2019-12-10

## 2019-12-10 RX ADMIN — ONDANSETRON 4 MG: 2 INJECTION INTRAMUSCULAR; INTRAVENOUS at 21:54

## 2019-12-10 RX ADMIN — SODIUM CHLORIDE 1000 ML: 9 INJECTION, SOLUTION INTRAVENOUS at 21:55

## 2019-12-10 RX ADMIN — FAMOTIDINE 20 MG: 10 INJECTION INTRAVENOUS at 21:54

## 2019-12-10 RX ADMIN — LORAZEPAM 0.5 MG: 2 INJECTION INTRAMUSCULAR; INTRAVENOUS at 21:55

## 2019-12-10 ASSESSMENT — PAIN SCALES - WONG BAKER: WONGBAKER_NUMERICALRESPONSE: HURTS A WHOLE LOT

## 2019-12-11 VITALS
WEIGHT: 266.76 LBS | HEIGHT: 69 IN | RESPIRATION RATE: 14 BRPM | TEMPERATURE: 97.4 F | OXYGEN SATURATION: 99 % | SYSTOLIC BLOOD PRESSURE: 146 MMHG | HEART RATE: 62 BPM | DIASTOLIC BLOOD PRESSURE: 84 MMHG | BODY MASS INDEX: 39.51 KG/M2

## 2019-12-11 PROCEDURE — A9270 NON-COVERED ITEM OR SERVICE: HCPCS | Performed by: EMERGENCY MEDICINE

## 2019-12-11 PROCEDURE — 700102 HCHG RX REV CODE 250 W/ 637 OVERRIDE(OP): Performed by: EMERGENCY MEDICINE

## 2019-12-11 PROCEDURE — 76705 ECHO EXAM OF ABDOMEN: CPT

## 2019-12-11 RX ORDER — PROMETHAZINE HYDROCHLORIDE 25 MG/1
25 TABLET ORAL EVERY 8 HOURS PRN
Qty: 9 TAB | Refills: 0 | Status: SHIPPED | OUTPATIENT
Start: 2019-12-11 | End: 2019-12-14

## 2019-12-11 RX ORDER — HYDROXYZINE HYDROCHLORIDE 25 MG/1
25 TABLET, FILM COATED ORAL 3 TIMES DAILY PRN
Qty: 15 TAB | Refills: 0 | Status: SHIPPED | OUTPATIENT
Start: 2019-12-11 | End: 2019-12-16

## 2019-12-11 RX ORDER — HYDROCODONE BITARTRATE AND ACETAMINOPHEN 5; 325 MG/1; MG/1
1 TABLET ORAL ONCE
Status: COMPLETED | OUTPATIENT
Start: 2019-12-11 | End: 2019-12-11

## 2019-12-11 RX ADMIN — HYDROCODONE BITARTRATE AND ACETAMINOPHEN 1 TABLET: 5; 325 TABLET ORAL at 00:24

## 2019-12-11 NOTE — DISCHARGE INSTRUCTIONS
Return to the ER for any recurrent vomiting, worsening diarrhea, blood in vomit, blood in stool, worsening abdominal pain, changing abdominal pain, fevers over 100.4, shaking chills, cough, coughing of rust colored phlegm or blood, worsening or changing head/neck pain, or for any concerns.    Use ice on the back of your neck to help with your discomfort.    Follow-up with your neurosurgeon and your primary care physician within the next 1 to 2 days.  Also follow-up with your gastroenterologist as scheduled in February.  Please call tomorrow to see if there is any chance they can move your appointment up.

## 2019-12-11 NOTE — ED NOTES
Discharge instructions given and discussed. RX for atarax and phenergan given and pt educated. Pt educated to come back to ER for new or worsening symptoms and follow up with PCP as instructed. Pt verbalized understanding. VSS. Pt  Discharged in stable condition.

## 2019-12-11 NOTE — ED PROVIDER NOTES
ED Provider Note  CHIEF COMPLAINT  Chief Complaint   Patient presents with   • Nausea/Vomiting/Diarrhea     started a day and half ago. no blood in stool or vomit.        HPI  Nickolas Galdamez is a 29 y.o. female who presents to the ER with complaint of nausea, vomiting and diarrhea which began 2 days ago.  Patient has history of nausea and vomiting.  She has history of chronic diarrhea.  She also has history of chronic posterior head and neck pain which is been going on for the last 2 months.  She is been under the care of a neurosurgeon for her chronic head and neck pain.  She states that when her chronic head neck pain gets worse, she starts to have nausea and then she starts to vomit.  As a result of her vomiting she is been unable to keep down her tramadol, which then further compounds her head and neck pain.  With respect to the diarrhea, that is been going on a few days, although she reports that she has issues with chronic diarrhea.  The stool she has been outputting has been loose and nonbloody.  She says she is had about 12 episodes of diarrhea in the last several days.  She has had about 8-10 episodes of nonbilious and nonbloody emesis in the last several days.  She describes abdominal pain but states it only occurs right before and after vomiting.  Between episodes of vomiting she does not have any abdominal pain.  Patient has been taking ODT Zofran without any improvement.  She tried a Phenergan suppository but she immediately had a diarrheal bowel movement and pooped it out.  No fevers or chills.  No cough, runny nose or sore throat.  No urinary symptoms.  The patient is scheduled to follow-up with gastroenterology in February for her chronic diarrhea.  With respect to her chronic neck and headache which she has had for several months, she is followed by neurosurgery.  She is had multiple imaging studies including MRI of her brain and neck.  She gets trigger point injections.  She states  "that if they cannot control her pain she will likely need \"another surgery to break up the scar tissue.\"    REVIEW OF SYSTEMS  See HPI for further details.  Chronic posterior head and neck pain, nausea, vomiting, diarrhea, abdominal pain.  Negative for runny nose, cough, sore throat, fevers, chills, dysuria, hematuria, frequency urgency of urination.  All other systems are negative.    PAST MEDICAL HISTORY  Past Medical History:   Diagnosis Date   • Cancer (HCC)     pre-cancerous cervical cells--froze the cervix   • EEG abnormal    • Hashimoto's disease    • Hyperglycemia    • Hypertension    • Hypoglycemia    • Migraine    • Pseudotumor cerebri    • Seizure disorder (HCC)        FAMILY HISTORY  Family History   Problem Relation Age of Onset   • Cancer Mother         Cervical       SOCIAL HISTORY  Social History     Socioeconomic History   • Marital status: Single     Spouse name: Not on file   • Number of children: Not on file   • Years of education: Not on file   • Highest education level: Not on file   Occupational History   • Not on file   Social Needs   • Financial resource strain: Not on file   • Food insecurity:     Worry: Not on file     Inability: Not on file   • Transportation needs:     Medical: Not on file     Non-medical: Not on file   Tobacco Use   • Smoking status: Former Smoker     Packs/day: 0.00     Start date: 2005     Last attempt to quit: 5/3/2012     Years since quittin.6   • Smokeless tobacco: Never Used   • Tobacco comment: 1/2 pack a day   Substance and Sexual Activity   • Alcohol use: Yes     Alcohol/week: 0.6 oz     Types: 1 Glasses of wine per week     Comment: socially, 1 glass of wine per week   • Drug use: No     Comment:     • Sexual activity: Never   Lifestyle   • Physical activity:     Days per week: Not on file     Minutes per session: Not on file   • Stress: Not on file   Relationships   • Social connections:     Talks on phone: Not on file     Gets together: Not on " "file     Attends Synagogue service: Not on file     Active member of club or organization: Not on file     Attends meetings of clubs or organizations: Not on file     Relationship status: Not on file   • Intimate partner violence:     Fear of current or ex partner: Not on file     Emotionally abused: Not on file     Physically abused: Not on file     Forced sexual activity: Not on file   Other Topics Concern   • Not on file   Social History Narrative    ** Merged History Encounter **            SURGICAL HISTORY  Past Surgical History:   Procedure Laterality Date   • GASTROSCOPY N/A 12/26/2018    Procedure: GASTROSCOPY;  Surgeon: Kleber Ivory M.D.;  Location: [facility];  Service: Gastroenterology   • GASTROSCOPY WITH BIOPSY N/A 12/26/2018    Procedure: GASTROSCOPY WITH BIOPSY - GASTRIC;  Surgeon: Kleber Ivory M.D.;  Location: [facility];  Service: Gastroenterology   • EGD W/ENDOSCOPIC ULTRASOUND N/A 12/26/2018    Procedure: EGD W/ENDOSCOPIC ULTRASOUND - UPPER, CURVILINEAR;  Surgeon: Kleber Ivory M.D.;  Location: [facility];  Service: Gastroenterology   • EGD WITH ASP/BX N/A 12/26/2018    Procedure: EGD WITH ASP/BX - GUIDED FNA PANCREATIC HEAD AND/OR TAIL LESION;  Surgeon: Kleber Ivory M.D.;  Location: [facility];  Service: Gastroenterology   • GYN SURGERY      precancerous cells with cervical freezing, HPV positive       CURRENT MEDICATIONS  Home Medications    **Home medications have not yet been reviewed for this encounter**         ALLERGIES  No Known Allergies    PHYSICAL EXAM  VITAL SIGNS: /86   Pulse 63   Temp 36.8 °C (98.3 °F) (Temporal)   Resp 16   Ht 1.753 m (5' 9\")   Wt 121 kg (266 lb 12.1 oz)   SpO2 97%   BMI 39.39 kg/m²     Constitutional: Well developed, well nourished; obese, anxious, moderate distress due to chronic pain and nausea  HENT: Normocephalic, atraumatic; Bilateral external ears normal; Oropharynx with slightly " dry mucous membranes; No erythema or exudates in the posterior oropharynx.   Eyes: PERRL, EOMI, Conjunctiva normal. No discharge.   Neck:  Supple, nontender midline; No stridor; No nuchal rigidity.  Moves her neck with full range of motion without any difficulty or limitation  Lymphatic: No cervical lymphadenopathy noted.   Cardiovascular: Regular rate and rhythm without murmurs, rubs, or gallop.   Thorax & Lungs: No respiratory distress, breath sounds clear to auscultation bilaterally without wheezing, rales or rhonchi. Nontender chest wall. No crepitus or subcutaneous air  Abdomen: Soft, morbidly obese, tender in the midepigastrium and right upper quadrant bowel sounds normal. No obvious masses; No pulsatile masses; no rebound, guarding, or peritoneal signs.   Skin: Good color; warm and dry without rash or petechia.  Back: Nontender, No CVA tenderness.   Extremities: Distal dorsalis pedis, posterior tibial pulses are equal bilaterally; No edema; Nontender calves or saphenous, No cyanosis, No clubbing.   Musculoskeletal: Good range of motion in all major joints. No tenderness to palpation or major deformities noted.   Neurologic: Alert & oriented x 4, clear speech      RADIOLOGY/PROCEDURES  US-RUQ   Final Result      1.  No evidence of gallstone or evidence of biliary ductal dilatation.      2.  The liver is echogenic consistent with fatty change versus hepatocellular dysfunction.          COURSE & MEDICAL DECISION MAKING  Pertinent Labs & Imaging studies reviewed. (See chart for details)    Patient's old records were reviewed.  She has presented multiple times for complaint of chronic neck and posterior head pain.  She is also been seen multiple times in the past for abdominal pain with associated nausea and vomiting.  She has had 4 CT scans of the abdomen within the last 1 year, all of which have been negative.    Results for orders placed or performed during the hospital encounter of 12/10/19   CBC WITH  DIFFERENTIAL   Result Value Ref Range    WBC 9.8 4.8 - 10.8 K/uL    RBC 5.34 4.20 - 5.40 M/uL    Hemoglobin 15.5 12.0 - 16.0 g/dL    Hematocrit 45.6 37.0 - 47.0 %    MCV 85.4 81.4 - 97.8 fL    MCH 29.0 27.0 - 33.0 pg    MCHC 34.0 33.6 - 35.0 g/dL    RDW 40.8 35.9 - 50.0 fL    Platelet Count 279 164 - 446 K/uL    MPV 9.6 9.0 - 12.9 fL    Neutrophils-Polys 76.70 (H) 44.00 - 72.00 %    Lymphocytes 15.10 (L) 22.00 - 41.00 %    Monocytes 5.00 0.00 - 13.40 %    Eosinophils 2.20 0.00 - 6.90 %    Basophils 0.30 0.00 - 1.80 %    Immature Granulocytes 0.70 0.00 - 0.90 %    Nucleated RBC 0.00 /100 WBC    Neutrophils (Absolute) 7.48 (H) 2.00 - 7.15 K/uL    Lymphs (Absolute) 1.47 1.00 - 4.80 K/uL    Monos (Absolute) 0.49 0.00 - 0.85 K/uL    Eos (Absolute) 0.21 0.00 - 0.51 K/uL    Baso (Absolute) 0.03 0.00 - 0.12 K/uL    Immature Granulocytes (abs) 0.07 0.00 - 0.11 K/uL    NRBC (Absolute) 0.00 K/uL   COMP METABOLIC PANEL   Result Value Ref Range    Sodium 137 135 - 145 mmol/L    Potassium 3.6 3.6 - 5.5 mmol/L    Chloride 104 96 - 112 mmol/L    Co2 19 (L) 20 - 33 mmol/L    Anion Gap 14.0 (H) 0.0 - 11.9    Glucose 87 65 - 99 mg/dL    Bun 8 8 - 22 mg/dL    Creatinine 0.75 0.50 - 1.40 mg/dL    Calcium 9.3 8.4 - 10.2 mg/dL    AST(SGOT) 15 12 - 45 U/L    ALT(SGPT) 16 2 - 50 U/L    Alkaline Phosphatase 63 30 - 99 U/L    Total Bilirubin 0.4 0.1 - 1.5 mg/dL    Albumin 4.5 3.2 - 4.9 g/dL    Total Protein 7.4 6.0 - 8.2 g/dL    Globulin 2.9 1.9 - 3.5 g/dL    A-G Ratio 1.6 g/dL   LIPASE   Result Value Ref Range    Lipase 17 7 - 58 U/L   URINALYSIS (UA)   Result Value Ref Range    Color Yellow     Character Clear     Specific Gravity 1.015 <1.035    Ph 6.0 5.0 - 8.0    Glucose Negative Negative mg/dL    Ketones 15 (A) Negative mg/dL    Protein Negative Negative mg/dL    Bilirubin Negative Negative    Nitrite Negative Negative    Leukocyte Esterase Negative Negative    Occult Blood Negative Negative    Micro Urine Req see below    ESTIMATED GFR    Result Value Ref Range    GFR If African American >60 >60 mL/min/1.73 m 2    GFR If Non African American >60 >60 mL/min/1.73 m 2   HCG QUAL SERUM   Result Value Ref Range    Beta-Hcg Qualitative Serum Negative Negative         Patient presents to the ER with complaint of nausea, vomiting, diarrhea and abdominal pain for the last 2 days.  She also complains of chronic posterior head and neck pain which is unchanged.  She states that when her posterior head and neck pain gets worse, it makes her nauseated.  She then starts to vomit.  As a result of the vomiting she is unable to keep down her tramadol which she takes for her chronic head and neck pain.  As a result the pain gets worse and then her vomiting gets worse.  It becomes a special cycle. She is vomited multiple times in the last several days.  Is been nonbilious and nonbloody.  The abdominal pain she is describing occurs before and after vomiting.  Between episodes of vomiting she has no pain.  She is morbidly obese.  She has some mild tenderness in the midepigastric and right upper quadrant.  She also complains of diarrhea over the last couple days.  She has a history of chronic diarrhea.  This is really nothing particularly new or different for her.  She is scheduled to see GI in February to address her diarrheal issues.  With respect to her nausea and vomiting, this happens fairly often as well.  She has home Zofran and Phenergan which she says has not been working lately.  The patient has had 4 normal CT scans abdomen pelvis in the last 1 year alone for similar complaints.  With respect to her chronic neck and head pain, there is nothing new or different in that respect.  This is being followed by neurosurgery.  She has trigger point injections.  She says that she is not here for her posterior head or neck pain today.  She is here for nausea, vomiting and diarrhea and for the fact that she cannot keep her pain medicine down.  Patient is very anxious upon  arrival.  She is demanding to see a doctor immediately.  She is crying.  She seemed rather anxious to me.  I gave her 1/2 mg Ativan and a little bit of nausea medicine and then she slept comfortably.  Since she was tender in the midepigastrium and right upper quadrant so I ordered a right upper quadrant ultrasound.  That is negative.  No evidence for gallbladder pathology at this time.  After the patient woke up (towards the end of her ED stay), she complained of some pain in her posterior head and neck.  I gave her a Norco.  She is feeling much better.  She is no longer vomiting.  I will send her home with some p.o. Phenergan and some Vistaril.  She has Zofran at home.  At this time I think patient is safe and stable for outpatient management discharge home.  Her vital signs are normal and stable.  Her labs are unremarkable.  Urine is clear.  I do not think she needs a CT scan of her abdomen as the last 4 CT scans of her abdomen and pelvis have been negative and I do not suspect appendicitis, colitis, obstruction or perforation.  She has been given strict return precautions and discharge instructions.  She understands if she gets worse in any way she must return to the ER immediately.      Patient we discharged home with prescription for Phenergan 25 mg p.o. #9 and Atarax 25 mg p.o. 3 times daily x5 days #15.    FINAL IMPRESSION  1. Nausea vomiting and diarrhea Acute    2. Abdominal pain, unspecified abdominal location Acute         This dictation has been created using voice recognition software. The accuracy of the dictation is limited by the abilities of the software. I expect there may be some errors of grammar and possibly content. I made every attempt to manually correct the errors within my dictation. However, errors related to voice recognition software may still exist and should be interpreted within the appropriate context.    Electronically signed by: Zoe Dubois, 12/10/2019 9:06 PM

## 2019-12-11 NOTE — ED NOTES
Pt is alert and oritne.d pt is cryinf and complsinof throbbing pain on right, back, lower head. Pt states

## 2019-12-11 NOTE — ED NOTES
Pt asking how much time until ERP sees her. Pt was made aware ERP knows she is here and will see her as soon as possible. Pt states, she continues to have pain and nausea, no changes since arrival. Dr. martinez aware. Will continue to monitor. VSS.

## 2019-12-11 NOTE — ED NOTES
Rounded on pt, pt continues to complain of headache. Apologized to pt for the wait and explained wait times. ERP made ware.     2139 pt ambulated to the bathroom with steady gait.

## 2019-12-11 NOTE — ED TRIAGE NOTES
"Chief Complaint   Patient presents with   • Nausea/Vomiting/Diarrhea     started a day and half ago. no blood in stool or vomit.      Ht 1.753 m (5' 9\")   Wt 121 kg (266 lb 12.1 oz)   BMI 39.39 kg/m²     "

## 2020-01-02 ENCOUNTER — OFFICE VISIT (OUTPATIENT)
Dept: URGENT CARE | Facility: PHYSICIAN GROUP | Age: 30
End: 2020-01-02
Payer: COMMERCIAL

## 2020-01-02 VITALS
DIASTOLIC BLOOD PRESSURE: 74 MMHG | BODY MASS INDEX: 39.4 KG/M2 | OXYGEN SATURATION: 98 % | HEIGHT: 69 IN | TEMPERATURE: 98 F | SYSTOLIC BLOOD PRESSURE: 128 MMHG | HEART RATE: 77 BPM | WEIGHT: 266 LBS | RESPIRATION RATE: 16 BRPM

## 2020-01-02 DIAGNOSIS — R10.30 LOWER ABDOMINAL PAIN: ICD-10-CM

## 2020-01-02 DIAGNOSIS — R11.2 NAUSEA VOMITING AND DIARRHEA: ICD-10-CM

## 2020-01-02 DIAGNOSIS — R19.7 NAUSEA VOMITING AND DIARRHEA: ICD-10-CM

## 2020-01-02 DIAGNOSIS — R10.10 UPPER ABDOMINAL PAIN: ICD-10-CM

## 2020-01-02 LAB
APPEARANCE UR: CLEAR
BILIRUB UR STRIP-MCNC: NEGATIVE MG/DL
COLOR UR AUTO: YELLOW
GLUCOSE UR STRIP.AUTO-MCNC: NEGATIVE MG/DL
INT CON NEG: NEGATIVE
INT CON POS: POSITIVE
KETONES UR STRIP.AUTO-MCNC: NEGATIVE MG/DL
LEUKOCYTE ESTERASE UR QL STRIP.AUTO: NEGATIVE
NITRITE UR QL STRIP.AUTO: NEGATIVE
PH UR STRIP.AUTO: 5 [PH] (ref 5–8)
POC URINE PREGNANCY TEST: NEGATIVE
PROT UR QL STRIP: NEGATIVE MG/DL
RBC UR QL AUTO: NEGATIVE
SP GR UR STRIP.AUTO: 1
UROBILINOGEN UR STRIP-MCNC: 0.2 MG/DL

## 2020-01-02 PROCEDURE — 81002 URINALYSIS NONAUTO W/O SCOPE: CPT | Performed by: PHYSICIAN ASSISTANT

## 2020-01-02 PROCEDURE — 81025 URINE PREGNANCY TEST: CPT | Performed by: PHYSICIAN ASSISTANT

## 2020-01-02 PROCEDURE — 99214 OFFICE O/P EST MOD 30 MIN: CPT | Performed by: PHYSICIAN ASSISTANT

## 2020-01-02 RX ORDER — ONDANSETRON 4 MG/1
4 TABLET, ORALLY DISINTEGRATING ORAL EVERY 8 HOURS PRN
Qty: 20 TAB | Refills: 0 | Status: SHIPPED | OUTPATIENT
Start: 2020-01-02

## 2020-01-02 RX ORDER — ONDANSETRON 2 MG/ML
4 INJECTION INTRAMUSCULAR; INTRAVENOUS ONCE
Status: COMPLETED | OUTPATIENT
Start: 2020-01-02 | End: 2020-01-02

## 2020-01-02 RX ADMIN — ONDANSETRON 4 MG: 2 INJECTION INTRAMUSCULAR; INTRAVENOUS at 14:17

## 2020-01-02 NOTE — PROGRESS NOTES
Chief Complaint   Patient presents with   • Abdominal Pain     was sick before mojgan then it went away came back today even worse   • Vomiting     6 times today   • Diarrhea     2 times       HISTORY OF PRESENT ILLNESS: Patient is a 29 y.o. female who presents today for the following:    Patient comes in for evaluation of nausea, vomiting, and diarrhea that started about 7 to 10 days ago.  She was seen in the emergency department 12/10 for the same.  She has had somewhat chronic issues with nausea, vomiting, and diarrhea and has an appointment with GI but not until next month.  She was feeling a little bit better after her emergency department visit but started feeling worse over the last week.  She has not been able to hold anything down over the last 24 to 48 hours.  She has severe abdominal pain just prior to vomiting and diarrhea.  She denies fever, recent travel, recent antibiotics, bad food or drink, and sick contacts that she is aware of.  She has used Zofran and Phenergan in the past but is out of medication at home.    Patient Active Problem List    Diagnosis Date Noted   • Hypoglycemia 09/11/2017     Priority: High   • Hypothyroidism 01/11/2018     Priority: Low   • Vitamin D deficiency 05/04/2017     Priority: Low   • Obesity 04/14/2013     Priority: Low   • Seizure disorder (CMS-[facility]) 10/22/2012     Priority: Low   • Insomnia 10/31/2019   • Neck pain 10/30/2019   • Acute midline low back pain without sciatica 09/12/2019   • Cervical radiculopathy 09/10/2019   • Hospital discharge follow-up 07/08/2019   • Major depressive disorder with single episode, in full remission ([facility]) 06/04/2018   • PCOS (polycystic ovarian syndrome) 02/28/2018   • Hashimoto's thyroiditis 05/04/2017   • Chronic daily headache 02/28/2017   • Essential hypertension 01/31/2017   • Obesity (BMI 30-39.9) 01/31/2017   • Pseudotumor cerebri 10/15/2012       Allergies:Patient has no known allergies.    Current Outpatient Medications  Ordered in [facility]   Medication Sig Dispense Refill   • ondansetron (ZOFRAN ODT) 4 MG TABLET DISPERSIBLE Take 1 Tab by mouth every 8 hours as needed for Nausea. 20 Tab 0   • tizanidine (ZANAFLEX) 4 MG Tab Take 1 Tab by mouth every 6 hours as needed. 30 Tab 0   • ketorolac (TORADOL) 10 MG Tab Take 1 Tab by mouth every four hours as needed. 15 Tab 0   • lidocaine (LIDODERM) 5 % Patch Apply 1 Patch to skin as directed every 24 hours. 5 Patch 0   • hydrOXYzine HCl (ATARAX) 50 MG Tab Take 50 mg by mouth 3 times a day as needed for Itching.     • metoclopramide (REGLAN) 10 MG Tab Take 1 Tab by mouth 4 times a day as needed (prn nausea). 20 Tab 0   • dicyclomine (BENTYL) 20 MG Tab Take 1 Tab by mouth 4 times a day as needed (abdominal pain). 20 Tab 0   • hydrocortisone (ANUSOL-HC) 25 MG Suppos Insert 1 Suppository in rectum every 12 hours as needed (prn pain or itching). 12 Suppository 0   • amLODIPine (NORVASC) 10 MG Tab Take 10 mg by mouth every day.     • levothyroxine (SYNTHROID) 200 MCG Tab Take 200 mcg by mouth Every morning on an empty stomach.     • naproxen (NAPROSYN) 500 MG Tab Take 500 mg by mouth 2 times a day as needed (Pain).     • venlafaxine XR (EFFEXOR XR) 75 MG CAPSULE SR 24 HR Take 75 mg by mouth every day.     • MIRENA, 52 MG, 20 MCG/24HR IUD 1 Each by Intrauterine route Once.     • tizanidine (ZANAFLEX) 4 MG Tab Take 4 mg by mouth every 6 hours as needed.     • promethazine (PHENERGAN) 25 MG Suppos Insert 25 mg in rectum every 6 hours as needed for Nausea/Vomiting.       Current Facility-Administered Medications Ordered in Epic   Medication Dose Route Frequency Provider Last Rate Last Dose   • ondansetron (ZOFRAN) syringe/vial injection 4 mg  4 mg Intramuscular Once Charline Wilkins P.A.-LIZETTE.           Past Medical History:   Diagnosis Date   • Cancer (HCC) 2008    pre-cancerous cervical cells--froze the cervix   • EEG abnormal    • Hashimoto's disease    • Hyperglycemia    • Hypertension    • Hypoglycemia  "   • Migraine    • Pseudotumor cerebri    • Seizure disorder (HCC)        Social History     Tobacco Use   • Smoking status: Former Smoker     Packs/day: 0.00     Start date: 2005     Last attempt to quit: 5/3/2012     Years since quittin.6   • Smokeless tobacco: Never Used   • Tobacco comment: 1/2 pack a day   Substance Use Topics   • Alcohol use: Yes     Alcohol/week: 0.6 oz     Types: 1 Glasses of wine per week     Comment: socially, 1 glass of wine per week   • Drug use: No     Comment:         Family Status   Relation Name Status   • Mo  Alive   • Fa  Alive   • Sis  Alive   • Bro  Alive   • Sis  Alive   • Bro  Alive   • Sis  Alive   • Sis  Alive   • Bro  Alive     Family History   Problem Relation Age of Onset   • Cancer Mother         Cervical       Review of Systems:   Constitutional ROS: No unexpected change in weight, No weakness, No fatigue  Eye ROS: No recent significant change in vision, No eye pain, redness, discharge  Ear ROS: No drainage, No tinnitus or vertigo, No recent change in hearing  Mouth/Throat ROS: No teeth or gum problems, No bleeding gums, No tongue complaints  Neck ROS: No swollen glands, No significant pain in neck  Pulmonary ROS: No chronic cough, sputum, or hemoptysis, No dyspnea on exertion, No wheezing  Cardiovascular ROS: No diaphoresis, No edema, No palpitations  GI: Positive for nausea, vomiting, diarrhea.  Musculoskeletal/Extremities ROS: No peripheral edema, No pain, redness or swelling on the joints  Hematologic/Lymphatic ROS: No chills, No night sweats, No weight loss  Skin/Integumentary ROS: No edema, No evidence of rash, No itching      Exam:  /74   Pulse 77   Temp 36.7 °C (98 °F) (Temporal)   Resp 16   Ht 1.753 m (5' 9\")   Wt 120.7 kg (266 lb)   SpO2 98%   General: Well developed, well nourished. No distress.    Pulmonary: Unlabored respiratory effort. Lungs clear to auscultation, no wheezes, no rhonchi.    Cardiovascular: Regular rate and rhythm without " murmur.   Neurologic: Grossly nonfocal. No facial asymmetry noted.  Abdomen: Soft, nondistended.  Bowel sounds within normal limits.  Mild tenderness in the lower abdomen centrally and diffusely across the upper abdomen.  No guarding or rebound noted.  Lymph: No cervical lymphadenopathy noted.  Skin: Warm, dry, good turgor. No rashes in visible areas.   Psych: Normal mood. Alert and oriented to person, place and time.    Zofran 4 mg IM; patient reports feeling better within a few minutes    Results for orders placed or performed in visit on 01/02/20   POCT Urinalysis   Result Value Ref Range    POC Color yellow Negative    POC Appearance clear Negative    POC Leukocyte Esterase negative Negative    POC Nitrites negative Negative    POC Urobiligen 0.2 Negative (0.2) mg/dL    POC Protein negative Negative mg/dL    POC Urine PH 5.0 5.0 - 8.0    POC Blood negative Negative    POC Specific Gravity 1.005 <1.005 - >1.030    POC Ketones negative Negative mg/dL    POC Bilirubin negative Negative mg/dL    POC Glucose negative Negative mg/dL   POCT PREGNANCY   Result Value Ref Range    POC Urine Pregnancy Test negative Negative    Internal Control Positive Positive     Internal Control Negative Negative      Assessment/Plan:  Chronically intermittent nausea, vomiting, and diarrhea.  Following up with GI in February.  Refilling Zofran.  Discussed red flags and ER precautions.  1. Nausea vomiting and diarrhea  ondansetron (ZOFRAN ODT) 4 MG TABLET DISPERSIBLE    ondansetron (ZOFRAN) syringe/vial injection 4 mg    POCT Urinalysis    POCT PREGNANCY   2. Lower abdominal pain     3. Upper abdominal pain

## 2020-01-15 ENCOUNTER — HOSPITAL ENCOUNTER (EMERGENCY)
Facility: MEDICAL CENTER | Age: 30
End: 2020-01-15
Attending: EMERGENCY MEDICINE
Payer: COMMERCIAL

## 2020-01-15 ENCOUNTER — APPOINTMENT (OUTPATIENT)
Dept: RADIOLOGY | Facility: MEDICAL CENTER | Age: 30
End: 2020-01-15
Attending: EMERGENCY MEDICINE
Payer: COMMERCIAL

## 2020-01-15 VITALS
TEMPERATURE: 97.7 F | OXYGEN SATURATION: 97 % | RESPIRATION RATE: 16 BRPM | HEIGHT: 69 IN | DIASTOLIC BLOOD PRESSURE: 105 MMHG | SYSTOLIC BLOOD PRESSURE: 158 MMHG | HEART RATE: 72 BPM | BODY MASS INDEX: 38.92 KG/M2 | WEIGHT: 262.79 LBS

## 2020-01-15 DIAGNOSIS — M54.2 CHRONIC NECK PAIN: ICD-10-CM

## 2020-01-15 DIAGNOSIS — G89.29 CHRONIC NECK PAIN: ICD-10-CM

## 2020-01-15 DIAGNOSIS — R11.10 VOMITING AND DIARRHEA: ICD-10-CM

## 2020-01-15 DIAGNOSIS — R19.7 VOMITING AND DIARRHEA: ICD-10-CM

## 2020-01-15 DIAGNOSIS — R10.84 GENERALIZED ABDOMINAL PAIN: ICD-10-CM

## 2020-01-15 LAB
ALBUMIN SERPL BCP-MCNC: 4.5 G/DL (ref 3.2–4.9)
ALBUMIN/GLOB SERPL: 1.6 G/DL
ALP SERPL-CCNC: 61 U/L (ref 30–99)
ALT SERPL-CCNC: 13 U/L (ref 2–50)
ANION GAP SERPL CALC-SCNC: 11 MMOL/L (ref 0–11.9)
APPEARANCE UR: CLEAR
AST SERPL-CCNC: 16 U/L (ref 12–45)
BASOPHILS # BLD AUTO: 0.6 % (ref 0–1.8)
BASOPHILS # BLD: 0.05 K/UL (ref 0–0.12)
BILIRUB SERPL-MCNC: 0.4 MG/DL (ref 0.1–1.5)
BILIRUB UR QL STRIP.AUTO: NEGATIVE
BUN SERPL-MCNC: 11 MG/DL (ref 8–22)
CALCIUM SERPL-MCNC: 9.3 MG/DL (ref 8.5–10.5)
CHLORIDE SERPL-SCNC: 108 MMOL/L (ref 96–112)
CO2 SERPL-SCNC: 22 MMOL/L (ref 20–33)
COLOR UR: YELLOW
CREAT SERPL-MCNC: 0.85 MG/DL (ref 0.5–1.4)
EOSINOPHIL # BLD AUTO: 0.26 K/UL (ref 0–0.51)
EOSINOPHIL NFR BLD: 3.3 % (ref 0–6.9)
ERYTHROCYTE [DISTWIDTH] IN BLOOD BY AUTOMATED COUNT: 38.5 FL (ref 35.9–50)
GLOBULIN SER CALC-MCNC: 2.8 G/DL (ref 1.9–3.5)
GLUCOSE SERPL-MCNC: 71 MG/DL (ref 65–99)
GLUCOSE UR STRIP.AUTO-MCNC: NEGATIVE MG/DL
HCG SERPL QL: NEGATIVE
HCT VFR BLD AUTO: 46.1 % (ref 37–47)
HGB BLD-MCNC: 15.9 G/DL (ref 12–16)
IMM GRANULOCYTES # BLD AUTO: 0.05 K/UL (ref 0–0.11)
IMM GRANULOCYTES NFR BLD AUTO: 0.6 % (ref 0–0.9)
KETONES UR STRIP.AUTO-MCNC: NEGATIVE MG/DL
LEUKOCYTE ESTERASE UR QL STRIP.AUTO: NEGATIVE
LIPASE SERPL-CCNC: 7 U/L (ref 11–82)
LYMPHOCYTES # BLD AUTO: 1.61 K/UL (ref 1–4.8)
LYMPHOCYTES NFR BLD: 20.7 % (ref 22–41)
MCH RBC QN AUTO: 29.4 PG (ref 27–33)
MCHC RBC AUTO-ENTMCNC: 34.5 G/DL (ref 33.6–35)
MCV RBC AUTO: 85.4 FL (ref 81.4–97.8)
MICRO URNS: NORMAL
MONOCYTES # BLD AUTO: 0.49 K/UL (ref 0–0.85)
MONOCYTES NFR BLD AUTO: 6.3 % (ref 0–13.4)
NEUTROPHILS # BLD AUTO: 5.33 K/UL (ref 2–7.15)
NEUTROPHILS NFR BLD: 68.5 % (ref 44–72)
NITRITE UR QL STRIP.AUTO: NEGATIVE
NRBC # BLD AUTO: 0 K/UL
NRBC BLD-RTO: 0 /100 WBC
PH UR STRIP.AUTO: 5.5 [PH] (ref 5–8)
PLATELET # BLD AUTO: 288 K/UL (ref 164–446)
PMV BLD AUTO: 9.9 FL (ref 9–12.9)
POTASSIUM SERPL-SCNC: 3.9 MMOL/L (ref 3.6–5.5)
PROT SERPL-MCNC: 7.3 G/DL (ref 6–8.2)
PROT UR QL STRIP: NEGATIVE MG/DL
RBC # BLD AUTO: 5.4 M/UL (ref 4.2–5.4)
RBC UR QL AUTO: NEGATIVE
SODIUM SERPL-SCNC: 141 MMOL/L (ref 135–145)
SP GR UR STRIP.AUTO: 1.01
UROBILINOGEN UR STRIP.AUTO-MCNC: 0.2 MG/DL
WBC # BLD AUTO: 7.8 K/UL (ref 4.8–10.8)

## 2020-01-15 PROCEDURE — 99285 EMERGENCY DEPT VISIT HI MDM: CPT

## 2020-01-15 PROCEDURE — 700105 HCHG RX REV CODE 258: Performed by: EMERGENCY MEDICINE

## 2020-01-15 PROCEDURE — 85025 COMPLETE CBC W/AUTO DIFF WBC: CPT

## 2020-01-15 PROCEDURE — 84703 CHORIONIC GONADOTROPIN ASSAY: CPT

## 2020-01-15 PROCEDURE — 96374 THER/PROPH/DIAG INJ IV PUSH: CPT

## 2020-01-15 PROCEDURE — 83690 ASSAY OF LIPASE: CPT

## 2020-01-15 PROCEDURE — 96375 TX/PRO/DX INJ NEW DRUG ADDON: CPT

## 2020-01-15 PROCEDURE — 74177 CT ABD & PELVIS W/CONTRAST: CPT

## 2020-01-15 PROCEDURE — 81003 URINALYSIS AUTO W/O SCOPE: CPT

## 2020-01-15 PROCEDURE — 700117 HCHG RX CONTRAST REV CODE 255: Performed by: EMERGENCY MEDICINE

## 2020-01-15 PROCEDURE — 700111 HCHG RX REV CODE 636 W/ 250 OVERRIDE (IP): Performed by: EMERGENCY MEDICINE

## 2020-01-15 PROCEDURE — 80053 COMPREHEN METABOLIC PANEL: CPT

## 2020-01-15 RX ORDER — DIPHENHYDRAMINE HYDROCHLORIDE 50 MG/ML
25 INJECTION INTRAMUSCULAR; INTRAVENOUS ONCE
Status: COMPLETED | OUTPATIENT
Start: 2020-01-15 | End: 2020-01-15

## 2020-01-15 RX ORDER — SODIUM CHLORIDE 9 MG/ML
1000 INJECTION, SOLUTION INTRAVENOUS ONCE
Status: COMPLETED | OUTPATIENT
Start: 2020-01-15 | End: 2020-01-15

## 2020-01-15 RX ORDER — DICYCLOMINE HCL 20 MG
20 TABLET ORAL EVERY 6 HOURS PRN
Qty: 20 TAB | Refills: 0 | Status: SHIPPED | OUTPATIENT
Start: 2020-01-15 | End: 2020-03-13

## 2020-01-15 RX ORDER — HALOPERIDOL 5 MG/ML
5 INJECTION INTRAMUSCULAR ONCE
Status: COMPLETED | OUTPATIENT
Start: 2020-01-15 | End: 2020-01-15

## 2020-01-15 RX ORDER — ONDANSETRON 4 MG/1
4 TABLET, ORALLY DISINTEGRATING ORAL EVERY 4 HOURS PRN
Qty: 10 TAB | Refills: 0 | Status: SHIPPED | OUTPATIENT
Start: 2020-01-15 | End: 2020-03-13

## 2020-01-15 RX ADMIN — HALOPERIDOL LACTATE 5 MG: 5 INJECTION, SOLUTION INTRAMUSCULAR at 14:08

## 2020-01-15 RX ADMIN — IOHEXOL 100 ML: 350 INJECTION, SOLUTION INTRAVENOUS at 15:16

## 2020-01-15 RX ADMIN — SODIUM CHLORIDE 1000 ML: 9 INJECTION, SOLUTION INTRAVENOUS at 14:08

## 2020-01-15 RX ADMIN — DIPHENHYDRAMINE HYDROCHLORIDE 25 MG: 50 INJECTION INTRAMUSCULAR; INTRAVENOUS at 14:39

## 2020-01-15 NOTE — TELEPHONE ENCOUNTER
MS RN NOTE



CALLED PHARMACY AGAIN TO VERIFY MEDICATIONS. Patient called and [facility] need you to please call the Chief of Endocrinology Clinic at [place] before they will Schedule the Patient. The Doctors name is Maria Guadalupe Atkins and she would like  to go Over the Pathology report  [phone] (Maria Guadalupe Atkins.)

## 2020-01-15 NOTE — DISCHARGE INSTRUCTIONS
Follow-up with primary care this week for reevaluation, medication management and close blood pressure monitoring.  Follow-up with GI as scheduled next month for evaluation of chronic abdominal pain and diarrhea.    Continue any home medications as previously indicated, and encourage use of tizanidine as needed for breakthrough neck pain and spasm caused by vomiting.  Bentyl every 6 hours as needed for abdominal pain or cramping.  Zofran every 4-6 hours as needed for nausea or vomiting.    Encourage oral fluid hydration, advance diet as tolerated.    Return emergency department for persistent worsening abdominal pain, intractable vomiting, bloody stools, fever or other new concerns.

## 2020-01-15 NOTE — ED NOTES
addis states she has been seen for nausea and vomiting multiple times over last couple of months, she states she has an appointment with gastro physician February 17, 2020. She has been vomiting more
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
## 2020-02-28 ENCOUNTER — HOSPITAL ENCOUNTER (OUTPATIENT)
Dept: RADIOLOGY | Facility: MEDICAL CENTER | Age: 30
End: 2020-02-28
Attending: PHYSICAL MEDICINE & REHABILITATION
Payer: COMMERCIAL

## 2020-02-28 DIAGNOSIS — M47.896 OTHER SPONDYLOSIS, LUMBAR REGION: ICD-10-CM

## 2020-02-28 PROCEDURE — 72148 MRI LUMBAR SPINE W/O DYE: CPT

## 2020-03-09 ENCOUNTER — HOSPITAL ENCOUNTER (EMERGENCY)
Facility: MEDICAL CENTER | Age: 30
End: 2020-03-09
Attending: EMERGENCY MEDICINE
Payer: COMMERCIAL

## 2020-03-09 VITALS
WEIGHT: 276.9 LBS | HEIGHT: 70 IN | OXYGEN SATURATION: 99 % | HEART RATE: 75 BPM | SYSTOLIC BLOOD PRESSURE: 143 MMHG | RESPIRATION RATE: 18 BRPM | TEMPERATURE: 97.4 F | BODY MASS INDEX: 39.64 KG/M2 | DIASTOLIC BLOOD PRESSURE: 107 MMHG

## 2020-03-09 DIAGNOSIS — R51.9 ACUTE NONINTRACTABLE HEADACHE, UNSPECIFIED HEADACHE TYPE: ICD-10-CM

## 2020-03-09 PROCEDURE — 700105 HCHG RX REV CODE 258: Performed by: EMERGENCY MEDICINE

## 2020-03-09 PROCEDURE — 99284 EMERGENCY DEPT VISIT MOD MDM: CPT

## 2020-03-09 PROCEDURE — 700111 HCHG RX REV CODE 636 W/ 250 OVERRIDE (IP): Performed by: EMERGENCY MEDICINE

## 2020-03-09 PROCEDURE — 96375 TX/PRO/DX INJ NEW DRUG ADDON: CPT

## 2020-03-09 PROCEDURE — 96374 THER/PROPH/DIAG INJ IV PUSH: CPT

## 2020-03-09 RX ORDER — METOCLOPRAMIDE HYDROCHLORIDE 5 MG/ML
10 INJECTION INTRAMUSCULAR; INTRAVENOUS ONCE
Status: COMPLETED | OUTPATIENT
Start: 2020-03-09 | End: 2020-03-09

## 2020-03-09 RX ORDER — DEXAMETHASONE SODIUM PHOSPHATE 4 MG/ML
12 INJECTION, SOLUTION INTRA-ARTICULAR; INTRALESIONAL; INTRAMUSCULAR; INTRAVENOUS; SOFT TISSUE ONCE
Status: COMPLETED | OUTPATIENT
Start: 2020-03-09 | End: 2020-03-09

## 2020-03-09 RX ORDER — DIPHENHYDRAMINE HYDROCHLORIDE 50 MG/ML
25 INJECTION INTRAMUSCULAR; INTRAVENOUS ONCE
Status: COMPLETED | OUTPATIENT
Start: 2020-03-09 | End: 2020-03-09

## 2020-03-09 RX ORDER — BUTALBITAL, ACETAMINOPHEN AND CAFFEINE 50; 325; 40 MG/1; MG/1; MG/1
1 TABLET ORAL EVERY 4 HOURS PRN
Qty: 10 TAB | Refills: 0 | Status: SHIPPED | OUTPATIENT
Start: 2020-03-09 | End: 2020-03-14

## 2020-03-09 RX ORDER — KETOROLAC TROMETHAMINE 30 MG/ML
30 INJECTION, SOLUTION INTRAMUSCULAR; INTRAVENOUS ONCE
Status: COMPLETED | OUTPATIENT
Start: 2020-03-09 | End: 2020-03-09

## 2020-03-09 RX ORDER — SODIUM CHLORIDE 9 MG/ML
1000 INJECTION, SOLUTION INTRAVENOUS ONCE
Status: COMPLETED | OUTPATIENT
Start: 2020-03-09 | End: 2020-03-09

## 2020-03-09 RX ADMIN — SODIUM CHLORIDE 1000 ML: 9 INJECTION, SOLUTION INTRAVENOUS at 09:39

## 2020-03-09 RX ADMIN — METOCLOPRAMIDE 10 MG: 5 INJECTION, SOLUTION INTRAMUSCULAR; INTRAVENOUS at 09:40

## 2020-03-09 RX ADMIN — DEXAMETHASONE SODIUM PHOSPHATE 12 MG: 4 INJECTION, SOLUTION INTRA-ARTICULAR; INTRALESIONAL; INTRAMUSCULAR; INTRAVENOUS; SOFT TISSUE at 09:40

## 2020-03-09 RX ADMIN — DIPHENHYDRAMINE HYDROCHLORIDE 25 MG: 50 INJECTION INTRAMUSCULAR; INTRAVENOUS at 09:40

## 2020-03-09 RX ADMIN — KETOROLAC TROMETHAMINE 30 MG: 30 INJECTION, SOLUTION INTRAMUSCULAR; INTRAVENOUS at 11:37

## 2020-03-09 ASSESSMENT — FIBROSIS 4 INDEX: FIB4 SCORE: 0.45

## 2020-03-09 NOTE — DISCHARGE INSTRUCTIONS
Follow-up with primary care 1 to 2 days for reevaluation, medication management, close blood pressure monitoring referral back to neurology for recurrent headaches.    Continue home medications as previously indicated.  Add Fioricet every 4-6 hours as needed for breakthrough headache.  Do not take Excedrin while taking Fioricet.    Encourage oral fluid hydration.  Diet and activity as tolerated otherwise.    Return to the emergency department for persistent or worsening headache, altered mental status, seizure, visual changes, focal weakness, fever, vomiting or other new concerns.

## 2020-03-09 NOTE — ED NOTES
Pt ambulates to triage with c/c headache x1 week, history of migraines.  Pt also c/c n/v/d since 0300 today.  A&ox4.  Pt &  Mother to lobby & advised to inform RN of any changes.

## 2020-03-09 NOTE — ED PROVIDER NOTES
"ED Provider Note    CHIEF COMPLAINT  Chief Complaint   Patient presents with   • Headache     x1 week, history of migraines    • Nausea/Vomiting/Diarrhea     since 0300       [facility]  Nickolas Galdamez is a 30 y.o. female who presents to the emergency department through triage with her mother for headache.  Patient describes history of migraines or frequent headaches, although states that her headaches normally only last 2 to 3 days.  This headache is been persistent for 1 week despite Excedrin which normally works.  Bifrontal, right greater than left, throbbing, constant.  Patient states that starting early this morning, she now also has nausea, vomiting and diarrhea.  No abdominal pain.  No dysuria, hematuria, frequency urgency.  Not tolerating oral fluids at home.  Denies fever.  Denies visual changes although does have photophobia.  Denies seizure.  Denies focal weakness or paresthesias.  Denies neck pain or stiffness.  Denies trauma or injury.     History of hypertension, compliant with amlodipine but describes spikes with pain.  History of Hashimoto's, compliant with Synthroid.  History of \"pseudotumor from 17 to 22\", followed by .    REVIEW OF SYSTEMS  See HPI for further details. All other systems are negative.     PAST MEDICAL HISTORY   has a past medical history of Cancer (HCC) (), EEG abnormal, Hashimoto's disease, Hyperglycemia, Hypertension, Hypoglycemia, Migraine, Pseudotumor cerebri, and Seizure disorder ([facility]).    SOCIAL HISTORY  Social History     Tobacco Use   • Smoking status: Former Smoker     Packs/day: 0.00     Start date: 2005     Last attempt to quit: 5/3/2012     Years since quittin.8   • Smokeless tobacco: Never Used   • Tobacco comment: 1/2 pack a day   Substance and Sexual Activity   • Alcohol use: Yes     Alcohol/week: 0.6 oz     Types: 1 Glasses of wine per week     Comment: socially, 1 glass of wine per week   • Drug use: No     Comment:     • Sexual " "activity: Never       SURGICAL HISTORY   has a past surgical history that includes gyn surgery; gastroscopy (N/A, 12/26/2018); gastroscopy with biopsy (N/A, 12/26/2018); egd w/endoscopic ultrasound (N/A, 12/26/2018); and egd with asp/bx (N/A, 12/26/2018).    CURRENT MEDICATIONS  Home Medications    **Home medications have not yet been reviewed for this encounter**         ALLERGIES  No Known Allergies    PHYSICAL EXAM  VITAL SIGNS: /107   Pulse 75   Temp 36.3 °C (97.4 °F) (Temporal)   Resp 18   Ht 1.778 m (5' 10\")   Wt (!) 125.6 kg (276 lb 14.4 oz)   LMP  (LMP Unknown) Comment: IUD  SpO2 99%   BMI 39.73 kg/m²   Pulse ox interpretation: I interpret this pulse ox as normal.  Constitutional: Alert in no apparent distress.  Obese.  HENT: Normocephalic, atraumatic. Bilateral external ears normal, Nose normal.  Dry mucous membranes.    Eyes: Pupils are equal and reactive, Conjunctiva normal.  EOMI.  No nystagmus.  Neck: Normal range of motion, Supple.  No meningeal irritation.  Lymphatic: No lymphadenopathy noted.   Cardiovascular: Normal peripheral perfusion.  Thorax & Lungs: Nonlabored respirations.   Abdomen: Obese, soft, non-distended, non-tender to palpation.  Skin: Warm, Dry, No erythema, No rash.   Musculoskeletal: Good range of motion in all major joints.   Neurologic: Alert and oriented x4.  Speech is clear and cohesive.  Cranial nerves II through XII intact bilaterally.  5 out of 5 strength in 4 extremities.  Gait stable independently without ataxia.  Psychiatric: Affect normal, Judgment normal, Mood normal.       COURSE & MEDICAL DECISION MAKING  Nursing notes and vital signs were reviewed. (See chart for details)  The patients records were reviewed, history was obtained from the patient;     Evaluation for headache is most consistent with acute exacerbation of patient's chronic headaches.  Cannot exclude a superimposed viral gastrointestinal illness complicated her symptomatology.  However " "abdominal exam is benign.  She is neurologically intact and nonfocal.  Headache presentation is unchanged from frequent headaches.  There is no history of clinical evidence to suggest subarachnoid hemorrhage, stroke or other mass-effect.  No clinical evidence for meningitis.  Vague history for \"pseudotumor but I do not have all the criteria,\" doubt acute complications from this given recurrence of headaches and normal neuro exam.  No trauma or injury.  More dynamically stable, blood pressure improved with pain control.  Pain improved with Reglan, Benadryl and Decadron, the nearly resolved after the addition of Toradol.  IV fluid bolus for vomiting clinical dehydration, now tolerating oral fluids.    Patient is stable for discharge at this time, anticipatory guidance provided, Fioricet for breakthrough headache, close follow-up is encouraged with primary care and neurology, and strict ED return instructions have been detailed. Patient is agreeable to the disposition and plan.    Patient's blood pressure was elevated in the emergency department, and has been referred to primary care for close monitoring.    FINAL IMPRESSION  (R51) Acute nonintractable headache, unspecified headache type      Electronically signed by: Cecilia Anders D.O., 3/9/2020 12:03 PM      This dictation was created using voice recognition software. The accuracy of the dictation is limited to the abilities of the software. I expect there may be some errors of grammar and possibly content. The nursing notes were reviewed and certain aspects of this information were incorporated into this note.        "

## 2020-03-13 ENCOUNTER — OFFICE VISIT (OUTPATIENT)
Dept: MEDICAL GROUP | Facility: PHYSICIAN GROUP | Age: 30
End: 2020-03-13
Payer: COMMERCIAL

## 2020-03-13 VITALS
OXYGEN SATURATION: 99 % | WEIGHT: 266 LBS | SYSTOLIC BLOOD PRESSURE: 160 MMHG | HEART RATE: 97 BPM | RESPIRATION RATE: 14 BRPM | DIASTOLIC BLOOD PRESSURE: 96 MMHG | HEIGHT: 69 IN | BODY MASS INDEX: 39.4 KG/M2 | TEMPERATURE: 97.6 F

## 2020-03-13 DIAGNOSIS — I10 ESSENTIAL HYPERTENSION: ICD-10-CM

## 2020-03-13 DIAGNOSIS — R51.9 CHRONIC DAILY HEADACHE: ICD-10-CM

## 2020-03-13 DIAGNOSIS — R00.2 PALPITATIONS: ICD-10-CM

## 2020-03-13 DIAGNOSIS — E55.9 VITAMIN D DEFICIENCY: ICD-10-CM

## 2020-03-13 DIAGNOSIS — E03.8 HYPOTHYROIDISM DUE TO HASHIMOTO'S THYROIDITIS: ICD-10-CM

## 2020-03-13 DIAGNOSIS — R53.83 FATIGUE, UNSPECIFIED TYPE: ICD-10-CM

## 2020-03-13 DIAGNOSIS — E06.3 HYPOTHYROIDISM DUE TO HASHIMOTO'S THYROIDITIS: ICD-10-CM

## 2020-03-13 PROBLEM — Z09 HOSPITAL DISCHARGE FOLLOW-UP: Status: RESOLVED | Noted: 2019-07-08 | Resolved: 2020-03-13

## 2020-03-13 PROCEDURE — 99214 OFFICE O/P EST MOD 30 MIN: CPT | Performed by: NURSE PRACTITIONER

## 2020-03-13 RX ORDER — METOPROLOL SUCCINATE 25 MG/1
25 TABLET, EXTENDED RELEASE ORAL DAILY
Qty: 60 TAB | Refills: 2 | Status: SHIPPED
Start: 2020-03-13 | End: 2020-05-15

## 2020-03-13 ASSESSMENT — FIBROSIS 4 INDEX: FIB4 SCORE: 0.45

## 2020-03-13 NOTE — ASSESSMENT & PLAN NOTE
Chronic health problem, worsening.  Patient reports home blood pressure readings have been elevated 150s-170s/low 100s.  Has been having worsening headache.  Was seen in the ER last week for headache.  Was told that high blood pressure may be causing her worsening headaches or that the headaches will be causing her hypertension.  Taking amlodipine 10 mg daily and metoprolol 25 mg daily.  Will increase metoprolol to twice a day.  The patient denies chest pain, shortness of breath, headache, vision changes, epistaxis, or dyspnea on exertion.  Has not tried ACE/ARB or hydrochlorothiazide.
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
## 2020-03-13 NOTE — LETTER
[facility]  [facility]  [address] [place] [place] [address]  Fax: [phone]   Authorization for Release/Disclosure of   Protected Health Information   Name: WILLIE FLANAGAN : 1990 SSN: [id number]   Address: [address] [place] [id number] Phone:    [phone] (home)    I authorize the entity listed below to release/disclose the PHI below to:   [facility] hospital/[facility] and [facility]   Provider or Entity Name:   [facility]      Address   City, State, [facility]   Phone:      Fax:     Reason for request: continuity of care   Information to be released:    [  ] LAST COLONOSCOPY,  including any PATH REPORT and follow-up  [  ] LAST FIT/COLOGUARD RESULT [  ] LAST DEXA  [  ] LAST MAMMOGRAM  [  ] LAST PAP  [  ] LAST LABS [  ] RETINA EXAM REPORT  [  ] IMMUNIZATION RECORDS  [X] Release resent visit notes      [  ] Check here and initial the line next to each item to release ALL health information INCLUDING  _____ Care and treatment for drug and / or alcohol abuse  _____ HIV testing, infection status, or AIDS  _____ Genetic Testing    DATES OF SERVICE OR TIME PERIOD TO BE DISCLOSED: _____________  I understand and acknowledge that:  * This Authorization may be revoked at any time by you in writing, except if your health information has already been used or disclosed.  * Your health information that will be used or disclosed as a result of you signing this authorization could be re-disclosed by the recipient. If this occurs, your re-disclosed health information may no longer be protected by State or Federal laws.  * You may refuse to sign this Authorization. Your refusal will not affect your ability to obtain treatment.  * This Authorization becomes effective upon signing and will  on (date) __________.      If no date is indicated, this Authorization will  one (1) year from the signature date.    Name:  Nickolas Galdamez    Signature:   Date:     3/13/2020       PLEASE FAX REQUESTED RECORDS BACK TO: [phone]
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
## 2020-03-30 ENCOUNTER — APPOINTMENT (OUTPATIENT)
Dept: RADIOLOGY | Facility: MEDICAL CENTER | Age: 30
End: 2020-03-30
Attending: EMERGENCY MEDICINE
Payer: COMMERCIAL

## 2020-03-30 ENCOUNTER — HOSPITAL ENCOUNTER (EMERGENCY)
Facility: MEDICAL CENTER | Age: 30
End: 2020-03-30
Attending: EMERGENCY MEDICINE
Payer: COMMERCIAL

## 2020-03-30 VITALS
SYSTOLIC BLOOD PRESSURE: 156 MMHG | OXYGEN SATURATION: 99 % | WEIGHT: 264.55 LBS | RESPIRATION RATE: 18 BRPM | DIASTOLIC BLOOD PRESSURE: 105 MMHG | HEIGHT: 70 IN | HEART RATE: 113 BPM | TEMPERATURE: 97.5 F | BODY MASS INDEX: 37.87 KG/M2

## 2020-03-30 DIAGNOSIS — I15.9 SECONDARY HYPERTENSION: ICD-10-CM

## 2020-03-30 DIAGNOSIS — R51.9 CHRONIC NONINTRACTABLE HEADACHE, UNSPECIFIED HEADACHE TYPE: ICD-10-CM

## 2020-03-30 DIAGNOSIS — Z86.69 HISTORY OF MIGRAINE HEADACHES: ICD-10-CM

## 2020-03-30 DIAGNOSIS — G89.29 CHRONIC NONINTRACTABLE HEADACHE, UNSPECIFIED HEADACHE TYPE: ICD-10-CM

## 2020-03-30 DIAGNOSIS — I10 CHRONIC HYPERTENSION: ICD-10-CM

## 2020-03-30 LAB
ALBUMIN SERPL BCP-MCNC: 5 G/DL (ref 3.2–4.9)
ALBUMIN/GLOB SERPL: 1.6 G/DL
ALP SERPL-CCNC: 82 U/L (ref 30–99)
ALT SERPL-CCNC: 22 U/L (ref 2–50)
ANION GAP SERPL CALC-SCNC: 15 MMOL/L (ref 7–16)
AST SERPL-CCNC: 17 U/L (ref 12–45)
BASOPHILS # BLD AUTO: 0.4 % (ref 0–1.8)
BASOPHILS # BLD: 0.03 K/UL (ref 0–0.12)
BILIRUB SERPL-MCNC: 0.4 MG/DL (ref 0.1–1.5)
BUN SERPL-MCNC: 11 MG/DL (ref 8–22)
CALCIUM SERPL-MCNC: 9.8 MG/DL (ref 8.5–10.5)
CHLORIDE SERPL-SCNC: 102 MMOL/L (ref 96–112)
CO2 SERPL-SCNC: 23 MMOL/L (ref 20–33)
CREAT SERPL-MCNC: 0.87 MG/DL (ref 0.5–1.4)
EOSINOPHIL # BLD AUTO: 0.21 K/UL (ref 0–0.51)
EOSINOPHIL NFR BLD: 2.5 % (ref 0–6.9)
ERYTHROCYTE [DISTWIDTH] IN BLOOD BY AUTOMATED COUNT: 40.4 FL (ref 35.9–50)
GLOBULIN SER CALC-MCNC: 3.2 G/DL (ref 1.9–3.5)
GLUCOSE SERPL-MCNC: 107 MG/DL (ref 65–99)
HCG SERPL QL: NEGATIVE
HCT VFR BLD AUTO: 51.7 % (ref 37–47)
HGB BLD-MCNC: 17.5 G/DL (ref 12–16)
IMM GRANULOCYTES # BLD AUTO: 0.07 K/UL (ref 0–0.11)
IMM GRANULOCYTES NFR BLD AUTO: 0.8 % (ref 0–0.9)
LYMPHOCYTES # BLD AUTO: 1.68 K/UL (ref 1–4.8)
LYMPHOCYTES NFR BLD: 19.9 % (ref 22–41)
MCH RBC QN AUTO: 28.4 PG (ref 27–33)
MCHC RBC AUTO-ENTMCNC: 33.8 G/DL (ref 33.6–35)
MCV RBC AUTO: 83.8 FL (ref 81.4–97.8)
MONOCYTES # BLD AUTO: 0.59 K/UL (ref 0–0.85)
MONOCYTES NFR BLD AUTO: 7 % (ref 0–13.4)
NEUTROPHILS # BLD AUTO: 5.87 K/UL (ref 2–7.15)
NEUTROPHILS NFR BLD: 69.4 % (ref 44–72)
NRBC # BLD AUTO: 0 K/UL
NRBC BLD-RTO: 0 /100 WBC
PLATELET # BLD AUTO: 338 K/UL (ref 164–446)
PMV BLD AUTO: 9 FL (ref 9–12.9)
POTASSIUM SERPL-SCNC: 4.1 MMOL/L (ref 3.6–5.5)
PROT SERPL-MCNC: 8.2 G/DL (ref 6–8.2)
RBC # BLD AUTO: 6.17 M/UL (ref 4.2–5.4)
SODIUM SERPL-SCNC: 140 MMOL/L (ref 135–145)
TROPONIN T SERPL-MCNC: <6 NG/L (ref 6–19)
WBC # BLD AUTO: 8.5 K/UL (ref 4.8–10.8)

## 2020-03-30 PROCEDURE — 700111 HCHG RX REV CODE 636 W/ 250 OVERRIDE (IP): Performed by: EMERGENCY MEDICINE

## 2020-03-30 PROCEDURE — 70496 CT ANGIOGRAPHY HEAD: CPT

## 2020-03-30 PROCEDURE — 84484 ASSAY OF TROPONIN QUANT: CPT

## 2020-03-30 PROCEDURE — 70498 CT ANGIOGRAPHY NECK: CPT

## 2020-03-30 PROCEDURE — 84703 CHORIONIC GONADOTROPIN ASSAY: CPT

## 2020-03-30 PROCEDURE — 700117 HCHG RX CONTRAST REV CODE 255: Performed by: EMERGENCY MEDICINE

## 2020-03-30 PROCEDURE — 80053 COMPREHEN METABOLIC PANEL: CPT

## 2020-03-30 PROCEDURE — 96375 TX/PRO/DX INJ NEW DRUG ADDON: CPT

## 2020-03-30 PROCEDURE — 99284 EMERGENCY DEPT VISIT MOD MDM: CPT

## 2020-03-30 PROCEDURE — 93005 ELECTROCARDIOGRAM TRACING: CPT | Performed by: EMERGENCY MEDICINE

## 2020-03-30 PROCEDURE — 700111 HCHG RX REV CODE 636 W/ 250 OVERRIDE (IP)

## 2020-03-30 PROCEDURE — 85025 COMPLETE CBC W/AUTO DIFF WBC: CPT

## 2020-03-30 PROCEDURE — 94760 N-INVAS EAR/PLS OXIMETRY 1: CPT

## 2020-03-30 PROCEDURE — 96374 THER/PROPH/DIAG INJ IV PUSH: CPT

## 2020-03-30 RX ORDER — MORPHINE SULFATE 4 MG/ML
4 INJECTION, SOLUTION INTRAMUSCULAR; INTRAVENOUS ONCE
Status: COMPLETED | OUTPATIENT
Start: 2020-03-30 | End: 2020-03-30

## 2020-03-30 RX ORDER — DIPHENHYDRAMINE HYDROCHLORIDE 50 MG/ML
25 INJECTION INTRAMUSCULAR; INTRAVENOUS ONCE
Status: COMPLETED | OUTPATIENT
Start: 2020-03-30 | End: 2020-03-30

## 2020-03-30 RX ORDER — ONDANSETRON 2 MG/ML
4 INJECTION INTRAMUSCULAR; INTRAVENOUS ONCE
Status: COMPLETED | OUTPATIENT
Start: 2020-03-30 | End: 2020-03-30

## 2020-03-30 RX ORDER — DEXAMETHASONE SODIUM PHOSPHATE 4 MG/ML
10 INJECTION, SOLUTION INTRA-ARTICULAR; INTRALESIONAL; INTRAMUSCULAR; INTRAVENOUS; SOFT TISSUE ONCE
Status: COMPLETED | OUTPATIENT
Start: 2020-03-30 | End: 2020-03-30

## 2020-03-30 RX ORDER — PROCHLORPERAZINE EDISYLATE 5 MG/ML
10 INJECTION INTRAMUSCULAR; INTRAVENOUS ONCE
Status: COMPLETED | OUTPATIENT
Start: 2020-03-30 | End: 2020-03-30

## 2020-03-30 RX ORDER — ONDANSETRON 2 MG/ML
INJECTION INTRAMUSCULAR; INTRAVENOUS
Status: COMPLETED
Start: 2020-03-30 | End: 2020-03-30

## 2020-03-30 RX ORDER — HYDRALAZINE HYDROCHLORIDE 20 MG/ML
10 INJECTION INTRAMUSCULAR; INTRAVENOUS ONCE
Status: COMPLETED | OUTPATIENT
Start: 2020-03-30 | End: 2020-03-30

## 2020-03-30 RX ORDER — KETOROLAC TROMETHAMINE 30 MG/ML
30 INJECTION, SOLUTION INTRAMUSCULAR; INTRAVENOUS ONCE
Status: COMPLETED | OUTPATIENT
Start: 2020-03-30 | End: 2020-03-30

## 2020-03-30 RX ADMIN — DIPHENHYDRAMINE HYDROCHLORIDE 25 MG: 50 INJECTION INTRAMUSCULAR; INTRAVENOUS at 05:53

## 2020-03-30 RX ADMIN — IOHEXOL 80 ML: 350 INJECTION, SOLUTION INTRAVENOUS at 05:17

## 2020-03-30 RX ADMIN — ONDANSETRON 4 MG: 2 INJECTION INTRAMUSCULAR; INTRAVENOUS at 04:45

## 2020-03-30 RX ADMIN — KETOROLAC TROMETHAMINE 30 MG: 30 INJECTION, SOLUTION INTRAMUSCULAR; INTRAVENOUS at 05:51

## 2020-03-30 RX ADMIN — PROCHLORPERAZINE EDISYLATE 10 MG: 5 INJECTION INTRAMUSCULAR; INTRAVENOUS at 05:55

## 2020-03-30 RX ADMIN — HYDRALAZINE HYDROCHLORIDE 10 MG: 20 INJECTION INTRAMUSCULAR; INTRAVENOUS at 05:20

## 2020-03-30 RX ADMIN — MORPHINE SULFATE 4 MG: 4 INJECTION INTRAVENOUS at 04:39

## 2020-03-30 RX ADMIN — DEXAMETHASONE SODIUM PHOSPHATE 10 MG: 4 INJECTION, SOLUTION INTRA-ARTICULAR; INTRALESIONAL; INTRAMUSCULAR; INTRAVENOUS; SOFT TISSUE at 05:57

## 2020-03-30 ASSESSMENT — ENCOUNTER SYMPTOMS
ABDOMINAL PAIN: 0
SENSORY CHANGE: 0
SEIZURES: 0
NAUSEA: 1
HEADACHES: 1
LOSS OF CONSCIOUSNESS: 0
TINGLING: 0
WEAKNESS: 0
VOMITING: 1
FOCAL WEAKNESS: 0
BACK PAIN: 0
FEVER: 0
COUGH: 0
SHORTNESS OF BREATH: 0

## 2020-03-30 ASSESSMENT — LIFESTYLE VARIABLES
DOES PATIENT WANT TO STOP DRINKING: NO
DO YOU DRINK ALCOHOL: NO

## 2020-03-30 ASSESSMENT — FIBROSIS 4 INDEX: FIB4 SCORE: 0.45

## 2020-03-30 NOTE — ED PROVIDER NOTES
-`ED Provider Note    ED Provider Note    Primary care provider: KARINE Hernández  Means of arrival: POV  History obtained from: Patient  History limited by: None    CHIEF COMPLAINT  Chief Complaint   Patient presents with   • Hypertension     Blood pressure noted at 174/132 in triage, for one day, history of high blood pressure   • Headache     Described as pressure   • N/V       HPI  Nickolas Galdamez is a 30 y.o. female who presents to the Emergency Department With a chief complaint of severe headache.  Patient states she does have a history of headaches.  She has what she describes as migraines approximately 1 time per month.  Typically she will take Tylenol and if that does not help, she will take Fioricet.  She did have what she describes as a normal migraine headache on Saturday.  She took Tylenol and it went away.  Last night, Sunday, she developed a severe headache approximately 7 PM.This has gradually worsened.  She describes it as a different location.  Typically, her normal headaches are frontal. This one goe across the top of her parietal scalp and across her occipital area. She has not had a headache this 1 grows across the top of her parietal scalp and across her occipital area.  Has any fever.  She reports nausea and vomiting that started this morning.  She states it is bile.  She does have some chest pressure associated with vomiting.  She is not short of breath.  She reports a remote history of seizures that she states is related to pseudotumor cerebri.  She takes amlodipine and metoprolol for her blood pressure.  She denies any recent changes in her medications or missing doses.  She has been compliant.  She denies the possibility of pregnancy, stating that she has an IUD.  She denies any visual changes.  No numbness or tingling.  No weakness either generally or isolated.    REVIEW OF SYSTEMS  Review of Systems   Constitutional: Negative for fever.   HENT: Negative for  congestion.    Respiratory: Negative for cough and shortness of breath.    Cardiovascular: Positive for chest pain. Negative for leg swelling.   Gastrointestinal: Positive for nausea and vomiting. Negative for abdominal pain.   Genitourinary: Negative for dysuria.   Musculoskeletal: Negative for back pain.   Skin: Negative for rash.   Neurological: Positive for headaches. Negative for tingling, sensory change, focal weakness, seizures, loss of consciousness and weakness.   All other systems reviewed and are negative.      PAST MEDICAL HISTORY   has a past medical history of Cancer (HCC) (), EEG abnormal, Hashimoto's disease, Hyperglycemia, Hypertension, Hypoglycemia, Migraine, Pseudotumor cerebri, and Seizure disorder (HCC).    SURGICAL HISTORY   has a past surgical history that includes gyn surgery; gastroscopy (N/A, 2018); gastroscopy with biopsy (N/A, 2018); egd w/endoscopic ultrasound (N/A, 2018); and egd with asp/bx (N/A, 2018).    SOCIAL HISTORY  Social History     Tobacco Use   • Smoking status: Former Smoker     Packs/day: 0.00     Start date: 2005     Last attempt to quit: 5/3/2012     Years since quittin.9   • Smokeless tobacco: Never Used   • Tobacco comment: 1/2 pack a day   Substance Use Topics   • Alcohol use: Yes     Alcohol/week: 0.6 oz     Types: 1 Glasses of wine per week     Comment: socially, 1 glass of wine per week   • Drug use: No     Comment:        Social History     Substance and Sexual Activity   Drug Use No    Comment:         FAMILY HISTORY  Family History   Problem Relation Age of Onset   • Cancer Mother         Cervical       CURRENT MEDICATIONS  Home Medications     Reviewed by Merrill Rodríguez R.N. (Registered Nurse) on 20 at 0413  Med List Status: Partial   Medication Last Dose Status   amLODIPine (NORVASC) 10 MG Tab  Active   hydrocortisone (ANUSOL-HC) 25 MG Suppos  Active   hydrOXYzine HCl (ATARAX) 50 MG Tab  Active   ketorolac (TORADOL) 10  "MG Tab  Active   levothyroxine (SYNTHROID) 200 MCG Tab  Active   lidocaine (LIDODERM) 5 % Patch  Active   metoprolol SR (TOPROL XL) 25 MG TABLET SR 24 HR  Active   MIRENA, 52 MG, 20 MCG/24HR IUD  Active   ondansetron (ZOFRAN ODT) 4 MG TABLET DISPERSIBLE  Active   tizanidine (ZANAFLEX) 4 MG Tab  Active   venlafaxine XR (EFFEXOR XR) 75 MG CAPSULE SR 24 HR  Active                ALLERGIES  No Known Allergies    PHYSICAL EXAM  VITAL SIGNS: /105   Pulse (!) 113   Temp 36.4 °C (97.5 °F) (Oral)   Resp 18   Ht 1.778 m (5' 10\")   Wt 120 kg (264 lb 8.8 oz)   LMP  (LMP Unknown) Comment: IUD  SpO2 99%   BMI 37.96 kg/m²   Vitals reviewed.  Constitutional: Patient is oriented to person, place, and time. Appears well-developed and well-nourished.  Moderate distress.  Anxious and tearful  Head: Normocephalic and atraumatic.   Ears: Normal external ears bilaterally.   Mouth/Throat: Oropharynx is clear and moist, no exudates.   Eyes: Conjunctivae are normal. Pupils are equal, round, and reactive to light.   Neck: Normal range of motion. Neck supple.  Cardiovascular: Normal rate, regular rhythm and normal heart sounds. Normal peripheral pulses.  Pulmonary/Chest: Effort normal and breath sounds normal. No respiratory distress, no wheezes, rhonchi, or rales. No chest wall tenderness.  Abdominal: Soft. Bowel sounds are normal. There is no tenderness. No rebound or guarding, or peritoneal signs.   Musculoskeletal: No edema and no tenderness.   Lymphadenopathy: No cervical adenopathy.   Neurological: No focal deficits. CN II through XII intact.  Normal motor and sensory exam.  Skin: Skin is warm and dry. No erythema. No pallor.   Psychiatric: Anxious and tearful.    LABS  Results for orders placed or performed during the hospital encounter of 03/30/20   CBC WITH DIFFERENTIAL   Result Value Ref Range    WBC 8.5 4.8 - 10.8 K/uL    RBC 6.17 (H) 4.20 - 5.40 M/uL    Hemoglobin 17.5 (H) 12.0 - 16.0 g/dL    Hematocrit 51.7 (H) 37.0 " - 47.0 %    MCV 83.8 81.4 - 97.8 fL    MCH 28.4 27.0 - 33.0 pg    MCHC 33.8 33.6 - 35.0 g/dL    RDW 40.4 35.9 - 50.0 fL    Platelet Count 338 164 - 446 K/uL    MPV 9.0 9.0 - 12.9 fL    Neutrophils-Polys 69.40 44.00 - 72.00 %    Lymphocytes 19.90 (L) 22.00 - 41.00 %    Monocytes 7.00 0.00 - 13.40 %    Eosinophils 2.50 0.00 - 6.90 %    Basophils 0.40 0.00 - 1.80 %    Immature Granulocytes 0.80 0.00 - 0.90 %    Nucleated RBC 0.00 /100 WBC    Neutrophils (Absolute) 5.87 2.00 - 7.15 K/uL    Lymphs (Absolute) 1.68 1.00 - 4.80 K/uL    Monos (Absolute) 0.59 0.00 - 0.85 K/uL    Eos (Absolute) 0.21 0.00 - 0.51 K/uL    Baso (Absolute) 0.03 0.00 - 0.12 K/uL    Immature Granulocytes (abs) 0.07 0.00 - 0.11 K/uL    NRBC (Absolute) 0.00 K/uL   COMP METABOLIC PANEL   Result Value Ref Range    Sodium 140 135 - 145 mmol/L    Potassium 4.1 3.6 - 5.5 mmol/L    Chloride 102 96 - 112 mmol/L    Co2 23 20 - 33 mmol/L    Anion Gap 15.0 7.0 - 16.0    Glucose 107 (H) 65 - 99 mg/dL    Bun 11 8 - 22 mg/dL    Creatinine 0.87 0.50 - 1.40 mg/dL    Calcium 9.8 8.5 - 10.5 mg/dL    AST(SGOT) 17 12 - 45 U/L    ALT(SGPT) 22 2 - 50 U/L    Alkaline Phosphatase 82 30 - 99 U/L    Total Bilirubin 0.4 0.1 - 1.5 mg/dL    Albumin 5.0 (H) 3.2 - 4.9 g/dL    Total Protein 8.2 6.0 - 8.2 g/dL    Globulin 3.2 1.9 - 3.5 g/dL    A-G Ratio 1.6 g/dL   TROPONIN   Result Value Ref Range    Troponin T <6 6 - 19 ng/L   BETA-HCG QUALITATIVE SERUM   Result Value Ref Range    Beta-Hcg Qualitative Serum Negative Negative   ESTIMATED GFR   Result Value Ref Range    GFR If African American >60 >60 mL/min/1.73 m 2    GFR If Non African American >60 >60 mL/min/1.73 m 2   EKG (NOW)   Result Value Ref Range    Report       [facility] Emergency Dept.    Test Date:  2020  Pt Name:    WILLIE STEWART MACKALFREDO       Department: ER  MRN:        [id number]                      Room:        11  Gender:     Female                       Technician: [id number]  :         1990                   Requested By:LUIS JORDAN [place]  Order #:    [id number]                    Reading MD:    Measurements  Intervals                                Axis  Rate:       96                           P:          63  AR:         188                          QRS:        20  QRSD:       100                          T:          43  QT:         372  QTc:        471    Interpretive Statements  SINUS RHYTHM  No previous ECG available for comparison         All labs reviewed by me.    EKG Interpretation  Interpreted by me    Rhythm: normal sinus   Rate: 96  Axis: normal  Ectopy: none  Conduction: normal  ST Segments: no acute change  T Waves: no acute change  Q Waves: none    Clinical Impression: No change from comparison EKG from 2012 sinus rhythm.  Normal rate.  No acute ST segment elevation.  No acute changes and normal EKG    RADIOLOGY  CT-CTA HEAD WITH & W/O-POST PROCESS   Final Result      CT angiogram of the [place] within normal limits.      CT-CTA NECK WITH & W/O-POST PROCESSING   Final Result      CT angiogram of the neck within normal limits.        The radiologist's interpretation of all radiological studies have been reviewed by me.    COURSE & MEDICAL DECISION MAKING  Pertinent Labs & Imaging studies reviewed. (See chart for details)    Obtained and reviewed past medical records.  Patient's last encounter was in the outpatient clinic with her primary care provider for evaluation of her ongoing health problems.  At that time, patient complained of chronic daily headaches.  This was March 13 of this year.  Essential hypertension.  At that time, patient's had been having readings of 1 50-1 70s over diastolic of 100s.  Patient was seen in the emergency department March 9 of this year for headache and nausea vomiting and diarrhea    4:27 AM - Patient seen and examined at bedside.  This is a 30-year-old female with a history of hypertension which she states is normally about 130/110.  Who  presents with a headache, sudden and severe in onset at 7 PM yesterday.  She is out side of the 6-hour window.  she will need imaging for further evaluation.  She does have a history of migraines but this headache feels different.  She does not have symptoms at this time, to suggest infectious etiology.  Currently, she has a normal neurologic exam.  An IV has been established, labs drawn per nursing protocols.  She will be treated with pain medication and antiemetics.  Her heart rate is quite elevated in the 140s but if she rests and columns herself which she is able to do somewhat, her heart rate comes down to the low 100s.  Her blood pressure remains high.  After administration of pain medication, will reassess, sooner she certainly, may need, blood pressure management.  Differential diagnosis includes but is not limited to: Subarachnoid hemorrhage, migraine headache, pseudotumor cerebri, increased intracranial pressure, less likely ACS.    5:49 AM patient's reevaluated the bedside.  She still tearful, complaining of pain.  Blood pressure is improved now her diastolic, is essentially back to what she reports is her normal.  When asked further about her pseudotumor cerebri.  Patient states she saw Dr. Maharaj from the age of 17 to the age of 25, when he determined, that she likely did not have pseudotumor cerebri as she did not demonstrate any of the other manifestations of this illness and she was discharged from his care and transfer to general neurology where she was seen and essentially diagnosed with migraines and treated for migraines.  Patient updated on CT results which were overall unrevealing.  This is reassuring for her.  Labs were normal.  White blood cell count 8.5.  H&H high 17 and 51.  There is no neutrophilic shift.  Chemistry shows a glucose of 107 but was otherwise normal.  Troponin normal.  At this time, will pursue treatment for migraine and reassess.    7 AM patient's reevaluated at the  bedside.  After combination of medications including Decadron, Benadryl, Toradol and Compazine, patient reports feeling markedly better.  Her blood pressure is come down to the 150s over 105.  She states that routinely when she is in her primary care doctors office, her diastolic blood pressure is 100-110.  Patient is awake, alert, she is not tearful anymore she has been very appreciative.  Both objectively and subjectively, she is much better.  At this time, I feel she can safely be discharged home.  She is agreeable.  Her heart rate has come down to the normal range.  She has no neurologic deficits.  I feel no further emergent intervention is necessary.  She is advised to follow-up with her PCP.  She is agreeable to this plan of care.  Patient be discharged home in stable condition.    FINAL IMPRESSION  1. Chronic nonintractable headache, unspecified headache type    2. History of migraine headaches    3. Secondary hypertension    4. Chronic hypertension

## 2020-03-30 NOTE — ED NOTES
The patient has been provided with discharge education and information.  The patient was also provided with instructions on follow up care and return precautions.  The patient verbalizes understanding of discharge instructions, follow up care, and return precautions.  All questions have been answered.    NAD, A/Ox4, good color and appropriate at time of discharge.  Patient ambulated out of department.

## 2020-03-30 NOTE — ED TRIAGE NOTES
Chief Complaint   Patient presents with   • Hypertension     Blood pressure noted at 174/132 in triage, for one day, history of high blood pressure   • Headache     Described as pressure   • N/V     Code Aorta score one during triage

## 2020-03-30 NOTE — ED NOTES
Patient back from CT. Complains that her headache/ pressure is getting worse again. Patient states that the morphine took the edge off for a minute but now the pressure is increasing again. ERP notified, see MAR for medication administration.

## 2020-04-10 LAB — EKG IMPRESSION: NORMAL

## 2020-04-27 NOTE — MR AVS SNAPSHOT
"Nickolas Galdamez   2017 10:20 AM   Office Visit   MRN: [id number]    Department:  [facility]   Dept Phone:  [phone]    Description:  Female : 1990   Provider:  POP Mcfarland           Reason for Visit     Follow-Up FMLA paperwork      Allergies as of 2017     No Known Allergies      You were diagnosed with     Non morbid obesity, unspecified obesity type   [6242130]       Anti-TPO antibodies present   [[id number]]         Vital Signs     Blood Pressure Pulse Temperature Respirations Height Weight    124/80 mmHg 70 36.7 °C (98.1 °F) 16 1.778 m (5' 10\") 135.172 kg (298 lb)    Body Mass Index Oxygen Saturation Last Menstrual Period Smoking Status          42.76 kg/m2 97% 2012 Former Smoker        Basic Information     Date Of Birth Sex Race Ethnicity Preferred Language    1990 Female White Non- English      Your appointments     2017  7:30 AM   ELECTROENCEPHALOGRAPHY with NEURODIAGNOSTIC LAB [facility] (--)    [address], [facility] [address]  [place] [address] [phone]           Limit caffeine. Clean, dry hair, no gels, oils, or hairsprays. If testing for seizures or spells, please allow no more than 4 hours of sleep the night before the exam (sleep 12am-4am).            Aug 08, 2017 10:00 AM   Follow Up Visit with Chel Sandra M.D.   [facility] (--)    [address], [facility] [address]  [place] [address] [phone]           You will be receiving a confirmation call a few days before your appointment from our automated call confirmation system.            Aug 16, 2017  1:20 PM   Established Patient with KARINE Cuevas   [facility]    [facility] [facility] [address] [phone]           You will be receiving a confirmation call a few days before your appointment from our automated call confirmation system.              Problem List             " MD David Ghosh M [facility] Nurse Msg Pool 36 minutes ago (8:45 AM)     Please triage call. I did check him last Friday and his legs were not swollen. MG         ICD-10-CM Priority Class Noted - Resolved    Pseudotumor cerebri G93.2   10/15/2012 - Present    History of migraine headaches Z86.69   10/15/2012 - Present    Seizure disorder (CMS-HCC) G40.909   10/22/2012 - Present    Obesity E66.9   4/14/2013 - Present    Essential hypertension I10   1/31/2017 - Present    Morbid obesity with BMI of 40.0-44.9, adult ([facility]) E66.01, Z68.41   1/31/2017 - Present    Chronic daily headache R51   2/28/2017 - Present    Elevated testosterone level in female E34.9   2/28/2017 - Present    Intractable migraine without status migrainosus G43.919   4/24/2017 - Present    Abnormal thyroid blood test R94.6   5/2/2017 - Present    Vitamin D deficiency E55.9   5/4/2017 - Present    Hashimoto's thyroiditis E06.3   5/4/2017 - Present      Health Maintenance        Date Due Completion Dates    IMM HEP B VACCINE (1 of 3 - Primary Series) 1990 ---    IMM HEP A VACCINE (1 of 2 - Standard Series) 3/2/1992 ---    IMM HPV VACCINE (1 of 3 - Female 3 Dose Series) 3/2/2002 ---    IMM VARICELLA (CHICKENPOX) VACCINE (1 of 2 - 2 Dose Adolescent Series) 3/2/2004 ---    IMM DTaP/Tdap/Td Vaccine (1 - Tdap) 3/2/2010 ---    PAP SMEAR 12/1/2017 12/1/2014, 10/15/2012            Current Immunizations     Influenza TIV (IM) 1/30/2013 11:30 AM      Below and/or attached are the medications your provider expects you to take. Review all of your home medications and newly ordered medications with your provider and/or pharmacist. Follow medication instructions as directed by your provider and/or pharmacist. Please keep your medication list with you and share with your provider. Update the information when medications are discontinued, doses are changed, or new medications (including over-the-counter products) are added; and carry medication information at all times in the event of emergency situations     Allergies:  No Known Allergies          Medications  Valid as of: June 08, 2017 - 10:59 AM    Generic Name Brand  Name Tablet Size Instructions for use    AmLODIPine Besylate (Tab) NORVASC 5 MG Take 1 Tab by mouth every day.        Cholecalciferol (Cap) Vitamin D 2000 UNITS Take  by mouth.        Ergocalciferol (Cap) DRISDOL 61481 UNITS Take 1 Cap by mouth every 7 days.        Phentermine HCl (Tab) ADIPEX-P 37.5 MG Take 1 Tab by mouth every morning before breakfast.        SUMAtriptan Succinate (Tab) IMITREX 50 MG Take 1 Tab by mouth Once PRN for Migraine for up to 1 dose.        Topiramate (Tab) TOPAMAX 50 MG Take 1 Tab by mouth 2 times a day.        .                 Medicines prescribed today were sent to:     [facility][id number] - [place], [place] - [address]    [address] [place] [address]    Phone: [phone] Fax: [phone]    Open 24 Hours?: No      Medication refill instructions:       If your prescription bottle indicates you have medication refills left, it is not necessary to call your provider’s office. Please contact your pharmacy and they will refill your medication.    If your prescription bottle indicates you do not have any refills left, you may request refills at any time through one of the following ways: The online [employer] system (except Urgent Care), by calling your provider’s office, or by asking your pharmacy to contact your provider’s office with a refill request. Medication refills are processed only during regular business hours and may not be available until the next business day. Your provider may request additional information or to have a follow-up visit with you prior to refilling your medication.   *Please Note: Medication refills are assigned a new Rx number when refilled electronically. Your pharmacy may indicate that no refills were authorized even though a new prescription for the same medication is available at the pharmacy. Please request the medicine by name with the pharmacy before contacting your provider for a refill.        Referral     A referral request has been sent to  our patient care coordination department. Please allow 3-5 business days for us to process this request and contact you either by phone or mail. If you do not hear from us by the 5th business day, please call us at [phone].           Wardrobe Housekeeper Access Code: Activation code not generated  Current Wardrobe Housekeeper Status: Active

## 2020-04-28 ENCOUNTER — TELEMEDICINE (OUTPATIENT)
Dept: MEDICAL GROUP | Facility: PHYSICIAN GROUP | Age: 30
End: 2020-04-28
Payer: COMMERCIAL

## 2020-04-28 DIAGNOSIS — G93.2 BENIGN INTRACRANIAL HYPERTENSION: ICD-10-CM

## 2020-04-28 DIAGNOSIS — R51.9 CHRONIC DAILY HEADACHE: ICD-10-CM

## 2020-04-28 DIAGNOSIS — I10 ESSENTIAL HYPERTENSION: ICD-10-CM

## 2020-04-28 PROCEDURE — 99214 OFFICE O/P EST MOD 30 MIN: CPT | Mod: 95,CR | Performed by: NURSE PRACTITIONER

## 2020-04-28 RX ORDER — LISINOPRIL 20 MG/1
20 TABLET ORAL DAILY
Qty: 90 TAB | Refills: 1 | Status: SHIPPED
Start: 2020-04-28 | End: 2020-05-15

## 2020-04-28 RX ORDER — MELOXICAM 7.5 MG/1
TABLET ORAL
COMMUNITY
Start: 2020-04-03 | End: 2020-06-24

## 2020-04-29 ENCOUNTER — PATIENT MESSAGE (OUTPATIENT)
Dept: MEDICAL GROUP | Facility: PHYSICIAN GROUP | Age: 30
End: 2020-04-29

## 2020-04-29 ENCOUNTER — HOSPITAL ENCOUNTER (OUTPATIENT)
Dept: LAB | Facility: MEDICAL CENTER | Age: 30
End: 2020-04-29
Attending: NURSE PRACTITIONER
Payer: COMMERCIAL

## 2020-04-29 ENCOUNTER — TELEPHONE (OUTPATIENT)
Dept: MEDICAL GROUP | Facility: PHYSICIAN GROUP | Age: 30
End: 2020-04-29

## 2020-04-29 DIAGNOSIS — E06.3 HYPOTHYROIDISM DUE TO HASHIMOTO'S THYROIDITIS: ICD-10-CM

## 2020-04-29 DIAGNOSIS — E55.9 VITAMIN D DEFICIENCY: ICD-10-CM

## 2020-04-29 DIAGNOSIS — E03.8 HYPOTHYROIDISM DUE TO HASHIMOTO'S THYROIDITIS: ICD-10-CM

## 2020-04-29 DIAGNOSIS — R53.83 FATIGUE, UNSPECIFIED TYPE: ICD-10-CM

## 2020-04-29 LAB
25(OH)D3 SERPL-MCNC: 17 NG/ML (ref 30–100)
T4 FREE SERPL-MCNC: 1.41 NG/DL (ref 0.93–1.7)
TSH SERPL DL<=0.005 MIU/L-ACNC: 0.49 UIU/ML (ref 0.38–5.33)
VIT B12 SERPL-MCNC: 568 PG/ML (ref 211–911)

## 2020-04-29 PROCEDURE — 82607 VITAMIN B-12: CPT

## 2020-04-29 PROCEDURE — 36415 COLL VENOUS BLD VENIPUNCTURE: CPT

## 2020-04-29 PROCEDURE — 84443 ASSAY THYROID STIM HORMONE: CPT

## 2020-04-29 PROCEDURE — 82306 VITAMIN D 25 HYDROXY: CPT

## 2020-04-29 PROCEDURE — 84439 ASSAY OF FREE THYROXINE: CPT

## 2020-04-29 NOTE — ASSESSMENT & PLAN NOTE
This is a chronic health problem that is uncontrolled with current medications and lifestyle measures.  Taking amlodipine 10 mg daily and metoprolol SR 25 mg daily.  Since last appointment, patient has been to the ER for elevated blood pressure with associative headache.  Patient was given Decadron, Toradol, Compazine, Benadryl.  She reports medications helped with her severe headache and her blood pressures decreased, though diastolic stayed in the low 100s.  Blood pressure readings at home have been in the 150s/100-110s.  Patient has chronic daily headache for many years.  Uncertain if headache is triggering blood pressure or blood pressure is triggering headache.  Please see additional notes.  It is likely patient has hypertension and when it becomes severe makes headache worse.  Patient has not trialed lisinopril in the past.  Will add lisinopril and refer to vascular medicine for hypertension management.

## 2020-04-29 NOTE — TELEPHONE ENCOUNTER
----- Message from Nickolas Galdamez sent at 4/29/2020 10:48 AM PDT -----  Regarding: Non-Urgent Medical Question  Contact: [phone]    They said all you need to send is a basic doctors note extending until May 30th. They are just going to be putting it in the file, but hecause of all my medical stuff the last 2 years and everything I sent them, they are auto approving  it. Thank you
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
## 2020-04-29 NOTE — TELEPHONE ENCOUNTER
----- Message from Nickolas Galdamez sent at 4/28/2020  4:35 PM PDT -----  Regarding: Non-Urgent Medical Question  Contact: [phone]  I got your voicemail. I am scheduled to get the blood work done tomorrow. I am not sure how i forgot that. Where do you want my HR lady to send the papers?

## 2020-04-29 NOTE — TELEPHONE ENCOUNTER
Phone Number Called: [phone] (home)     Call outcome: Cell number rings busy    Message: I sent a [employer] message to please fax [employer] forms to [phone] or please upload her forms to [employer].

## 2020-04-30 ENCOUNTER — PATIENT MESSAGE (OUTPATIENT)
Dept: MEDICAL GROUP | Facility: PHYSICIAN GROUP | Age: 30
End: 2020-04-30

## 2020-04-30 ENCOUNTER — TELEPHONE (OUTPATIENT)
Dept: MEDICAL GROUP | Facility: PHYSICIAN GROUP | Age: 30
End: 2020-04-30

## 2020-04-30 DIAGNOSIS — E55.9 VITAMIN D DEFICIENCY: ICD-10-CM

## 2020-04-30 RX ORDER — ERGOCALCIFEROL 1.25 MG/1
50000 CAPSULE ORAL
Qty: 12 CAP | Refills: 0 | Status: SHIPPED | OUTPATIENT
Start: 2020-04-30 | End: 2020-07-30

## 2020-04-30 RX ORDER — LEVOTHYROXINE SODIUM 0.2 MG/1
200 TABLET ORAL
COMMUNITY
End: 2021-04-27

## 2020-05-15 ENCOUNTER — TELEPHONE (OUTPATIENT)
Dept: VASCULAR LAB | Facility: MEDICAL CENTER | Age: 30
End: 2020-05-15

## 2020-05-15 ENCOUNTER — OFFICE VISIT (OUTPATIENT)
Dept: VASCULAR LAB | Facility: MEDICAL CENTER | Age: 30
End: 2020-05-15
Attending: FAMILY MEDICINE
Payer: COMMERCIAL

## 2020-05-15 VITALS
SYSTOLIC BLOOD PRESSURE: 127 MMHG | DIASTOLIC BLOOD PRESSURE: 85 MMHG | HEART RATE: 86 BPM | WEIGHT: 279.3 LBS | BODY MASS INDEX: 39.99 KG/M2 | HEIGHT: 70 IN

## 2020-05-15 DIAGNOSIS — R51.9 CHRONIC DAILY HEADACHE: ICD-10-CM

## 2020-05-15 DIAGNOSIS — E03.8 HYPOTHYROIDISM DUE TO HASHIMOTO'S THYROIDITIS: ICD-10-CM

## 2020-05-15 DIAGNOSIS — I10 ESSENTIAL HYPERTENSION: ICD-10-CM

## 2020-05-15 DIAGNOSIS — G93.2 BENIGN INTRACRANIAL HYPERTENSION: ICD-10-CM

## 2020-05-15 DIAGNOSIS — E28.2 PCOS (POLYCYSTIC OVARIAN SYNDROME): ICD-10-CM

## 2020-05-15 DIAGNOSIS — E06.3 HYPOTHYROIDISM DUE TO HASHIMOTO'S THYROIDITIS: ICD-10-CM

## 2020-05-15 DIAGNOSIS — Z30.09 FAMILY PLANNING COUNSELING: ICD-10-CM

## 2020-05-15 PROCEDURE — 99204 OFFICE O/P NEW MOD 45 MIN: CPT | Performed by: FAMILY MEDICINE

## 2020-05-15 PROCEDURE — 99212 OFFICE O/P EST SF 10 MIN: CPT | Performed by: FAMILY MEDICINE

## 2020-05-15 RX ORDER — PRAZOSIN HYDROCHLORIDE 1 MG/1
1 CAPSULE ORAL 3 TIMES DAILY PRN
Qty: 90 CAP | Refills: 0 | Status: SHIPPED | OUTPATIENT
Start: 2020-05-15 | End: 2020-08-13

## 2020-05-15 RX ORDER — LISINOPRIL 40 MG/1
40 TABLET ORAL DAILY
Qty: 90 TAB | Refills: 3 | Status: SHIPPED | OUTPATIENT
Start: 2020-05-15 | End: 2020-11-12 | Stop reason: SDUPTHER

## 2020-05-15 ASSESSMENT — ENCOUNTER SYMPTOMS
NAUSEA: 0
SEIZURES: 0
TREMORS: 0
FEVER: 0
VOMITING: 0
HEMOPTYSIS: 0
MYALGIAS: 0
DEPRESSION: 0
ABDOMINAL PAIN: 0
WEAKNESS: 0
DIZZINESS: 0
SHORTNESS OF BREATH: 0
PALPITATIONS: 0
HEADACHES: 1
BLURRED VISION: 0
ORTHOPNEA: 0
SORE THROAT: 0
WHEEZING: 0
CHILLS: 0
NERVOUS/ANXIOUS: 0
DIARRHEA: 0
COUGH: 0
BRUISES/BLEEDS EASILY: 0
INSOMNIA: 0
BLOOD IN STOOL: 0
FOCAL WEAKNESS: 0
DOUBLE VISION: 0

## 2020-05-15 ASSESSMENT — FIBROSIS 4 INDEX: FIB4 SCORE: 0.31

## 2020-05-15 NOTE — PROGRESS NOTES
[facility]IST[facility] HTN [facility]INIC - INITIAL EVALUATION    5/15/20    Assessment / Plan:     1. Blood Pressure Control:  Qualifies as resistant HTN: no  Office BP Goal ACC/AHA (2017) goal <130/80  Home BP at goal:  No,  Using wrist cuff   Office BP at goal:  yes  Echo: pending  ACR: pending  Device candidate? Not at this time   Plan:   Monitoring:   - start/continue home BP monitoring, reviewed correct technique:  Yes   - order 24h ABPM:  YES  - monitor lytes/gfr routinely   - advised that stabilizing BP may require multiple med changes and close monitoring over the next several months, reviewed that initially there will be additional imaging and labs and the possibility of adverse drug rxn's and variability of his BP and HR until we have things stabilized.  Advised to contact our office as needed for questions or concerns.    - recommend to allow our clinic to be primary source for HTN mgmt and med adjustments     2. Work up of Secondary Causes of Resistant Hypertension:   Renovascular HTN: Not Yet Assessed  Primary Aldosteronism: Not Yet Assessed        Thyroid Function: stable on current meds, TSH normal      Obstructive Sleep Apnea: Excluded  Pheochromocytoma: Not Yet Assessed   Cushing's:   Not Yet Assessed   Other: n/a  Recommendations At This Visit: as note below         3. Medication Use / Adherence:  Assessment: Complete  Recommendations: Instructed to Continue Taking All Medications As Prescribed and we will closely monitor          4. End Organ Damage:   Left Ventricular Hypertrophy: Not Assessed on  Echocardiogram Date: ordered   Opthalmologic findings:  Reports none   Albuminuria: Not Yet Assessed on Date: ordered   Renal Function: Chronic Kidney Disease  CKD G2 at the worst   - avoid nephrotoxins  - continue HTN control with RAAS blockade   - monitor lytes/gfr routinely  - eval with nephrology if worsening over time    Established Cardiovascular Disease:   None    5. Lifestyle Recommendations:    Smoking: continued  complete avoidance of all tobacco products     Physical Activity: continue healthy activity to improve CV fitness, see care instructions for additional details     Weight Management and Nutrition: Dietary plan was discussed with patient at this visit including DASH, low sodium and/or as outlined in care instructions  - recommend for aggressive wt loss strategy  - avoid phentermine due to HTN  - failed Saxenda  - consider for weight-loss surgery evaluation     6. Standard HTN Pharmacotherapy:  ACEI/ARB: increase lisinopril to 40mg daily, change to QHS dosing   Thiazide Type Diuretic: consider as next agent   Calcium Channel Blocker (CCB): continue amlodipine 10mg, change to QHS (monitor for edema)     7. Additional Agents:  Aldosterone Antagonist: consider as next agent   Loop Diuretic: not indicated   Peripheral Alpha Blocker:  Start prazosin 1mg TID prn BP spikes >160/100, call office if any issues   Other CCB: start verapamil 180mg ER daily, aware now on dual CCB blockade and will monitor for edema, constipation and bradycardia.  May be beneficial for migraines   Direct Vasodilator: not indicated   Centrally Acting Alpha Agonist: not indicated   Other:   BB: not indicated   DRI: not indicated      8. Other CV Risk Factors:     1) LIPID MANAGEMENT:  Qualifies for Statin Therapy Based on 2018 ACC/AHA Guidelines: no  Calculated 10-Year Risk of ASCVD: N/A  Currently on Statin: No, unlikely of benefit at her age range unless early events or FH  Metabolic dyslipidemic profile noted including hypertrig   NLA Risk Category (2014): Moderate: 2 major RFs  Tx threshold:  non-HDL-C >159, LDL-C >129  Tx goal:  non-HDL <130, LDL-C <100 (optional: apoB<90)   At goal:  No but not at tx threshold   Plan:  - reinforced ongoing TLC measures, specifically focused on trig reduction with diet, reduced etoh and fried/fatty foods, weight reduction and increase activity   - annual lipid surveillance     2) GLYCEMIA MANAGEMENT:  Normal   -  due to weight, monitor fasting gluc annually     3) ANTIPLATELET/ANTICOAGULANT THERAPY:   Not indicated      9. Other Issues:    1) palpitations - all prior eval negative including report of normal holter and prior cardiology evals  - requested reports from [facility] cardiology   - pending f/u with cardiology on 5/20  - verapamil may be of benefit     2) metabolic syndrome - independent risk factor for ASCVD    3) chronic daily HA, ? Hx of pseudotumor cerebri, though reports has been told most recently she does not have   - recommend f/u with neurology  - trial of verapamil for potential migraine proph    4) hx of PCOS - prior labs normal, sees endocrine.  Has been fertile in the past, so unsure if clinically relevant disease.  Would be risk factor for ASCVD and VTE.   - weight loss essential     5) family planning  - stable on Mirena  - recommend against E2 containing BC due to risk for HTN worsening and VTE    Studies Ordered At This Visit: echo, RICHARD duplex - ordered, aprn to track   Blood Work to Be Obtained Prior to Next Visit: as noted below   Disposition: RTC in 6 weeks     1. Essential hypertension  ALDOSTERONE    CBC WITH DIFFERENTIAL    Comp Metabolic Panel    CORTISOL    METANEPHRINES PLASMA    MICROALBUMIN CREAT RATIO URINE    RENIN ACTIVITY    URINE MICROSCOPIC (MICROSCOPIC URINALYSIS)    US-RENAL ARTERY DUPLEX COMP    REFERRAL TO 24-HOUR BLOOD PRESSURE MONITORING    EC-ECHOCARDIOGRAM COMPLETE W/O CONT    verapamil ER (CALAN-SR) 180 MG Tab CR    lisinopril (PRINIVIL) 40 MG tablet    prazosin (MINIPRESS) 1 MG Cap   2. Chronic daily headache     3. Pseudotumor cerebri      unclear diagnosis   4. Hypothyroidism due to Hashimoto's thyroiditis     5. BMI 40.0-44.9, adult (HCC)     6. PCOS (polycystic ovarian syndrome)     7. Family planning counseling         History of Present Illness:   Nickolas Galdamez who is seen for resistant HTN and management. Nickolas Galdamez is referred by:  Nara Hercules    HTN:  Current HTN concerns: main issues are concern about ongoing lack of control and may visits to ED for urgencies.  Has daily HA.  Reports lack of control after many visits to cardiology, neurology, endocrinology.   Current ADRs: No  HTN sx:  Reports daily migraines, gets migraines after all physical acitivity including sex.  Better with advil.    No current blurred or changed vision, using glasses.  No report of HTN retinopathy.    No chest pain, shortness of breath, headache, nausea, dizziness/vertigo   Home BP los/120s  24h ABPM completed: not tested  Adherence to current HTN meds: compliant all of the time     Pertinent HTN hx (reviewed at initial visit):   Age at HTN dx: 18yo,  Worsened since 2019 has worsened.   (if female, any hx of pregnancy-related HTN): gest HTN, no preeclampsia   Past HTN medications: as noted   HTN crises:  HTN urgency, seen multiple times in the ED over last 2 yrs   Lifestyle factors:     High salt: Yes, Details: but trying to improve    EtOH: No  Interfering substances/contributing factors:     NSAIDs/Alamo-2i: Yes, Details: 3-4x/week , not daily   Antidepressants (TCS, SNRI, MOAI): yes, venlafaxine, 1 year    Atypical antipsych: no   Stimulants/sympathomimetics (illicit, OTC decongestants): no   OTC/Herbals (NaHCO3 antacids, weight loss meds, ephedra, etc): no   Chronic corticosteroid tx: no    Hormonal therapy (joe high-dose E2): no   EPO: no    Immunosuppresants (cyclosporine, tacrolimus): no   Chemotx (tyrosine kinase inhib, angiogenesis inhib): no     Hyperlipidemia:    Stable, no current concerns  Current treatment: TLC   Myalgias? No  Other adverse drug reactions? Not applicable  Lipid profile:   Lab Results   Component Value Date/Time    CHOLSTRLTOT 174 2017 1115    TRIGLYCERIDE 176 (H) 2017 1115    HDL 34 (A) 2017 1115     (H) 2017 1115     LDL (mg/dL)   Date Value   2017 105 (H)   2014 82     HDL  (mg/dL)   Date Value   02/06/2017 34 (A)   12/02/2014 28 (A)     Pseudotumor cerebri:   Followed for many years with neurology,  Pending reestablishing care.   Report years with chronic daily HA.   Apparently no secondary causes have been found.     Antiplatelet/anticoagulation:   No, no bleeding noted     Type 2 DM:  No     CKD:    No     Sleeping disorder/REAL:   Reports prior normal PSG      Hypothyroidism:   Yes, Details: stable on current med dose, last TSH normal.   No symptoms     Clinical evidence of ASCVD:  no    Major ASCVD risk factors:    1) Age (Men>44, women>54):  no   2) Fhx early CHD (MI, SCD, coronary revasc procedures in men <55, women <65):  no   3) cigarette smoking:   reports that she quit smoking about 8 years ago. She started smoking about 15 years ago. She smoked 0.00 packs per day. She has never used smokeless tobacco.   4) high BP >139/89 or on tx: yes   5) Low HDL-C (<40 men, <50 women): yes   HDL   Date Value Ref Range Status   02/06/2017 34 (A) >=40 mg/dL Final          Problem List:     Patient Active Problem List    Diagnosis Date Noted   • Hypoglycemia 09/11/2017     Priority: High   • Hypothyroidism 01/11/2018     Priority: Low   • Vitamin D deficiency 05/04/2017     Priority: Low   • Obesity 04/14/2013     Priority: Low   • Seizure disorder ([facility]) 10/22/2012     Priority: Low   • BMI 40.0-44.9, adult ([facility]) 05/17/2020   • Palpitations 03/13/2020   • Insomnia 10/31/2019   • Neck pain 10/30/2019   • Acute midline low back pain without sciatica 09/12/2019   • Cervical radiculopathy 09/10/2019   • Major depressive disorder with single episode, in full remission ([facility]) 06/04/2018   • PCOS (polycystic ovarian syndrome) 02/28/2018   • Hashimoto's thyroiditis 05/04/2017   • Chronic daily headache 02/28/2017   • Essential hypertension 01/31/2017   • Obesity (BMI 30-39.9) 01/31/2017   • Pseudotumor cerebri 10/15/2012        Surgical History:     Past Surgical History:   Procedure Laterality  Date   • GASTROSCOPY N/A 2018    Procedure: GASTROSCOPY;  Surgeon: Kleber Ivory M.D.;  Location: SURGERY [facility];  Service: Gastroenterology   • GASTROSCOPY WITH BIOPSY N/A 2018    Procedure: GASTROSCOPY WITH BIOPSY - GASTRIC;  Surgeon: Kleber Ivory M.D.;  Location: [facility];  Service: Gastroenterology   • EGD W/ENDOSCOPIC ULTRASOUND N/A 2018    Procedure: EGD W/ENDOSCOPIC ULTRASOUND - UPPER, CURVILINEAR;  Surgeon: Kleber Ivory M.D.;  Location: SURGERY [facility];  Service: Gastroenterology   • EGD WITH ASP/BX N/A 2018    Procedure: EGD WITH ASP/BX - GUIDED FNA PANCREATIC HEAD AND/OR TAIL LESION;  Surgeon: Kleber Ivory M.D.;  Location: [facility];  Service: Gastroenterology   • GYN SURGERY      precancerous cells with cervical freezing, HPV positive        Social History:     Social History     Socioeconomic History   • Marital status: Single     Spouse name: Not on file   • Number of children: Not on file   • Years of education: Not on file   • Highest education level: Not on file   Occupational History   • Not on file   Social Needs   • Financial resource strain: Not on file   • Food insecurity     Worry: Not on file     Inability: Not on file   • Transportation needs     Medical: Not on file     Non-medical: Not on file   Tobacco Use   • Smoking status: Former Smoker     Packs/day: 0.00     Start date: 2005     Last attempt to quit: 5/3/2012     Years since quittin.0   • Smokeless tobacco: Never Used   • Tobacco comment: 1/2 pack a day   Substance and Sexual Activity   • Alcohol use: Yes     Alcohol/week: 0.6 oz     Types: 1 Glasses of wine per week     Comment: socially, 1 glass of wine per week   • Drug use: No     Comment:     • Sexual activity: Never   Lifestyle   • Physical activity     Days per week: Not on file     Minutes per session: Not on file   • Stress: Not on file   Relationships   • Social connections     Talks  on phone: Not on file     Gets together: Not on file     Attends [employer] service: Not on file     Active member of club or organization: Not on file     Attends meetings of clubs or organizations: Not on file     Relationship status: Not on file   • Intimate partner violence     Fear of current or ex partner: Not on file     Emotionally abused: Not on file     Physically abused: Not on file     Forced sexual activity: Not on file   Other Topics Concern   • Not on file   Social History Narrative    ** Merged History Encounter **             Family History:     Family History   Problem Relation Age of Onset   • Cancer Mother         Cervical   • Thyroid Mother    • No Known Problems Father    • Heart Disease Maternal Grandmother         Review of Systems:   Review of Systems   Constitutional: Negative for chills, fever and malaise/fatigue.   HENT: Negative for nosebleeds, sore throat and tinnitus.    Eyes: Negative for blurred vision and double vision.   Respiratory: Negative for cough, hemoptysis, shortness of breath and wheezing.    Cardiovascular: Negative for chest pain, palpitations, orthopnea and leg swelling.   Gastrointestinal: Negative for abdominal pain, blood in stool, diarrhea, melena, nausea and vomiting.   Genitourinary: Negative for hematuria.   Musculoskeletal: Negative for joint pain and myalgias.   Skin: Negative for itching and rash.   Neurological: Positive for headaches. Negative for dizziness, tremors, focal weakness, seizures and weakness.   Endo/Heme/Allergies: Does not bruise/bleed easily.   Psychiatric/Behavioral: Negative for depression. The patient is not nervous/anxious and does not have insomnia.         Objective:   Allergies:  Patient has no known allergies.    Vitals:    05/15/20 1036 05/15/20 1045 05/15/20 1046   BP: 127/96 147/110 127/85   BP Location: Left arm Left arm Right arm   Patient Position: Sitting Sitting Sitting   BP Cuff Size: Large adult Adult Large adult   Pulse: 89  "93 86   Weight: (!) 126.7 kg (279 lb 4.8 oz)     Height: 1.778 m (5' 10\")       BP Readings from Last 10 Encounters:   05/15/20 127/85   03/30/20 156/105   03/13/20 160/96   03/09/20 143/107   01/15/20 158/105   01/02/20 128/74   12/11/19 146/84   11/27/19 120/85   11/06/19 130/70   11/04/19 130/90       Body mass index is 40.08 kg/m².      Outpatient Encounter Medications as of 5/15/2020   Medication Sig Dispense Refill   • verapamil ER (CALAN-SR) 180 MG Tab CR Take 1 Tab by mouth every day for 360 days. 90 Tab 3   • lisinopril (PRINIVIL) 40 MG tablet Take 1 Tab by mouth every day for 360 days. 90 Tab 3   • prazosin (MINIPRESS) 1 MG Cap Take 1 Cap by mouth 3 times a day as needed (BP spikes, if SBP > 160 or DBP > 100) for up to 90 days. 90 Cap 0   • vitamin D, Ergocalciferol, (DRISDOL) 1.25 MG (13653 UT) Cap capsule Take 1 Cap by mouth every 7 days for 91 days. 12 Cap 0   • levothyroxine (SYNTHROID) 200 MCG Tab Take 200 mcg by mouth Every morning on an empty stomach.     • meloxicam (MOBIC) 7.5 MG Tab TAKE 1 TABLET BY MOUTH UP TO TWICE DAILY AS NEEDED FOR PAIN. TAKE WITH FOOD.     • ondansetron (ZOFRAN ODT) 4 MG TABLET DISPERSIBLE Take 1 Tab by mouth every 8 hours as needed for Nausea. 20 Tab 0   • tizanidine (ZANAFLEX) 4 MG Tab Take 1 Tab by mouth every 6 hours as needed. 30 Tab 0   • hydrocortisone (ANUSOL-HC) 25 MG Suppos Insert 1 Suppository in rectum every 12 hours as needed (prn pain or itching). 12 Suppository 0   • amLODIPine (NORVASC) 10 MG Tab Take 10 mg by mouth every day.     • venlafaxine XR (EFFEXOR XR) 75 MG CAPSULE SR 24 HR Take 75 mg by mouth every day.     • MIRENA, 52 MG, 20 MCG/24HR IUD 1 Each by Intrauterine route Once.     • [DISCONTINUED] lisinopril (PRINIVIL) 20 MG Tab Take 1 Tab by mouth every day. 90 Tab 1   • [DISCONTINUED] metoprolol SR (TOPROL XL) 25 MG TABLET SR 24 HR Take 1 Tab by mouth every day. 60 Tab 2   • [DISCONTINUED] ketorolac (TORADOL) 10 MG Tab Take 1 Tab by mouth every four " hours as needed. 15 Tab 0   • [DISCONTINUED] lidocaine (LIDODERM) 5 % Patch Apply 1 Patch to skin as directed every 24 hours. 5 Patch 0   • [DISCONTINUED] hydrOXYzine HCl (ATARAX) 50 MG Tab Take 50 mg by mouth 3 times a day as needed for Itching.     • [DISCONTINUED] levothyroxine (SYNTHROID) 200 MCG Tab Take 200 mcg by mouth Every morning on an empty stomach.       No facility-administered encounter medications on file as of 5/15/2020.      Physical Exam  Vitals signs reviewed.   Constitutional:       Appearance: Normal appearance.   HENT:      Head: Normocephalic and atraumatic.      Nose: Nose normal.      Mouth/Throat:      Mouth: Mucous membranes are moist.      Pharynx: Oropharynx is clear.   Eyes:      Extraocular Movements: Extraocular movements intact.      Conjunctiva/sclera: Conjunctivae normal.   Neck:      Musculoskeletal: Normal range of motion and neck supple.   Cardiovascular:      Rate and Rhythm: Normal rate and regular rhythm.      Pulses: Normal pulses.           Carotid pulses are 2+ on the right side and 2+ on the left side.       Radial pulses are 2+ on the right side and 2+ on the left side.        Dorsalis pedis pulses are 2+ on the right side and 2+ on the left side.        Posterior tibial pulses are 2+ on the right side and 2+ on the left side.      Heart sounds: Normal heart sounds.      Comments:    Spider telangectasia:       RLE:  None      LLE: none   Varicosities:           RLE: none      LLE: none   Corona phlebectatica:      RLE:  None        LLE:  None   Cording:         RLE:  None     LLE: None     Pulmonary:      Effort: Pulmonary effort is normal.      Breath sounds: Normal breath sounds.   Abdominal:      General: Abdomen is flat. Bowel sounds are normal.      Palpations: Abdomen is soft.   Musculoskeletal:      Right lower leg: No edema.      Left lower leg: No edema.   Skin:     General: Skin is warm and dry.      Capillary Refill: Capillary refill takes less than 2  seconds.   Neurological:      General: No focal deficit present.      Mental Status: She is alert and oriented to person, place, and time. Mental status is at baseline.   Psychiatric:         Mood and Affect: Mood normal.         Behavior: Behavior normal.          Accessory Clinical Findings:   Echocardiogram:  No results found for this or any previous visit.       Lab Results   Component Value Date    PROTHROMBTM 13.3 01/22/2019    INR 1.00 01/22/2019       Lab Results   Component Value Date    HBA1C 5.0 12/11/2018      Lab Results   Component Value Date    SODIUM 140 03/30/2020    POTASSIUM 4.1 03/30/2020    CHLORIDE 102 03/30/2020    CO2 23 03/30/2020    GLUCOSE 107 (H) 03/30/2020    BUN 11 03/30/2020    CREATININE 0.87 03/30/2020            Results for WILLIE CUNNINGHAM (MRN [id number]) as of 5/15/2020 10:56   Ref. Range 4/29/2020 11:30   TSH Latest Ref Range: 0.380 - 5.330 uIU/mL 0.486   Free T-4 Latest Ref Range: 0.93 - 1.70 ng/dL 1.41   Results for WILLIE CUNNINGHAM (MRN [id number]) as of 5/15/2020 10:56   Ref. Range 10/12/2018 11:13   Antinuclear Antibody Latest Ref Range: None Detected  None Detected     Results for WILLIE CUNNINGHAM (MRN [id number]) as of 5/15/2020 10:56   Ref. Range 1/24/2019 14:20   CSF Albumin Latest Ref Range: 0 - 35 mg/dL 19   Serum Albumin Latest Ref Range: 3500 - 5200 mg/dL 4370   Albumin Ratio Latest Ref Range: 0.09 - 0.25 ratio 0.10   Albumin Index Latest Ref Range: 0.0 - 9.0 ratio 4.3   IgG Ratio Latest Ref Range: 0.28 - 0.66 ratio 0.49   Cns IgG Syn Latest Ref Range: <=8.0 mg/d <0.0   Oligoclonal Bands CSF Latest Ref Range: 0 - 1 Bands 0   Oligoclonal Bands CSF Unknown Negative   Interpretation Unknown See Note       Lab Results   Component Value Date    STMTRPT  03/30/2020     [facility] Emergency Dept.    Test Date:  2020-03-30  Pt Name:    WILLIE FLANAGAN       Department: ER  MRN:        [id number]                       Room:       [facility]  Gender:     Female                       Technician: [id number]  :        1990                   Requested By:LUIS PEDROZA  Order #:    [id number]                    Reading MD: LUIS PEDROZA DO    Measurements  Intervals                                Axis  Rate:       96                           P:          63  GA:         188                          QRS:        20  QRSD:       100                          T:          43  QT:         372  QTc:        471    Interpretive Statements  SINUS RHYTHM  No previous ECG available for comparison  Electronically Signed On 4- 8:40:46 PDT by LUIS PEDROZA DO       MRI brain 2017  MRI of the brain without and with contrast within normal limits.    CTA head/neck 3/30/20  CT angiogram of the [place] within normal limits.  CT angiogram of the neck within normal limits.    Sam Rashid M.D.

## 2020-05-15 NOTE — PATIENT INSTRUCTIONS
- continue all current medications, see updated medication list for changes     I have ordered a 24 hour BP monitor - my medical assistant will contact you, call [phone] if you do not get a call       BLOOD PRESSURE:  - if you notice BP > 140/>90 for more than 3 days, then contact our office ([facility], [phone]) for further instructions    If you are being treated for blood pressure today: please be advised that stabilizing BP may require multiple med changes and close monitoring over the next several months and initially there may be additional imaging and labs and the possibility of adverse drug reactions. Variability of your blood pressure and heart rate may occur until we have things stabilized.  Please feel free to contact our office as needed for questions or concerns.        SODIUM RESTRICTIONS:   - consume no more than 2,300mg of sodium daily (look at food labels) ,or if you have high BP then reduce to no more than 1,500mg of sodium daily   For more information visit: https://www.[employer]/contents/low-sodium-diet-the-basics/print?uetqsPug=3358&source=see_link       DASH DIET:  Overview: (https://www.heart.org/en/health-topics/high-blood-pressure/changes-you-can-make-to-manage-high-blood-pressure/managing-blood-pressure-with-a-heart-healthy-diet)  Tips for shopping:  https://www.[facility].org/healthy-lifestyle/nutrition-and-healthy-eating/in-depth/dash-diet/art-58324586?p=1   DASH is a flexible and balanced eating plan that helps create a heart-healthy eating style for life.  The DASH eating plan requires no special foods and instead provides daily and weekly nutritional goals. This plan recommends:  · Eating vegetables, fruits, and whole grains  · Including fat-free or low-fat dairy products, fish, poultry, beans, nuts, and vegetable oils  · Limiting foods that are high in saturated fat, such as fatty meats, full-fat dairy products, and tropical oils such as coconut, palm kernel,  and palm oils  · Limiting sugar-sweetened beverages and sweets.  Based on these recommendations, the following table shows examples of daily and weekly servings that meet DASH eating plan targets for a 2,930-oxgmmzq-l-day diet.    CHOLESTEROL-REDUCING DIETS:  1) AHA diet  (http://www.heart.org/en/healthy-living/healthy-eating/eat-smart/nutrition-basics/aha-diet-and-lifestyle-recommendations)   2) Mediterranean diet (http://www.heart.org/en/healthy-living/healthy-eating/eat-smart/nutrition-basics/mediterranean-diet)   3) Lowering triglycerides: (http://my.americanheart.org/idc/groups/ahamah-public/@wc/@sop/@[facility]/documents/downloadable/[facility]_425988.pdf)     PHYSICAL ACTIVITY:  Unless directed otherwise at today's visit or you are physically incapable due to your current health or medical conditions, it is advised per the American Heart Association to engage in moderate to vigorous physical activity such as brisk walking, swimming, cycling, >150 minutes per week at 30-40 minutes per session, 3 to 5 times weekly.      GENERAL HEALTHY DIET ADVICE:  - the USDA food pattern (https://www.cnpp.usda.gov/USDAFoodPatterns)  - plate method (https://www.choosemyplate.gov/)  - consume diet that emphasizes intake of vegetables, fruits, and whole grains,  - use low fat diary products, poultry, fish, legumes, nontropical vegetable oils, nuts  - limit intake of sweets, sugar-sweetned beverages, and red meats   - reduce saturated and trans fats to <6% of your daily calories

## 2020-05-15 NOTE — TELEPHONE ENCOUNTER
Faxed request to [facility] ERASMO [phone]    ----- Message from Sam Rashid M.D. sent at 5/15/2020 11:16 AM PDT -----  Please find copy of 30-day heart monitor and last consult note from cardiology at [facility].

## 2020-05-19 NOTE — TELEPHONE ENCOUNTER
Was the patient seen in the last year in this department? 2020    Does patient have an active prescription for medications requested? Yes    Received Request Via: Pharmacy    Hospital Outpatient Visit on 2020   Component Date Value   • Vitamin B12 -True Cobala* 2020 568    • 25-Hydroxy   Vitamin D 25 2020 17*   • Free T-4 2020 1.41    • TSH 2020 0.486    Admission on 2020, Discharged on 2020   Component Date Value   • Report 2020                      Value:[facility]    Test Date:  2020  Pt Name:    WILLIE FLANAGAN       Department: ER  MRN:        [id number]                      Room:        11  Gender:     Female                       Technician: [id number]  :        1990                   Requested By:LUIS PEDROZA  Order #:    [id number]                    Reading MD: LUIS PEDROZA DO    Measurements  Intervals                                Axis  Rate:       96                           P:          63  CT:         188                          QRS:        20  QRSD:       100                          T:          43  QT:         372  QTc:        471    Interpretive Statements  SINUS RHYTHM  No previous ECG available for comparison  Electronically Signed On 4- 8:40:46 PDT by LUIS PEDROZA DO     • WBC 2020 8.5    • RBC 2020 6.17*   • Hemoglobin 2020 17.5*   • Hematocrit 2020 51.7*   • MCV 2020 83.8    • MCH 2020 28.4    • MCHC 2020 33.8    • RDW 2020 40.4    • Platelet Count 2020 338    • MPV 2020 9.0    • Neutrophils-Polys 2020 69.40    • Lymphocytes 2020 19.90*   • Monocytes 2020 7.00    • Eosinophils 2020 2.50    • Basophils 2020 0.40    • Immature Granulocytes 2020 0.80    • Nucleated RBC 2020 0.00    • Neutrophils (Absolute) 2020 5.87    • Lymphs (Absolute) 2020 1.68    • Monos  (Absolute) 03/30/2020 0.59    • Eos (Absolute) 03/30/2020 0.21    • Baso (Absolute) 03/30/2020 0.03    • Immature Granulocytes (a* 03/30/2020 0.07    • NRBC (Absolute) 03/30/2020 0.00    • Sodium 03/30/2020 140    • Potassium 03/30/2020 4.1    • Chloride 03/30/2020 102    • Co2 03/30/2020 23    • Anion Gap 03/30/2020 15.0    • Glucose 03/30/2020 107*   • Bun 03/30/2020 11    • Creatinine 03/30/2020 0.87    • Calcium 03/30/2020 9.8    • AST(SGOT) 03/30/2020 17    • ALT(SGPT) 03/30/2020 22    • Alkaline Phosphatase 03/30/2020 82    • Total Bilirubin 03/30/2020 0.4    • Albumin 03/30/2020 5.0*   • Total Protein 03/30/2020 8.2    • Globulin 03/30/2020 3.2    • A-G Ratio 03/30/2020 1.6    • Troponin T 03/30/2020 <6    • Beta-Hcg Qualitative Ser* 03/30/2020 Negative    • GFR If  03/30/2020 >60    • GFR If Non  Ameri* 03/30/2020 >60    Admission on 01/15/2020, Discharged on 01/15/2020   Component Date Value   • WBC 01/15/2020 7.8    • RBC 01/15/2020 5.40    • Hemoglobin 01/15/2020 15.9    • Hematocrit 01/15/2020 46.1    • MCV 01/15/2020 85.4    • MCH 01/15/2020 29.4    • MCHC 01/15/2020 34.5    • RDW 01/15/2020 38.5    • Platelet Count 01/15/2020 288    • MPV 01/15/2020 9.9    • Neutrophils-Polys 01/15/2020 68.50    • Lymphocytes 01/15/2020 20.70*   • Monocytes 01/15/2020 6.30    • Eosinophils 01/15/2020 3.30    • Basophils 01/15/2020 0.60    • Immature Granulocytes 01/15/2020 0.60    • Nucleated RBC 01/15/2020 0.00    • Neutrophils (Absolute) 01/15/2020 5.33    • Lymphs (Absolute) 01/15/2020 1.61    • Monos (Absolute) 01/15/2020 0.49    • Eos (Absolute) 01/15/2020 0.26    • Baso (Absolute) 01/15/2020 0.05    • Immature Granulocytes (a* 01/15/2020 0.05    • NRBC (Absolute) 01/15/2020 0.00    • Sodium 01/15/2020 141    • Potassium 01/15/2020 3.9    • Chloride 01/15/2020 108    • Co2 01/15/2020 22    • Anion Gap 01/15/2020 11.0    • Glucose 01/15/2020 71    • Bun 01/15/2020 11    • Creatinine  01/15/2020 0.85    • Calcium 01/15/2020 9.3    • AST(SGOT) 01/15/2020 16    • ALT(SGPT) 01/15/2020 13    • Alkaline Phosphatase 01/15/2020 61    • Total Bilirubin 01/15/2020 0.4    • Albumin 01/15/2020 4.5    • Total Protein 01/15/2020 7.3    • Globulin 01/15/2020 2.8    • A-G Ratio 01/15/2020 1.6    • Lipase 01/15/2020 7*   • Beta-Hcg Qualitative Ser* 01/15/2020 Negative    • Color 01/15/2020 Yellow    • Character 01/15/2020 Clear    • Specific Gravity 01/15/2020 1.011    • Ph 01/15/2020 5.5    • Glucose 01/15/2020 Negative    • Ketones 01/15/2020 Negative    • Protein 01/15/2020 Negative    • Bilirubin 01/15/2020 Negative    • Urobilinogen, Urine 01/15/2020 0.2    • Nitrite 01/15/2020 Negative    • Leukocyte Esterase 01/15/2020 Negative    • Occult Blood 01/15/2020 Negative    • Micro Urine Req 01/15/2020 see below    • GFR If  01/15/2020 >60    • GFR If Non  Ameri* 01/15/2020 >60    Office Visit on 01/02/2020   Component Date Value   • POC Color 01/02/2020 yellow    • POC Appearance 01/02/2020 clear    • POC Leukocyte Esterase 01/02/2020 negative    • POC Nitrites 01/02/2020 negative    • POC Urobiligen 01/02/2020 0.2    • POC Protein 01/02/2020 negative    • POC Urine PH 01/02/2020 5.0    • POC Blood 01/02/2020 negative    • POC Specific Gravity 01/02/2020 1.005    • POC Ketones 01/02/2020 negative    • POC Bilirubin 01/02/2020 negative    • POC Glucose 01/02/2020 negative    • POC Urine Pregnancy Test 01/02/2020 negative    • Internal Control Positive 01/02/2020 Positive    • Internal Control Negative 01/02/2020 Negative    Admission on 12/10/2019, Discharged on 12/11/2019   Component Date Value   • WBC 12/10/2019 9.8    • RBC 12/10/2019 5.34    • Hemoglobin 12/10/2019 15.5    • Hematocrit 12/10/2019 45.6    • MCV 12/10/2019 85.4    • MCH 12/10/2019 29.0    • MCHC 12/10/2019 34.0    • RDW 12/10/2019 40.8    • Platelet Count 12/10/2019 279    • MPV 12/10/2019 9.6    • Neutrophils-Polys  12/10/2019 76.70*   • Lymphocytes 12/10/2019 15.10*   • Monocytes 12/10/2019 5.00    • Eosinophils 12/10/2019 2.20    • Basophils 12/10/2019 0.30    • Immature Granulocytes 12/10/2019 0.70    • Nucleated RBC 12/10/2019 0.00    • Neutrophils (Absolute) 12/10/2019 7.48*   • Lymphs (Absolute) 12/10/2019 1.47    • Monos (Absolute) 12/10/2019 0.49    • Eos (Absolute) 12/10/2019 0.21    • Baso (Absolute) 12/10/2019 0.03    • Immature Granulocytes (a* 12/10/2019 0.07    • NRBC (Absolute) 12/10/2019 0.00    • Sodium 12/10/2019 137    • Potassium 12/10/2019 3.6    • Chloride 12/10/2019 104    • Co2 12/10/2019 19*   • Anion Gap 12/10/2019 14.0*   • Glucose 12/10/2019 87    • Bun 12/10/2019 8    • Creatinine 12/10/2019 0.75    • Calcium 12/10/2019 9.3    • AST(SGOT) 12/10/2019 15    • ALT(SGPT) 12/10/2019 16    • Alkaline Phosphatase 12/10/2019 63    • Total Bilirubin 12/10/2019 0.4    • Albumin 12/10/2019 4.5    • Total Protein 12/10/2019 7.4    • Globulin 12/10/2019 2.9    • A-G Ratio 12/10/2019 1.6    • Lipase 12/10/2019 17    • Color 12/10/2019 Yellow    • Character 12/10/2019 Clear    • Specific Gravity 12/10/2019 1.015    • Ph 12/10/2019 6.0    • Glucose 12/10/2019 Negative    • Ketones 12/10/2019 15*   • Protein 12/10/2019 Negative    • Bilirubin 12/10/2019 Negative    • Nitrite 12/10/2019 Negative    • Leukocyte Esterase 12/10/2019 Negative    • Occult Blood 12/10/2019 Negative    • Micro Urine Req 12/10/2019 see below    • GFR If  12/10/2019 >60    • GFR If Non  Ameri* 12/10/2019 >60    • Beta-Hcg Qualitative Ser* 12/10/2019 Negative    Admission on 11/27/2019, Discharged on 11/27/2019   Component Date Value   • Color 11/27/2019 Yellow    • Character 11/27/2019 Clear    • Ph 11/27/2019 6.0    • Glucose 11/27/2019 Negative    • Ketones 11/27/2019 Negative    • Protein 11/27/2019 Negative    • Bilirubin 11/27/2019 Negative    • Urobilinogen, Urine 11/27/2019 0.2    • Nitrite 11/27/2019 Negative     • Leukocyte Esterase 11/27/2019 Small*   • Occult Blood 11/27/2019 Negative    • Micro Urine Req 11/27/2019 Microscopic    • Beta-Hcg Urine 11/27/2019 Negative    • Specific Gravity 11/27/2019 1.018    • WBC 11/27/2019 5-10*   • RBC 11/27/2019 0-2    • Bacteria 11/27/2019 Moderate*   • Epithelial Cells 11/27/2019 Few    Admission on 11/04/2019, Discharged on 11/04/2019   Component Date Value   • WBC 11/04/2019 10.7    • RBC 11/04/2019 5.92*   • Hemoglobin 11/04/2019 17.2*   • Hematocrit 11/04/2019 50.9*   • MCV 11/04/2019 86.0    • MCH 11/04/2019 29.1    • MCHC 11/04/2019 33.8    • RDW 11/04/2019 40.5    • Platelet Count 11/04/2019 369    • MPV 11/04/2019 9.5    • Neutrophils-Polys 11/04/2019 72.00    • Lymphocytes 11/04/2019 21.00*   • Monocytes 11/04/2019 3.00    • Eosinophils 11/04/2019 4.00    • Basophils 11/04/2019 0.00    • Nucleated RBC 11/04/2019 0.00    • Neutrophils (Absolute) 11/04/2019 7.70*   • Lymphs (Absolute) 11/04/2019 2.25    • Monos (Absolute) 11/04/2019 0.32    • Eos (Absolute) 11/04/2019 0.43    • Baso (Absolute) 11/04/2019 0.00    • NRBC (Absolute) 11/04/2019 0.00    • Sodium 11/04/2019 138    • Potassium 11/04/2019 3.7    • Chloride 11/04/2019 103    • Co2 11/04/2019 24    • Anion Gap 11/04/2019 11.0    • Glucose 11/04/2019 98    • Bun 11/04/2019 11    • Creatinine 11/04/2019 1.04    • Calcium 11/04/2019 9.5    • AST(SGOT) 11/04/2019 19    • ALT(SGPT) 11/04/2019 8    • Alkaline Phosphatase 11/04/2019 63    • Total Bilirubin 11/04/2019 0.6    • Albumin 11/04/2019 5.3*   • Total Protein 11/04/2019 8.1    • Globulin 11/04/2019 2.8    • A-G Ratio 11/04/2019 1.9    • Lipase 11/04/2019 15    • Lactic Acid 11/04/2019 3.4*   • Color 11/04/2019 Yellow    • Character 11/04/2019 Clear    • Specific Gravity 11/04/2019 1.020    • Ph 11/04/2019 5.5    • Glucose 11/04/2019 Negative    • Ketones 11/04/2019 Trace*   • Protein 11/04/2019 Negative    • Bilirubin 11/04/2019 Negative    • Urobilinogen, Urine  11/04/2019 0.2    • Nitrite 11/04/2019 Negative    • Leukocyte Esterase 11/04/2019 Negative    • Occult Blood 11/04/2019 Negative    • Micro Urine Req 11/04/2019 see below    • Beta-Hcg Qualitative Ser* 11/04/2019 Negative    • GFR If  11/04/2019 >60    • GFR If Non  Ameri* 11/04/2019 >60    • Lactic Acid 11/04/2019 1.0    • Manual Diff Status 11/04/2019 PERFORMED    • Peripheral Smear Review 11/04/2019 see below    • Plt Estimation 11/04/2019 Normal    • RBC Morphology 11/04/2019 Normal    Admission on 10/30/2019, Discharged on 11/01/2019   Component Date Value   • Sodium 10/30/2019 139    • Potassium 10/30/2019 4.2    • Chloride 10/30/2019 106    • Co2 10/30/2019 26    • Anion Gap 10/30/2019 7.0    • Glucose 10/30/2019 85    • Bun 10/30/2019 18    • Creatinine 10/30/2019 0.84    • Calcium 10/30/2019 8.6    • AST(SGOT) 10/30/2019 18    • ALT(SGPT) 10/30/2019 13    • Alkaline Phosphatase 10/30/2019 52    • Total Bilirubin 10/30/2019 0.4    • Albumin 10/30/2019 4.5    • Total Protein 10/30/2019 6.9    • Globulin 10/30/2019 2.4    • A-G Ratio 10/30/2019 1.9    • WBC 10/30/2019 8.5    • RBC 10/30/2019 4.96    • Hemoglobin 10/30/2019 14.5    • Hematocrit 10/30/2019 42.6    • MCV 10/30/2019 85.9    • MCH 10/30/2019 29.2    • MCHC 10/30/2019 34.0    • RDW 10/30/2019 40.9    • Platelet Count 10/30/2019 250    • MPV 10/30/2019 9.2    • Neutrophils-Polys 10/30/2019 60.70    • Lymphocytes 10/30/2019 31.00    • Monocytes 10/30/2019 6.60    • Eosinophils 10/30/2019 0.90    • Basophils 10/30/2019 0.40    • Immature Granulocytes 10/30/2019 0.40    • Nucleated RBC 10/30/2019 0.00    • Neutrophils (Absolute) 10/30/2019 5.17    • Lymphs (Absolute) 10/30/2019 2.64    • Monos (Absolute) 10/30/2019 0.56    • Eos (Absolute) 10/30/2019 0.08    • Baso (Absolute) 10/30/2019 0.03    • Immature Granulocytes (a* 10/30/2019 0.03    • NRBC (Absolute) 10/30/2019 0.00    • Sed Rate Haley 10/30/2019 4    • C Reactive  Protein High * 10/30/2019 0.5    • C Reactive Protein High * 10/30/2019 0.4    • GFR If  10/30/2019 >60    • GFR If Non  Ameri* 10/30/2019 >60    Admission on 10/27/2019, Discharged on 10/27/2019   Component Date Value   • WBC 10/27/2019 8.2    • RBC 10/27/2019 5.64*   • Hemoglobin 10/27/2019 16.4*   • Hematocrit 10/27/2019 48.4*   • MCV 10/27/2019 85.8    • MCH 10/27/2019 29.1    • MCHC 10/27/2019 33.9    • RDW 10/27/2019 39.8    • Platelet Count 10/27/2019 326    • MPV 10/27/2019 9.7    • Neutrophils-Polys 10/27/2019 72.60*   • Lymphocytes 10/27/2019 16.80*   • Monocytes 10/27/2019 5.80    • Eosinophils 10/27/2019 3.70    • Basophils 10/27/2019 0.60    • Immature Granulocytes 10/27/2019 0.50    • Nucleated RBC 10/27/2019 0.00    • Neutrophils (Absolute) 10/27/2019 5.94    • Lymphs (Absolute) 10/27/2019 1.37    • Monos (Absolute) 10/27/2019 0.47    • Eos (Absolute) 10/27/2019 0.30    • Baso (Absolute) 10/27/2019 0.05    • Immature Granulocytes (a* 10/27/2019 0.04    • NRBC (Absolute) 10/27/2019 0.00    • Lipase 10/27/2019 17    • Beta-Hcg Qualitative Ser* 10/27/2019 Negative    • Sodium 10/27/2019 141    • Potassium 10/27/2019 3.8    • Chloride 10/27/2019 109    • Co2 10/27/2019 19*   • Anion Gap 10/27/2019 13.0*   • Glucose 10/27/2019 99    • Bun 10/27/2019 10    • Creatinine 10/27/2019 0.81    • Calcium 10/27/2019 9.1    • AST(SGOT) 10/27/2019 17    • ALT(SGPT) 10/27/2019 15    • Alkaline Phosphatase 10/27/2019 66    • Total Bilirubin 10/27/2019 0.4    • Albumin 10/27/2019 4.8    • Total Protein 10/27/2019 7.8    • Globulin 10/27/2019 3.0    • A-G Ratio 10/27/2019 1.6    • GFR If  10/27/2019 >60    • GFR If Non  Ameri* 10/27/2019 >60    Admission on 08/29/2019, Discharged on 08/29/2019   Component Date Value   • WBC 08/29/2019 9.3    • RBC 08/29/2019 5.46*   • Hemoglobin 08/29/2019 15.4    • Hematocrit 08/29/2019 46.4    • MCV 08/29/2019 85.0    • MCH 08/29/2019 28.2     • MCHC 08/29/2019 33.2*   • RDW 08/29/2019 40.0    • Platelet Count 08/29/2019 282    • MPV 08/29/2019 9.6    • Neutrophils-Polys 08/29/2019 69.90    • Lymphocytes 08/29/2019 20.20*   • Monocytes 08/29/2019 6.10    • Eosinophils 08/29/2019 2.90    • Basophils 08/29/2019 0.40    • Immature Granulocytes 08/29/2019 0.50    • Nucleated RBC 08/29/2019 0.00    • Neutrophils (Absolute) 08/29/2019 6.46    • Lymphs (Absolute) 08/29/2019 1.87    • Monos (Absolute) 08/29/2019 0.56    • Eos (Absolute) 08/29/2019 0.27    • Baso (Absolute) 08/29/2019 0.04    • Immature Granulocytes (a* 08/29/2019 0.05    • NRBC (Absolute) 08/29/2019 0.00    • Stat C-Reactive Protein 08/29/2019 0.11    • Lipase 08/29/2019 11    • Sodium 08/29/2019 138    • Potassium 08/29/2019 3.8    • Chloride 08/29/2019 108    • Co2 08/29/2019 21    • Anion Gap 08/29/2019 9.0    • Glucose 08/29/2019 87    • Bun 08/29/2019 12    • Creatinine 08/29/2019 0.86    • Calcium 08/29/2019 9.1    • AST(SGOT) 08/29/2019 14    • ALT(SGPT) 08/29/2019 12    • Alkaline Phosphatase 08/29/2019 67    • Total Bilirubin 08/29/2019 0.4    • Albumin 08/29/2019 4.6    • Total Protein 08/29/2019 7.4    • Globulin 08/29/2019 2.8    • A-G Ratio 08/29/2019 1.6    • Color 08/29/2019 Yellow    • Character 08/29/2019 Clear    • Specific Gravity 08/29/2019 1.012    • Ph 08/29/2019 5.5    • Glucose 08/29/2019 Negative    • Ketones 08/29/2019 Negative    • Protein 08/29/2019 Negative    • Bilirubin 08/29/2019 Negative    • Urobilinogen, Urine 08/29/2019 0.2    • Nitrite 08/29/2019 Negative    • Leukocyte Esterase 08/29/2019 Trace*   • Occult Blood 08/29/2019 Negative    • Micro Urine Req 08/29/2019 Microscopic    • Beta-Hcg Qualitative Ser* 08/29/2019 Negative    • GFR If  08/29/2019 >60    • GFR If Non  Ameri* 08/29/2019 >60    • WBC 08/29/2019 0-2    • RBC 08/29/2019 0-2    • Bacteria 08/29/2019 Few*   • Epithelial Cells 08/29/2019 Negative    • Hyaline Cast  08/29/2019 0-2    There may be more visits with results that are not included.

## 2020-05-20 ENCOUNTER — HOSPITAL ENCOUNTER (OUTPATIENT)
Dept: RADIOLOGY | Facility: MEDICAL CENTER | Age: 30
End: 2020-05-20
Attending: FAMILY MEDICINE
Payer: COMMERCIAL

## 2020-05-20 ENCOUNTER — HOSPITAL ENCOUNTER (OUTPATIENT)
Dept: LAB | Facility: MEDICAL CENTER | Age: 30
End: 2020-05-20
Attending: FAMILY MEDICINE
Payer: COMMERCIAL

## 2020-05-20 ENCOUNTER — HOSPITAL ENCOUNTER (OUTPATIENT)
Dept: CARDIOLOGY | Facility: MEDICAL CENTER | Age: 30
End: 2020-05-20
Attending: FAMILY MEDICINE
Payer: COMMERCIAL

## 2020-05-20 DIAGNOSIS — I10 ESSENTIAL HYPERTENSION: ICD-10-CM

## 2020-05-20 LAB
ALBUMIN SERPL BCP-MCNC: 4.5 G/DL (ref 3.2–4.9)
ALBUMIN/GLOB SERPL: 1.8 G/DL
ALP SERPL-CCNC: 71 U/L (ref 30–99)
ALT SERPL-CCNC: 17 U/L (ref 2–50)
ANION GAP SERPL CALC-SCNC: 11 MMOL/L (ref 7–16)
AST SERPL-CCNC: 15 U/L (ref 12–45)
BACTERIA #/AREA URNS HPF: NEGATIVE /HPF
BASOPHILS # BLD AUTO: 0.6 % (ref 0–1.8)
BASOPHILS # BLD: 0.04 K/UL (ref 0–0.12)
BILIRUB SERPL-MCNC: 0.2 MG/DL (ref 0.1–1.5)
BUN SERPL-MCNC: 11 MG/DL (ref 8–22)
CALCIUM SERPL-MCNC: 9 MG/DL (ref 8.5–10.5)
CHLORIDE SERPL-SCNC: 108 MMOL/L (ref 96–112)
CO2 SERPL-SCNC: 23 MMOL/L (ref 20–33)
CORTIS SERPL-MCNC: 7.3 UG/DL (ref 0–23)
CREAT SERPL-MCNC: 0.94 MG/DL (ref 0.5–1.4)
CREAT UR-MCNC: 37.04 MG/DL
EOSINOPHIL # BLD AUTO: 0.2 K/UL (ref 0–0.51)
EOSINOPHIL NFR BLD: 3 % (ref 0–6.9)
EPI CELLS #/AREA URNS HPF: NORMAL /HPF
ERYTHROCYTE [DISTWIDTH] IN BLOOD BY AUTOMATED COUNT: 42.2 FL (ref 35.9–50)
FASTING STATUS PATIENT QL REPORTED: NORMAL
GLOBULIN SER CALC-MCNC: 2.5 G/DL (ref 1.9–3.5)
GLUCOSE SERPL-MCNC: 93 MG/DL (ref 65–99)
HCT VFR BLD AUTO: 43.4 % (ref 37–47)
HGB BLD-MCNC: 14.7 G/DL (ref 12–16)
IMM GRANULOCYTES # BLD AUTO: 0.04 K/UL (ref 0–0.11)
IMM GRANULOCYTES NFR BLD AUTO: 0.6 % (ref 0–0.9)
LV EJECT FRACT  99904: 60
LV EJECT FRACT MOD 2C 99903: 65.86
LV EJECT FRACT MOD 4C 99902: 59
LV EJECT FRACT MOD BP 99901: 62.36
LYMPHOCYTES # BLD AUTO: 1.49 K/UL (ref 1–4.8)
LYMPHOCYTES NFR BLD: 22.2 % (ref 22–41)
MCH RBC QN AUTO: 29.1 PG (ref 27–33)
MCHC RBC AUTO-ENTMCNC: 33.9 G/DL (ref 33.6–35)
MCV RBC AUTO: 85.9 FL (ref 81.4–97.8)
MICROALBUMIN UR-MCNC: <1.2 MG/DL
MICROALBUMIN/CREAT UR: NORMAL MG/G (ref 0–30)
MONOCYTES # BLD AUTO: 0.37 K/UL (ref 0–0.85)
MONOCYTES NFR BLD AUTO: 5.5 % (ref 0–13.4)
NEUTROPHILS # BLD AUTO: 4.57 K/UL (ref 2–7.15)
NEUTROPHILS NFR BLD: 68.1 % (ref 44–72)
NRBC # BLD AUTO: 0 K/UL
NRBC BLD-RTO: 0 /100 WBC
PLATELET # BLD AUTO: 289 K/UL (ref 164–446)
PMV BLD AUTO: 9.7 FL (ref 9–12.9)
POTASSIUM SERPL-SCNC: 4 MMOL/L (ref 3.6–5.5)
PROT SERPL-MCNC: 7 G/DL (ref 6–8.2)
RBC # BLD AUTO: 5.05 M/UL (ref 4.2–5.4)
RBC # URNS HPF: NORMAL /HPF
RENAL EPI CELLS #/AREA URNS HPF: NEGATIVE /HPF
SODIUM SERPL-SCNC: 142 MMOL/L (ref 135–145)
TRANS CELLS #/AREA URNS HPF: NEGATIVE /HPF
WBC # BLD AUTO: 6.7 K/UL (ref 4.8–10.8)
WBC #/AREA URNS HPF: NORMAL /HPF

## 2020-05-20 PROCEDURE — 93306 TTE W/DOPPLER COMPLETE: CPT

## 2020-05-20 PROCEDURE — 85025 COMPLETE CBC W/AUTO DIFF WBC: CPT

## 2020-05-20 PROCEDURE — 82088 ASSAY OF ALDOSTERONE: CPT

## 2020-05-20 PROCEDURE — 93306 TTE W/DOPPLER COMPLETE: CPT | Mod: 26 | Performed by: INTERNAL MEDICINE

## 2020-05-20 PROCEDURE — 82043 UR ALBUMIN QUANTITATIVE: CPT

## 2020-05-20 PROCEDURE — 82570 ASSAY OF URINE CREATININE: CPT

## 2020-05-20 PROCEDURE — 81015 MICROSCOPIC EXAM OF URINE: CPT

## 2020-05-20 PROCEDURE — 82533 TOTAL CORTISOL: CPT

## 2020-05-20 PROCEDURE — 80053 COMPREHEN METABOLIC PANEL: CPT

## 2020-05-20 PROCEDURE — 93975 VASCULAR STUDY: CPT | Mod: 26 | Performed by: INTERNAL MEDICINE

## 2020-05-20 PROCEDURE — 93975 VASCULAR STUDY: CPT

## 2020-05-20 PROCEDURE — 84244 ASSAY OF RENIN: CPT

## 2020-05-20 PROCEDURE — 83835 ASSAY OF METANEPHRINES: CPT

## 2020-05-20 PROCEDURE — 36415 COLL VENOUS BLD VENIPUNCTURE: CPT

## 2020-05-20 RX ORDER — AMLODIPINE BESYLATE 10 MG/1
10 TABLET ORAL DAILY
Qty: 90 TAB | Refills: 0 | OUTPATIENT
Start: 2020-05-20

## 2020-05-20 NOTE — TELEPHONE ENCOUNTER
Hi Dr. Rashid,  I received refill request from pharmacy for amlodipine.  I'm not certain if this was to be continued as patient may be taking verapamil?  Take care,  Irais Hercules, APRN

## 2020-05-21 ENCOUNTER — TELEPHONE (OUTPATIENT)
Dept: VASCULAR LAB | Facility: MEDICAL CENTER | Age: 30
End: 2020-05-21

## 2020-05-22 ENCOUNTER — TELEPHONE (OUTPATIENT)
Dept: VASCULAR LAB | Facility: MEDICAL CENTER | Age: 30
End: 2020-05-22

## 2020-05-22 LAB — ALDOST SERPL-MCNC: 9 NG/DL

## 2020-05-22 NOTE — TELEPHONE ENCOUNTER
Faxed ERASMO request to [facility] #[phone]   Requesting 30Heart monitor and last consult note from Cardiology

## 2020-05-23 LAB — RENIN PLAS-CCNC: 0.3 NG/ML/HR

## 2020-05-24 LAB
METANEPHS SERPL-SCNC: 0.13 NMOL/L (ref 0–0.49)
NORMETANEPHRINE SERPL-SCNC: 0.41 NMOL/L (ref 0–0.89)

## 2020-06-05 ENCOUNTER — APPOINTMENT (OUTPATIENT)
Dept: RADIOLOGY | Facility: MEDICAL CENTER | Age: 30
End: 2020-06-05
Attending: EMERGENCY MEDICINE
Payer: COMMERCIAL

## 2020-06-05 ENCOUNTER — HOSPITAL ENCOUNTER (EMERGENCY)
Facility: MEDICAL CENTER | Age: 30
End: 2020-06-05
Attending: EMERGENCY MEDICINE
Payer: COMMERCIAL

## 2020-06-05 VITALS
TEMPERATURE: 98.1 F | SYSTOLIC BLOOD PRESSURE: 174 MMHG | HEART RATE: 73 BPM | HEIGHT: 70 IN | RESPIRATION RATE: 18 BRPM | DIASTOLIC BLOOD PRESSURE: 96 MMHG | BODY MASS INDEX: 40.4 KG/M2 | WEIGHT: 282.19 LBS | OXYGEN SATURATION: 95 %

## 2020-06-05 DIAGNOSIS — R11.2 NON-INTRACTABLE VOMITING WITH NAUSEA, UNSPECIFIED VOMITING TYPE: ICD-10-CM

## 2020-06-05 DIAGNOSIS — G89.29 CHRONIC ABDOMINAL PAIN: ICD-10-CM

## 2020-06-05 DIAGNOSIS — R19.7 DIARRHEA, UNSPECIFIED TYPE: ICD-10-CM

## 2020-06-05 DIAGNOSIS — R10.9 CHRONIC ABDOMINAL PAIN: ICD-10-CM

## 2020-06-05 LAB
ALBUMIN SERPL BCP-MCNC: 4.6 G/DL (ref 3.2–4.9)
ALBUMIN/GLOB SERPL: 1.8 G/DL
ALP SERPL-CCNC: 68 U/L (ref 30–99)
ALT SERPL-CCNC: 15 U/L (ref 2–50)
ANION GAP SERPL CALC-SCNC: 12 MMOL/L (ref 7–16)
APPEARANCE UR: ABNORMAL
AST SERPL-CCNC: 13 U/L (ref 12–45)
BACTERIA #/AREA URNS HPF: ABNORMAL /HPF
BASOPHILS # BLD AUTO: 0.4 % (ref 0–1.8)
BASOPHILS # BLD: 0.04 K/UL (ref 0–0.12)
BILIRUB SERPL-MCNC: 0.2 MG/DL (ref 0.1–1.5)
BILIRUB UR QL STRIP.AUTO: NEGATIVE
BUN SERPL-MCNC: 12 MG/DL (ref 8–22)
CALCIUM SERPL-MCNC: 9.4 MG/DL (ref 8.5–10.5)
CHLORIDE SERPL-SCNC: 108 MMOL/L (ref 96–112)
CO2 SERPL-SCNC: 20 MMOL/L (ref 20–33)
COLOR UR: YELLOW
CREAT SERPL-MCNC: 0.83 MG/DL (ref 0.5–1.4)
EOSINOPHIL # BLD AUTO: 0.2 K/UL (ref 0–0.51)
EOSINOPHIL NFR BLD: 1.8 % (ref 0–6.9)
EPI CELLS #/AREA URNS HPF: ABNORMAL /HPF
ERYTHROCYTE [DISTWIDTH] IN BLOOD BY AUTOMATED COUNT: 41.2 FL (ref 35.9–50)
GLOBULIN SER CALC-MCNC: 2.6 G/DL (ref 1.9–3.5)
GLUCOSE SERPL-MCNC: 102 MG/DL (ref 65–99)
GLUCOSE UR STRIP.AUTO-MCNC: NEGATIVE MG/DL
HCG SERPL QL: NEGATIVE
HCT VFR BLD AUTO: 46.3 % (ref 37–47)
HGB BLD-MCNC: 15.6 G/DL (ref 12–16)
HYALINE CASTS #/AREA URNS LPF: ABNORMAL /LPF
IMM GRANULOCYTES # BLD AUTO: 0.04 K/UL (ref 0–0.11)
IMM GRANULOCYTES NFR BLD AUTO: 0.4 % (ref 0–0.9)
KETONES UR STRIP.AUTO-MCNC: NEGATIVE MG/DL
LEUKOCYTE ESTERASE UR QL STRIP.AUTO: ABNORMAL
LIPASE SERPL-CCNC: 24 U/L (ref 11–82)
LYMPHOCYTES # BLD AUTO: 1.11 K/UL (ref 1–4.8)
LYMPHOCYTES NFR BLD: 9.8 % (ref 22–41)
MCH RBC QN AUTO: 29.1 PG (ref 27–33)
MCHC RBC AUTO-ENTMCNC: 33.7 G/DL (ref 33.6–35)
MCV RBC AUTO: 86.4 FL (ref 81.4–97.8)
MICRO URNS: ABNORMAL
MONOCYTES # BLD AUTO: 0.64 K/UL (ref 0–0.85)
MONOCYTES NFR BLD AUTO: 5.7 % (ref 0–13.4)
NEUTROPHILS # BLD AUTO: 9.25 K/UL (ref 2–7.15)
NEUTROPHILS NFR BLD: 81.9 % (ref 44–72)
NITRITE UR QL STRIP.AUTO: NEGATIVE
NRBC # BLD AUTO: 0 K/UL
NRBC BLD-RTO: 0 /100 WBC
PH UR STRIP.AUTO: 5 [PH] (ref 5–8)
PLATELET # BLD AUTO: 254 K/UL (ref 164–446)
PMV BLD AUTO: 9.4 FL (ref 9–12.9)
POTASSIUM SERPL-SCNC: 3.7 MMOL/L (ref 3.6–5.5)
PROT SERPL-MCNC: 7.2 G/DL (ref 6–8.2)
PROT UR QL STRIP: NEGATIVE MG/DL
RBC # BLD AUTO: 5.36 M/UL (ref 4.2–5.4)
RBC # URNS HPF: ABNORMAL /HPF
RBC UR QL AUTO: NEGATIVE
SODIUM SERPL-SCNC: 140 MMOL/L (ref 135–145)
SP GR UR STRIP.AUTO: 1.02
UROBILINOGEN UR STRIP.AUTO-MCNC: 0.2 MG/DL
WBC # BLD AUTO: 11.3 K/UL (ref 4.8–10.8)
WBC #/AREA URNS HPF: ABNORMAL /HPF

## 2020-06-05 PROCEDURE — 700101 HCHG RX REV CODE 250: Performed by: EMERGENCY MEDICINE

## 2020-06-05 PROCEDURE — 81001 URINALYSIS AUTO W/SCOPE: CPT

## 2020-06-05 PROCEDURE — 85025 COMPLETE CBC W/AUTO DIFF WBC: CPT

## 2020-06-05 PROCEDURE — 96374 THER/PROPH/DIAG INJ IV PUSH: CPT

## 2020-06-05 PROCEDURE — 700111 HCHG RX REV CODE 636 W/ 250 OVERRIDE (IP): Performed by: EMERGENCY MEDICINE

## 2020-06-05 PROCEDURE — 700105 HCHG RX REV CODE 258: Performed by: EMERGENCY MEDICINE

## 2020-06-05 PROCEDURE — 80053 COMPREHEN METABOLIC PANEL: CPT

## 2020-06-05 PROCEDURE — 96375 TX/PRO/DX INJ NEW DRUG ADDON: CPT

## 2020-06-05 PROCEDURE — 99285 EMERGENCY DEPT VISIT HI MDM: CPT

## 2020-06-05 PROCEDURE — 84703 CHORIONIC GONADOTROPIN ASSAY: CPT

## 2020-06-05 PROCEDURE — 74018 RADEX ABDOMEN 1 VIEW: CPT

## 2020-06-05 PROCEDURE — 83690 ASSAY OF LIPASE: CPT

## 2020-06-05 RX ORDER — SODIUM CHLORIDE 9 MG/ML
1000 INJECTION, SOLUTION INTRAVENOUS ONCE
Status: COMPLETED | OUTPATIENT
Start: 2020-06-05 | End: 2020-06-05

## 2020-06-05 RX ORDER — KETOROLAC TROMETHAMINE 30 MG/ML
15 INJECTION, SOLUTION INTRAMUSCULAR; INTRAVENOUS ONCE
Status: COMPLETED | OUTPATIENT
Start: 2020-06-05 | End: 2020-06-05

## 2020-06-05 RX ORDER — ONDANSETRON 2 MG/ML
4 INJECTION INTRAMUSCULAR; INTRAVENOUS ONCE
Status: COMPLETED | OUTPATIENT
Start: 2020-06-05 | End: 2020-06-05

## 2020-06-05 RX ORDER — LORAZEPAM 2 MG/ML
0.5 INJECTION INTRAMUSCULAR ONCE
Status: COMPLETED | OUTPATIENT
Start: 2020-06-05 | End: 2020-06-05

## 2020-06-05 RX ADMIN — FAMOTIDINE 20 MG: 10 INJECTION, SOLUTION INTRAVENOUS at 08:06

## 2020-06-05 RX ADMIN — SODIUM CHLORIDE 1000 ML: 9 INJECTION, SOLUTION INTRAVENOUS at 07:22

## 2020-06-05 RX ADMIN — KETOROLAC TROMETHAMINE 15 MG: 30 INJECTION, SOLUTION INTRAMUSCULAR at 08:06

## 2020-06-05 RX ADMIN — KETAMINE HYDROCHLORIDE 25 MG: 10 INJECTION, SOLUTION INTRAMUSCULAR; INTRAVENOUS at 09:47

## 2020-06-05 RX ADMIN — LORAZEPAM 0.5 MG: 2 INJECTION INTRAMUSCULAR; INTRAVENOUS at 07:23

## 2020-06-05 RX ADMIN — ONDANSETRON HYDROCHLORIDE 4 MG: 2 SOLUTION INTRAMUSCULAR; INTRAVENOUS at 07:23

## 2020-06-05 ASSESSMENT — FIBROSIS 4 INDEX: FIB4 SCORE: 0.37

## 2020-06-05 NOTE — DISCHARGE INSTRUCTIONS
If you do not hear from the office by Mon afternoon, call them and let them know that I spoke with Dr Mukherjee and we would like you seen at soonest possibility.  Use that Lomotil for diarrhea.  Use Zofran for nausea.  Plenty of fluids--advance diet as tolerated.  Return to ER for worsening pain ,fever, or any turn for the worse.

## 2020-06-05 NOTE — ED PROVIDER NOTES
ED Provider Note    CHIEF COMPLAINT  Vomiting and diarrhea    HPI  Nickolas Galdamez is a 30 y.o. female who presents with abdominal pain, vomiting and diarrhea.  This started earlier this morning.  She has had this many times in the past but this feels worse than it typically does.  She describes a stabbing sensation the upper belly followed by soreness.  Drinking water makes it worse.  She has had associated diarrhea.  Patient has had this intermittently for quite some time.  Looking to the records, she has had many ER visits for similar symptoms.  She has had multiple CTs, the last of which I reviewed, in January, which showed some mild hepatosplenomegaly.  She had a right upper quadrant ultrasound December which showed no stones, does show an echogenic liver.  From the records I see that she had endoscopy back in 2018 by Dr. Ivory.  Patient states she is subsequently followed up, and no one can figure out what is wrong with her.  There is been no fever.  No bladder or pelvic complaints.  Patient also suffers from headaches, has had multiple head CTs, last with a normal CT angiogram 3 months ago.  Apparently she is established with Dr. Estrada and neurosurgery.  Also suffers from chronic back and neck pain, MRI of her lumbar spine in February was notable for transitional S1 vertebral body.  There is no other complaint.    PAST MEDICAL HISTORY  Past Medical History:   Diagnosis Date   • Cancer (HCC) 2008    pre-cancerous cervical cells--froze the cervix   • EEG abnormal    • Hashimoto's disease    • Hyperglycemia    • Hypertension    • Hypoglycemia    • Migraine    • Pseudotumor cerebri    • Seizure disorder (HCC)        FAMILY HISTORY  Family History   Problem Relation Age of Onset   • Cancer Mother         Cervical   • Thyroid Mother    • No Known Problems Father    • Heart Disease Maternal Grandmother        SOCIAL HISTORY  Social History     Tobacco Use   • Smoking status: Former Smoker      Packs/day: 0.00     Start date: 2005     Last attempt to quit: 5/3/2012     Years since quittin.0   • Smokeless tobacco: Never Used   • Tobacco comment: 1/2 pack a day   Substance Use Topics   • Alcohol use: Yes     Alcohol/week: 0.6 oz     Types: 1 Glasses of wine per week     Comment: socially, 1 glass of wine per week   • Drug use: No     Comment:           SURGICAL HISTORY  Past Surgical History:   Procedure Laterality Date   • GASTROSCOPY N/A 2018    Procedure: GASTROSCOPY;  Surgeon: Kleber Ivory M.D.;  Location: [facility];  Service: Gastroenterology   • GASTROSCOPY WITH BIOPSY N/A 2018    Procedure: GASTROSCOPY WITH BIOPSY - GASTRIC;  Surgeon: Kleber Ivory M.D.;  Location: [facility];  Service: Gastroenterology   • EGD W/ENDOSCOPIC ULTRASOUND N/A 2018    Procedure: EGD W/ENDOSCOPIC ULTRASOUND - UPPER, CURVILINEAR;  Surgeon: Kleber Ivory M.D.;  Location: [facility];  Service: Gastroenterology   • EGD WITH ASP/BX N/A 2018    Procedure: EGD WITH ASP/BX - GUIDED FNA PANCREATIC HEAD AND/OR TAIL LESION;  Surgeon: Kleber Ivory M.D.;  Location: [facility];  Service: Gastroenterology   • GYN SURGERY      precancerous cells with cervical freezing, HPV positive       CURRENT MEDICATIONS  Home Medications     Reviewed by Merrill Rodríguez R.N. (Registered Nurse) on 20 at 0655  Med List Status: Partial   Medication Last Dose Status   amLODIPine (NORVASC) 10 MG Tab  Active   hydrocortisone (ANUSOL-HC) 25 MG Suppos  Active   levothyroxine (SYNTHROID) 200 MCG Tab  Active   lisinopril (PRINIVIL) 40 MG tablet  Active   meloxicam (MOBIC) 7.5 MG Tab  Active   MIRENA, 52 MG, 20 MCG/24HR IUD  Active   ondansetron (ZOFRAN ODT) 4 MG TABLET DISPERSIBLE  Active   prazosin (MINIPRESS) 1 MG Cap  Active   tizanidine (ZANAFLEX) 4 MG Tab  Active   venlafaxine XR (EFFEXOR XR) 75 MG CAPSULE SR 24 HR  Active   verapamil ER (CALAN-SR) 180 MG  "Tab CR  Active   vitamin D, Ergocalciferol, (DRISDOL) 1.25 MG (91041 UT) Cap capsule  Active                I have reviewed the nurses notes and/or the list brought with the patient.    ALLERGIES  No Known Allergies    REVIEW OF SYSTEMS  See HPI for further details. Review of systems as above, otherwise all other systems are negative.     PHYSICAL EXAM  VITAL SIGNS: /108   Pulse 100   Temp 36.3 °C (97.3 °F)   Resp 18   Ht 1.778 m (5' 10\")   Wt (!) 128 kg (282 lb 3 oz)   SpO2 97%   BMI 40.49 kg/m²     Constitutional: Sobbing, crying, copious tears.  Not toxic.  HENT: Mucus membranes moist.  Oropharynx is clear.  Eyes: Pupils equally round.  No scleral icterus.   Neck: Full nontender range of motion.  Lymphatic: No cervical lymphadenopathy noted.   Cardiovascular: Regular heart rate and rhythm.  No murmurs, rubs, nor gallop appreciated.   Thorax & Lungs: Chest is nontender.  Lungs are clear to auscultation with good air movement bilaterally.  No wheeze, rhonchi, nor rales.   Abdomen: Soft, with mild epigastric tenderness however no rebound nor guarding.  No mass, pulsatile mass, nor hepatosplenomegaly appreciated.  Skin: No purpura nor petechia noted.  Extremities/Musculoskeletal: No sign of trauma.  Calves are nontender with no cords nor edema.  No Leo's sign.  Pulses are intact all around.   Neurologic: Alert & oriented.  Strength and sensation is intact all around.  Gait is normal.  Psychiatric: Normal affect appropriate for the clinical situation.    LABS  Labs Reviewed   CBC WITH DIFFERENTIAL - Abnormal; Notable for the following components:       Result Value    WBC 11.3 (*)     Neutrophils-Polys 81.90 (*)     Lymphocytes 9.80 (*)     Neutrophils (Absolute) 9.25 (*)     All other components within normal limits   COMP METABOLIC PANEL - Abnormal; Notable for the following components:    Glucose 102 (*)     All other components within normal limits   URINALYSIS,CULTURE IF INDICATED - Abnormal; " Notable for the following components:    Character Cloudy (*)     Leukocyte Esterase Trace (*)     All other components within normal limits   URINE MICROSCOPIC (W/UA) - Abnormal; Notable for the following components:    WBC 5-10 (*)     RBC 2-5 (*)     Bacteria Moderate (*)     Hyaline Cast 3-5 (*)     All other components within normal limits   LIPASE   HCG QUAL SERUM   ESTIMATED GFR       MEDICAL RECORD  I have reviewed patient's medical record and pertinent results are listed above.    COURSE & MEDICAL DECISION MAKING  I have reviewed any medical record information, laboratory studies and radiographic results as noted above.  This is a patient presents with recurrent abdominal pain with associated nausea, vomiting and diarrhea.  Since January 1, 2018 this 30-year-old has had 6 belly CTs with this health system, the last which I reviewed and showed hepatosplenomegaly.  She had a right upper quadrant ultrasound 6 months ago which showed no stones.  She is already established with GI.  Given all this, I suspect that we will not have much to offer her in terms of a diagnosis today, however we will check basic laboratories and a flatplate, and in the interim, give her IV fluids and symptom control.  Given report of copious nausea and vomiting, do not think she is a candidate for p.o. medication and oral hydration.    Recheck at 0 749: The patient's states she is better but still hurts.  She looks much more comfortable.  She is no longer crying.  We will add on Pepcid and Toradol.    Recheck at 0808: Patient is once again sobbing.  She presents another episode of vomiting and diarrhea.  Laboratories returned showing a mild leukocytosis at 11.3, with a preponderance of neutrophils but no bandemia reported by the lab.  She is not pregnant.  LFTs are normal.  Hepatic and kidney function are normal.  Lipase is normal.  She clarifies she has not seen a local GI since Dr. Ivory worked on her back in December 2018.  She is  been trying to follow-up but has been unable to because of the pandemic.  She had been referred to [facility] was told that everything was fine, and to see local providers.    Recheck 0 845: Resting comfortably    Recheck 0 940: She is complaining of ongoing pain.  She is received Zofran, Ativan, Toradol, Pepcid.  Her urinalysis has returned showing bacteriuria, with her lack of symptoms this would be asymptomatic bacteriuria.  Urine was obtained to check for hydration status, there is no evidence of ketones.  At this point we will go ahead and treat her with a dose of ketamine pain protocol.  She has declined opiates, which I think is reasonable.    Upon recheck, after IV fluids, she is improved.    Recheck at 1140: The patient is feeling like she can go home.  Her abdomen is completely benign.  She is frustrated that she does not have a diagnosis.  However she feels good enough to manage at home.  She points that she has a lomotil that she may use for diarrhea already, as well as both oral dissolving tablets of Zofran as well as antinausea suppositories.  I did call and discussed the patient's case with Dr. Mukherjee, on-call for the GI group.  He will arrange her to be seen in follow-up.  I encouraged the patient to keep this, hopefully get some guidance on managing what appears to be chronic episodes of vomiting and diarrhea.  Also she may follow-up with her personal doctor as well.  Instructions on vomiting and diarrhea.        FINAL IMPRESSION  1. Non-intractable vomiting with nausea, unspecified vomiting type    2. Diarrhea, unspecified type    3. Chronic abdominal pain           This dictation was created using voice recognition software.    Electronically signed by: George Corea M.D., 6/5/2020 7:07 AM

## 2020-06-05 NOTE — ED NOTES
Discharge instructions given and explained to pt including f/u and reason for rtn. All questions answered and pt verbalized understanding.

## 2020-06-05 NOTE — ED TRIAGE NOTES
Chief Complaint   Patient presents with   • Abdominal Pain     Since 6/4/20 evening, increasing in severity since 0300 6/5/20, described as cramping, vomitting makes pain better, drinking water make pain worse   • Diarrhea     Since abdominal pain   • Nausea/Vomiting/Diarrhea     Vomits after eating or drinking 6/4/20

## 2020-06-10 ENCOUNTER — HOSPITAL ENCOUNTER (EMERGENCY)
Facility: MEDICAL CENTER | Age: 30
End: 2020-06-11
Attending: EMERGENCY MEDICINE
Payer: COMMERCIAL

## 2020-06-10 ENCOUNTER — APPOINTMENT (OUTPATIENT)
Dept: RADIOLOGY | Facility: MEDICAL CENTER | Age: 30
End: 2020-06-10
Attending: EMERGENCY MEDICINE
Payer: COMMERCIAL

## 2020-06-10 DIAGNOSIS — R51.9 NONINTRACTABLE HEADACHE, UNSPECIFIED CHRONICITY PATTERN, UNSPECIFIED HEADACHE TYPE: ICD-10-CM

## 2020-06-10 LAB
ANION GAP SERPL CALC-SCNC: 17 MMOL/L (ref 7–16)
APTT PPP: 28.3 SEC (ref 24.7–36)
BASOPHILS # BLD AUTO: 0.4 % (ref 0–1.8)
BASOPHILS # BLD: 0.04 K/UL (ref 0–0.12)
BUN SERPL-MCNC: 11 MG/DL (ref 8–22)
CALCIUM SERPL-MCNC: 9.6 MG/DL (ref 8.5–10.5)
CHLORIDE SERPL-SCNC: 104 MMOL/L (ref 96–112)
CO2 SERPL-SCNC: 21 MMOL/L (ref 20–33)
CREAT SERPL-MCNC: 1.01 MG/DL (ref 0.5–1.4)
EOSINOPHIL # BLD AUTO: 0.42 K/UL (ref 0–0.51)
EOSINOPHIL NFR BLD: 4.6 % (ref 0–6.9)
ERYTHROCYTE [DISTWIDTH] IN BLOOD BY AUTOMATED COUNT: 40.7 FL (ref 35.9–50)
GLUCOSE SERPL-MCNC: 102 MG/DL (ref 65–99)
HCT VFR BLD AUTO: 47.2 % (ref 37–47)
HGB BLD-MCNC: 15.8 G/DL (ref 12–16)
IMM GRANULOCYTES # BLD AUTO: 0.06 K/UL (ref 0–0.11)
IMM GRANULOCYTES NFR BLD AUTO: 0.7 % (ref 0–0.9)
INR PPP: 0.95 (ref 0.87–1.13)
LYMPHOCYTES # BLD AUTO: 1.79 K/UL (ref 1–4.8)
LYMPHOCYTES NFR BLD: 19.7 % (ref 22–41)
MCH RBC QN AUTO: 28.8 PG (ref 27–33)
MCHC RBC AUTO-ENTMCNC: 33.5 G/DL (ref 33.6–35)
MCV RBC AUTO: 86.1 FL (ref 81.4–97.8)
MONOCYTES # BLD AUTO: 0.53 K/UL (ref 0–0.85)
MONOCYTES NFR BLD AUTO: 5.8 % (ref 0–13.4)
NEUTROPHILS # BLD AUTO: 6.26 K/UL (ref 2–7.15)
NEUTROPHILS NFR BLD: 68.8 % (ref 44–72)
NRBC # BLD AUTO: 0 K/UL
NRBC BLD-RTO: 0 /100 WBC
PLATELET # BLD AUTO: 286 K/UL (ref 164–446)
PMV BLD AUTO: 9.7 FL (ref 9–12.9)
POTASSIUM SERPL-SCNC: 3.6 MMOL/L (ref 3.6–5.5)
PROTHROMBIN TIME: 12.8 SEC (ref 12–14.6)
RBC # BLD AUTO: 5.48 M/UL (ref 4.2–5.4)
SODIUM SERPL-SCNC: 142 MMOL/L (ref 135–145)
WBC # BLD AUTO: 9.1 K/UL (ref 4.8–10.8)

## 2020-06-10 PROCEDURE — 85730 THROMBOPLASTIN TIME PARTIAL: CPT

## 2020-06-10 PROCEDURE — 70450 CT HEAD/BRAIN W/O DYE: CPT

## 2020-06-10 PROCEDURE — 700111 HCHG RX REV CODE 636 W/ 250 OVERRIDE (IP)

## 2020-06-10 PROCEDURE — 85610 PROTHROMBIN TIME: CPT

## 2020-06-10 PROCEDURE — 94760 N-INVAS EAR/PLS OXIMETRY 1: CPT

## 2020-06-10 PROCEDURE — 96374 THER/PROPH/DIAG INJ IV PUSH: CPT

## 2020-06-10 PROCEDURE — 85025 COMPLETE CBC W/AUTO DIFF WBC: CPT

## 2020-06-10 PROCEDURE — 80048 BASIC METABOLIC PNL TOTAL CA: CPT

## 2020-06-10 PROCEDURE — 700111 HCHG RX REV CODE 636 W/ 250 OVERRIDE (IP): Performed by: EMERGENCY MEDICINE

## 2020-06-10 PROCEDURE — 99285 EMERGENCY DEPT VISIT HI MDM: CPT

## 2020-06-10 PROCEDURE — 96375 TX/PRO/DX INJ NEW DRUG ADDON: CPT

## 2020-06-10 RX ORDER — KETOROLAC TROMETHAMINE 30 MG/ML
30 INJECTION, SOLUTION INTRAMUSCULAR; INTRAVENOUS ONCE
Status: COMPLETED | OUTPATIENT
Start: 2020-06-10 | End: 2020-06-10

## 2020-06-10 RX ORDER — DIPHENHYDRAMINE HYDROCHLORIDE 50 MG/ML
25 INJECTION INTRAMUSCULAR; INTRAVENOUS ONCE
Status: COMPLETED | OUTPATIENT
Start: 2020-06-10 | End: 2020-06-10

## 2020-06-10 RX ORDER — HYDRALAZINE HYDROCHLORIDE 20 MG/ML
20 INJECTION INTRAMUSCULAR; INTRAVENOUS ONCE
Status: COMPLETED | OUTPATIENT
Start: 2020-06-10 | End: 2020-06-10

## 2020-06-10 RX ORDER — ONDANSETRON 2 MG/ML
4 INJECTION INTRAMUSCULAR; INTRAVENOUS ONCE
Status: COMPLETED | OUTPATIENT
Start: 2020-06-10 | End: 2020-06-10

## 2020-06-10 RX ORDER — ONDANSETRON 2 MG/ML
INJECTION INTRAMUSCULAR; INTRAVENOUS
Status: COMPLETED
Start: 2020-06-10 | End: 2020-06-10

## 2020-06-10 RX ORDER — METOCLOPRAMIDE HYDROCHLORIDE 5 MG/ML
10 INJECTION INTRAMUSCULAR; INTRAVENOUS ONCE
Status: COMPLETED | OUTPATIENT
Start: 2020-06-10 | End: 2020-06-10

## 2020-06-10 RX ORDER — HYDROMORPHONE HYDROCHLORIDE 1 MG/ML
1 INJECTION, SOLUTION INTRAMUSCULAR; INTRAVENOUS; SUBCUTANEOUS ONCE
Status: COMPLETED | OUTPATIENT
Start: 2020-06-10 | End: 2020-06-10

## 2020-06-10 RX ADMIN — HYDROMORPHONE HYDROCHLORIDE 1 MG: 1 INJECTION, SOLUTION INTRAMUSCULAR; INTRAVENOUS; SUBCUTANEOUS at 22:20

## 2020-06-10 RX ADMIN — DIPHENHYDRAMINE HYDROCHLORIDE 25 MG: 50 INJECTION INTRAMUSCULAR; INTRAVENOUS at 23:02

## 2020-06-10 RX ADMIN — FENTANYL CITRATE 100 MCG: 50 INJECTION INTRAMUSCULAR; INTRAVENOUS at 21:55

## 2020-06-10 RX ADMIN — METOCLOPRAMIDE 10 MG: 5 INJECTION, SOLUTION INTRAMUSCULAR; INTRAVENOUS at 23:02

## 2020-06-10 RX ADMIN — ONDANSETRON 4 MG: 2 INJECTION INTRAMUSCULAR; INTRAVENOUS at 22:45

## 2020-06-10 RX ADMIN — HYDRALAZINE HYDROCHLORIDE 20 MG: 20 INJECTION INTRAMUSCULAR; INTRAVENOUS at 22:20

## 2020-06-10 RX ADMIN — KETOROLAC TROMETHAMINE 30 MG: 30 INJECTION, SOLUTION INTRAMUSCULAR at 23:33

## 2020-06-10 ASSESSMENT — FIBROSIS 4 INDEX: FIB4 SCORE: 0.38

## 2020-06-11 VITALS
SYSTOLIC BLOOD PRESSURE: 126 MMHG | BODY MASS INDEX: 41.99 KG/M2 | DIASTOLIC BLOOD PRESSURE: 83 MMHG | RESPIRATION RATE: 12 BRPM | HEIGHT: 69 IN | TEMPERATURE: 98.1 F | HEART RATE: 69 BPM | OXYGEN SATURATION: 98 % | WEIGHT: 283.51 LBS

## 2020-06-11 PROCEDURE — 700111 HCHG RX REV CODE 636 W/ 250 OVERRIDE (IP): Performed by: EMERGENCY MEDICINE

## 2020-06-11 PROCEDURE — 96375 TX/PRO/DX INJ NEW DRUG ADDON: CPT

## 2020-06-11 PROCEDURE — 96376 TX/PRO/DX INJ SAME DRUG ADON: CPT

## 2020-06-11 RX ORDER — METOCLOPRAMIDE HYDROCHLORIDE 5 MG/ML
10 INJECTION INTRAMUSCULAR; INTRAVENOUS ONCE
Status: COMPLETED | OUTPATIENT
Start: 2020-06-11 | End: 2020-06-11

## 2020-06-11 RX ORDER — DEXAMETHASONE SODIUM PHOSPHATE 10 MG/ML
10 INJECTION, SOLUTION INTRAMUSCULAR; INTRAVENOUS ONCE
Status: COMPLETED | OUTPATIENT
Start: 2020-06-11 | End: 2020-06-11

## 2020-06-11 RX ORDER — DIPHENHYDRAMINE HYDROCHLORIDE 50 MG/ML
50 INJECTION INTRAMUSCULAR; INTRAVENOUS ONCE
Status: COMPLETED | OUTPATIENT
Start: 2020-06-11 | End: 2020-06-11

## 2020-06-11 RX ADMIN — DEXAMETHASONE SODIUM PHOSPHATE 10 MG: 10 INJECTION INTRAMUSCULAR; INTRAVENOUS at 00:21

## 2020-06-11 RX ADMIN — DIPHENHYDRAMINE HYDROCHLORIDE 50 MG: 50 INJECTION INTRAMUSCULAR; INTRAVENOUS at 00:21

## 2020-06-11 RX ADMIN — METOCLOPRAMIDE 10 MG: 5 INJECTION, SOLUTION INTRAMUSCULAR; INTRAVENOUS at 00:21

## 2020-06-11 NOTE — ED NOTES
Patient returned from CT and placed on monitor. The patient is no longer actively vomiting but is still reporting severe nausea even after prior medication. Will continue to monitor. Neuro exam remains the same

## 2020-06-11 NOTE — ED PROVIDER NOTES
ED Provider Note    ED Provider Note      Primary care provider: KARINE Hernández    I verified that the patient was wearing a mask and I was wearing appropriate PPE every time I entered the room. The patient's mask was on the patient at all times during my encounter except for a brief view of the oropharynx.      CHIEF COMPLAINT  Chief Complaint   Patient presents with   • Headache       HPI  Nickolas Galdamez is a 30 y.o. female who presents to the Emergency Department with chief complaint of headache.  Patient reports sudden onset of posterior headache directly after orgasm this evening.  Patient has pain at a 10 out of 10 into the right occipital region with radiation into her neck.  She reports moderate nausea she has not had any emesis she has no vision changes or hearing changes no photophobia no phonophobia no chest pain shortness of breath abdominal pain constipation diarrhea no abnormal vaginal bleeding discharge no urinary complaints no skin changes no other acute symptoms or concerns.  Patient does have history of migraines and pseudotumor cerebra but states that this headache is much different than any previous.      REVIEW OF SYSTEMS  10 systems reviewed and otherwise negative, pertinent positives and negatives listed in the history of present illness.    PAST MEDICAL HISTORY   has a past medical history of Cancer (HCC) (2008), EEG abnormal, Hashimoto's disease, Hyperglycemia, Hypertension, Hypoglycemia, Migraine, Pseudotumor cerebri, and Seizure disorder (HCC).    SURGICAL HISTORY   has a past surgical history that includes gyn surgery; gastroscopy (N/A, 12/26/2018); gastroscopy with biopsy (N/A, 12/26/2018); egd w/endoscopic ultrasound (N/A, 12/26/2018); and egd with asp/bx (N/A, 12/26/2018).    SOCIAL HISTORY  Social History     Tobacco Use   • Smoking status: Former Smoker     Packs/day: 0.00     Start date: 1/1/2005     Last attempt to quit: 5/3/2012     Years since  "quittin.1   • Smokeless tobacco: Never Used   • Tobacco comment: 1/2 pack a day   Substance Use Topics   • Alcohol use: Yes     Alcohol/week: 0.6 oz     Types: 1 Glasses of wine per week     Comment: socially, 1 glass of wine per week   • Drug use: No     Comment:        Social History     Substance and Sexual Activity   Drug Use No    Comment:         FAMILY HISTORY  Non-Contributory    CURRENT MEDICATIONS  Home Medications     Reviewed by Anika Zarate R.N. (Registered Nurse) on 06/10/20 at 2130  Med List Status: Not Addressed   Medication Last Dose Status   amLODIPine (NORVASC) 10 MG Tab  Active   hydrocortisone (ANUSOL-HC) 25 MG Suppos  Active   levothyroxine (SYNTHROID) 200 MCG Tab  Active   lisinopril (PRINIVIL) 40 MG tablet  Active   meloxicam (MOBIC) 7.5 MG Tab  Active   MIRENA, 52 MG, 20 MCG/24HR IUD  Active   ondansetron (ZOFRAN ODT) 4 MG TABLET DISPERSIBLE  Active   prazosin (MINIPRESS) 1 MG Cap  Active   tizanidine (ZANAFLEX) 4 MG Tab  Active   venlafaxine XR (EFFEXOR XR) 75 MG CAPSULE SR 24 HR  Active   verapamil ER (CALAN-SR) 180 MG Tab CR  Active   vitamin D, Ergocalciferol, (DRISDOL) 1.25 MG (55387 UT) Cap capsule  Active                ALLERGIES  No Known Allergies    PHYSICAL EXAM  VITAL SIGNS: BP (!) 179/107   Pulse 100   Temp 36.6 °C (97.9 °F) (Temporal)   Resp (!) 30   Ht 1.753 m (5' 9\")   Wt (!) 128.6 kg (283 lb 8.2 oz)   SpO2 95%   BMI 41.87 kg/m²   Pulse ox interpretation: I interpret this pulse ox as normal.  Constitutional: Alert and oriented x 3, moderate Distress  HEENT: Atraumatic normocephalic, pupils are equal round reactive to light extraocular movements are intact. The nares is clear, external ears are normal, mouth shows moist mucous membranes  Neck: Supple, no JVD no tracheal deviation, no meningismus  Cardiovascular: Regular rate and rhythm no murmur rub or gallop 2+ pulses peripherally x4  Thorax & Lungs: No respiratory distress, no wheezes rales or rhonchi, No " chest tenderness.   GI: Morbid obesity obese Soft nontender nondistended positive bowel sounds, no peritoneal signs  Skin: Warm dry no acute rash or lesion  Musculoskeletal: Moving all extremities with full range and 5 of 5 strength, no acute  deformity  Neurologic: Cranial nerves III through XII are grossly intact, no sensory deficit, no cerebellar dysfunction   Psychiatric: Appropriate affect for situation at this time      DIAGNOSTIC STUDIES / PROCEDURES  LABS    Results for orders placed or performed during the hospital encounter of 06/10/20   CBC WITH DIFFERENTIAL   Result Value Ref Range    WBC 9.1 4.8 - 10.8 K/uL    RBC 5.48 (H) 4.20 - 5.40 M/uL    Hemoglobin 15.8 12.0 - 16.0 g/dL    Hematocrit 47.2 (H) 37.0 - 47.0 %    MCV 86.1 81.4 - 97.8 fL    MCH 28.8 27.0 - 33.0 pg    MCHC 33.5 (L) 33.6 - 35.0 g/dL    RDW 40.7 35.9 - 50.0 fL    Platelet Count 286 164 - 446 K/uL    MPV 9.7 9.0 - 12.9 fL    Neutrophils-Polys 68.80 44.00 - 72.00 %    Lymphocytes 19.70 (L) 22.00 - 41.00 %    Monocytes 5.80 0.00 - 13.40 %    Eosinophils 4.60 0.00 - 6.90 %    Basophils 0.40 0.00 - 1.80 %    Immature Granulocytes 0.70 0.00 - 0.90 %    Nucleated RBC 0.00 /100 WBC    Neutrophils (Absolute) 6.26 2.00 - 7.15 K/uL    Lymphs (Absolute) 1.79 1.00 - 4.80 K/uL    Monos (Absolute) 0.53 0.00 - 0.85 K/uL    Eos (Absolute) 0.42 0.00 - 0.51 K/uL    Baso (Absolute) 0.04 0.00 - 0.12 K/uL    Immature Granulocytes (abs) 0.06 0.00 - 0.11 K/uL    NRBC (Absolute) 0.00 K/uL   BASIC METABOLIC PANEL   Result Value Ref Range    Sodium 142 135 - 145 mmol/L    Potassium 3.6 3.6 - 5.5 mmol/L    Chloride 104 96 - 112 mmol/L    Co2 21 20 - 33 mmol/L    Glucose 102 (H) 65 - 99 mg/dL    Bun 11 8 - 22 mg/dL    Creatinine 1.01 0.50 - 1.40 mg/dL    Calcium 9.6 8.5 - 10.5 mg/dL    Anion Gap 17.0 (H) 7.0 - 16.0   PROTHROMBIN TIME   Result Value Ref Range    PT 12.8 12.0 - 14.6 sec    INR 0.95 0.87 - 1.13   APTT   Result Value Ref Range    APTT 28.3 24.7 - 36.0 sec  "  ESTIMATED GFR   Result Value Ref Range    GFR If African American >60 >60 mL/min/1.73 m 2    GFR If Non African American >60 >60 mL/min/1.73 m 2       All labs reviewed by me.      RADIOLOGY  CT-HEAD W/O   Final Result         1.  No acute intracranial abnormality.        The radiologist's interpretation of all radiological studies have been reviewed by me.    COURSE & MEDICAL DECISION MAKING  Pertinent Labs & Imaging studies reviewed. (See chart for details)    10:02 PM - Patient seen and examined at bedside.       Patient noted to have slightly elevated blood pressure likely circumstantial secondary to presenting complaint. Referred to primary care physician for further evaluation.        Medical Decision Making: Patient was here within an hour of onset of headache.  CT is unremarkable she was initially given 100 mcg of fentanyl no relief was getting 20 mg hydralazine 1 mg of Dilaudid minimal improvement.  Patient with a history of pseudotumor cerebra a remotely but diagnosis is since been retracted we discussed lumbar puncture patient is adamant that she does not want this this evening in agreement that this probably poses more risk than benefit at this time especially having negative CT within 6-hour window.  Patient was given Reglan Benadryl Toradol and had complete resolution of her headache and symptoms feeling much better her vital signs of completely normalized she given instructions return for worsening headache altered mental status neck stiffness fevers chills altered mental status dizziness any other acute symptoms or concerns otherwise discharged in stable and improved condition.    /83   Pulse 69   Temp 36.7 °C (98.1 °F)   Resp 12   Ht 1.753 m (5' 9\")   Wt (!) 128.6 kg (283 lb 8.2 oz)   SpO2 98%   BMI 41.87 kg/m²     [facility], Emergency Dept  [address] [address] [phone]    in 12-24 hours if symptoms persist,, immediately if symptoms " worsen    KARINE Hernández  [address] [place] [id number] [phone]    In 2 days        Discharge Medication List as of 6/11/2020  1:15 AM          FINAL IMPRESSION  1. Nonintractable headache, unspecified chronicity pattern, unspecified headache type     2.  Hypertensive urgency      This dictation has been created using voice recognition software and/or scribes. The accuracy of the dictation is limited by the abilities of the software and the expertise of the scribes. I expect there may be some errors of grammar and possibly content. I made every attempt to manually correct the errors within my dictation. However, errors related to voice recognition software and/or scribes may still exist and should be interpreted within the appropriate context.

## 2020-06-11 NOTE — ED NOTES
Patient provided a urinal and stated he has started to have a headache. ERP made aware and requested NPO and IV morphine 4mg if the patient's headache is too severe. If the CT scan is clear, the patient can have 1g Tylenol PO.

## 2020-06-11 NOTE — ED NOTES
Patient medicated per ERP orders. Patient is stating the pain has slightly improved but is still a 9/10 in the right posterior head. The patient is AOx4 and neurologically intact. The patient's mother is at the bedside and oxygen has been applied to the patient due to a second dose of pain medication.

## 2020-06-11 NOTE — ED TRIAGE NOTES
"Chief Complaint   Patient presents with   • Headache     Patient wheeled into triage with above complaint. States she is constipated and was sitting on the toilet and felt a pop in the back of here head. Patient has a 10/10 pain in the back of head and states it is the worst headache she has ever had.  Patient has a history of migraines and high blood pressure. Patient is dry heaving in triage.     Unable to get blood pressure in triage. States she took a blood pressure medication because her blood pressure was 179/139.    Charge updated on patient and roomed to red 11. Room is still being cleaned. Will bring back patient as soon as room is cleaned.     Blood Pressure: (couldn't obtain), Pulse: (!) 120, Respiration: (!) 22, Temperature: 36.6 °C (97.9 °F), Height: 175.3 cm (5' 9\"), Weight: (!) 128.6 kg (283 lb 8.2 oz), BMI (Calculated): 41.87, BSA (Calculated): 2.5, Pulse Oximetry: 97 %, O2 Delivery Device: None - Room Air        "

## 2020-06-11 NOTE — ED NOTES
Patient to CT via gurney, on the monitor, and with ACLS RN. Patient medicated prior to transport for active vomiting per MAR.

## 2020-06-11 NOTE — ED NOTES
Called CT for a second time to follow up on ETA of scan with no current ETA. Patient has become nauseous and vomited approx 500mL of light brown undigested food.  Patient administered 4mg Zofran per ERP.

## 2020-06-11 NOTE — ED NOTES
Pt entered lobby via w/c provided by staff, pt was screaming and crying c/o head pain. Could not obtain a blood pressure due to pts distress.

## 2020-06-11 NOTE — ED NOTES
"Patient back to R11 for the triaged complaint.  The patient updated her story and stated she was having intercourse and was in the middle of an orgasm when she felt a pop in the posterior right occipital region with an immediate severe headache that was described as \"The worst headache of my life.\"  The patient denies syncope but does state she has been dizzy ever since. The patient has a history of migraines and states \"this does not feel like my normal migraines.\"  The patient is on the monitor, HTN, and is inconsolably crying.  "

## 2020-06-15 ENCOUNTER — TELEPHONE (OUTPATIENT)
Dept: MEDICAL GROUP | Facility: PHYSICIAN GROUP | Age: 30
End: 2020-06-15

## 2020-06-15 PROCEDURE — 99284 EMERGENCY DEPT VISIT MOD MDM: CPT

## 2020-06-15 PROCEDURE — 96374 THER/PROPH/DIAG INJ IV PUSH: CPT

## 2020-06-15 PROCEDURE — 96376 TX/PRO/DX INJ SAME DRUG ADON: CPT

## 2020-06-15 PROCEDURE — 96375 TX/PRO/DX INJ NEW DRUG ADDON: CPT

## 2020-06-15 ASSESSMENT — FIBROSIS 4 INDEX: FIB4 SCORE: 0.34

## 2020-06-16 ENCOUNTER — APPOINTMENT (OUTPATIENT)
Dept: RADIOLOGY | Facility: MEDICAL CENTER | Age: 30
End: 2020-06-16
Attending: EMERGENCY MEDICINE
Payer: COMMERCIAL

## 2020-06-16 ENCOUNTER — HOSPITAL ENCOUNTER (EMERGENCY)
Facility: MEDICAL CENTER | Age: 30
End: 2020-06-16
Attending: EMERGENCY MEDICINE
Payer: COMMERCIAL

## 2020-06-16 VITALS
WEIGHT: 278 LBS | HEIGHT: 69 IN | TEMPERATURE: 98.6 F | SYSTOLIC BLOOD PRESSURE: 159 MMHG | HEART RATE: 62 BPM | DIASTOLIC BLOOD PRESSURE: 100 MMHG | OXYGEN SATURATION: 96 % | RESPIRATION RATE: 18 BRPM | BODY MASS INDEX: 41.18 KG/M2

## 2020-06-16 DIAGNOSIS — G43.001 MIGRAINE WITHOUT AURA AND WITH STATUS MIGRAINOSUS, NOT INTRACTABLE: ICD-10-CM

## 2020-06-16 LAB
ALBUMIN SERPL BCP-MCNC: 4.9 G/DL (ref 3.2–4.9)
ALBUMIN/GLOB SERPL: 1.7 G/DL
ALP SERPL-CCNC: 67 U/L (ref 30–99)
ALT SERPL-CCNC: 21 U/L (ref 2–50)
ANION GAP SERPL CALC-SCNC: 19 MMOL/L (ref 7–16)
AST SERPL-CCNC: 32 U/L (ref 12–45)
BASOPHILS # BLD AUTO: 0.7 % (ref 0–1.8)
BASOPHILS # BLD: 0.07 K/UL (ref 0–0.12)
BILIRUB SERPL-MCNC: 0.6 MG/DL (ref 0.1–1.5)
BUN SERPL-MCNC: 10 MG/DL (ref 8–22)
CALCIUM SERPL-MCNC: 9.8 MG/DL (ref 8.5–10.5)
CHLORIDE SERPL-SCNC: 102 MMOL/L (ref 96–112)
CO2 SERPL-SCNC: 18 MMOL/L (ref 20–33)
CREAT SERPL-MCNC: 0.78 MG/DL (ref 0.5–1.4)
EOSINOPHIL # BLD AUTO: 0.28 K/UL (ref 0–0.51)
EOSINOPHIL NFR BLD: 2.9 % (ref 0–6.9)
ERYTHROCYTE [DISTWIDTH] IN BLOOD BY AUTOMATED COUNT: 39.8 FL (ref 35.9–50)
GLOBULIN SER CALC-MCNC: 2.9 G/DL (ref 1.9–3.5)
GLUCOSE SERPL-MCNC: 95 MG/DL (ref 65–99)
HCG SERPL QL: NEGATIVE
HCT VFR BLD AUTO: 49.9 % (ref 37–47)
HGB BLD-MCNC: 16.7 G/DL (ref 12–16)
IMM GRANULOCYTES # BLD AUTO: 0.07 K/UL (ref 0–0.11)
IMM GRANULOCYTES NFR BLD AUTO: 0.7 % (ref 0–0.9)
LYMPHOCYTES # BLD AUTO: 2.02 K/UL (ref 1–4.8)
LYMPHOCYTES NFR BLD: 20.8 % (ref 22–41)
MCH RBC QN AUTO: 28.6 PG (ref 27–33)
MCHC RBC AUTO-ENTMCNC: 33.5 G/DL (ref 33.6–35)
MCV RBC AUTO: 85.6 FL (ref 81.4–97.8)
MONOCYTES # BLD AUTO: 0.61 K/UL (ref 0–0.85)
MONOCYTES NFR BLD AUTO: 6.3 % (ref 0–13.4)
NEUTROPHILS # BLD AUTO: 6.66 K/UL (ref 2–7.15)
NEUTROPHILS NFR BLD: 68.6 % (ref 44–72)
NRBC # BLD AUTO: 0 K/UL
NRBC BLD-RTO: 0 /100 WBC
PLATELET # BLD AUTO: 336 K/UL (ref 164–446)
PMV BLD AUTO: 9.5 FL (ref 9–12.9)
POTASSIUM SERPL-SCNC: 4.4 MMOL/L (ref 3.6–5.5)
PROT SERPL-MCNC: 7.8 G/DL (ref 6–8.2)
RBC # BLD AUTO: 5.83 M/UL (ref 4.2–5.4)
SODIUM SERPL-SCNC: 139 MMOL/L (ref 135–145)
WBC # BLD AUTO: 9.7 K/UL (ref 4.8–10.8)

## 2020-06-16 PROCEDURE — 700111 HCHG RX REV CODE 636 W/ 250 OVERRIDE (IP): Performed by: EMERGENCY MEDICINE

## 2020-06-16 PROCEDURE — 700105 HCHG RX REV CODE 258: Performed by: EMERGENCY MEDICINE

## 2020-06-16 PROCEDURE — 70496 CT ANGIOGRAPHY HEAD: CPT

## 2020-06-16 PROCEDURE — 84703 CHORIONIC GONADOTROPIN ASSAY: CPT

## 2020-06-16 PROCEDURE — 80053 COMPREHEN METABOLIC PANEL: CPT

## 2020-06-16 PROCEDURE — 700111 HCHG RX REV CODE 636 W/ 250 OVERRIDE (IP)

## 2020-06-16 PROCEDURE — 700117 HCHG RX CONTRAST REV CODE 255: Performed by: EMERGENCY MEDICINE

## 2020-06-16 PROCEDURE — 85025 COMPLETE CBC W/AUTO DIFF WBC: CPT

## 2020-06-16 RX ORDER — ONDANSETRON 2 MG/ML
4 INJECTION INTRAMUSCULAR; INTRAVENOUS ONCE
Status: COMPLETED | OUTPATIENT
Start: 2020-06-16 | End: 2020-06-16

## 2020-06-16 RX ORDER — ONDANSETRON 2 MG/ML
INJECTION INTRAMUSCULAR; INTRAVENOUS
Status: COMPLETED
Start: 2020-06-16 | End: 2020-06-16

## 2020-06-16 RX ORDER — SODIUM CHLORIDE 9 MG/ML
INJECTION, SOLUTION INTRAVENOUS CONTINUOUS
Status: DISCONTINUED | OUTPATIENT
Start: 2020-06-16 | End: 2020-06-16 | Stop reason: HOSPADM

## 2020-06-16 RX ORDER — DEXAMETHASONE SODIUM PHOSPHATE 10 MG/ML
10 INJECTION, SOLUTION INTRAMUSCULAR; INTRAVENOUS ONCE
Status: COMPLETED | OUTPATIENT
Start: 2020-06-16 | End: 2020-06-16

## 2020-06-16 RX ORDER — PROCHLORPERAZINE EDISYLATE 5 MG/ML
10 INJECTION INTRAMUSCULAR; INTRAVENOUS ONCE
Status: COMPLETED | OUTPATIENT
Start: 2020-06-16 | End: 2020-06-16

## 2020-06-16 RX ORDER — HYDROMORPHONE HYDROCHLORIDE 1 MG/ML
1 INJECTION, SOLUTION INTRAMUSCULAR; INTRAVENOUS; SUBCUTANEOUS ONCE
Status: COMPLETED | OUTPATIENT
Start: 2020-06-16 | End: 2020-06-16

## 2020-06-16 RX ORDER — DIPHENHYDRAMINE HYDROCHLORIDE 50 MG/ML
25 INJECTION INTRAMUSCULAR; INTRAVENOUS ONCE
Status: COMPLETED | OUTPATIENT
Start: 2020-06-16 | End: 2020-06-16

## 2020-06-16 RX ORDER — MORPHINE SULFATE 4 MG/ML
4 INJECTION, SOLUTION INTRAMUSCULAR; INTRAVENOUS ONCE
Status: COMPLETED | OUTPATIENT
Start: 2020-06-16 | End: 2020-06-16

## 2020-06-16 RX ORDER — KETOROLAC TROMETHAMINE 30 MG/ML
30 INJECTION, SOLUTION INTRAMUSCULAR; INTRAVENOUS ONCE
Status: COMPLETED | OUTPATIENT
Start: 2020-06-16 | End: 2020-06-16

## 2020-06-16 RX ADMIN — SODIUM CHLORIDE 1000 ML: 9 INJECTION, SOLUTION INTRAVENOUS at 01:46

## 2020-06-16 RX ADMIN — KETOROLAC TROMETHAMINE 30 MG: 30 INJECTION, SOLUTION INTRAMUSCULAR at 05:41

## 2020-06-16 RX ADMIN — DEXAMETHASONE SODIUM PHOSPHATE 10 MG: 10 INJECTION INTRAMUSCULAR; INTRAVENOUS at 05:41

## 2020-06-16 RX ADMIN — ONDANSETRON 4 MG: 2 INJECTION INTRAMUSCULAR; INTRAVENOUS at 01:37

## 2020-06-16 RX ADMIN — ONDANSETRON 4 MG: 2 INJECTION INTRAMUSCULAR; INTRAVENOUS at 05:00

## 2020-06-16 RX ADMIN — HYDROMORPHONE HYDROCHLORIDE 1 MG: 1 INJECTION, SOLUTION INTRAMUSCULAR; INTRAVENOUS; SUBCUTANEOUS at 04:07

## 2020-06-16 RX ADMIN — IOHEXOL 80 ML: 350 INJECTION, SOLUTION INTRAVENOUS at 03:04

## 2020-06-16 RX ADMIN — MORPHINE SULFATE 4 MG: 4 INJECTION INTRAVENOUS at 01:46

## 2020-06-16 RX ADMIN — PROCHLORPERAZINE EDISYLATE 10 MG: 5 INJECTION INTRAMUSCULAR; INTRAVENOUS at 05:41

## 2020-06-16 RX ADMIN — DIPHENHYDRAMINE HYDROCHLORIDE 25 MG: 50 INJECTION INTRAMUSCULAR; INTRAVENOUS at 01:46

## 2020-06-16 ASSESSMENT — LIFESTYLE VARIABLES
HOW MANY TIMES IN THE PAST YEAR HAVE YOU HAD 5 OR MORE DRINKS IN A DAY: 0
TOTAL SCORE: 0
HAVE PEOPLE ANNOYED YOU BY CRITICIZING YOUR DRINKING: NO
CONSUMPTION TOTAL: NEGATIVE
EVER HAD A DRINK FIRST THING IN THE MORNING TO STEADY YOUR NERVES TO GET RID OF A HANGOVER: NO
DOES PATIENT WANT TO STOP DRINKING: NO
ON A TYPICAL DAY WHEN YOU DRINK ALCOHOL HOW MANY DRINKS DO YOU HAVE: 1
DO YOU DRINK ALCOHOL: YES
HAVE YOU EVER FELT YOU SHOULD CUT DOWN ON YOUR DRINKING: NO
TOTAL SCORE: 0
TOTAL SCORE: 0
AVERAGE NUMBER OF DAYS PER WEEK YOU HAVE A DRINK CONTAINING ALCOHOL: 1
EVER FELT BAD OR GUILTY ABOUT YOUR DRINKING: NO
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
## 2020-06-16 NOTE — ED NOTES
Pt started vomiting and crying.  MD Petersen made aware due to MD Daley no longer here.  Zofran verbal given.

## 2020-06-16 NOTE — ED PROVIDER NOTES
ED Provider Note    CHIEF COMPLAINT  Chief Complaint   Patient presents with   • Headache     started up again today. was seen here 5 days ago for the same thing   • N/V       HPI  Nickolas Galdamez is a 30 y.o. female here for evaluation of headache.  Patient states that she has had headaches like this in the past, with her most recent one being around 5 days ago.  She was seen and evaluated then had a negative CT scan, and felt better.  She was then sent home and has not had any issues until this morning.  She states that she awoke with a gradual headache to the upper left side of her head, that has progressed in its severity of pain over the last day.  She did have some nausea and some vomiting, but also complains of having watery diarrhea as well.  She has no abdominal pain, no chest pain, no shortness of breath, and no paresthesias or weakness.  Patient has not had any syncope.  She has no other medical concerns.  She not take any prior to arrival for coming in.  She states that nothing seems alleviate or exacerbate her symptoms.      ROS  See HPI for further details, o/w negative.     PAST MEDICAL HISTORY   has a past medical history of Cancer (HCC) (), EEG abnormal, Hashimoto's disease, Hyperglycemia, Hypertension, Hypoglycemia, Migraine, Pseudotumor cerebri, and Seizure disorder ([facility]).    SOCIAL HISTORY  Social History     Tobacco Use   • Smoking status: Former Smoker     Packs/day: 0.00     Start date: 2005     Last attempt to quit: 5/3/2012     Years since quittin.1   • Smokeless tobacco: Never Used   • Tobacco comment: 1/2 pack a day   Substance and Sexual Activity   • Alcohol use: Yes     Alcohol/week: 0.6 oz     Types: 1 Glasses of wine per week     Comment: socially, 1 glass of wine per week   • Drug use: No     Comment:     • Sexual activity: Never       Family History  No bleeding disorders    SURGICAL HISTORY   has a past surgical history that includes gyn surgery; gastroscopy  (N/A, 12/26/2018); gastroscopy with biopsy (N/A, 12/26/2018); egd w/endoscopic ultrasound (N/A, 12/26/2018); and egd with asp/bx (N/A, 12/26/2018).    CURRENT MEDICATIONS  Home Medications    **Home medications have not yet been reviewed for this encounter**         ALLERGIES  No Known Allergies    REVIEW OF SYSTEMS  See HPI for further details. Review of systems as above, otherwise all other systems are negative.     PHYSICAL EXAM  Constitutional: Well developed, well nourished.  Mild acute distress.  HEENT: Normocephalic, atraumatic. Posterior pharynx clear and moist.  Eyes:  EOMI. Normal sclera.  Neck: Supple, Full range of motion, nontender.  Chest/Pulmonary: clear to ausculation. Symmetrical expansion.   Cardio: Regular rate and rhythm with no murmur.   Abdomen: Soft, nontender. No peritoneal signs. No guarding. No palpable masses.  Musculoskeletal: No deformity, no edema, neurovascular intact.   Neuro: Clear speech, appropriate, cooperative, cranial nerves II-XII grossly intact.  Psych: anxious mood and affect    Results for orders placed or performed during the hospital encounter of 06/16/20   CBC WITH DIFFERENTIAL   Result Value Ref Range    WBC 9.7 4.8 - 10.8 K/uL    RBC 5.83 (H) 4.20 - 5.40 M/uL    Hemoglobin 16.7 (H) 12.0 - 16.0 g/dL    Hematocrit 49.9 (H) 37.0 - 47.0 %    MCV 85.6 81.4 - 97.8 fL    MCH 28.6 27.0 - 33.0 pg    MCHC 33.5 (L) 33.6 - 35.0 g/dL    RDW 39.8 35.9 - 50.0 fL    Platelet Count 336 164 - 446 K/uL    MPV 9.5 9.0 - 12.9 fL    Neutrophils-Polys 68.60 44.00 - 72.00 %    Lymphocytes 20.80 (L) 22.00 - 41.00 %    Monocytes 6.30 0.00 - 13.40 %    Eosinophils 2.90 0.00 - 6.90 %    Basophils 0.70 0.00 - 1.80 %    Immature Granulocytes 0.70 0.00 - 0.90 %    Nucleated RBC 0.00 /100 WBC    Neutrophils (Absolute) 6.66 2.00 - 7.15 K/uL    Lymphs (Absolute) 2.02 1.00 - 4.80 K/uL    Monos (Absolute) 0.61 0.00 - 0.85 K/uL    Eos (Absolute) 0.28 0.00 - 0.51 K/uL    Baso (Absolute) 0.07 0.00 - 0.12  K/uL    Immature Granulocytes (abs) 0.07 0.00 - 0.11 K/uL    NRBC (Absolute) 0.00 K/uL   COMP METABOLIC PANEL   Result Value Ref Range    Sodium 139 135 - 145 mmol/L    Potassium 4.4 3.6 - 5.5 mmol/L    Chloride 102 96 - 112 mmol/L    Co2 18 (L) 20 - 33 mmol/L    Anion Gap 19.0 (H) 7.0 - 16.0    Glucose 95 65 - 99 mg/dL    Bun 10 8 - 22 mg/dL    Creatinine 0.78 0.50 - 1.40 mg/dL    Calcium 9.8 8.5 - 10.5 mg/dL    AST(SGOT) 32 12 - 45 U/L    ALT(SGPT) 21 2 - 50 U/L    Alkaline Phosphatase 67 30 - 99 U/L    Total Bilirubin 0.6 0.1 - 1.5 mg/dL    Albumin 4.9 3.2 - 4.9 g/dL    Total Protein 7.8 6.0 - 8.2 g/dL    Globulin 2.9 1.9 - 3.5 g/dL    A-G Ratio 1.7 g/dL   BETA-HCG QUALITATIVE SERUM   Result Value Ref Range    Beta-Hcg Qualitative Serum Negative Negative   ESTIMATED GFR   Result Value Ref Range    GFR If African American >60 >60 mL/min/1.73 m 2    GFR If Non African American >60 >60 mL/min/1.73 m 2     CT-CTA HEAD WITH & W/O-POST PROCESS   Final Result         1.  CT angiogram of the [place] within normal limits.          PROCEDURES     MEDICAL RECORD  I have reviewed patient's medical record and pertinent results are listed.    COURSE & MEDICAL DECISION MAKING  I have reviewed any medical record information, laboratory studies and radiographic results as noted above.    HYDRATION: Based on the patient's presentation of Dehydration the patient was given IV fluids. IV Hydration was used because oral hydration was not adequate alone. Upon recheck following hydration, the patient was improved.    3:46 AM  The patient has a history of headaches, like this one in the recent past.   This particular event was a gradual onset, and she states that she feels better after meds here.  She has no focal neuro deficits, she is a negative CT scan with contrast, and had a negative CT scan without contrast on the sixth.   She has no fever/chills.  No vomiting  While here. No diarrhea while here. She will follow-up, or  return here for any further issues or concerns.  The pt states that she would like an additional dose of pain medications here, and agrees to call her mom to come in and get her. RN aware.     3:59 AM  The pt states she has an appointment with her neurologist, tomorrow morning for the headaches. She will keep this appt.      If you have had any blood pressure issues while here in the emergency department, please see your doctor for a further evaluation or work up.    Differential diagnoses include but not limited to: Subarachnoid, subdural, epidural, migraine headache    This patient presents with headache.  At this time, I have counseled the patient/family regarding their medications, pain control, and follow up.  They will continue their medications, if any, as prescribed.  They will return immediately for any worsening symptoms and/or any other medical concerns.  They will see their doctor, or contact the doctor provided, in 1-2 days for follow up.       FINAL IMPRESSION  1. Migraine without aura and with status migrainosus, not intractable           Electronically signed by: Alfredo Daley D.O., 6/16/2020 1:39 AM

## 2020-06-16 NOTE — ED TRIAGE NOTES
"Chief Complaint   Patient presents with   • Headache     started up again today. was seen here 5 days ago for the same thing   • N/V     Pt ambulatory to triage for above complaint. Pt was seen here for the same issue 5 days ago, she reports the pain was worse last time she was here. Pt is tearful when asking questions. Pt reports n/v with the head pain.     /99   Pulse 72   Temp 37 °C (98.6 °F) (Temporal)   Resp 18   Ht 1.753 m (5' 9\")   Wt (!) 126.1 kg (278 lb)   SpO2 97%   BMI 41.05 kg/m²     "

## 2020-06-16 NOTE — ED PROVIDER NOTES
ED Provider Note    5:30 AM patient was up for discharge when her mom arrived and she was the prior night earpiece patient.  However, I have been notified by the nurse that she is having increased pain after an episode of vomiting.  Patient is known to me from a prior visit with similar symptoms in March of this year.  Review of medications during this ED visit, was undertaken.  I do think she would benefit from additional Decadron, Toradol which she has benefited from in the past and she will be treated with additional antinausea medication as well.  We will continue to monitor for improvement, I do anticipate that she will likely be able to go home.  She is reassured.  She is quite anxious at the bedside on evaluation.    Patient reevaluated prior to discharge.  She is feeling markedly better.  She is eager to go home.  This is a reasonable disposition.  Her mother is here to transport her home.

## 2020-06-17 ENCOUNTER — OFFICE VISIT (OUTPATIENT)
Dept: NEUROLOGY | Facility: MEDICAL CENTER | Age: 30
End: 2020-06-17
Payer: COMMERCIAL

## 2020-06-17 VITALS
HEIGHT: 70 IN | TEMPERATURE: 98.6 F | HEART RATE: 86 BPM | RESPIRATION RATE: 16 BRPM | DIASTOLIC BLOOD PRESSURE: 86 MMHG | WEIGHT: 275.57 LBS | SYSTOLIC BLOOD PRESSURE: 132 MMHG | OXYGEN SATURATION: 98 % | BODY MASS INDEX: 39.45 KG/M2

## 2020-06-17 DIAGNOSIS — M54.2 NECK PAIN: ICD-10-CM

## 2020-06-17 DIAGNOSIS — G93.2 BENIGN INTRACRANIAL HYPERTENSION: ICD-10-CM

## 2020-06-17 DIAGNOSIS — I10 ESSENTIAL HYPERTENSION: ICD-10-CM

## 2020-06-17 DIAGNOSIS — M54.81 BILATERAL OCCIPITAL NEURALGIA: ICD-10-CM

## 2020-06-17 DIAGNOSIS — G89.29 CHRONIC INTRACTABLE HEADACHE, UNSPECIFIED HEADACHE TYPE: ICD-10-CM

## 2020-06-17 DIAGNOSIS — R51.9 CHRONIC INTRACTABLE HEADACHE, UNSPECIFIED HEADACHE TYPE: ICD-10-CM

## 2020-06-17 DIAGNOSIS — E66.01 CLASS 2 SEVERE OBESITY WITH SERIOUS COMORBIDITY AND BODY MASS INDEX (BMI) OF 38.0 TO 38.9 IN ADULT, UNSPECIFIED OBESITY TYPE (HCC): ICD-10-CM

## 2020-06-17 PROCEDURE — 99215 OFFICE O/P EST HI 40 MIN: CPT | Performed by: PSYCHIATRY & NEUROLOGY

## 2020-06-17 ASSESSMENT — ENCOUNTER SYMPTOMS
EYE DISCHARGE: 0
FEVER: 0
ABDOMINAL PAIN: 0
NECK PAIN: 1
WEIGHT LOSS: 0
SHORTNESS OF BREATH: 0
FALLS: 0
BRUISES/BLEEDS EASILY: 0
HALLUCINATIONS: 0
SORE THROAT: 0

## 2020-06-17 ASSESSMENT — FIBROSIS 4 INDEX: FIB4 SCORE: 0.6

## 2020-06-18 ENCOUNTER — TELEPHONE (OUTPATIENT)
Dept: NEUROLOGY | Facility: MEDICAL CENTER | Age: 30
End: 2020-06-18

## 2020-06-18 NOTE — TELEPHONE ENCOUNTER
----- Message from Cecilia Arndt M.D. sent at 6/18/2020  8:18 AM PDT -----  Can you please request all the available imaging from [facility] for this patient?  I am particularly interested in the C-spine

## 2020-06-18 NOTE — TELEPHONE ENCOUNTER
Spoke with a representative she stated the [facility] is having issues with their phones. She left a message for them to call me back so I can get the records .

## 2020-06-24 ENCOUNTER — OFFICE VISIT (OUTPATIENT)
Dept: MEDICAL GROUP | Facility: PHYSICIAN GROUP | Age: 30
End: 2020-06-24
Payer: COMMERCIAL

## 2020-06-24 VITALS
HEIGHT: 70 IN | OXYGEN SATURATION: 99 % | DIASTOLIC BLOOD PRESSURE: 88 MMHG | HEART RATE: 78 BPM | SYSTOLIC BLOOD PRESSURE: 142 MMHG | TEMPERATURE: 98.8 F | WEIGHT: 275 LBS | BODY MASS INDEX: 39.37 KG/M2

## 2020-06-24 DIAGNOSIS — I10 ESSENTIAL HYPERTENSION: ICD-10-CM

## 2020-06-24 DIAGNOSIS — R51.9 CHRONIC DAILY HEADACHE: ICD-10-CM

## 2020-06-24 DIAGNOSIS — E03.8 HYPOTHYROIDISM DUE TO HASHIMOTO'S THYROIDITIS: ICD-10-CM

## 2020-06-24 DIAGNOSIS — E06.3 HYPOTHYROIDISM DUE TO HASHIMOTO'S THYROIDITIS: ICD-10-CM

## 2020-06-24 DIAGNOSIS — R23.3 EASY BRUISING: ICD-10-CM

## 2020-06-24 DIAGNOSIS — M54.12 CERVICAL RADICULOPATHY: ICD-10-CM

## 2020-06-24 DIAGNOSIS — M54.2 NECK PAIN, ACUTE: ICD-10-CM

## 2020-06-24 PROCEDURE — 99214 OFFICE O/P EST MOD 30 MIN: CPT | Performed by: NURSE PRACTITIONER

## 2020-06-24 RX ORDER — CYCLOBENZAPRINE HCL 10 MG
10 TABLET ORAL 3 TIMES DAILY PRN
Qty: 60 TAB | Refills: 1 | Status: SHIPPED | OUTPATIENT
Start: 2020-06-24 | End: 2021-01-26 | Stop reason: SDUPTHER

## 2020-06-24 ASSESSMENT — FIBROSIS 4 INDEX: FIB4 SCORE: 0.6

## 2020-06-24 NOTE — PROGRESS NOTES
CC: Bruise left breast, medication refill    HISTORY OF THE PRESENT ILLNESS: Patient is a 30 y.o. female. This pleasant patient is here today for evaluation and management of the following health problems.        Easy bruising  Patient reports large bruise on left breast.  Suddenly appeared 2 days ago.  Denies injury.  Denies other signs of bleeding.  Denies swelling, erythema, nipple discharge.  Last platelet count and bleeding times were normal.  Patient has been taking meloxicam daily for last 3 months.  Struggling with uncontrolled hypertension.    Cervical radiculopathy  Patient continues to have neck pain.  Was being managed by [facility].  Care with them is on temporary hold due to waiting for auto insurance claim to be resolved.  Patient reports she has been taking meloxicam for last 3 months, which has not really helped.  She is requesting a refill of muscle relaxer.  Tolerated cyclobenzaprine in the past.  She has now back to work.    Hypothyroidism  This is a chronic health problem that is well controlled with current medications and lifestyle measures.  Taking levothyroxine 200 mcg daily.  TSH normal.  Denies skin or hair changes, fatigue.  Component      Latest Ref Rng & Units 4/29/2020          11:30 AM   TSH      0.380 - 5.330 uIU/mL 0.486       Essential hypertension  Patient continues to struggle with uncontrolled hypertension.  Has had several ER visits for headache and hypertension.  Now managed by vascular medicine.  Not taking verapamil ER and lisinopril 40 mg daily.  Takes prazosin as needed.  Most recent ER visit was while in [place] for a family gathering.  Patient was treated with Toradol, Compazine, diphenhydramine.  Patient reports today that she has been taking meloxicam daily for last 3 months.  May be contributing to hypertension.  Advised patient to taper off of meloxicam.    Chronic daily headache  Patient continues to struggle with daily headache.  Severe headaches have brought her  to the ER several times since last appointment.  Now established with neurology.  Pending further work-up.      Allergies: Patient has no known allergies.    Current Outpatient Medications Ordered in Epic   Medication Sig Dispense Refill   • cyclobenzaprine (FLEXERIL) 10 MG Tab Take 1 Tab by mouth 3 times a day as needed. 60 Tab 1   • venlafaxine XR (EFFEXOR XR) 75 MG CAPSULE SR 24 HR Take 1 Cap by mouth every day. 30 Cap 1   • verapamil ER (CALAN-SR) 180 MG Tab CR Take 1 Tab by mouth every day for 360 days. 90 Tab 3   • lisinopril (PRINIVIL) 40 MG tablet Take 1 Tab by mouth every day for 360 days. 90 Tab 3   • prazosin (MINIPRESS) 1 MG Cap Take 1 Cap by mouth 3 times a day as needed (BP spikes, if SBP > 160 or DBP > 100) for up to 90 days. 90 Cap 0   • vitamin D, Ergocalciferol, (DRISDOL) 1.25 MG (50418 UT) Cap capsule Take 1 Cap by mouth every 7 days for 91 days. 12 Cap 0   • levothyroxine (SYNTHROID) 200 MCG Tab Take 200 mcg by mouth Every morning on an empty stomach.     • ondansetron (ZOFRAN ODT) 4 MG TABLET DISPERSIBLE Take 1 Tab by mouth every 8 hours as needed for Nausea. 20 Tab 0   • MIRENA, 52 MG, 20 MCG/24HR IUD 1 Each by Intrauterine route Once.       No current Epic-ordered facility-administered medications on file.        Past Medical History:   Diagnosis Date   • Cancer (HCC) 2008    pre-cancerous cervical cells--froze the cervix   • EEG abnormal    • Hashimoto's disease    • Hyperglycemia    • Hypertension    • Hypoglycemia    • Migraine    • Pseudotumor cerebri    • Seizure disorder (HCC)        Past Surgical History:   Procedure Laterality Date   • GASTROSCOPY N/A 12/26/2018    Procedure: GASTROSCOPY;  Surgeon: Kleber Ivory M.D.;  Location: SURGERY [facility];  Service: Gastroenterology   • GASTROSCOPY WITH BIOPSY N/A 12/26/2018    Procedure: GASTROSCOPY WITH BIOPSY - GASTRIC;  Surgeon: Kleber Ivory M.D.;  Location: SURGERY [facility];  Service: Gastroenterology   • EGD  "W/ENDOSCOPIC ULTRASOUND N/A 2018    Procedure: EGD W/ENDOSCOPIC ULTRASOUND - UPPER, CURVILINEAR;  Surgeon: Kleber Ivory M.D.;  Location: SURGERY [facility];  Service: Gastroenterology   • EGD WITH ASP/BX N/A 2018    Procedure: EGD WITH ASP/BX - GUIDED FNA PANCREATIC HEAD AND/OR TAIL LESION;  Surgeon: Kleber Ivory M.D.;  Location: SURGERY [facility];  Service: Gastroenterology   • GYN SURGERY      precancerous cells with cervical freezing, HPV positive       Social History     Tobacco Use   • Smoking status: Former Smoker     Packs/day: 0.00     Start date: 2005     Last attempt to quit: 5/3/2012     Years since quittin.1   • Smokeless tobacco: Never Used   • Tobacco comment: 1/2 pack a day   Substance Use Topics   • Alcohol use: Not Currently     Alcohol/week: 0.6 oz     Types: 1 Glasses of wine per week     Comment: socially, 1 glass of wine per week   • Drug use: Yes     Types: Marijuana     Comment:         Family History   Problem Relation Age of Onset   • Cancer Mother         Cervical   • Thyroid Mother    • No Known Problems Father    • Heart Disease Maternal Grandmother        ROS:   As in HPI, otherwise negative for chest pain, dyspnea, abdominal pain, dysuria, blood in stool, fever           Exam: /88 (BP Location: Left arm, Patient Position: Sitting, BP Cuff Size: Large adult)   Pulse 78   Temp 37.1 °C (98.8 °F) (Temporal)   Ht 1.778 m (5' 10\")   Wt 124.7 kg (275 lb)   SpO2 99%  Body mass index is 39.46 kg/m².    General: Alert, pleasant, obese habitus, well nourished, well developed female in NAD  Neck: Supple without bruit.   Pulmonary: Clear to ausculation.  Normal effort. No rales, ronchi, or wheezing.  Cardiovascular: Normal rate and rhythm without murmur.   Neurologic: Grossly nonfocal  Skin: Warm and dry.  Left breast with large bruise over areola, resolving with yellow perimeter, nontender, soft.  Musculoskeletal: Normal gait.   Psych: Normal mood " and affect. Alert and oriented. Judgment and insight is normal.    Please note that this dictation was created using voice recognition software. I have made every reasonable attempt to correct obvious errors, but I expect that there are errors of grammar and possibly content that I did not discover before finalizing the note.      Assessment/Plan  1. Easy bruising  Large bruise on left breast.  Advised to apply ice, continue to monitor for increased size.  Will get updated bleeding times.  - CBC WITH DIFFERENTIAL; Future  - Prothrombin Time; Future  - APTT; Future    2. Cervical radiculopathy  Patient doing moderately better, now back to work.  Will taper off of meloxicam.  Will prescribe cyclobenzaprine to take as needed.  Reviewed instructions and side effects with patient and not to drive a car while taking medication if it makes her tired.  Patient has taken this in the past and tolerated.  - cyclobenzaprine (FLEXERIL) 10 MG Tab; Take 1 Tab by mouth 3 times a day as needed.  Dispense: 60 Tab; Refill: 1    3. Neck pain, acute      4. Hypothyroidism due to Hashimoto's thyroiditis  Clinically euthyroid.  TSH normal.  Continue with current dose of levothyroxine.    5. Essential hypertension  Uncontrolled, several ER visits for headache/hypertension.  Continue with vascular medicine.  Patient will decrease use of meloxicam.  We will see if this helps with hypertension.  Strict ER precautions reviewed with patient.    6. Chronic daily headache  Continue follow-up with neurology.    Patient will return to clinic in 3 to 6 months or sooner if needed.

## 2020-06-25 PROBLEM — R23.3 EASY BRUISING: Status: ACTIVE | Noted: 2020-06-25

## 2020-06-25 NOTE — ASSESSMENT & PLAN NOTE
Patient continues to have neck pain.  Was being managed by [facility].  Care with them is on temporary hold due to waiting for auto insurance claim to be resolved.  Patient reports she has been taking meloxicam for last 3 months, which has not really helped.  She is requesting a refill of muscle relaxer.  Tolerated cyclobenzaprine in the past.  She has now back to work.

## 2020-06-25 NOTE — TELEPHONE ENCOUNTER
Received records from facility , the patient only had an L spine done on 02/28/2020 and mri of the brain on 11/06/2019. Scanned into  for review sent as well.

## 2020-06-25 NOTE — ASSESSMENT & PLAN NOTE
Patient continues to struggle with daily headache.  Severe headaches have brought her to the ER several times since last appointment.  Now established with neurology.  Pending further work-up.

## 2020-06-25 NOTE — ASSESSMENT & PLAN NOTE
This is a chronic health problem that is well controlled with current medications and lifestyle measures.  Taking levothyroxine 200 mcg daily.  TSH normal.  Denies skin or hair changes, fatigue.  Component      Latest Ref Rng & Units 4/29/2020          11:30 AM   TSH      0.380 - 5.330 uIU/mL 0.486

## 2020-06-25 NOTE — ASSESSMENT & PLAN NOTE
Patient reports large bruise on left breast.  Suddenly appeared 2 days ago.  Denies injury.  Denies other signs of bleeding.  Denies swelling, erythema, nipple discharge.  Last platelet count and bleeding times were normal.  Patient has been taking meloxicam daily for last 3 months.  Struggling with uncontrolled hypertension.

## 2020-06-25 NOTE — ASSESSMENT & PLAN NOTE
Patient continues to struggle with uncontrolled hypertension.  Has had several ER visits for headache and hypertension.  Now managed by vascular medicine.  Not taking verapamil ER and lisinopril 40 mg daily.  Takes prazosin as needed.  Most recent ER visit was while in [place] for a family gathering.  Patient was treated with Toradol, Compazine, diphenhydramine.  Patient reports today that she has been taking meloxicam daily for last 3 months.  May be contributing to hypertension.  Advised patient to taper off of meloxicam.

## 2020-07-02 ENCOUNTER — HOSPITAL ENCOUNTER (OUTPATIENT)
Dept: RADIOLOGY | Facility: MEDICAL CENTER | Age: 30
End: 2020-07-02
Attending: PSYCHIATRY & NEUROLOGY
Payer: COMMERCIAL

## 2020-07-02 DIAGNOSIS — G44.209 ACUTE NON INTRACTABLE TENSION-TYPE HEADACHE: ICD-10-CM

## 2020-07-02 PROCEDURE — 70553 MRI BRAIN STEM W/O & W/DYE: CPT

## 2020-07-02 PROCEDURE — 700117 HCHG RX CONTRAST REV CODE 255: Performed by: PSYCHIATRY & NEUROLOGY

## 2020-07-02 PROCEDURE — 70544 MR ANGIOGRAPHY HEAD W/O DYE: CPT

## 2020-07-02 PROCEDURE — A9576 INJ PROHANCE MULTIPACK: HCPCS | Performed by: PSYCHIATRY & NEUROLOGY

## 2020-07-02 RX ADMIN — GADOTERIDOL 25 ML: 279.3 INJECTION, SOLUTION INTRAVENOUS at 15:06

## 2020-07-06 ENCOUNTER — TELEPHONE (OUTPATIENT)
Dept: VASCULAR LAB | Facility: MEDICAL CENTER | Age: 30
End: 2020-07-06

## 2020-07-06 NOTE — TELEPHONE ENCOUNTER
LM for pt to call back and schedule her 24 hour BP monitor. Dr Rashid would prefer before next appt July 10

## 2020-07-07 ENCOUNTER — APPOINTMENT (OUTPATIENT)
Dept: VASCULAR LAB | Facility: MEDICAL CENTER | Age: 30
End: 2020-07-07
Attending: INTERNAL MEDICINE
Payer: COMMERCIAL

## 2020-07-09 ENCOUNTER — APPOINTMENT (OUTPATIENT)
Dept: VASCULAR LAB | Facility: MEDICAL CENTER | Age: 30
End: 2020-07-09
Attending: INTERNAL MEDICINE
Payer: COMMERCIAL

## 2020-07-10 ENCOUNTER — APPOINTMENT (OUTPATIENT)
Dept: VASCULAR LAB | Facility: MEDICAL CENTER | Age: 30
End: 2020-07-10
Payer: COMMERCIAL

## 2020-09-14 ENCOUNTER — TELEPHONE (OUTPATIENT)
Dept: VASCULAR LAB | Facility: MEDICAL CENTER | Age: 30
End: 2020-09-14

## 2020-11-12 ENCOUNTER — OFFICE VISIT (OUTPATIENT)
Dept: MEDICAL GROUP | Facility: PHYSICIAN GROUP | Age: 30
End: 2020-11-12
Payer: COMMERCIAL

## 2020-11-12 VITALS
DIASTOLIC BLOOD PRESSURE: 108 MMHG | SYSTOLIC BLOOD PRESSURE: 160 MMHG | BODY MASS INDEX: 38.51 KG/M2 | OXYGEN SATURATION: 98 % | WEIGHT: 269 LBS | TEMPERATURE: 97.6 F | HEART RATE: 77 BPM | HEIGHT: 70 IN

## 2020-11-12 DIAGNOSIS — K44.9 ESOPHAGEAL HERNIA: ICD-10-CM

## 2020-11-12 DIAGNOSIS — I10 ESSENTIAL HYPERTENSION: ICD-10-CM

## 2020-11-12 DIAGNOSIS — R51.9 CHRONIC DAILY HEADACHE: ICD-10-CM

## 2020-11-12 DIAGNOSIS — M54.12 CERVICAL RADICULOPATHY: ICD-10-CM

## 2020-11-12 DIAGNOSIS — K21.9 GASTROESOPHAGEAL REFLUX DISEASE, UNSPECIFIED WHETHER ESOPHAGITIS PRESENT: ICD-10-CM

## 2020-11-12 DIAGNOSIS — F32.5 MAJOR DEPRESSIVE DISORDER WITH SINGLE EPISODE, IN FULL REMISSION (HCC): ICD-10-CM

## 2020-11-12 DIAGNOSIS — Z23 NEED FOR INFLUENZA VACCINATION: ICD-10-CM

## 2020-11-12 PROCEDURE — 90686 IIV4 VACC NO PRSV 0.5 ML IM: CPT | Performed by: NURSE PRACTITIONER

## 2020-11-12 PROCEDURE — 90471 IMMUNIZATION ADMIN: CPT | Performed by: NURSE PRACTITIONER

## 2020-11-12 PROCEDURE — 99214 OFFICE O/P EST MOD 30 MIN: CPT | Mod: 25 | Performed by: NURSE PRACTITIONER

## 2020-11-12 RX ORDER — VENLAFAXINE HYDROCHLORIDE 75 MG/1
75 CAPSULE, EXTENDED RELEASE ORAL DAILY
Qty: 90 CAP | Refills: 1 | Status: SHIPPED | OUTPATIENT
Start: 2020-11-12 | End: 2021-03-03 | Stop reason: SDUPTHER

## 2020-11-12 RX ORDER — LISINOPRIL 40 MG/1
40 TABLET ORAL DAILY
Qty: 90 TAB | Refills: 0 | Status: SHIPPED | OUTPATIENT
Start: 2020-11-12 | End: 2021-03-03 | Stop reason: SDUPTHER

## 2020-11-12 ASSESSMENT — FIBROSIS 4 INDEX: FIB4 SCORE: 0.6

## 2020-11-12 NOTE — PROGRESS NOTES
"CC: Follow-up on chronic health problems, [facility] paperwork continuation    HISTORY OF THE PRESENT ILLNESS: Patient is a 30 y.o. female. This pleasant patient is here today for evaluation and management of the following health problems.      Essential hypertension  This is a chronic health problem that is uncontrolled with current medications and lifestyle measures.  Patient discontinued taking verapamil, lisinopril, prazosin approximately 2 months ago.  Ran out of medications and did not request refills.  Managed by vascular medicine.  Was supposed to have 24-hour blood pressure monitoring done, but has not done so.  Patient recognizes that she has been noncompliant and that this will affect her health.  She reports she had other life stressors and \"just wanted to not deal with my health issues.\"  Blood pressure today is elevated.  Does report continued intermittent headaches.  Unclear if headaches are coming from neck pain, hypertension, pseudotumor cerebri.  Does report intermittent chest pain.  Denies shortness of breath, epistaxis.    Cervical radiculopathy  Chronic health problem, stable.  Does get intermittent flares of pain which impact patient's ability to function at home and at work.  Neck pain likely causes headache and hypertension.  Unclear if hypertension is causing neck pain and headache.  Patient is due for follow-up with provider at [facility].  Manages with muscle relaxer if needed and lifestyle measures.  Meloxicam did not help.    Chronic daily headache  Patient continues with chronic daily headache.  Has frequent episodes of severe headaches that are triggered by orgasm and possibly hypertension.  Patient had consultation with neurology, but failed to do further work-up and follow-up.  Patient reports she is now ready to reestablish and follow further work-up and treatment plan.  Headaches prevent patient from functioning at home and at work as she is unable to concentrate, interact, move, lift " "due to severe migraine headache.  She is requesting [facility] paperwork completion today.    Major depressive disorder with single episode, in full remission (HCC)  This is a chronic health problem that is well controlled with current medications and lifestyle measures.  Was taking venlafaxine XR 75 mg daily.  Discontinued taking about 2 months ago because ran out of medication.  She did not pursue getting a refill.  Reports she had life stressors and \"shut down\" for a while regarding her health.  She reports she is now back on track and wanting to restart medication.  Denies SI/HI.    Gastroesophageal reflux disease  Patient reports epigastric pain and lump, onset almost a year ago.  Reports gets a protrusion in epigastrium after eating large amounts.  Also has epigastric pain.  Has not tried omeprazole. Requesting referral to gastroenterology for further evaluation.      Allergies: Patient has no known allergies.    Current Outpatient Medications Ordered in Epic   Medication Sig Dispense Refill   • verapamil ER (CALAN-SR) 180 MG Tab CR Take 1 Tab by mouth every day. 90 Tab 0   • lisinopril (PRINIVIL) 40 MG tablet Take 1 Tab by mouth every day. 90 Tab 0   • venlafaxine XR (EFFEXOR XR) 75 MG CAPSULE SR 24 HR Take 1 Cap by mouth every day. 90 Cap 1   • cyclobenzaprine (FLEXERIL) 10 MG Tab Take 1 Tab by mouth 3 times a day as needed. (Patient not taking: Reported on 11/12/2020) 60 Tab 1   • levothyroxine (SYNTHROID) 200 MCG Tab Take 200 mcg by mouth Every morning on an empty stomach.     • ondansetron (ZOFRAN ODT) 4 MG TABLET DISPERSIBLE Take 1 Tab by mouth every 8 hours as needed for Nausea. (Patient not taking: Reported on 11/12/2020) 20 Tab 0   • MIRENA, 52 MG, 20 MCG/24HR IUD 1 Each by Intrauterine route Once.       No current Epic-ordered facility-administered medications on file.        Past Medical History:   Diagnosis Date   • Cancer (HCC) 2008    pre-cancerous cervical cells--froze the cervix   • EEG abnormal    • " Hashimoto's disease    • Hyperglycemia    • Hypertension    • Hypoglycemia    • Migraine    • Pseudotumor cerebri    • Seizure disorder (HCC)        Past Surgical History:   Procedure Laterality Date   • GASTROSCOPY N/A 2018    Procedure: GASTROSCOPY;  Surgeon: Kleber Ivory M.D.;  Location: [facility];  Service: Gastroenterology   • GASTROSCOPY WITH BIOPSY N/A 2018    Procedure: GASTROSCOPY WITH BIOPSY - GASTRIC;  Surgeon: Kleber Ivory M.D.;  Location: [facility];  Service: Gastroenterology   • EGD W/ENDOSCOPIC ULTRASOUND N/A 2018    Procedure: EGD W/ENDOSCOPIC ULTRASOUND - UPPER, CURVILINEAR;  Surgeon: Kleber Ivory M.D.;  Location: [facility];  Service: Gastroenterology   • EGD WITH ASP/BX N/A 2018    Procedure: EGD WITH ASP/BX - GUIDED FNA PANCREATIC HEAD AND/OR TAIL LESION;  Surgeon: Kleber Ivory M.D.;  Location: [facility];  Service: Gastroenterology   • GYN SURGERY      precancerous cells with cervical freezing, HPV positive       Social History     Tobacco Use   • Smoking status: Former Smoker     Packs/day: 0.00     Start date: 2005     Quit date: 5/3/2012     Years since quittin.5   • Smokeless tobacco: Never Used   • Tobacco comment: 1/2 pack a day   Substance Use Topics   • Alcohol use: Not Currently     Alcohol/week: 0.6 oz     Types: 1 Glasses of wine per week     Comment: socially, 1 glass of wine per week   • Drug use: Yes     Types: Marijuana     Comment:         Family History   Problem Relation Age of Onset   • Cancer Mother         Cervical   • Thyroid Mother    • No Known Problems Father    • Heart Disease Maternal Grandmother        ROS:   As in HPI, otherwise negative for chest pain, dyspnea, abdominal pain, dysuria, blood in stool, fever      Exam: /108 (BP Location: Right arm, Patient Position: Sitting, BP Cuff Size: Large adult)   Pulse 77   Temp 36.4 °C (97.6 °F) (Temporal)   Ht 1.778  "m (5' 10\")   Wt 122 kg (269 lb)   SpO2 98%  Body mass index is 38.6 kg/m².    General: Alert, pleasant, obese habitus, well nourished, well developed female in NAD  HEENT: Normocephalic. Eyes conjunctiva clear lids without ptosis, pupils equal and reactive to light, ears normal shape and contour, canals are clear bilaterally, tympanic membranes are pearly gray with good light reflex.  Patient wearing mask.   Neck: Supple without bruit. Thyroid is not enlarged.  Pulmonary: Clear to ausculation.  Normal effort. No rales, ronchi, or wheezing.  Cardiovascular: Normal rate and rhythm without murmur.   Abdomen: Soft, nontender, nondistended. Normal bowel sounds. Liver and spleen are not palpable  Neurologic: Grossly nonfocal  Lymph: No cervical or supraclavicular lymph nodes are palpable  Skin: Warm and dry.    Musculoskeletal: Normal gait.   Psych: Normal mood and affect. Alert and oriented. Judgment and insight is normal.    Please note that this dictation was created using voice recognition software. I have made every reasonable attempt to correct obvious errors, but I expect that there are errors of grammar and possibly content that I did not discover before finalizing the note.      Assessment/Plan  1. Essential hypertension  Blood pressure quite elevated today.  Asymptomatic today.  Patient will restart verapamil ER and lisinopril.  She will call to schedule follow-up appointment with vascular medicine.  Strict ER precautions reviewed with patient.  - verapamil ER (CALAN-SR) 180 MG Tab CR; Take 1 Tab by mouth every day.  Dispense: 90 Tab; Refill: 0  - lisinopril (PRINIVIL) 40 MG tablet; Take 1 Tab by mouth every day.  Dispense: 90 Tab; Refill: 0    2. Major depressive disorder with single episode, in full remission (HCC)  Patient would like to restart venlafaxine Exar as she ran out a couple months ago.  Has been managing adequately with lifestyle measures, but has noticed a difference of mood without the " medication.  Lifestyle measures reviewed with patient.  - venlafaxine XR (EFFEXOR XR) 75 MG CAPSULE SR 24 HR; Take 1 Cap by mouth every day.  Dispense: 90 Cap; Refill: 1    3. Need for influenza vaccination  Given.  - Influenza Vaccine Quad Injection (PF)    4. Gastroesophageal reflux disease, unspecified whether esophagitis present  No abdominal mass palpated on exam today.  Patient says mass comes and goes.  Also having GERD.  Will refer to gastroenterology for further evaluation.  - REFERRAL TO GASTROENTEROLOGY    5. Esophageal hernia    - REFERRAL TO GASTROENTEROLOGY    6. Cervical radiculopathy  Patient continuing to miss work periodically due to neck pain in addition to severe headaches and hypertension.  Patient will schedule follow-up with [facility].    7. Chronic daily headache  Patient continues to have chronic daily headaches with exacerbations.  Exacerbations are uncertain if due to hypertension or neck pain or both.  Patient was to have further work-up ordered by neurology.  Patient has not followed through.  Exacerbations of headaches do not allow patient to function at work or home.  She is requesting [facility] paperwork completion renewal.      Patient has multiple complex symptoms.  Has established with specialists, including endocrinology, neurology, vascular medicine, pain management/spine.  Unfortunately, patient periodically becomes noncompliant and discontinues treatment plan.  Therefore, having to start over with treatment plans frequently.  I did discuss this with patient and how it will negatively affect establishing etiology of her symptoms, her health, and activities of daily living.  I urged patient to please reestablish with specialists to help have better management of her multiple complex conditions.  Patient agrees to reschedule appointments with specialists.    Patient wishing to have [facility] paperwork completed.  I will complete paperwork once it arrives.  Patient will need to  intermittently miss work due to exacerbations/flares of headache, hypertension, and neck pain; 1 to 32 times a week for up to 1 to 2 days duration.    Patient will return to clinic in 3 months or sooner if needed.  She will have previously ordered lab work done.

## 2020-11-16 PROBLEM — K21.9 GASTROESOPHAGEAL REFLUX DISEASE: Status: ACTIVE | Noted: 2020-11-16

## 2020-11-16 NOTE — ASSESSMENT & PLAN NOTE
Patient reports epigastric pain and lump, onset almost a year ago.  Reports gets a protrusion in epigastrium after eating large amounts.  Also has epigastric pain.  Has not tried omeprazole. Requesting referral to gastroenterology for further evaluation.

## 2020-11-16 NOTE — ASSESSMENT & PLAN NOTE
Patient continues with chronic daily headache.  Has frequent episodes of severe headaches that are triggered by orgasm and possibly hypertension.  Patient had consultation with neurology, but failed to do further work-up and follow-up.  Patient reports she is now ready to reestablish and follow further work-up and treatment plan.  Headaches prevent patient from functioning at home and at work as she is unable to concentrate, interact, move, lift due to severe migraine headache.  She is requesting [facility] paperwork completion today.

## 2020-11-16 NOTE — ASSESSMENT & PLAN NOTE
"This is a chronic health problem that is uncontrolled with current medications and lifestyle measures.  Patient discontinued taking verapamil, lisinopril, prazosin approximately 2 months ago.  Ran out of medications and did not request refills.  Managed by vascular medicine.  Was supposed to have 24-hour blood pressure monitoring done, but has not done so.  Patient recognizes that she has been noncompliant and that this will affect her health.  She reports she had other life stressors and \"just wanted to not deal with my health issues.\"  Blood pressure today is elevated.  Does report continued intermittent headaches.  Unclear if headaches are coming from neck pain, hypertension, pseudotumor cerebri.  Does report intermittent chest pain.  Denies shortness of breath, epistaxis.  "

## 2020-11-16 NOTE — ASSESSMENT & PLAN NOTE
"This is a chronic health problem that is well controlled with current medications and lifestyle measures.  Was taking venlafaxine XR 75 mg daily.  Discontinued taking about 2 months ago because ran out of medication.  She did not pursue getting a refill.  Reports she had life stressors and \"shut down\" for a while regarding her health.  She reports she is now back on track and wanting to restart medication.  Denies SI/HI.  "

## 2020-11-16 NOTE — ASSESSMENT & PLAN NOTE
Chronic health problem, stable.  Does get intermittent flares of pain which impact patient's ability to function at home and at work.  Neck pain likely causes headache and hypertension.  Unclear if hypertension is causing neck pain and headache.  Patient is due for follow-up with provider at [facility].  Manages with muscle relaxer if needed and lifestyle measures.  Meloxicam did not help.

## 2020-11-18 ENCOUNTER — TELEPHONE (OUTPATIENT)
Dept: VASCULAR LAB | Facility: MEDICAL CENTER | Age: 30
End: 2020-11-18

## 2020-12-10 ENCOUNTER — TELEPHONE (OUTPATIENT)
Dept: MEDICAL GROUP | Facility: PHYSICIAN GROUP | Age: 30
End: 2020-12-10

## 2020-12-10 NOTE — TELEPHONE ENCOUNTER
Good morning this patient would like to get a order her a covid test to have done at the [facility] clinic     Thank you

## 2021-01-11 ENCOUNTER — HOSPITAL ENCOUNTER (OUTPATIENT)
Dept: LAB | Facility: MEDICAL CENTER | Age: 31
End: 2021-01-11
Attending: PSYCHIATRY & NEUROLOGY
Payer: COMMERCIAL

## 2021-01-11 ENCOUNTER — HOSPITAL ENCOUNTER (OUTPATIENT)
Dept: LAB | Facility: MEDICAL CENTER | Age: 31
End: 2021-01-11
Attending: NURSE PRACTITIONER
Payer: COMMERCIAL

## 2021-01-11 DIAGNOSIS — R23.3 EASY BRUISING: ICD-10-CM

## 2021-01-11 LAB
APTT PPP: 28.4 SEC (ref 24.7–36)
BASOPHILS # BLD AUTO: 0.4 % (ref 0–1.8)
BASOPHILS # BLD: 0.03 K/UL (ref 0–0.12)
EOSINOPHIL # BLD AUTO: 0.27 K/UL (ref 0–0.51)
EOSINOPHIL NFR BLD: 3.7 % (ref 0–6.9)
ERYTHROCYTE [DISTWIDTH] IN BLOOD BY AUTOMATED COUNT: 40.5 FL (ref 35.9–50)
ERYTHROCYTE [SEDIMENTATION RATE] IN BLOOD BY WESTERGREN METHOD: 5 MM/HOUR (ref 0–20)
HCT VFR BLD AUTO: 44.8 % (ref 37–47)
HGB BLD-MCNC: 15 G/DL (ref 12–16)
IMM GRANULOCYTES # BLD AUTO: 0.03 K/UL (ref 0–0.11)
IMM GRANULOCYTES NFR BLD AUTO: 0.4 % (ref 0–0.9)
INR PPP: 0.96 (ref 0.87–1.13)
LYMPHOCYTES # BLD AUTO: 1.53 K/UL (ref 1–4.8)
LYMPHOCYTES NFR BLD: 21.2 % (ref 22–41)
MCH RBC QN AUTO: 28.6 PG (ref 27–33)
MCHC RBC AUTO-ENTMCNC: 33.5 G/DL (ref 33.6–35)
MCV RBC AUTO: 85.3 FL (ref 81.4–97.8)
MONOCYTES # BLD AUTO: 0.42 K/UL (ref 0–0.85)
MONOCYTES NFR BLD AUTO: 5.8 % (ref 0–13.4)
NEUTROPHILS # BLD AUTO: 4.95 K/UL (ref 2–7.15)
NEUTROPHILS NFR BLD: 68.5 % (ref 44–72)
NRBC # BLD AUTO: 0 K/UL
NRBC BLD-RTO: 0 /100 WBC
PLATELET # BLD AUTO: 307 K/UL (ref 164–446)
PMV BLD AUTO: 10 FL (ref 9–12.9)
PROTHROMBIN TIME: 13.1 SEC (ref 12–14.6)
RBC # BLD AUTO: 5.25 M/UL (ref 4.2–5.4)
WBC # BLD AUTO: 7.2 K/UL (ref 4.8–10.8)

## 2021-01-11 PROCEDURE — 36415 COLL VENOUS BLD VENIPUNCTURE: CPT

## 2021-01-11 PROCEDURE — 85610 PROTHROMBIN TIME: CPT

## 2021-01-11 PROCEDURE — 85025 COMPLETE CBC W/AUTO DIFF WBC: CPT

## 2021-01-11 PROCEDURE — 85652 RBC SED RATE AUTOMATED: CPT

## 2021-01-11 PROCEDURE — 85730 THROMBOPLASTIN TIME PARTIAL: CPT

## 2021-01-21 ENCOUNTER — HOSPITAL ENCOUNTER (EMERGENCY)
Facility: MEDICAL CENTER | Age: 31
End: 2021-01-21
Attending: EMERGENCY MEDICINE
Payer: COMMERCIAL

## 2021-01-21 VITALS
HEART RATE: 83 BPM | DIASTOLIC BLOOD PRESSURE: 108 MMHG | RESPIRATION RATE: 17 BRPM | TEMPERATURE: 97.5 F | WEIGHT: 270 LBS | SYSTOLIC BLOOD PRESSURE: 165 MMHG | BODY MASS INDEX: 38.74 KG/M2 | OXYGEN SATURATION: 99 %

## 2021-01-21 DIAGNOSIS — G43.809 OTHER MIGRAINE WITHOUT STATUS MIGRAINOSUS, NOT INTRACTABLE: ICD-10-CM

## 2021-01-21 DIAGNOSIS — R11.2 NAUSEA AND VOMITING, INTRACTABILITY OF VOMITING NOT SPECIFIED, UNSPECIFIED VOMITING TYPE: ICD-10-CM

## 2021-01-21 LAB
ALBUMIN SERPL BCP-MCNC: 4.6 G/DL (ref 3.2–4.9)
ALBUMIN/GLOB SERPL: 1.6 G/DL
ALP SERPL-CCNC: 83 U/L (ref 30–99)
ALT SERPL-CCNC: 22 U/L (ref 2–50)
ANION GAP SERPL CALC-SCNC: 13 MMOL/L (ref 7–16)
AST SERPL-CCNC: 25 U/L (ref 12–45)
BASOPHILS # BLD AUTO: 0.3 % (ref 0–1.8)
BASOPHILS # BLD: 0.03 K/UL (ref 0–0.12)
BILIRUB SERPL-MCNC: 0.2 MG/DL (ref 0.1–1.5)
BUN SERPL-MCNC: 9 MG/DL (ref 8–22)
CALCIUM SERPL-MCNC: 9 MG/DL (ref 8.5–10.5)
CHLORIDE SERPL-SCNC: 99 MMOL/L (ref 96–112)
CO2 SERPL-SCNC: 22 MMOL/L (ref 20–33)
CREAT SERPL-MCNC: 0.73 MG/DL (ref 0.5–1.4)
EOSINOPHIL # BLD AUTO: 0.19 K/UL (ref 0–0.51)
EOSINOPHIL NFR BLD: 1.9 % (ref 0–6.9)
ERYTHROCYTE [DISTWIDTH] IN BLOOD BY AUTOMATED COUNT: 42 FL (ref 35.9–50)
GLOBULIN SER CALC-MCNC: 2.9 G/DL (ref 1.9–3.5)
GLUCOSE SERPL-MCNC: 165 MG/DL (ref 65–99)
HCT VFR BLD AUTO: 48.7 % (ref 37–47)
HGB BLD-MCNC: 16.3 G/DL (ref 12–16)
IMM GRANULOCYTES # BLD AUTO: 0.09 K/UL (ref 0–0.11)
IMM GRANULOCYTES NFR BLD AUTO: 0.9 % (ref 0–0.9)
LIPASE SERPL-CCNC: 15 U/L (ref 11–82)
LYMPHOCYTES # BLD AUTO: 1.1 K/UL (ref 1–4.8)
LYMPHOCYTES NFR BLD: 11 % (ref 22–41)
MCH RBC QN AUTO: 28.9 PG (ref 27–33)
MCHC RBC AUTO-ENTMCNC: 33.5 G/DL (ref 33.6–35)
MCV RBC AUTO: 86.3 FL (ref 81.4–97.8)
MONOCYTES # BLD AUTO: 0.25 K/UL (ref 0–0.85)
MONOCYTES NFR BLD AUTO: 2.5 % (ref 0–13.4)
NEUTROPHILS # BLD AUTO: 8.35 K/UL (ref 2–7.15)
NEUTROPHILS NFR BLD: 83.4 % (ref 44–72)
NRBC # BLD AUTO: 0 K/UL
NRBC BLD-RTO: 0 /100 WBC
PLATELET # BLD AUTO: 244 K/UL (ref 164–446)
PMV BLD AUTO: 10.2 FL (ref 9–12.9)
POTASSIUM SERPL-SCNC: 4.1 MMOL/L (ref 3.6–5.5)
PROT SERPL-MCNC: 7.5 G/DL (ref 6–8.2)
RBC # BLD AUTO: 5.64 M/UL (ref 4.2–5.4)
SODIUM SERPL-SCNC: 134 MMOL/L (ref 135–145)
WBC # BLD AUTO: 10 K/UL (ref 4.8–10.8)

## 2021-01-21 PROCEDURE — 80053 COMPREHEN METABOLIC PANEL: CPT

## 2021-01-21 PROCEDURE — 85025 COMPLETE CBC W/AUTO DIFF WBC: CPT

## 2021-01-21 PROCEDURE — 99284 EMERGENCY DEPT VISIT MOD MDM: CPT

## 2021-01-21 PROCEDURE — 96375 TX/PRO/DX INJ NEW DRUG ADDON: CPT

## 2021-01-21 PROCEDURE — 700105 HCHG RX REV CODE 258: Performed by: EMERGENCY MEDICINE

## 2021-01-21 PROCEDURE — 96374 THER/PROPH/DIAG INJ IV PUSH: CPT

## 2021-01-21 PROCEDURE — 700111 HCHG RX REV CODE 636 W/ 250 OVERRIDE (IP): Performed by: EMERGENCY MEDICINE

## 2021-01-21 PROCEDURE — 83690 ASSAY OF LIPASE: CPT

## 2021-01-21 RX ORDER — DIPHENHYDRAMINE HYDROCHLORIDE 50 MG/ML
25 INJECTION INTRAMUSCULAR; INTRAVENOUS ONCE
Status: COMPLETED | OUTPATIENT
Start: 2021-01-21 | End: 2021-01-21

## 2021-01-21 RX ORDER — KETOROLAC TROMETHAMINE 30 MG/ML
15 INJECTION, SOLUTION INTRAMUSCULAR; INTRAVENOUS ONCE
Status: COMPLETED | OUTPATIENT
Start: 2021-01-21 | End: 2021-01-21

## 2021-01-21 RX ORDER — DEXAMETHASONE SODIUM PHOSPHATE 4 MG/ML
10 INJECTION, SOLUTION INTRA-ARTICULAR; INTRALESIONAL; INTRAMUSCULAR; INTRAVENOUS; SOFT TISSUE ONCE
Status: COMPLETED | OUTPATIENT
Start: 2021-01-21 | End: 2021-01-21

## 2021-01-21 RX ORDER — SODIUM CHLORIDE, SODIUM LACTATE, POTASSIUM CHLORIDE, CALCIUM CHLORIDE 600; 310; 30; 20 MG/100ML; MG/100ML; MG/100ML; MG/100ML
1000 INJECTION, SOLUTION INTRAVENOUS ONCE
Status: COMPLETED | OUTPATIENT
Start: 2021-01-21 | End: 2021-01-21

## 2021-01-21 RX ORDER — METOCLOPRAMIDE HYDROCHLORIDE 5 MG/ML
10 INJECTION INTRAMUSCULAR; INTRAVENOUS ONCE
Status: COMPLETED | OUTPATIENT
Start: 2021-01-21 | End: 2021-01-21

## 2021-01-21 RX ORDER — PROMETHAZINE HYDROCHLORIDE 25 MG/1
25 TABLET ORAL EVERY 6 HOURS PRN
Qty: 30 TAB | Refills: 0 | Status: SHIPPED | OUTPATIENT
Start: 2021-01-21

## 2021-01-21 RX ORDER — ONDANSETRON 4 MG/1
4 TABLET, ORALLY DISINTEGRATING ORAL EVERY 6 HOURS PRN
Qty: 20 TAB | Refills: 0 | Status: SHIPPED | OUTPATIENT
Start: 2021-01-21 | End: 2021-03-11

## 2021-01-21 RX ADMIN — DIPHENHYDRAMINE HYDROCHLORIDE 25 MG: 50 INJECTION INTRAMUSCULAR; INTRAVENOUS at 06:52

## 2021-01-21 RX ADMIN — KETOROLAC TROMETHAMINE 15 MG: 30 INJECTION, SOLUTION INTRAMUSCULAR at 06:51

## 2021-01-21 RX ADMIN — METOCLOPRAMIDE HYDROCHLORIDE 10 MG: 5 INJECTION INTRAMUSCULAR; INTRAVENOUS at 06:52

## 2021-01-21 RX ADMIN — SODIUM CHLORIDE, POTASSIUM CHLORIDE, SODIUM LACTATE AND CALCIUM CHLORIDE 1000 ML: 600; 310; 30; 20 INJECTION, SOLUTION INTRAVENOUS at 06:38

## 2021-01-21 RX ADMIN — DEXAMETHASONE SODIUM PHOSPHATE 10 MG: 4 INJECTION, SOLUTION INTRA-ARTICULAR; INTRALESIONAL; INTRAMUSCULAR; INTRAVENOUS; SOFT TISSUE at 07:00

## 2021-01-21 ASSESSMENT — FIBROSIS 4 INDEX: FIB4 SCORE: 0.66

## 2021-01-21 NOTE — ED PROVIDER NOTES
ED Provider Note    ER PROVIDER NOTE          CHIEF COMPLAINT  Chief Complaint   Patient presents with   • N/V   • Headache       HPI  Nickolas Galdamez is a 30 y.o. female who presents to the emergency department complaining of headache.  Patient has longstanding history of chronic headaches, from migraine as well as pseudotumor.  She reports gradual onset of her headache yesterday, this progressed through the night and her home medications were not helping.  Additionally she started having some nausea and vomiting overnight which she also commonly gets with her headaches and was unable to hold down her medications for headache as well as her blood pressure.  The headache is a pressure sensation all over, does not seem to be positional, and it feels exactly the same as her past headaches.  There is no neck pain or stiffness.  No focal weakness numbness or tingling.  No recent fevers chills cough or congestion.  No blurry vision, double vision or other visual symptoms.  She has no abdominal pain or chest pain or other focal complaints.    She is followed by neurology at [facility] for her headaches, has scheduled therapeutic lumbar puncture in 2 weeks    REVIEW OF SYSTEMS  Pertinent positives include headache. Pertinent negatives include no fever or chills. See HPI for details. All other systems reviewed and are negative.    PAST MEDICAL HISTORY   has a past medical history of Cancer (HCC) (2008), EEG abnormal, Hashimoto's disease, Hyperglycemia, Hypertension, Hypoglycemia, Migraine, Pseudotumor cerebri, and Seizure disorder (Formerly [facility]).    SURGICAL HISTORY   has a past surgical history that includes gyn surgery; gastroscopy (N/A, 12/26/2018); gastroscopy with biopsy (N/A, 12/26/2018); egd w/endoscopic ultrasound (N/A, 12/26/2018); and egd with asp/bx (N/A, 12/26/2018).    FAMILY HISTORY  Family History   Problem Relation Age of Onset   • Cancer Mother         Cervical   • Thyroid Mother    • No Known Problems  Father    • Heart Disease Maternal Grandmother        SOCIAL HISTORY  Social History     Socioeconomic History   • Marital status: Single     Spouse name: Not on file   • Number of children: Not on file   • Years of education: Not on file   • Highest education level: Not on file   Occupational History   • Not on file   Social Needs   • Financial resource strain: Not on file   • Food insecurity     Worry: Not on file     Inability: Not on file   • Transportation needs     Medical: Not on file     Non-medical: Not on file   Tobacco Use   • Smoking status: Former Smoker     Packs/day: 0.00     Start date: 2005     Quit date: 5/3/2012     Years since quittin.7   • Smokeless tobacco: Never Used   • Tobacco comment: 1/2 pack a day   Substance and Sexual Activity   • Alcohol use: Not Currently     Alcohol/week: 0.6 oz     Types: 1 Glasses of wine per week     Comment: socially, 1 glass of wine per week   • Drug use: Yes     Types: Marijuana     Comment:     • Sexual activity: Never   Lifestyle   • Physical activity     Days per week: Not on file     Minutes per session: Not on file   • Stress: Not on file   Relationships   • Social connections     Talks on phone: Not on file     Gets together: Not on file     Attends Sabianism service: Not on file     Active member of club or organization: Not on file     Attends meetings of clubs or organizations: Not on file     Relationship status: Not on file   • Intimate partner violence     Fear of current or ex partner: Not on file     Emotionally abused: Not on file     Physically abused: Not on file     Forced sexual activity: Not on file   Other Topics Concern   • Not on file   Social History Narrative    ** Merged History Encounter **           Social History     Substance and Sexual Activity   Drug Use Yes   • Types: Marijuana    Comment:         CURRENT MEDICATIONS  Home Medications    **Home medications have not yet been reviewed for this encounter**          ALLERGIES  No Known Allergies    PHYSICAL EXAM  VITAL SIGNS: /98   Pulse 84   Temp 36.4 °C (97.5 °F) (Temporal)   Resp (!) 26   Wt 122.5 kg (270 lb)   SpO2 98%   BMI 38.74 kg/m²   Pulse ox interpretation: I interpret this pulse ox as normal.    Constitutional: Alert uncomfortable appearing  HENT: No signs of trauma, Bilateral external ears normal, Nose normal.   Eyes: Pupils are equal and reactive, Conjunctiva normal, Non-icteric.   Neck: Normal range of motion, No tenderness, Supple, No stridor.   Lymphatic: No lymphadenopathy noted.   Cardiovascular: Tachycardic, no murmurs.   Thorax & Lungs: Normal breath sounds, No respiratory distress, No wheezing, No chest tenderness.   Abdomen: Bowel sounds normal, Soft, No tenderness, No masses, No pulsatile masses. No peritoneal signs.  Skin: Warm, Dry, No erythema, No rash.   Back: No bony tenderness, No CVA tenderness.   Extremities: Intact distal pulses, No edema, No tenderness, No cyanosis, Negative Leo's sign.  Musculoskeletal: Good range of motion in all major joints. No tenderness to palpation or major deformities noted.   Neurologic: Alert, cranial nerves intact, speech is appropriate or not slurred, upper extremities bilaterally exhibit no drift, no dysmetria, 5 out of 5 strength with bilateral bicep/tricep/, sensation intact to light touch throughout upper extremities. Lower extremities strength 5 out of 5 thigh extension/flexion/abduction/adduction, knee extension/flexion, dorsiflexion plantar flexion. No clonus.  2+ patella reflexes.  sensation intact to light touch.  No focal deficits noted. Ambulates with steady gait, steady tandem gait  Psychiatric: Affect normal, Judgment normal, Mood normal.        DIAGNOSTIC STUDIES / PROCEDURES        LABS  Labs Reviewed   CBC WITH DIFFERENTIAL - Abnormal; Notable for the following components:       Result Value    RBC 5.64 (*)     Hemoglobin 16.3 (*)     Hematocrit 48.7 (*)     MCHC 33.5 (*)      Neutrophils-Polys 83.40 (*)     Lymphocytes 11.00 (*)     Neutrophils (Absolute) 8.35 (*)     All other components within normal limits   COMP METABOLIC PANEL - Abnormal; Notable for the following components:    Sodium 134 (*)     Glucose 165 (*)     All other components within normal limits   LIPASE   ESTIMATED GFR       All labs reviewed by me.    RADIOLOGY  No orders to display     The radiologist's interpretation of all radiological studies have been reviewed and images independently viewed by me.    COURSE & MEDICAL DECISION MAKING  Nursing notes, VS, PMSFHx reviewed in chart.    6:28 AM Patient seen and examined at bedside. Patient will be treated with IV fluid, Reglan, Toradol, Benadryl, Decadron. Ordered for CBC, CMP to evaluate her symptoms.     7:45 AM on reevaluation, patient states she is feeling improved would like something to eat or drink, is given water and crackers    8:30 AM  Patient is reevaluated, states she feels much better headache is resolved as well as nausea she has tolerated orals will plan for discharge    I reviewed the patient records, she has had prior similar presentations the last being this summer which improved with symptomatic care.  She also had CT and CTA at that time which were normal, she also saw neurology in June      HYDRATION: Based on the patient's presentation of Acute Vomiting the patient was given IV fluids. IV Hydration was used because oral hydration was not as rapid as required. Upon recheck following hydration, the patient was improved.    Decision Making:  This is a 30 y.o. female presented with headache.  Patient does have prior history of migraines as well as idiopathic intracranial hypertension and this does seem consistent with those.  Here she has no neurologic deficits or visual symptoms to suggest emergent need for lumbar puncture and already has one scheduled for next week for therapeutics.  She was given Toradol, Benadryl, Reglan, Decadron and improved  greatly with resolution of her symptoms.  Given the exact similarity to past as well as the lack of rapid onset and severity of the headache, in addition to the well appearance of the pt makes the dx of subarchnoid hemorrhage less likely. The normal vital signs, lack of a temperature and lack of meningeal signs (supple neck without pain with rom) makes the dx of meningitis less likely.   The patient has no neurologic signs, no fever, and is immunocompetent therefore the time course would be inconsistent with abscess. The absence of neurologic signs and the short duration of sx make neoplasm unlikely. There is no history or physical exam sign of trauma, and a normal neurologic exam making any traumatic head bleed (sdh/sah/iph/edh) unlikely. Toxic and metabolic causes of headache are also unlikely given no other signs or symptoms of such a disorder..    The patient is improved with normal vitals, a normal neurologic exam, normal gait, and is discharged home with neurology follow-up and return precautions.     The patient will return for new or worsening symptoms and is stable at the time of discharge.    The patient is referred to a primary physician for blood pressure management, diabetic screening, and for all other preventative health concerns.    DISPOSITION:  Patient will be discharged home in stable condition.    FOLLOW UP:  Cecilia Ardnt M.D.  [address] [place] [id number] [phone]      at your appointment      OUTPATIENT MEDICATIONS:  New Prescriptions    ONDANSETRON (ZOFRAN ODT) 4 MG TABLET DISPERSIBLE    Take 1 Tab by mouth every 6 hours as needed for Nausea.    PROMETHAZINE (PHENERGAN) 25 MG TAB    Take 1 Tab by mouth every 6 hours as needed for Nausea/Vomiting.         FINAL IMPRESSION  1. Other migraine without status migrainosus, not intractable    2. Nausea and vomiting, intractability of vomiting not specified, unspecified vomiting type          The note accurately reflects work and  decisions made by me.  Chapo Hensley M.D.  1/21/2021  8:53 AM

## 2021-01-21 NOTE — ED TRIAGE NOTES
Pt states she has intracranial hypertension and needs to have an LP to drain CSF. This has been an ongoing issue since she was 17; she has required yearly LPs and has had subsequent seizures, per pt.     Pt c/o nausea and vomiting, headaches, high blood pressure.  She states she often gets these symptoms when she has HTN and so she takes her BP meds, but this time it did not help.     EMS gave 25mg phenergan IV.

## 2021-01-26 ENCOUNTER — OFFICE VISIT (OUTPATIENT)
Dept: MEDICAL GROUP | Facility: PHYSICIAN GROUP | Age: 31
End: 2021-01-26
Payer: COMMERCIAL

## 2021-01-26 VITALS
DIASTOLIC BLOOD PRESSURE: 80 MMHG | OXYGEN SATURATION: 96 % | RESPIRATION RATE: 14 BRPM | WEIGHT: 258 LBS | TEMPERATURE: 98.1 F | BODY MASS INDEX: 38.21 KG/M2 | HEIGHT: 69 IN | SYSTOLIC BLOOD PRESSURE: 122 MMHG | HEART RATE: 99 BPM

## 2021-01-26 DIAGNOSIS — E28.2 PCOS (POLYCYSTIC OVARIAN SYNDROME): ICD-10-CM

## 2021-01-26 DIAGNOSIS — I10 ESSENTIAL HYPERTENSION: ICD-10-CM

## 2021-01-26 DIAGNOSIS — R51.9 CHRONIC DAILY HEADACHE: ICD-10-CM

## 2021-01-26 DIAGNOSIS — M54.12 CERVICAL RADICULOPATHY: ICD-10-CM

## 2021-01-26 DIAGNOSIS — R79.89 ABNORMAL CBC: ICD-10-CM

## 2021-01-26 DIAGNOSIS — Z30.431 IUD CHECK UP: ICD-10-CM

## 2021-01-26 DIAGNOSIS — E06.3 HYPOTHYROIDISM DUE TO HASHIMOTO'S THYROIDITIS: ICD-10-CM

## 2021-01-26 DIAGNOSIS — E03.8 HYPOTHYROIDISM DUE TO HASHIMOTO'S THYROIDITIS: ICD-10-CM

## 2021-01-26 PROBLEM — M54.2 NECK PAIN: Status: RESOLVED | Noted: 2019-10-30 | Resolved: 2021-01-26

## 2021-01-26 PROCEDURE — 99214 OFFICE O/P EST MOD 30 MIN: CPT | Performed by: NURSE PRACTITIONER

## 2021-01-26 RX ORDER — CYCLOBENZAPRINE HCL 10 MG
10 TABLET ORAL 3 TIMES DAILY PRN
Qty: 60 TAB | Refills: 1 | Status: SHIPPED | OUTPATIENT
Start: 2021-01-26 | End: 2021-03-03 | Stop reason: SDUPTHER

## 2021-01-26 ASSESSMENT — FIBROSIS 4 INDEX: FIB4 SCORE: 0.63

## 2021-01-26 NOTE — ASSESSMENT & PLAN NOTE
Patient reports history of PCOS and IUD placement 4 years ago.  Has not she is starting to spot when her blood pressure is elevated and has mild cramping.  Wanting to establish with gynecology for IUD management.

## 2021-01-26 NOTE — PROGRESS NOTES
CC: ER follow-up, referral    HISTORY OF THE PRESENT ILLNESS: Patient is a 30 y.o. female. This pleasant patient is here today for evaluation and management of the following health problems.    Patient is concerned that some of the abnormal lab values that were found at the ER.      PCOS (polycystic ovarian syndrome)  Patient reports history of PCOS and IUD placement 4 years ago.  Has not she is starting to spot when her blood pressure is elevated and has mild cramping.  Wanting to establish with gynecology for IUD management.    Essential hypertension  Chronic health problem.  Has had 1 ER visit since last appointment for hypertension caused by headache or headache caused by hypertension.  Continues with verapamil ER, lisinopril.  Blood pressure today is well controlled.  Patient is now scheduling work-up that was ordered by neurology, Dr. Arndt.  She will call for appointment with marychuy Medina for follow-up.    Chronic daily headache  Patient continues with daily headache.  Frequent episodes of severe headaches where she needs to be seen in ER.  Uncertain if headaches are caused by hypertension or hypertension is caused by headaches.  When hypertension is treated in the ER, her headaches resolved.  Part of the medication she received in the ER was cyclobenzaprine.  She is asking for refill.  She would like to take this at beginning of severe headache to see if it prevents her from having to go to the ER.  She was to have work-up done that was ordered by neurology, but has been noncompliant.  However, she has finally set up neurology ordered lumbar puncture and labs.      Allergies: Patient has no known allergies.    Current Outpatient Medications Ordered in Epic   Medication Sig Dispense Refill   • cyclobenzaprine (FLEXERIL) 10 mg Tab Take 1 Tab by mouth 3 times a day as needed. 60 Tab 1   • promethazine (PHENERGAN) 25 MG Tab Take 1 Tab by mouth every 6 hours as needed for Nausea/Vomiting. 30 Tab 0   •  verapamil ER (CALAN-SR) 180 MG Tab CR Take 1 Tab by mouth every day. 90 Tab 0   • lisinopril (PRINIVIL) 40 MG tablet Take 1 Tab by mouth every day. 90 Tab 0   • venlafaxine XR (EFFEXOR XR) 75 MG CAPSULE SR 24 HR Take 1 Cap by mouth every day. 90 Cap 1   • levothyroxine (SYNTHROID) 200 MCG Tab Take 200 mcg by mouth Every morning on an empty stomach.     • ondansetron (ZOFRAN ODT) 4 MG TABLET DISPERSIBLE Take 1 Tab by mouth every 8 hours as needed for Nausea. 20 Tab 0   • MIRENA, 52 MG, 20 MCG/24HR IUD 1 Each by Intrauterine route Once.     • ondansetron (ZOFRAN ODT) 4 MG TABLET DISPERSIBLE Take 1 Tab by mouth every 6 hours as needed for Nausea. (Patient not taking: Reported on 1/26/2021) 20 Tab 0     No current Epic-ordered facility-administered medications on file.        Past Medical History:   Diagnosis Date   • Cancer (HCC) 2008    pre-cancerous cervical cells--froze the cervix   • EEG abnormal    • Hashimoto's disease    • Hyperglycemia    • Hypertension    • Hypoglycemia    • Migraine    • Pseudotumor cerebri    • Seizure disorder (HCC)        Past Surgical History:   Procedure Laterality Date   • GASTROSCOPY N/A 12/26/2018    Procedure: GASTROSCOPY;  Surgeon: Kleber Ivory M.D.;  Location: [facility];  Service: Gastroenterology   • GASTROSCOPY WITH BIOPSY N/A 12/26/2018    Procedure: GASTROSCOPY WITH BIOPSY - GASTRIC;  Surgeon: Kleber Ivory M.D.;  Location: [facility];  Service: Gastroenterology   • EGD W/ENDOSCOPIC ULTRASOUND N/A 12/26/2018    Procedure: EGD W/ENDOSCOPIC ULTRASOUND - UPPER, CURVILINEAR;  Surgeon: Kleber Ivory M.D.;  Location: [facility];  Service: Gastroenterology   • EGD WITH ASP/BX N/A 12/26/2018    Procedure: EGD WITH ASP/BX - GUIDED FNA PANCREATIC HEAD AND/OR TAIL LESION;  Surgeon: Kleber Ivory M.D.;  Location: [facility];  Service: Gastroenterology   • GYN SURGERY      precancerous cells with cervical freezing, HPV  "positive       Social History     Tobacco Use   • Smoking status: Former Smoker     Packs/day: 0.00     Start date: 2005     Quit date: 5/3/2012     Years since quittin.7   • Smokeless tobacco: Never Used   • Tobacco comment: 1/2 pack a day   Substance Use Topics   • Alcohol use: Not Currently     Alcohol/week: 0.6 oz     Types: 1 Glasses of wine per week     Comment: socially, 1 glass of wine per week   • Drug use: Not Currently     Types: Marijuana     Comment:         Family History   Problem Relation Age of Onset   • Cancer Mother         Cervical   • Thyroid Mother    • No Known Problems Father    • Heart Disease Maternal Grandmother        ROS:   As in HPI, otherwise negative for chest pain, dyspnea, abdominal pain, dysuria, blood in stool, fever             Exam: /80 (BP Location: Left arm, Patient Position: Sitting, BP Cuff Size: Adult)   Pulse 99   Temp 36.7 °C (98.1 °F) (Temporal)   Resp 14   Ht 1.753 m (5' 9\")   Wt 117 kg (258 lb)   SpO2 96%  Body mass index is 38.1 kg/m².    General: Alert, pleasant, well nourished, well developed female in NAD  Neck: Supple without bruit. Thyroid is not enlarged.  Pulmonary: Clear to ausculation.  Normal effort. No rales, ronchi, or wheezing.  Cardiovascular: Normal rate and rhythm without murmur. Carotid and radial pulses are intact and equal bilaterally.  No lower extremity edema.  Abdomen: Soft, nontender, nondistended. Normal bowel sounds. Liver and spleen are not palpable  Neurologic: Grossly nonfocal  Lymph: No cervical or supraclavicular lymph nodes are palpable  Skin: Warm and dry.   Musculoskeletal: Normal gait.   Psych: Normal mood and affect. Alert and oriented. Judgment and insight is normal.    Component      Latest Ref Rng & Units 2021   WBC      4.8 - 10.8 K/uL 10.0   RBC      4.20 - 5.40 M/uL 5.64 (H)   Hemoglobin      12.0 - 16.0 g/dL 16.3 (H)   Hematocrit      37.0 - 47.0 % 48.7 (H)   MCV      81.4 - 97.8 fL 86.3   MCH      " 27.0 - 33.0 pg 28.9   MCHC      33.6 - 35.0 g/dL 33.5 (L)   RDW      35.9 - 50.0 fL 42.0   Platelet Count      164 - 446 K/uL 244   MPV      9.0 - 12.9 fL 10.2   Neutrophils-Polys      44.00 - 72.00 % 83.40 (H)   Lymphocytes      22.00 - 41.00 % 11.00 (L)   Monocytes      0.00 - 13.40 % 2.50   Eosinophils      0.00 - 6.90 % 1.90   Basophils      0.00 - 1.80 % 0.30   Immature Granulocytes      0.00 - 0.90 % 0.90   Nucleated RBC      /100 WBC 0.00   Neutrophils (Absolute)      2.00 - 7.15 K/uL 8.35 (H)   Lymphs (Absolute)      1.00 - 4.80 K/uL 1.10   Monos (Absolute)      0.00 - 0.85 K/uL 0.25   Eos (Absolute)      0.00 - 0.51 K/uL 0.19   Baso (Absolute)      0.00 - 0.12 K/uL 0.03   Immature Granulocytes (abs)      0.00 - 0.11 K/uL 0.09   NRBC (Absolute)      K/uL 0.00   Sodium      135 - 145 mmol/L 134 (L)   Potassium      3.6 - 5.5 mmol/L 4.1   Chloride      96 - 112 mmol/L 99   Co2      20 - 33 mmol/L 22   Anion Gap      7.0 - 16.0 13.0   Glucose      65 - 99 mg/dL 165 (H)   Bun      8 - 22 mg/dL 9   Creatinine      0.50 - 1.40 mg/dL 0.73   Calcium      8.5 - 10.5 mg/dL 9.0   AST(SGOT)      12 - 45 U/L 25   ALT(SGPT)      2 - 50 U/L 22   Alkaline Phosphatase      30 - 99 U/L 83   Total Bilirubin      0.1 - 1.5 mg/dL 0.2   Albumin      3.2 - 4.9 g/dL 4.6   Total Protein      6.0 - 8.2 g/dL 7.5   Globulin      1.9 - 3.5 g/dL 2.9   A-G Ratio      g/dL 1.6       Please note that this dictation was created using voice recognition software. I have made every reasonable attempt to correct obvious errors, but I expect that there are errors of grammar and possibly content that I did not discover before finalizing the note.      Assessment/Plan  1. Cervical radiculopathy    - cyclobenzaprine (FLEXERIL) 10 mg Tab; Take 1 Tab by mouth 3 times a day as needed.  Dispense: 60 Tab; Refill: 1    2. PCOS (polycystic ovarian syndrome)  History of PCOS.  IUD in place.  Now having no spotting/pelvic cramping.  Will refer to gynecology  as requested.  - REFERRAL TO GYNECOLOGY    3. IUD check up    - REFERRAL TO GYNECOLOGY    4. Hypothyroidism due to Hashimoto's thyroiditis  We will notify patient of lab results.  - TSH WITH REFLEX TO FT4; Future    5. Essential hypertension  Patient will call to schedule follow-up appointment with vascular medicine, Dr. Rashid.  In the meantime continue with verapamil and lisinopril.  Reviewed lifestyle measures including weight loss and routine aerobic exercise as tolerated.    6. Chronic daily headache  Patient will keep scheduled neurology work-up.  Will trial cyclobenzaprine as needed for severe headache.  - cyclobenzaprine (FLEXERIL) 10 mg Tab; Take 1 Tab by mouth 3 times a day as needed.  Dispense: 60 Tab; Refill: 1    - cyclobenzaprine (FLEXERIL) 10 mg Tab; Take 1 Tab by mouth 3 times a day as needed.  Dispense: 60 Tab; Refill: 1    7. Abnormal CBC  Advised patient that CBC and ER likely showed she was dehydrated.  We will get updated CBC in a couple weeks.  - CBC WITH DIFFERENTIAL; Future    Patient will return to clinic in 3 months or sooner if needed.

## 2021-01-26 NOTE — ASSESSMENT & PLAN NOTE
Patient continues with daily headache.  Frequent episodes of severe headaches where she needs to be seen in ER.  Uncertain if headaches are caused by hypertension or hypertension is caused by headaches.  When hypertension is treated in the ER, her headaches resolved.  Part of the medication she received in the ER was cyclobenzaprine.  She is asking for refill.  She would like to take this at beginning of severe headache to see if it prevents her from having to go to the ER.  She was to have work-up done that was ordered by neurology, but has been noncompliant.  However, she has finally set up neurology ordered lumbar puncture and labs.

## 2021-01-30 ENCOUNTER — HOSPITAL ENCOUNTER (EMERGENCY)
Facility: MEDICAL CENTER | Age: 31
End: 2021-01-30
Attending: EMERGENCY MEDICINE
Payer: COMMERCIAL

## 2021-01-30 ENCOUNTER — APPOINTMENT (OUTPATIENT)
Dept: RADIOLOGY | Facility: MEDICAL CENTER | Age: 31
End: 2021-01-30
Attending: EMERGENCY MEDICINE
Payer: COMMERCIAL

## 2021-01-30 VITALS
HEART RATE: 93 BPM | HEIGHT: 71 IN | RESPIRATION RATE: 17 BRPM | WEIGHT: 260 LBS | SYSTOLIC BLOOD PRESSURE: 149 MMHG | OXYGEN SATURATION: 97 % | TEMPERATURE: 99.1 F | BODY MASS INDEX: 36.4 KG/M2 | DIASTOLIC BLOOD PRESSURE: 96 MMHG

## 2021-01-30 DIAGNOSIS — R56.9 SEIZURE (HCC): ICD-10-CM

## 2021-01-30 LAB
ALBUMIN SERPL BCP-MCNC: 4.5 G/DL (ref 3.2–4.9)
ALBUMIN/GLOB SERPL: 1.7 G/DL
ALP SERPL-CCNC: 80 U/L (ref 30–99)
ALT SERPL-CCNC: 21 U/L (ref 2–50)
ANION GAP SERPL CALC-SCNC: 13 MMOL/L (ref 7–16)
AST SERPL-CCNC: 17 U/L (ref 12–45)
BASOPHILS # BLD AUTO: 0.4 % (ref 0–1.8)
BASOPHILS # BLD: 0.04 K/UL (ref 0–0.12)
BILIRUB SERPL-MCNC: <0.2 MG/DL (ref 0.1–1.5)
BUN SERPL-MCNC: 13 MG/DL (ref 8–22)
CALCIUM SERPL-MCNC: 9.2 MG/DL (ref 8.5–10.5)
CHLORIDE SERPL-SCNC: 109 MMOL/L (ref 96–112)
CO2 SERPL-SCNC: 21 MMOL/L (ref 20–33)
CREAT SERPL-MCNC: 1 MG/DL (ref 0.5–1.4)
EOSINOPHIL # BLD AUTO: 0.34 K/UL (ref 0–0.51)
EOSINOPHIL NFR BLD: 3.3 % (ref 0–6.9)
ERYTHROCYTE [DISTWIDTH] IN BLOOD BY AUTOMATED COUNT: 44.7 FL (ref 35.9–50)
GLOBULIN SER CALC-MCNC: 2.7 G/DL (ref 1.9–3.5)
GLUCOSE SERPL-MCNC: 72 MG/DL (ref 65–99)
HCG SERPL QL: NEGATIVE
HCT VFR BLD AUTO: 45 % (ref 37–47)
HGB BLD-MCNC: 15.1 G/DL (ref 12–16)
IMM GRANULOCYTES # BLD AUTO: 0.12 K/UL (ref 0–0.11)
IMM GRANULOCYTES NFR BLD AUTO: 1.2 % (ref 0–0.9)
LYMPHOCYTES # BLD AUTO: 1.33 K/UL (ref 1–4.8)
LYMPHOCYTES NFR BLD: 13 % (ref 22–41)
MCH RBC QN AUTO: 29.4 PG (ref 27–33)
MCHC RBC AUTO-ENTMCNC: 33.6 G/DL (ref 33.6–35)
MCV RBC AUTO: 87.7 FL (ref 81.4–97.8)
MONOCYTES # BLD AUTO: 0.56 K/UL (ref 0–0.85)
MONOCYTES NFR BLD AUTO: 5.5 % (ref 0–13.4)
NEUTROPHILS # BLD AUTO: 7.86 K/UL (ref 2–7.15)
NEUTROPHILS NFR BLD: 76.6 % (ref 44–72)
NRBC # BLD AUTO: 0 K/UL
NRBC BLD-RTO: 0 /100 WBC
PLATELET # BLD AUTO: 287 K/UL (ref 164–446)
PMV BLD AUTO: 9.8 FL (ref 9–12.9)
POTASSIUM SERPL-SCNC: 4.1 MMOL/L (ref 3.6–5.5)
PROT SERPL-MCNC: 7.2 G/DL (ref 6–8.2)
RBC # BLD AUTO: 5.13 M/UL (ref 4.2–5.4)
SODIUM SERPL-SCNC: 143 MMOL/L (ref 135–145)
TROPONIN T SERPL-MCNC: <6 NG/L (ref 6–19)
WBC # BLD AUTO: 10.3 K/UL (ref 4.8–10.8)

## 2021-01-30 PROCEDURE — 84703 CHORIONIC GONADOTROPIN ASSAY: CPT

## 2021-01-30 PROCEDURE — 700111 HCHG RX REV CODE 636 W/ 250 OVERRIDE (IP): Performed by: EMERGENCY MEDICINE

## 2021-01-30 PROCEDURE — 99284 EMERGENCY DEPT VISIT MOD MDM: CPT

## 2021-01-30 PROCEDURE — 96374 THER/PROPH/DIAG INJ IV PUSH: CPT

## 2021-01-30 PROCEDURE — 96375 TX/PRO/DX INJ NEW DRUG ADDON: CPT

## 2021-01-30 PROCEDURE — 85025 COMPLETE CBC W/AUTO DIFF WBC: CPT

## 2021-01-30 PROCEDURE — A9270 NON-COVERED ITEM OR SERVICE: HCPCS | Performed by: EMERGENCY MEDICINE

## 2021-01-30 PROCEDURE — 80053 COMPREHEN METABOLIC PANEL: CPT

## 2021-01-30 PROCEDURE — 72070 X-RAY EXAM THORAC SPINE 2VWS: CPT

## 2021-01-30 PROCEDURE — 84484 ASSAY OF TROPONIN QUANT: CPT

## 2021-01-30 PROCEDURE — 700105 HCHG RX REV CODE 258: Performed by: EMERGENCY MEDICINE

## 2021-01-30 PROCEDURE — 700102 HCHG RX REV CODE 250 W/ 637 OVERRIDE(OP): Performed by: EMERGENCY MEDICINE

## 2021-01-30 RX ORDER — MORPHINE SULFATE 4 MG/ML
4 INJECTION, SOLUTION INTRAMUSCULAR; INTRAVENOUS ONCE
Status: COMPLETED | OUTPATIENT
Start: 2021-01-30 | End: 2021-01-30

## 2021-01-30 RX ORDER — LORAZEPAM 2 MG/ML
1 INJECTION INTRAMUSCULAR ONCE
Status: COMPLETED | OUTPATIENT
Start: 2021-01-30 | End: 2021-01-30

## 2021-01-30 RX ORDER — ONDANSETRON 4 MG/1
4 TABLET, ORALLY DISINTEGRATING ORAL ONCE
Status: COMPLETED | OUTPATIENT
Start: 2021-01-30 | End: 2021-01-30

## 2021-01-30 RX ORDER — SODIUM CHLORIDE 9 MG/ML
1000 INJECTION, SOLUTION INTRAVENOUS ONCE
Status: COMPLETED | OUTPATIENT
Start: 2021-01-30 | End: 2021-01-30

## 2021-01-30 RX ORDER — CYCLOBENZAPRINE HCL 10 MG
10 TABLET ORAL ONCE
Status: COMPLETED | OUTPATIENT
Start: 2021-01-30 | End: 2021-01-30

## 2021-01-30 RX ORDER — ONDANSETRON 2 MG/ML
4 INJECTION INTRAMUSCULAR; INTRAVENOUS ONCE
Status: DISCONTINUED | OUTPATIENT
Start: 2021-01-30 | End: 2021-01-30

## 2021-01-30 RX ADMIN — CYCLOBENZAPRINE 10 MG: 10 TABLET, FILM COATED ORAL at 18:55

## 2021-01-30 RX ADMIN — MORPHINE SULFATE 4 MG: 4 INJECTION INTRAVENOUS at 17:14

## 2021-01-30 RX ADMIN — ONDANSETRON 4 MG: 4 TABLET, ORALLY DISINTEGRATING ORAL at 19:18

## 2021-01-30 RX ADMIN — LORAZEPAM 1 MG: 2 INJECTION INTRAMUSCULAR; INTRAVENOUS at 16:38

## 2021-01-30 RX ADMIN — SODIUM CHLORIDE 1000 ML: 9 INJECTION, SOLUTION INTRAVENOUS at 16:00

## 2021-01-30 ASSESSMENT — FIBROSIS 4 INDEX: FIB4 SCORE: 0.63
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
## 2021-01-31 NOTE — ED NOTES
Spoke with lab regarding delayed lab results. They stated they are running now and should be available shortly.

## 2021-01-31 NOTE — ED PROVIDER NOTES
ED Provider Note    CHIEF COMPLAINT  Chief Complaint   Patient presents with   • Seizure     1st on in last 2yrs       HPI  Nickolas Galdamez is a 30 y.o. female who presents for evaluation of a loss of consciousness, by report she had a seizure.  She reports a history of seizures, since she was 17 years old she is having a seizure once every several years.  She reports that she has had multiple EEGs and they had never showed any evidence of seizure.  Most recently evaluated by a neurologist about 6 months ago, she reports she never followed up with the ordered EEG.  She has history of chronic reoccurring headaches, she states that she has been diagnosed with pseudotumor cerebri, she has an appointment for an lumbar puncture coming up in 10 days.  She also reports that in the past she has been hypoglycemic which that was one of the possible causes of her seizure disorder.  Today she states she actually did not have a headache which is abnormal for her.  She felt a sensation in the back of her neck as though her muscles were tightening and that indicated to her that she was about to have a seizure.  He was in an arcade when this happened with her daughter, apparently she did not strike her head, she denies any tongue biting, she has no urinary incontinence.  She has mid back pain but no focal weakness, numbness or tingling.  No chest pain.  No abdominal pain.  No vomiting.  No fever.    REVIEW OF SYSTEMS  Negative for fever, rash, chest pain, dyspnea, abdominal pain, focal weakness, focal numbness, focal tingling. All other systems are negative.     PAST MEDICAL HISTORY   has a past medical history of Cancer (HCC) (2008), EEG abnormal, Hashimoto's disease, Hyperglycemia, Hypertension, Hypoglycemia, Migraine, Pseudotumor cerebri, and Seizure disorder (HCC).    SOCIAL HISTORY  Social History     Tobacco Use   • Smoking status: Former Smoker     Packs/day: 0.00     Start date: 1/1/2005     Quit date: 5/3/2012  "    Years since quittin.7   • Smokeless tobacco: Never Used   • Tobacco comment: 1/2 pack a day   Substance and Sexual Activity   • Alcohol use: Not Currently     Alcohol/week: 0.6 oz     Types: 1 Glasses of wine per week     Comment: socially, 1 glass of wine per week   • Drug use: Not Currently     Types: Marijuana     Comment:     • Sexual activity: Never       SURGICAL HISTORY   has a past surgical history that includes gyn surgery; gastroscopy (N/A, 2018); gastroscopy with biopsy (N/A, 2018); egd w/endoscopic ultrasound (N/A, 2018); and egd with asp/bx (N/A, 2018).    CURRENT MEDICATIONS  I personally reviewed the medication list in the charting documentation.     ALLERGIES  No Known Allergies    PHYSICAL EXAM  VITAL SIGNS: /99   Pulse (!) 117   Resp 20   Ht 1.8 m (5' 10.87\")   Wt 118 kg (260 lb)   SpO2 96%   BMI 36.40 kg/m²   Constitutional: Tearful otherwise well appearing patient in no acute distress.  Not toxic, nor ill in appearance.  HENT: Mucus membranes moist.  Oropharynx is clear.  Small contusion to the left posterior lateral scalp  Eyes: Pupils are equal and reactive to light. No scleral icterus. Normal conjunctiva   Neck: Supple, comfortable, nonpainful range of motion.   Cardiovascular: Tachycardic but regular   Thorax & Lungs: Chest is nontender.  Lungs are clear to auscultation with good air movement bilaterally.  No wheeze, rhonchi, nor rales.   Abdomen: Soft, with no tenderness, rebound nor guarding.  No mass or pulsatile mass appreciated.  Skin: Warm, dry. No rash appreciated  Extremities/Musculoskeletal: No sign of trauma. No asymmetric calf erythema, edema or tenderness. Normal range of motion.  Mild tenderness of the entire thoracic region in the midline as well as the paraspinal musculature.  Neurologic: Alert & oriented. CN II-XII grossly intact. No focal deficits observed.  Normal and symmetric upper and lower extremity motor and sensory function " bilaterally  Psychiatric: Normal affect appropriate for the clinical situation.    DIAGNOSTIC STUDIES / PROCEDURES    LABS/EKGs  Results for orders placed or performed during the hospital encounter of 01/30/21   CBC WITH DIFFERENTIAL   Result Value Ref Range    WBC 10.3 4.8 - 10.8 K/uL    RBC 5.13 4.20 - 5.40 M/uL    Hemoglobin 15.1 12.0 - 16.0 g/dL    Hematocrit 45.0 37.0 - 47.0 %    MCV 87.7 81.4 - 97.8 fL    MCH 29.4 27.0 - 33.0 pg    MCHC 33.6 33.6 - 35.0 g/dL    RDW 44.7 35.9 - 50.0 fL    Platelet Count 287 164 - 446 K/uL    MPV 9.8 9.0 - 12.9 fL    Neutrophils-Polys 76.60 (H) 44.00 - 72.00 %    Lymphocytes 13.00 (L) 22.00 - 41.00 %    Monocytes 5.50 0.00 - 13.40 %    Eosinophils 3.30 0.00 - 6.90 %    Basophils 0.40 0.00 - 1.80 %    Immature Granulocytes 1.20 (H) 0.00 - 0.90 %    Nucleated RBC 0.00 /100 WBC    Neutrophils (Absolute) 7.86 (H) 2.00 - 7.15 K/uL    Lymphs (Absolute) 1.33 1.00 - 4.80 K/uL    Monos (Absolute) 0.56 0.00 - 0.85 K/uL    Eos (Absolute) 0.34 0.00 - 0.51 K/uL    Baso (Absolute) 0.04 0.00 - 0.12 K/uL    Immature Granulocytes (abs) 0.12 (H) 0.00 - 0.11 K/uL    NRBC (Absolute) 0.00 K/uL   COMP METABOLIC PANEL   Result Value Ref Range    Sodium 143 135 - 145 mmol/L    Potassium 4.1 3.6 - 5.5 mmol/L    Chloride 109 96 - 112 mmol/L    Co2 21 20 - 33 mmol/L    Anion Gap 13.0 7.0 - 16.0    Glucose 72 65 - 99 mg/dL    Bun 13 8 - 22 mg/dL    Creatinine 1.00 0.50 - 1.40 mg/dL    Calcium 9.2 8.5 - 10.5 mg/dL    AST(SGOT) 17 12 - 45 U/L    ALT(SGPT) 21 2 - 50 U/L    Alkaline Phosphatase 80 30 - 99 U/L    Total Bilirubin <0.2 0.1 - 1.5 mg/dL    Albumin 4.5 3.2 - 4.9 g/dL    Total Protein 7.2 6.0 - 8.2 g/dL    Globulin 2.7 1.9 - 3.5 g/dL    A-G Ratio 1.7 g/dL   TROPONIN   Result Value Ref Range    Troponin T <6 6 - 19 ng/L   HCG QUAL SERUM   Result Value Ref Range    Beta-Hcg Qualitative Serum Negative Negative   ESTIMATED GFR   Result Value Ref Range    GFR If African American >60 >60 mL/min/1.73 m 2     GFR If Non African American >60 >60 mL/min/1.73 m 2        RADIOLOGY  DX-THORACIC SPINE-2 VIEWS   Final Result      1.  No thoracic spine fracture or subluxation.   2.  Mild multilevel degenerative change.            COURSE & MEDICAL DECISION MAKING  Pertinent Labs & Imaging studies reviewed. (See chart for details)  Differential diagnosis includes: Seizure, electrolyte abnormality, hypoglycemia, pregnancy, dehydration, anemia, thoracic injury    Encounter Summary: This is a 30 y.o. female with a possible seizure, has a questionable seizure history but did not follow-up with her most recent EEG about 6 months ago, she states that today's episode began similar to her prior episodes where she had a tightness sensation in the back of her neck and then has no recollection until waking up on the ambulance.  No tongue biting.  No urinary incontinence.  On exam she has no focal neurologic findings.  She does a history of chronic headaches which she is evaluated here frequently but states that today she does not have a headache.  Vital signs reveal tachycardia but she is crying and in emotional distress state on exam.  She will be treated with Ativan and some IV fluids, she will have some blood work performed as well as a thoracic x-ray as she is complaining of pain in this region with generalized tenderness ------- x-ray is unremarkable.  The work-up is otherwise unrevealing.  At this point the patient and her mother at the bedside expresses a great deal of frustration.  They have been evaluated by 3 local neurologist, they have been referred to various providers at [facility] and [facility], she has been seen by endocrinology as well as gastroenterology for all various related issues and reports that no one can ever figure out what is wrong with her.  At this point I have encouraged her to remain compliant with her neurology follow-up.  I went over strict return instructions and she is being discharged home in stable  condition      HYDRATION: Based on the patient's presentation of Tachycardia the patient was given IV fluids. IV Hydration was used because oral hydration was not adequate alone. Upon recheck following hydration, the patient was Improved.        DISPOSITION: Discharge Home      FINAL IMPRESSION  1. Seizure (HCC)        This dictation was created using voice recognition software. The accuracy of the dictation is limited to the abilities of the software. I expect there may be some errors of grammar and possibly content. The nursing notes were reviewed and certain aspects of this information were incorporated into this note.    Electronically signed by: Gabriel Ocasio M.D., 1/30/2021 4:12 PM

## 2021-02-01 ENCOUNTER — PATIENT MESSAGE (OUTPATIENT)
Dept: NEUROLOGY | Facility: MEDICAL CENTER | Age: 31
End: 2021-02-01

## 2021-02-02 ENCOUNTER — OFFICE VISIT (OUTPATIENT)
Dept: MEDICAL GROUP | Facility: PHYSICIAN GROUP | Age: 31
End: 2021-02-02
Payer: COMMERCIAL

## 2021-02-02 VITALS
RESPIRATION RATE: 12 BRPM | BODY MASS INDEX: 39.25 KG/M2 | WEIGHT: 265 LBS | SYSTOLIC BLOOD PRESSURE: 132 MMHG | OXYGEN SATURATION: 96 % | DIASTOLIC BLOOD PRESSURE: 88 MMHG | HEIGHT: 69 IN | TEMPERATURE: 97.8 F | HEART RATE: 95 BPM

## 2021-02-02 DIAGNOSIS — S06.0X1D CONCUSSION WITH LOSS OF CONSCIOUSNESS OF 30 MINUTES OR LESS, SUBSEQUENT ENCOUNTER: ICD-10-CM

## 2021-02-02 PROBLEM — S06.0X1A CONCUSSION WITH LOSS OF CONSCIOUSNESS OF 30 MINUTES OR LESS: Status: ACTIVE | Noted: 2021-02-02

## 2021-02-02 PROCEDURE — 99213 OFFICE O/P EST LOW 20 MIN: CPT | Performed by: NURSE PRACTITIONER

## 2021-02-02 ASSESSMENT — FIBROSIS 4 INDEX: FIB4 SCORE: 0.37

## 2021-02-02 NOTE — PATIENT INSTRUCTIONS
Post-Concussion Syndrome    Post-concussion syndrome is when symptoms last longer than normal after a head injury.  What are the signs or symptoms?  After a head injury, you may:  · Have headaches.  · Feel tired.  · Feel dizzy.  · Feel weak.  · Have trouble seeing.  · Have trouble in bright lights.  · Have trouble hearing.  · Not be able to remember things.  · Not be able to focus.  · Have trouble sleeping.  · Have mood swings.  · Have trouble learning new things.  These can last from weeks to months.  Follow these instructions at home:  Medicines  · Take all medicines only as told by your doctor.  · Do not take prescription pain medicines.  Activity  · Limit activities as told by your doctor. This includes:  ? Homework.  ? Job-related work.  ? Thinking.  ? Watching TV.  ? Using a computer or phone.  ? Puzzles.  ? Exercise.  ? Sports.  · Slowly return to your normal activity as told by your doctor.  · Stop an activity if you have symptoms.  · Do not do anything that may cause you to get injured again.  General instructions  · Rest. Try to:  ? Sleep 7-9 hours each night.  ? Take naps or breaks when you feel tired during the day.  · Do not drink alcohol until your doctor says that you can.  · Keep track of your symptoms.  · Keep all follow-up visits as told by your doctor. This is important.  Contact a doctor if:  · You do not improve.  · You get worse.  · You have another injury.  Get help right away if:  · You have a very bad headache.  · You feel confused.  · You feel very sleepy.  · You pass out (faint).  · You throw up (vomit).  · You feel weak in any part of your body.  · You feel numb in any part of your body.  · You start shaking (have a seizure).  · You have trouble talking.  Summary  · Post-concussion syndrome is when symptoms last longer than normal after a head injury.  · Limit all activity after your injury. Gradually return to normal activity as told by your doctor.  · Rest, do not drink alcohol, and  avoid prescription pain medicines after a concussion.  · Call your doctor if your symptoms get worse.  This information is not intended to replace advice given to you by your health care provider. Make sure you discuss any questions you have with your health care provider.  Document Released: 01/25/2006 Document Revised: 10/10/2019 Document Reviewed: 01/22/2019  Elsevier Patient Education © 2020 [employer]

## 2021-02-02 NOTE — PROGRESS NOTES
CC: ER follow-up    HISTORY OF THE PRESENT ILLNESS: Patient is a 30 y.o. female. This pleasant patient is here today for evaluation and management of the following health problems.      Concussion with loss of consciousness of 30 minutes or less  Patient is here for follow-up for [facility] ER visit on 1/30/2021.  [place] and [place] and hit her head.  She is concerned about concussion.  Upcoming appointment with neurology for follow-up on seizure.  Reports headache is improving, lump on head is resolving.  Still feels sleepy, foggy headed mild nausea.  Has noticed that she stutters occasionally.  Denies vision changes, muscle weakness, severe nausea, emesis.      Allergies: Patient has no known allergies.    Current Outpatient Medications Ordered in Epic   Medication Sig Dispense Refill   • cyclobenzaprine (FLEXERIL) 10 mg Tab Take 1 Tab by mouth 3 times a day as needed. 60 Tab 1   • promethazine (PHENERGAN) 25 MG Tab Take 1 Tab by mouth every 6 hours as needed for Nausea/Vomiting. 30 Tab 0   • verapamil ER (CALAN-SR) 180 MG Tab CR Take 1 Tab by mouth every day. 90 Tab 0   • lisinopril (PRINIVIL) 40 MG tablet Take 1 Tab by mouth every day. 90 Tab 0   • venlafaxine XR (EFFEXOR XR) 75 MG CAPSULE SR 24 HR Take 1 Cap by mouth every day. 90 Cap 1   • levothyroxine (SYNTHROID) 200 MCG Tab Take 200 mcg by mouth Every morning on an empty stomach.     • ondansetron (ZOFRAN ODT) 4 MG TABLET DISPERSIBLE Take 1 Tab by mouth every 8 hours as needed for Nausea. 20 Tab 0   • MIRENA, 52 MG, 20 MCG/24HR IUD 1 Each by Intrauterine route Once.     • ondansetron (ZOFRAN ODT) 4 MG TABLET DISPERSIBLE Take 1 Tab by mouth every 6 hours as needed for Nausea. (Patient not taking: Reported on 1/26/2021) 20 Tab 0     No current Epic-ordered facility-administered medications on file.        Past Medical History:   Diagnosis Date   • Cancer (HCC) 2008    pre-cancerous cervical cells--froze the cervix   • EEG abnormal    •  Hashimoto's disease    • Hyperglycemia    • Hypertension    • Hypoglycemia    • Migraine    • Pseudotumor cerebri    • Seizure disorder (HCC)        Past Surgical History:   Procedure Laterality Date   • GASTROSCOPY N/A 2018    Procedure: GASTROSCOPY;  Surgeon: Kleber Ivory M.D.;  Location: [facility];  Service: Gastroenterology   • GASTROSCOPY WITH BIOPSY N/A 2018    Procedure: GASTROSCOPY WITH BIOPSY - GASTRIC;  Surgeon: Kleber Ivory M.D.;  Location: [facility];  Service: Gastroenterology   • EGD W/ENDOSCOPIC ULTRASOUND N/A 2018    Procedure: EGD W/ENDOSCOPIC ULTRASOUND - UPPER, CURVILINEAR;  Surgeon: Kleber Ivory M.D.;  Location: [facility];  Service: Gastroenterology   • EGD WITH ASP/BX N/A 2018    Procedure: EGD WITH ASP/BX - GUIDED FNA PANCREATIC HEAD AND/OR TAIL LESION;  Surgeon: Kleber Ivory M.D.;  Location: [facility];  Service: Gastroenterology   • GYN SURGERY      precancerous cells with cervical freezing, HPV positive       Social History     Tobacco Use   • Smoking status: Former Smoker     Packs/day: 0.00     Start date: 2005     Quit date: 5/3/2012     Years since quittin.7   • Smokeless tobacco: Never Used   • Tobacco comment: 1/2 pack a day   Substance Use Topics   • Alcohol use: Not Currently     Alcohol/week: 0.6 oz     Types: 1 Glasses of wine per week     Comment: socially, 1 glass of wine per week   • Drug use: Not Currently     Types: Marijuana     Comment:         Family History   Problem Relation Age of Onset   • Cancer Mother         Cervical   • Thyroid Mother    • No Known Problems Father    • Heart Disease Maternal Grandmother        ROS:   As in HPI, otherwise negative for chest pain, dyspnea, abdominal pain, dysuria, fever          Exam: /88 (BP Location: Left arm, Patient Position: Sitting, BP Cuff Size: Adult long)   Pulse 95   Temp 36.6 °C (97.8 °F) (Temporal)   Resp 12   Ht  "1.753 m (5' 9\")   Wt 120 kg (265 lb)   SpO2 96%  Body mass index is 39.13 kg/m².    General: Alert, pleasant, obese habitus, well nourished, well developed female in NAD  HEENT: Small bump on top of head, no redness. Eyes conjunctiva clear lids without ptosis, pupils equal and reactive to light, ears normal shape and contour.   Neck: Supple without bruit.   Pulmonary: Clear to ausculation.  Normal effort. No rales, ronchi, or wheezing.  Cardiovascular: Normal rate and rhythm without murmur.   Neuro: CN 2-12 tested and grossly intact, strength testing in upper and lower extremities is 5/5 and equal bilaterally, Gross motor movement intact in all 4 extremities, Gross sensation intact to extremities and trunk, Gait grossly normal and not antalgic  Skin: Warm and dry.    Musculoskeletal: Normal gait.   Psych: Normal mood and affect. Alert and oriented. Judgment and insight is normal.    Please note that this dictation was created using voice recognition software. I have made every reasonable attempt to correct obvious errors, but I expect that there are errors of grammar and possibly content that I did not discover before finalizing the note.      Assessment/Plan  1. Concussion with loss of consciousness of 30 minutes or less, subsequent encounter  Patient has postconcussion symptoms.  Stable.  No abnormalities on exam today.  Advised on limiting activity including reading, watching TV.  Handout given to patient today.  Continue with ibuprofen or acetaminophen for headache.  ER precautions reviewed with patient.  Patient will schedule follow-up appointment with neurology.      "

## 2021-02-02 NOTE — TELEPHONE ENCOUNTER
I haven't seen her in 8 months. I am aware now. I don't think she followed through with workup for her spells. F/u and completing workup is necessary. I will switch her routine EEG to ambulatory and she will need to see me in clinic if she is interested. Please let me know what she decides.

## 2021-02-03 DIAGNOSIS — R40.4 NONSPECIFIC PAROXYSMAL SPELL: ICD-10-CM

## 2021-02-03 NOTE — PATIENT COMMUNICATION
I called the pt and gave her an appt. I informed her Dr Arndt ordered an AMB EEG and gave her a phone number to schedule. She said she has scheduled the LP for 02/10/2020. I mailed the order to her address.

## 2021-02-03 NOTE — ASSESSMENT & PLAN NOTE
Patient is here for follow-up for [facility] ER visit on 1/30/2021.  [place] and [place] and [place] her head.  She is concerned about concussion.  Upcoming appointment with neurology for follow-up on seizure.  Reports headache is improving, lump on head is resolving.  Still feels sleepy, foggy headed mild nausea.  Has noticed that she stutters occasionally.  Denies vision changes, muscle weakness, severe nausea, emesis.

## 2021-02-09 ENCOUNTER — HOSPITAL ENCOUNTER (OUTPATIENT)
Dept: LAB | Facility: MEDICAL CENTER | Age: 31
End: 2021-02-09
Attending: PSYCHIATRY & NEUROLOGY
Payer: COMMERCIAL

## 2021-02-09 LAB
APTT PPP: 29 SEC (ref 24.7–36)
BASOPHILS # BLD AUTO: 0.6 % (ref 0–1.8)
BASOPHILS # BLD: 0.04 K/UL (ref 0–0.12)
EOSINOPHIL # BLD AUTO: 0.26 K/UL (ref 0–0.51)
EOSINOPHIL NFR BLD: 3.8 % (ref 0–6.9)
ERYTHROCYTE [DISTWIDTH] IN BLOOD BY AUTOMATED COUNT: 43.2 FL (ref 35.9–50)
HCT VFR BLD AUTO: 46.3 % (ref 37–47)
HGB BLD-MCNC: 15.3 G/DL (ref 12–16)
IMM GRANULOCYTES # BLD AUTO: 0.08 K/UL (ref 0–0.11)
IMM GRANULOCYTES NFR BLD AUTO: 1.2 % (ref 0–0.9)
INR PPP: 0.89 (ref 0.87–1.13)
LYMPHOCYTES # BLD AUTO: 1.3 K/UL (ref 1–4.8)
LYMPHOCYTES NFR BLD: 19 % (ref 22–41)
MCH RBC QN AUTO: 28.7 PG (ref 27–33)
MCHC RBC AUTO-ENTMCNC: 33 G/DL (ref 33.6–35)
MCV RBC AUTO: 86.9 FL (ref 81.4–97.8)
MONOCYTES # BLD AUTO: 0.35 K/UL (ref 0–0.85)
MONOCYTES NFR BLD AUTO: 5.1 % (ref 0–13.4)
NEUTROPHILS # BLD AUTO: 4.8 K/UL (ref 2–7.15)
NEUTROPHILS NFR BLD: 70.3 % (ref 44–72)
NRBC # BLD AUTO: 0 K/UL
NRBC BLD-RTO: 0 /100 WBC
PLATELET # BLD AUTO: 302 K/UL (ref 164–446)
PMV BLD AUTO: 9.7 FL (ref 9–12.9)
PROTHROMBIN TIME: 12.4 SEC (ref 12–14.6)
RBC # BLD AUTO: 5.33 M/UL (ref 4.2–5.4)
WBC # BLD AUTO: 6.8 K/UL (ref 4.8–10.8)

## 2021-02-09 PROCEDURE — 36415 COLL VENOUS BLD VENIPUNCTURE: CPT

## 2021-02-09 PROCEDURE — 85610 PROTHROMBIN TIME: CPT

## 2021-02-09 PROCEDURE — 85730 THROMBOPLASTIN TIME PARTIAL: CPT

## 2021-02-09 PROCEDURE — 85025 COMPLETE CBC W/AUTO DIFF WBC: CPT

## 2021-02-10 ENCOUNTER — HOSPITAL ENCOUNTER (OUTPATIENT)
Dept: RADIOLOGY | Facility: MEDICAL CENTER | Age: 31
End: 2021-02-10
Attending: PSYCHIATRY & NEUROLOGY
Payer: COMMERCIAL

## 2021-02-10 LAB
BURR CELLS/RBC NFR CSF MANUAL: 0 %
CLARITY CSF: CLEAR
COLOR CSF: COLORLESS
COLOR SPUN CSF: COLORLESS
GLUCOSE CSF-MCNC: 49 MG/DL (ref 40–80)
LYMPHOCYTES NFR CSF: 88 %
MONONUC CELLS NFR CSF: 12 %
PROT CSF-MCNC: 36 MG/DL (ref 15–45)
RBC # CSF: 1 CELLS/UL
SPECIMEN VOL CSF: 3.5 ML
TUBE # CSF: 3
TUBE # CSF: 3
WBC # CSF: 2 CELLS/UL (ref 0–10)

## 2021-02-10 PROCEDURE — 82945 GLUCOSE OTHER FLUID: CPT

## 2021-02-10 PROCEDURE — 62270 DX LMBR SPI PNXR: CPT

## 2021-02-10 PROCEDURE — 84157 ASSAY OF PROTEIN OTHER: CPT

## 2021-02-10 PROCEDURE — 89051 BODY FLUID CELL COUNT: CPT

## 2021-02-10 PROCEDURE — 306637 DX-LUMBAR PUNCTURE FOR DIAGNOSIS

## 2021-02-10 PROCEDURE — 62328 DX LMBR SPI PNXR W/FLUOR/CT: CPT

## 2021-02-24 ENCOUNTER — TELEPHONE (OUTPATIENT)
Dept: NEUROLOGY | Facility: MEDICAL CENTER | Age: 31
End: 2021-02-24

## 2021-02-24 NOTE — TELEPHONE ENCOUNTER
----- Message from Cecilia Arndt M.D. sent at 2/24/2021  3:46 PM PST -----  Yes please  ----- Message -----  From: Jair Caballero, Med Ass't  Sent: 2/24/2021   2:43 PM PST  To: Cecilia Arndt M.D.    There is an opening on 03/29/21 @ 1;40 do you want me to give him that ?  ----- Message -----  From: Cecilia Arndt M.D.  Sent: 2/24/2021   1:41 PM PST  To: Jair Caballero, Med Ass't    Please put her on a wait list for a sooner appointment.  I will try to get her in sooner before May.

## 2021-02-25 ENCOUNTER — TELEPHONE (OUTPATIENT)
Dept: NEUROLOGY | Facility: MEDICAL CENTER | Age: 31
End: 2021-02-25

## 2021-02-25 NOTE — TELEPHONE ENCOUNTER
----- Message from Cecilia Arndt M.D. sent at 2/25/2021  8:04 AM PST -----  Can you please fit this patient in on 3/11/2021 at 3pm for 40 min seizure and pseudotumor etc. Follow up? She needs extra time.     Rimma once appointment confirmed, please block the spot. Thank you.

## 2021-02-25 NOTE — TELEPHONE ENCOUNTER
I spoke with pt and she said she will be starting a new job in 03/08/2021 starts at 10 am and she said she can't commit to 03/11/2021 @ 3 pm. She asking something around 8 am.

## 2021-02-25 NOTE — TELEPHONE ENCOUNTER
I called the pt number and LVM stating Dr Arndt would like to see her sooner than her 03/29 appt and  to call me back so that I can give her the appt.

## 2021-03-03 ENCOUNTER — GYNECOLOGY VISIT (OUTPATIENT)
Dept: OBGYN | Facility: CLINIC | Age: 31
End: 2021-03-03
Payer: COMMERCIAL

## 2021-03-03 VITALS — WEIGHT: 263 LBS | DIASTOLIC BLOOD PRESSURE: 115 MMHG | SYSTOLIC BLOOD PRESSURE: 164 MMHG | BODY MASS INDEX: 38.84 KG/M2

## 2021-03-03 DIAGNOSIS — N94.10 DYSPAREUNIA IN FEMALE: ICD-10-CM

## 2021-03-03 DIAGNOSIS — N91.2 AMENORRHEA: ICD-10-CM

## 2021-03-03 DIAGNOSIS — I10 ESSENTIAL HYPERTENSION: ICD-10-CM

## 2021-03-03 DIAGNOSIS — Z30.431 IUD CHECK UP: ICD-10-CM

## 2021-03-03 DIAGNOSIS — M54.12 CERVICAL RADICULOPATHY: ICD-10-CM

## 2021-03-03 DIAGNOSIS — F32.5 MAJOR DEPRESSIVE DISORDER WITH SINGLE EPISODE, IN FULL REMISSION (HCC): ICD-10-CM

## 2021-03-03 DIAGNOSIS — R51.9 CHRONIC DAILY HEADACHE: ICD-10-CM

## 2021-03-03 PROCEDURE — 99203 OFFICE O/P NEW LOW 30 MIN: CPT | Performed by: OBSTETRICS & GYNECOLOGY

## 2021-03-03 RX ORDER — VENLAFAXINE HYDROCHLORIDE 75 MG/1
75 CAPSULE, EXTENDED RELEASE ORAL DAILY
Qty: 90 CAPSULE | Refills: 3 | Status: SHIPPED | OUTPATIENT
Start: 2021-03-03

## 2021-03-03 RX ORDER — CYCLOBENZAPRINE HCL 10 MG
10 TABLET ORAL 3 TIMES DAILY PRN
Qty: 60 TABLET | Refills: 1 | Status: SHIPPED | OUTPATIENT
Start: 2021-03-03

## 2021-03-03 ASSESSMENT — FIBROSIS 4 INDEX: FIB4 SCORE: 0.37

## 2021-03-03 NOTE — NON-PROVIDER
Pt here for new pt appt  Pt states has a IUD and would like it to get removed soon. Pt states has pain in her ovaries and during intercourse it is really painful due to this Pt states she hasn't had intercourse in over a year.   Pharmacy verified  Good #:[phone]   LMP: unknown   PAP:2014 wnl   BC:IUD

## 2021-03-03 NOTE — PROGRESS NOTES
Chief complaint; new patient    Nickolas Galdamez is a 31 y.o.  who presents complaining of amenorrhea and pain during intercourse  Pain during intercourse has been going on for less than 1 year.  The patient states that she has severe pain including headache and passes out when she has intercourse.    I delivered her child 8 years ago by     Patient did have transvaginal ultrasound 2018 which showed septate uterus.      Patient is currently using Mirena IUD placed 5 years ago-she states she wants IUD taken out.  She has been amenorrheic but would like to have her menstrual cycles back..    Review of systems; denies fever chills abdominal pain, denies chest pain shortness of breath or urinary symptoms  Past medical history-  Past Medical History:   Diagnosis Date   • Cancer (HCC) 2008    pre-cancerous cervical cells--froze the cervix   • EEG abnormal    • Hashimoto's disease    • Hyperglycemia    • Hypertension    • Hypoglycemia    • Migraine    • Pseudotumor cerebri    • Seizure disorder (HCC)      Past surgical history-  Past Surgical History:   Procedure Laterality Date   • GASTROSCOPY N/A 2018    Procedure: GASTROSCOPY;  Surgeon: Kleber Ivory M.D.;  Location: [facility];  Service: Gastroenterology   • GASTROSCOPY WITH BIOPSY N/A 2018    Procedure: GASTROSCOPY WITH BIOPSY - GASTRIC;  Surgeon: Kleber Ivory M.D.;  Location: [facility];  Service: Gastroenterology   • EGD W/ENDOSCOPIC ULTRASOUND N/A 2018    Procedure: EGD W/ENDOSCOPIC ULTRASOUND - UPPER, CURVILINEAR;  Surgeon: Kleber Ivory M.D.;  Location: [facility];  Service: Gastroenterology   • EGD WITH ASP/BX N/A 2018    Procedure: EGD WITH ASP/BX - GUIDED FNA PANCREATIC HEAD AND/OR TAIL LESION;  Surgeon: Kleber Ivory M.D.;  Location: [facility];  Service: Gastroenterology   • GYN SURGERY      precancerous cells with cervical freezing, HPV positive      Allergies-Patient has no known allergies.  Medications-  Current Outpatient Medications on File Prior to Visit   Medication Sig Dispense Refill   • lisinopril (PRINIVIL) 40 MG tablet Take 1 Tab by mouth every day. 90 Tab 0   • ondansetron (ZOFRAN ODT) 4 MG TABLET DISPERSIBLE Take 1 Tab by mouth every 8 hours as needed for Nausea. 20 Tab 0   • cyclobenzaprine (FLEXERIL) 10 mg Tab Take 1 Tab by mouth 3 times a day as needed. 60 Tab 1   • promethazine (PHENERGAN) 25 MG Tab Take 1 Tab by mouth every 6 hours as needed for Nausea/Vomiting. 30 Tab 0   • ondansetron (ZOFRAN ODT) 4 MG TABLET DISPERSIBLE Take 1 Tab by mouth every 6 hours as needed for Nausea. (Patient not taking: Reported on 2021) 20 Tab 0   • verapamil ER (CALAN-SR) 180 MG Tab CR Take 1 Tab by mouth every day. 90 Tab 0   • venlafaxine XR (EFFEXOR XR) 75 MG CAPSULE SR 24 HR Take 1 Cap by mouth every day. 90 Cap 1   • levothyroxine (SYNTHROID) 200 MCG Tab Take 200 mcg by mouth Every morning on an empty stomach.     • MIRENA, 52 MG, 20 MCG/24HR IUD 1 Each by Intrauterine route Once.       No current facility-administered medications on file prior to visit.     Social history-  Social History     Socioeconomic History   • Marital status: Single     Spouse name: Not on file   • Number of children: Not on file   • Years of education: Not on file   • Highest education level: Not on file   Occupational History   • Not on file   Tobacco Use   • Smoking status: Former Smoker     Packs/day: 0.00     Start date: 2005     Quit date: 5/3/2012     Years since quittin.8   • Smokeless tobacco: Never Used   • Tobacco comment: 1/2 pack a day   Substance and Sexual Activity   • Alcohol use: Not Currently     Alcohol/week: 0.6 oz     Types: 1 Glasses of wine per week     Comment: socially, 1 glass of wine per week   • Drug use: Not Currently     Types: Marijuana     Comment:     • Sexual activity: Not Currently     Birth control/protection: I.U.D.   Other Topics  Concern   • Not on file   Social History Narrative    ** Merged History Encounter **          Social Determinants of Health     Financial Resource Strain:    • Difficulty of Paying Living Expenses:    Food Insecurity:    • Worried About Running Out of Food in the Last Year:    • Ran Out of Food in the Last Year:    Transportation Needs:    • Lack of Transportation (Medical):    • Lack of Transportation (Non-Medical):    Physical Activity:    • Days of Exercise per Week:    • Minutes of Exercise per Session:    Stress:    • Feeling of Stress :    Social Connections:    • Frequency of Communication with Friends and Family:    • Frequency of Social Gatherings with Friends and Family:    • Attends L[employer] day Services:    • Active Member of Clubs or Organizations:    • Attends Club or Organization Meetings:    • Marital Status:    Intimate Partner Violence:    • Fear of Current or Ex-Partner:    • Emotionally Abused:    • Physically Abused:    • Sexually Abused:      Past Family History-no history of breast or ovarian cancer    Physical examination;  Alert and oriented x3  General a thin well-developed well-nourished female in no apparent distress  Vitals:    03/03/21 1105   BP: (!) 164/115   BP Location: Right arm   Patient Position: Sitting   Weight: 119 kg (263 lb)     Skin is warm and dry  Neck-supple  HEENT-head-atraumatic, normocephalic, EOMI, PERRLA  Cardiovascular-regular rate and rhythm, normal S1-S2, no murmurs or gallops  Lungs-clear to auscultation bilaterally  Back-negative CVA tenderness  Abdomen-nondistended positive bowel sounds soft nontender no masses or hepatosplenomegaly  Pelvic examination;  External genitalia-no visible lesions   Vagina-no blood or discharge  Cervix-no gross lesions, IUD strings visualized  Uterus-normal size and shape, nontender  Adnexa without mass or tenderness  Extremities without cyanosis clubbing or edema  Neurologic exam grossly intact    Impression;  Dyspareunia  IUD  check  Amenorrhea-secondary to Mirena IUD  Family-planning  Elevated BMI  History of pseudotumor cerebri  History of seizure disorder  History of chronic headaches    Plan;  Referral placed for removal of IUD  Transvaginal ultrasound referral given        30  Minutes spent with the patient in face-to-face contact, greater than 50% of the time spent on counseling and coordination of care. All questions answered in detail.    Female chaperone present for entire examination and history

## 2021-03-04 RX ORDER — LISINOPRIL 40 MG/1
40 TABLET ORAL DAILY
Qty: 90 TABLET | Refills: 0 | Status: SHIPPED | OUTPATIENT
Start: 2021-03-04

## 2021-03-11 ENCOUNTER — OFFICE VISIT (OUTPATIENT)
Dept: NEUROLOGY | Facility: MEDICAL CENTER | Age: 31
End: 2021-03-11
Attending: PSYCHIATRY & NEUROLOGY
Payer: COMMERCIAL

## 2021-03-11 VITALS
WEIGHT: 264.55 LBS | RESPIRATION RATE: 14 BRPM | BODY MASS INDEX: 39.18 KG/M2 | DIASTOLIC BLOOD PRESSURE: 106 MMHG | HEART RATE: 71 BPM | HEIGHT: 69 IN | OXYGEN SATURATION: 99 % | TEMPERATURE: 98.2 F | SYSTOLIC BLOOD PRESSURE: 142 MMHG

## 2021-03-11 DIAGNOSIS — R40.4 NONSPECIFIC PAROXYSMAL SPELL: ICD-10-CM

## 2021-03-11 DIAGNOSIS — G93.2 BENIGN INTRACRANIAL HYPERTENSION: ICD-10-CM

## 2021-03-11 PROCEDURE — 99211 OFF/OP EST MAY X REQ PHY/QHP: CPT | Performed by: PSYCHIATRY & NEUROLOGY

## 2021-03-11 PROCEDURE — 99215 OFFICE O/P EST HI 40 MIN: CPT | Performed by: PSYCHIATRY & NEUROLOGY

## 2021-03-11 RX ORDER — TOPIRAMATE 50 MG/1
TABLET, FILM COATED ORAL
Qty: 60 TABLET | Refills: 6 | Status: SHIPPED | OUTPATIENT
Start: 2021-03-11

## 2021-03-11 ASSESSMENT — ENCOUNTER SYMPTOMS: NECK PAIN: 1

## 2021-03-11 ASSESSMENT — FIBROSIS 4 INDEX: FIB4 SCORE: 0.37
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
## 2021-03-20 ENCOUNTER — HOSPITAL ENCOUNTER (OUTPATIENT)
Dept: RADIOLOGY | Facility: MEDICAL CENTER | Age: 31
End: 2021-03-20
Attending: OBSTETRICS & GYNECOLOGY
Payer: COMMERCIAL

## 2021-03-20 DIAGNOSIS — N94.10 DYSPAREUNIA IN FEMALE: ICD-10-CM

## 2021-03-20 PROCEDURE — 76830 TRANSVAGINAL US NON-OB: CPT

## 2021-03-22 ENCOUNTER — TELEPHONE (OUTPATIENT)
Dept: MEDICAL GROUP | Facility: PHYSICIAN GROUP | Age: 31
End: 2021-03-22

## 2021-04-27 ENCOUNTER — OFFICE VISIT (OUTPATIENT)
Dept: MEDICAL GROUP | Facility: PHYSICIAN GROUP | Age: 31
End: 2021-04-27
Payer: COMMERCIAL

## 2021-04-27 VITALS
TEMPERATURE: 98.4 F | WEIGHT: 262.8 LBS | DIASTOLIC BLOOD PRESSURE: 88 MMHG | RESPIRATION RATE: 14 BRPM | OXYGEN SATURATION: 96 % | SYSTOLIC BLOOD PRESSURE: 128 MMHG | HEART RATE: 74 BPM | HEIGHT: 69 IN | BODY MASS INDEX: 38.92 KG/M2

## 2021-04-27 DIAGNOSIS — I10 ESSENTIAL HYPERTENSION: ICD-10-CM

## 2021-04-27 DIAGNOSIS — F32.5 MAJOR DEPRESSIVE DISORDER WITH SINGLE EPISODE, IN FULL REMISSION (HCC): ICD-10-CM

## 2021-04-27 DIAGNOSIS — J34.89 NOSE PAIN: ICD-10-CM

## 2021-04-27 DIAGNOSIS — G40.909 SEIZURE DISORDER (HCC): ICD-10-CM

## 2021-04-27 DIAGNOSIS — Z86.39 HISTORY OF HYPOTHYROIDISM: ICD-10-CM

## 2021-04-27 DIAGNOSIS — Z13.1 SCREENING FOR DIABETES MELLITUS: ICD-10-CM

## 2021-04-27 DIAGNOSIS — E55.9 VITAMIN D DEFICIENCY: ICD-10-CM

## 2021-04-27 PROBLEM — R23.3 EASY BRUISING: Status: RESOLVED | Noted: 2020-06-25 | Resolved: 2021-04-27

## 2021-04-27 PROCEDURE — 99214 OFFICE O/P EST MOD 30 MIN: CPT | Performed by: NURSE PRACTITIONER

## 2021-04-27 ASSESSMENT — FIBROSIS 4 INDEX: FIB4 SCORE: 0.37

## 2021-04-27 NOTE — ASSESSMENT & PLAN NOTE
Patient reports 2-week onset of pain in bilateral nares.  Scabbing, tender and swollen.  Vaseline helps somewhat, but has to apply multiple times a day.  Does have some rhinorrhea and wonders if it is related to her allergies, which typically get worse in the spring for her

## 2021-04-27 NOTE — ASSESSMENT & PLAN NOTE
Chronic health problem, improved control.  Taking verapamil  mg daily and lisinopril 40 mg daily.  Due for follow-up with Dr. Rashid.  Patient has been unable to schedule appointment as she has not gotten a return call.  The patient denies chest pain, shortness of breath, headache, vision changes, epistaxis, or dyspnea on exertion.

## 2021-04-27 NOTE — ASSESSMENT & PLAN NOTE
This is a chronic health problem that is well controlled with current medications and lifestyle measures.  Taking venlafaxine XR 75 mg daily.  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  Also reports she is eating better, walking for exercise, taking vitamin supplements.  Denies SI/HI

## 2021-04-27 NOTE — ASSESSMENT & PLAN NOTE
Chronic health problem.  Last seizure in January 2021.  Now managed by neurology, Dr. Arndt.  Patient now taking topiramate 50 mg twice a day.  She is due for 72-hour EEG prior to her follow-up appointment with neurology in a month.

## 2021-05-04 ENCOUNTER — TELEPHONE (OUTPATIENT)
Dept: VASCULAR LAB | Facility: MEDICAL CENTER | Age: 31
End: 2021-05-04

## 2021-05-04 NOTE — TELEPHONE ENCOUNTER
Per Dr. Bloch - Pls schedule f/u appt (20 min) with lupillo.    Visit Type: FOLL-UP RESISTANT HYPERTENSION

## 2021-05-10 ENCOUNTER — TELEPHONE (OUTPATIENT)
Dept: VASCULAR LAB | Facility: MEDICAL CENTER | Age: 31
End: 2021-05-10

## 2022-02-09 NOTE — ASSESSMENT & PLAN NOTE
Chronic health problem.  Has had 1 ER visit since last appointment for hypertension caused by headache or headache caused by hypertension.  Continues with verapamil ER, lisinopril.  Blood pressure today is well controlled.  Patient is now scheduling work-up that was ordered by neurology, Dr. Arndt.  She will call for appointment with marychuy Medina for follow-up.   [Patient] : patient [Mother] : mother

## 2023-02-20 NOTE — PROGRESS NOTES
1900 Received report from day RN tyree. Pt seen ambulating hallway steadily, family members at bedside.  Reports having a headache, medicated per MAR.     Pt updated about hourly blood glucose checks. Pt aware and understands plan of care.    Will contact Dr. Ayala [phone] when values are nearing 50-60s for blood works, and will update hospitalist    On tele, sinus rhythm sinus tachy.    Maintained on NPO w/ ice chips, IVF running at 50cc/hr    Will continue to monitor.     SOB
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
## 2023-09-29 NOTE — ASSESSMENT & PLAN NOTE
Patient is a 28-year-old female with history of hypertension, she has lost over 100 pounds in the last year, she is no longer taking any antihypertensives and her blood pressure in the office today is 128/78.  She will no longer be taking daily antihypertensives, will continue to monitor her blood pressure.   No

## 2024-11-05 NOTE — DISCHARGE INSTRUCTIONS
Discharge Instructions    Discharged to home by car with relative. Discharged via wheelchair, hospital escort: Yes.  Special equipment needed: Not Applicable    Be sure to schedule a follow-up appointment with your primary care doctor or any specialists as instructed.     Discharge Plan:   Diet Plan: Discussed  Activity Level: Discussed  Confirmed Follow up Appointment: Appointment Scheduled  Confirmed Symptoms Management: Discussed  Medication Reconciliation Updated: Yes  Influenza Vaccine Indication: Patient Refuses    I understand that a diet low in cholesterol, fat, and sodium is recommended for good health. Unless I have been given specific instructions below for another diet, I accept this instruction as my diet prescription.   Other diet: NOne    Special Instructions: None    · Is patient discharged on Warfarin / Coumadin?   No     · Is patient Post Blood Transfusion?  No    Depression / Suicide Risk    As you are discharged from this [facility] facility, it is important to learn how to keep safe from harming yourself.    Recognize the warning signs:  · Abrupt changes in personality, positive or negative- including increase in energy   · Giving away possessions  · Change in eating patterns- significant weight changes-  positive or negative  · Change in sleeping patterns- unable to sleep or sleeping all the time   · Unwillingness or inability to communicate  · Depression  · Unusual sadness, discouragement and loneliness  · Talk of wanting to die  · Neglect of personal appearance   · Rebelliousness- reckless behavior  · Withdrawal from people/activities they love  · Confusion- inability to concentrate     If you or a loved one observes any of these behaviors or has concerns about self-harm, here's what you can do:  · Talk about it- your feelings and reasons for harming yourself  · Remove any means that you might use to hurt yourself (examples: pills, rope, extension cords, firearm)  · Get professional help  from the community (Mental Health, Substance Abuse, psychological counseling)  · Do not be alone:Call your Safe Contact- someone whom you trust who will be there for you.  · Call your local CRISIS HOTLINE 939-2908 or 952-988-1451  · Call your local Children's Mobile Crisis Response Team [place] (671) 980-1547 or www.[employer]  · Call the toll free National Suicide Prevention Hotlines   · National Suicide Prevention Lifeline 304-567-EUQC (1016)  · [place] [facility] Network 800-SUICIDE (410-6203)    Hypoglycemia  Hypoglycemia occurs when the glucose in your blood is too low. Glucose is a type of sugar that is your body's main energy source. Hormones, such as insulin and glucagon, control the level of glucose in the blood. Insulin lowers blood glucose and glucagon increases blood glucose. Having too much insulin in your blood stream, or not eating enough food containing sugar, can result in hypoglycemia. Hypoglycemia can happen to people with or without diabetes. It can develop quickly and can be a medical emergency.   CAUSES   · Missing or delaying meals.  · Not eating enough carbohydrates at meals.  · Taking too much diabetes medicine.  · Not timing your oral diabetes medicine or insulin doses with meals, snacks, and exercise.  · Nausea and vomiting.  · Certain medicines.  · Severe illnesses, such as hepatitis, kidney disorders, and certain eating disorders.  · Increased activity or exercise without eating something extra or adjusting medicines.  · Drinking too much alcohol.  · A nerve disorder that affects body functions like your heart rate, blood pressure, and digestion (autonomic neuropathy).  · A condition where the stomach muscles do not function properly (gastroparesis). Therefore, medicines and food may not absorb properly.  · Rarely, a tumor of the pancreas can produce too much insulin.  SYMPTOMS   · Hunger.  · Sweating (diaphoresis).  · Change in body  temperature.  · Shakiness.  · Headache.  · Anxiety.  · Lightheadedness.  · Irritability.  · Difficulty concentrating.  · Dry mouth.  · Tingling or numbness in the hands or feet.  · Restless sleep or sleep disturbances.  · Altered speech and coordination.  · Change in mental status.  · Seizures or prolonged convulsions.  · Combativeness.  · Drowsiness (lethargic).  · Weakness.  · Increased heart rate or palpitations.  · Confusion.  · Pale, gray skin color.  · Blurred or double vision.  · Fainting.  DIAGNOSIS   A physical exam and medical history will be performed. Your caregiver may make a diagnosis based on your symptoms. Blood tests and other lab tests may be performed to confirm a diagnosis. Once the diagnosis is made, your caregiver will see if your signs and symptoms go away once your blood glucose is raised.   TREATMENT   Usually, you can easily treat your hypoglycemia when you notice symptoms.  · Check your blood glucose. If it is less than 70 mg/dl, take one of the following:    ¨ 3-4 glucose tablets.    ¨ ½ cup juice.    ¨ ½ cup regular soda.    ¨ 1 cup skim milk.    ¨ ½-1 tube of glucose gel.    ¨ 5-6 hard candies.    · Avoid high-fat drinks or food that may delay a rise in blood glucose levels.  · Do not take more than the recommended amount of sugary foods, drinks, gel, or tablets. Doing so will cause your blood glucose to go too high.    · Wait 10-15 minutes and recheck your blood glucose. If it is still less than 70 mg/dl or below your target range, repeat treatment.    · Eat a snack if it is more than 1 hour until your next meal.    There may be a time when your blood glucose may go so low that you are unable to treat yourself at home when you start to notice symptoms. You may need someone to help you. You may even faint or be unable to swallow. If you cannot treat yourself, someone will need to bring you to the hospital.   HOME CARE INSTRUCTIONS  · If you have diabetes, follow your diabetes management  plan by:  ¨ Taking your medicines as directed.  ¨ Following your exercise plan.  ¨ Following your meal plan. Do not skip meals. Eat on time.  ¨ Testing your blood glucose regularly. Check your blood glucose before and after exercise. If you exercise longer or different than usual, be sure to check blood glucose more frequently.  ¨ Wearing your medical alert jewelry that says you have diabetes.  · Identify the cause of your hypoglycemia. Then, develop ways to prevent the recurrence of hypoglycemia.  · Do not take a hot bath or shower right after an insulin shot.  · Always carry treatment with you. Glucose tablets are the easiest to carry.  · If you are going to drink alcohol, drink it only with meals.  · Tell friends or family members ways to keep you safe during a seizure. This may include removing hard or sharp objects from the area or turning you on your side.  · Maintain a healthy weight.  SEEK MEDICAL CARE IF:   · You are having problems keeping your blood glucose in your target range.  · You are having frequent episodes of hypoglycemia.  · You feel you might be having side effects from your medicines.  · You are not sure why your blood glucose is dropping so low.  · You notice a change in vision or a new problem with your vision.  SEEK IMMEDIATE MEDICAL CARE IF:   · Confusion develops.  · A change in mental status occurs.  · The inability to swallow develops.  · Fainting occurs.     This information is not intended to replace advice given to you by your health care provider. Make sure you discuss any questions you have with your health care provider.     Document Released: 12/18/2006 Document Revised: 12/23/2014 Document Reviewed: 04/15/2013  [employer] Interactive Patient Education ©2016 [employer]        Migraine Headache  A migraine headache is an intense, throbbing pain on one or both sides of your head. A migraine can last for 30 minutes to several hours.  CAUSES   The exact cause of a migraine headache  is not always known. However, a migraine may be caused when nerves in the brain become irritated and release chemicals that cause inflammation. This causes pain.  Certain things may also trigger migraines, such as:  · Alcohol.  · Smoking.  · Stress.  · Menstruation.  · Aged cheeses.  · Foods or drinks that contain nitrates, glutamate, aspartame, or tyramine.  · Lack of sleep.  · Chocolate.  · Caffeine.  · Hunger.  · Physical exertion.  · Fatigue.  · Medicines used to treat chest pain (nitroglycerine), birth control pills, estrogen, and some blood pressure medicines.  SIGNS AND SYMPTOMS  · Pain on one or both sides of your head.  · Pulsating or throbbing pain.  · Severe pain that prevents daily activities.  · Pain that is aggravated by any physical activity.  · Nausea, vomiting, or both.  · Dizziness.  · Pain with exposure to bright lights, loud noises, or activity.  · General sensitivity to bright lights, loud noises, or smells.  Before you get a migraine, you may get warning signs that a migraine is coming (aura). An aura may include:  · Seeing flashing lights.  · Seeing bright spots, halos, or zigzag lines.  · Having tunnel vision or blurred vision.  · Having feelings of numbness or tingling.  · Having trouble talking.  · Having muscle weakness.  DIAGNOSIS   A migraine headache is often diagnosed based on:  · Symptoms.  · Physical exam.  · A CT scan or MRI of your head. These imaging tests cannot diagnose migraines, but they can help rule out other causes of headaches.  TREATMENT  Medicines may be given for pain and nausea. Medicines can also be given to help prevent recurrent migraines.   HOME CARE INSTRUCTIONS  · Only take over-the-counter or prescription medicines for pain or discomfort as directed by your health care provider. The use of long-term narcotics is not recommended.  · Lie down in a dark, quiet room when you have a migraine.  · Keep a journal to find out what may trigger your migraine headaches. For  example, write down:  ¨ What you eat and drink.  ¨ How much sleep you get.  ¨ Any change to your diet or medicines.  · Limit alcohol consumption.  · Quit smoking if you smoke.  · Get 7-9 hours of sleep, or as recommended by your health care provider.  · Limit stress.  · Keep lights dim if bright lights bother you and make your migraines worse.  SEEK IMMEDIATE MEDICAL CARE IF:   · Your migraine becomes severe.  · You have a fever.  · You have a stiff neck.  · You have vision loss.  · You have muscular weakness or loss of muscle control.  · You start losing your balance or have trouble walking.  · You feel faint or pass out.  · You have severe symptoms that are different from your first symptoms.  MAKE SURE YOU:   · Understand these instructions.  · Will watch your condition.  · Will get help right away if you are not doing well or get worse.     This information is not intended to replace advice given to you by your health care provider. Make sure you discuss any questions you have with your health care provider.     Document Released: 12/18/2006 Document Revised: 01/08/2016 Document Reviewed: 08/25/2014  [employer] Interactive Patient Education ©2016 [employer]    Infection Control in the Home  If you have an infection or you are taking care of someone who has an infection, it is important to know how to keep the infection from spreading. Follow these guidelines to help stop the spread of infection, and talk to your health care provider.  HOW ARE INFECTIONS SPREAD?  In order for an infection to spread, the following must be present:  · A germ. This may be a virus, bacteria, fungus, or parasite.  · A place for the germ to live. This may be:  ¨ On or in a person, animal, plant, or food.  ¨ In soil or water.  ¨ On surfaces, such as a door handle.  · A susceptible host. This is a person or animal who does not have resistance (immunity) to the germ.  · A way for the germ to enter the host. This may occur by:  ¨ Direct  contact. This may happen by making contact--such as shaking hands or hugging--with an infected person or animal. Some germs can also travel through the air and spread to you if an infected person coughs or sneezes on you or near you.  ¨ Indirect contact. This is when the germ enters the host through contact with an infected object. Examples include eating contaminated food, drinking contaminated water, or touching a contaminated surface with your hands and then touching your face, nose, or mouth soon after that.  HOW CAN I HELP TO PREVENT INFECTION FROM SPREADING?  There are several things that you can do to help prevent infection from spreading.  Hand Washing  It is very important to wash your hands correctly, following these steps:  2. Wet your hands with clean, running water.  3. Apply soap to your hands. Liquid soap is better than bar soap.  4. Rub your hands together quickly to create lather.  5. Keep rubbing your hands together for at least 20 seconds. Thoroughly scrub all parts of your hands, including under your fingernails and between your fingers.  6. Rinse your hands with clean, running water until all of the soap is gone.  7. Dry your hands with an air dryer or a clean paper or cloth towel, or let your hands air-dry. Do not use your clothing or a soiled towel to dry your hands.  8. If you are in a public restroom, use your towel to turn off the water faucet and to open the bathroom door.  Make sure to wash your hands:  · Before:  ¨ Visiting a baby or anyone with a weakened or lowered defense (immune) system.  ¨ Putting in and taking out any contact lenses.  · After:  ¨ Working or playing outside.  ¨ Touching an animal or its toys or leash.  ¨ Handling livestock.  ¨ Using the bathroom or helping a child or adult to use the bathroom.  ¨ Using household  or toxic chemicals.  ¨ Touching or taking out the garbage.  ¨ Touching anything dirty around your home.  ¨ Handling soiled clothes or  rags.  ¨ Taking care of a sick child. This includes touching used tissues, toys, and clothes.  ¨ Sneezing, coughing, or blowing your nose.  ¨ Using public transportation.  ¨ Shaking hands.  ¨ Using a phone, including your mobile phone.  ¨ Touching money.  · Before and after:  ¨ Preparing food.  ¨ Preparing a bottle for a baby.  ¨ Feeding a baby or a young child.  ¨ Eating.  ¨ Visiting or taking care of someone who is sick.  ¨ Changing a diaper.  ¨ Changing a bandage (dressing) or taking care of an injury or wound.  ¨ Giving or taking medicine.  Taking Care of Your Home  · Make sure that you have enough cleaning supplies at all times. These include:  ¨ Disinfectants.  ¨ Reusable cleaning cloths. Wash these after each use.  ¨ Paper towels.  ¨ Utility gloves. Replace your gloves if they are cracked or torn or if they start to peel.  · Use bleach safely. Never mix it with other cleaning products, especially those that contain ammonia. This mixture can create a dangerous gas that may be deadly.  · Take care of your cleaning supplies. Toilet brushes, mops, and sponges can breed germs. Soak them in bleach and water for 5 minutes after each use.  · Do not pour used mop water down the sink. Pour it down the toilet instead.  · Maintain proper ventilation in your home.  · If you have a pet, ensure that your pet stays clean. Do not let people with weak immune systems touch bird droppings, fish tank water, or a litter box.  ¨ If you have a cat, be sure to change the litter every day.  · In the bathroom, make sure you:  ¨ Provide liquid soap.  ¨ Change towels and washcloths frequently. Avoid sharing towels and washcloths.  ¨ Change toothbrushes often and store them separately in a clean, dry place.  ¨ Disinfect the toilet.  ¨ Clean the tub, shower, and sink with standard cleaning products.    ¨ Mop the floor with a standard .  ¨ Do not share personal items, such as razors, toothbrushes, drinking glasses, deodorant, mckinney,  brushes, towels, and washcloths.    · In the kitchen, make sure you:  ¨ Store food carefully.  ¨ Refrigerate leftovers promptly in covered containers.  ¨ Throw out stale or spoiled food.  ¨ Clean the inside of your refrigerator each week.  ¨ Keep your refrigerator set at 40°F (4°C) or less, and set your freezer at 0°F (-18°C) or less.  ¨ Thaw foods in the refrigerator or microwave, not at room temperature.  ¨ Serve foods at the proper temperature. Do not eat raw meat. Make sure it is cooked to the appropriate temperature. Cook eggs until they are firm.  ¨ Wash fruits and vegetables under running water.  ¨ Use separate cutting boards, plates, and utensils for raw foods and cooked foods.  ¨ Keep work surfaces clean.  ¨ Use a clean spoon each time you sample food while cooking.  ¨ Wash your dishes in hot, soapy water. Air-dry your dishes or use a .  ¨ Do not share forks, cups, or spoons during meals.  · Wear gloves if laundry is visibly soiled.  · Change linens each week or whenever they are soiled.  · Do not shake soiled linens. Doing that may send germs into the air. Put dressings, sanitary or incontinence pads, diapers, and gloves in plastic garbage bags for disposal.     This information is not intended to replace advice given to you by your health care provider. Make sure you discuss any questions you have with your health care provider.     Document Released: 09/26/2009 Document Revised: 01/08/2016 Document Reviewed: 08/20/2015  [employer] Interactive Patient Education ©2016 [employer]     Normal for race

## (undated) DEVICE — NEPTUNE 4 PORT MANIFOLD - (20/PK)

## (undated) DEVICE — NEEDLE EUS DELIVERY SYSTEM FNB PRE LOADED 22 GA

## (undated) DEVICE — SENSOR SPO2 ADULT LNCS ADTX (20/BX) ORDER ITEM #19593

## (undated) DEVICE — TUBE E-T HI-LO CUFF 7.0MM (10EA/PK)

## (undated) DEVICE — ELECTRODE 850 FOAM ADHESIVE - HYDROGEL RADIOTRNSPRNT (50/PK)

## (undated) DEVICE — FORCEP RADIAL JAW 4 STANDARD CAPACITY W/NEEDLE 240CM (40EA/BX)

## (undated) DEVICE — TUBING CLEARLINK DUO-VENT - C-FLO (48EA/CA)

## (undated) DEVICE — CATHETER IV SAFETY 24 GA X 3/4 (50EA/BX)

## (undated) DEVICE — SET EXTENSION WITH 2 PORTS (48EA/CA) ***PART #2C8610 IS A SUBSTITUTE*****

## (undated) DEVICE — MASK ANESTHESIA ADULT  - (100/CA)

## (undated) DEVICE — NEEDLE EUS DELIVERY SYSTEM FNB PRE LOADED 19 GA

## (undated) DEVICE — KIT ANESTHESIA W/CIRCUIT & 3/LT BAG W/FILTER (20EA/CA)